# Patient Record
Sex: FEMALE | Race: WHITE | NOT HISPANIC OR LATINO | Employment: UNEMPLOYED | URBAN - METROPOLITAN AREA
[De-identification: names, ages, dates, MRNs, and addresses within clinical notes are randomized per-mention and may not be internally consistent; named-entity substitution may affect disease eponyms.]

---

## 2017-01-06 LAB
EXTERNAL CHLAMYDIA SCREEN: NEGATIVE
EXTERNAL GONORRHEA SCREEN: NEGATIVE
EXTERNAL GTT 1 HOUR: 174
EXTERNAL HEPATITIS B SURFACE ANTIGEN: NEGATIVE
EXTERNAL HEPATITIS C AB: NORMAL
EXTERNAL RUBELLA QUALITATIVE: NORMAL
EXTERNAL SYPHILIS RPR SCREEN: NORMAL
EXTERNAL URINE DRUG SCREEN: NORMAL

## 2017-01-09 ENCOUNTER — LAB (OUTPATIENT)
Dept: LAB | Facility: HOSPITAL | Age: 32
End: 2017-01-09

## 2017-01-09 DIAGNOSIS — Z34.01 ENCOUNTER FOR SUPERVISION OF NORMAL FIRST PREGNANCY IN FIRST TRIMESTER: ICD-10-CM

## 2017-01-09 DIAGNOSIS — Z34.81 PRENATAL CARE, SUBSEQUENT PREGNANCY, FIRST TRIMESTER: Primary | ICD-10-CM

## 2017-01-09 LAB
ABO GROUP BLD: NORMAL
AMPHET+METHAMPHET UR QL: NEGATIVE
AMPHETAMINES UR QL: NEGATIVE
BACTERIA UR QL AUTO: ABNORMAL /HPF
BARBITURATES UR QL SCN: NEGATIVE
BASOPHILS # BLD AUTO: 0.04 10*3/MM3 (ref 0–0.2)
BASOPHILS NFR BLD AUTO: 0.3 % (ref 0–1)
BENZODIAZ UR QL SCN: NEGATIVE
BILIRUB UR QL STRIP: NEGATIVE
BLD GP AB SCN SERPL QL: NEGATIVE
BUPRENORPHINE SERPL-MCNC: NEGATIVE NG/ML
CANNABINOIDS SERPL QL: NEGATIVE
CLARITY UR: CLEAR
COCAINE UR QL: NEGATIVE
COLOR UR: YELLOW
DEPRECATED RDW RBC AUTO: 40 FL (ref 37–54)
EOSINOPHIL # BLD AUTO: 0.22 10*3/MM3 (ref 0.1–0.3)
EOSINOPHIL NFR BLD AUTO: 1.9 % (ref 0–3)
ERYTHROCYTE [DISTWIDTH] IN BLOOD BY AUTOMATED COUNT: 12.4 % (ref 11.3–14.5)
GLUCOSE BLD-MCNC: 87 MG/DL (ref 70–100)
GLUCOSE UR STRIP-MCNC: NEGATIVE MG/DL
HBV SURFACE AG SERPL QL IA: NORMAL
HCT VFR BLD AUTO: 41.5 % (ref 34.5–44)
HCV AB SER DONR QL: NORMAL
HGB BLD-MCNC: 13.5 G/DL (ref 11.5–15.5)
HGB UR QL STRIP.AUTO: ABNORMAL
HIV1+2 AB SER QL: NORMAL
HYALINE CASTS UR QL AUTO: ABNORMAL /LPF
IMM GRANULOCYTES # BLD: 0.03 10*3/MM3 (ref 0–0.03)
IMM GRANULOCYTES NFR BLD: 0.3 % (ref 0–0.6)
KETONES UR QL STRIP: NEGATIVE
LEUKOCYTE ESTERASE UR QL STRIP.AUTO: NEGATIVE
LYMPHOCYTES # BLD AUTO: 3.05 10*3/MM3 (ref 0.6–4.8)
LYMPHOCYTES NFR BLD AUTO: 25.7 % (ref 24–44)
MCH RBC QN AUTO: 28.5 PG (ref 27–31)
MCHC RBC AUTO-ENTMCNC: 32.5 G/DL (ref 32–36)
MCV RBC AUTO: 87.7 FL (ref 80–99)
METHADONE UR QL SCN: NEGATIVE
MONOCYTES # BLD AUTO: 0.85 10*3/MM3 (ref 0–1)
MONOCYTES NFR BLD AUTO: 7.2 % (ref 0–12)
NEUTROPHILS # BLD AUTO: 7.67 10*3/MM3 (ref 1.5–8.3)
NEUTROPHILS NFR BLD AUTO: 64.6 % (ref 41–71)
NITRITE UR QL STRIP: NEGATIVE
OPIATES UR QL: NEGATIVE
OXYCODONE UR QL SCN: NEGATIVE
PCP UR QL SCN: NEGATIVE
PH UR STRIP.AUTO: 5.5 [PH] (ref 5–8)
PLATELET # BLD AUTO: 307 10*3/MM3 (ref 150–450)
PMV BLD AUTO: 9.5 FL (ref 6–12)
PROPOXYPH UR QL: NEGATIVE
PROT UR QL STRIP: NEGATIVE
RBC # BLD AUTO: 4.73 10*6/MM3 (ref 3.89–5.14)
RBC # UR: ABNORMAL /HPF
REF LAB TEST METHOD: ABNORMAL
RH BLD: POSITIVE
RUBV IGG SER QL: NORMAL
RUBV IGG SER-ACNC: 28.6 IU/ML
SP GR UR STRIP: 1.01 (ref 1–1.03)
SQUAMOUS #/AREA URNS HPF: ABNORMAL /HPF
TRICYCLICS UR QL SCN: NEGATIVE
UROBILINOGEN UR QL STRIP: ABNORMAL
WBC NRBC COR # BLD: 11.86 10*3/MM3 (ref 3.5–10.8)
WBC UR QL AUTO: ABNORMAL /HPF

## 2017-01-09 PROCEDURE — 86850 RBC ANTIBODY SCREEN: CPT

## 2017-01-09 PROCEDURE — 85025 COMPLETE CBC W/AUTO DIFF WBC: CPT | Performed by: ADVANCED PRACTICE MIDWIFE

## 2017-01-09 PROCEDURE — G0432 EIA HIV-1/HIV-2 SCREEN: HCPCS | Performed by: ADVANCED PRACTICE MIDWIFE

## 2017-01-09 PROCEDURE — 86803 HEPATITIS C AB TEST: CPT | Performed by: ADVANCED PRACTICE MIDWIFE

## 2017-01-09 PROCEDURE — 86900 BLOOD TYPING SEROLOGIC ABO: CPT

## 2017-01-09 PROCEDURE — 36415 COLL VENOUS BLD VENIPUNCTURE: CPT

## 2017-01-09 PROCEDURE — 82947 ASSAY GLUCOSE BLOOD QUANT: CPT | Performed by: ADVANCED PRACTICE MIDWIFE

## 2017-01-09 PROCEDURE — 80306 DRUG TEST PRSMV INSTRMNT: CPT | Performed by: ADVANCED PRACTICE MIDWIFE

## 2017-01-09 PROCEDURE — 81001 URINALYSIS AUTO W/SCOPE: CPT | Performed by: ADVANCED PRACTICE MIDWIFE

## 2017-01-09 PROCEDURE — 86592 SYPHILIS TEST NON-TREP QUAL: CPT | Performed by: ADVANCED PRACTICE MIDWIFE

## 2017-01-09 PROCEDURE — 87340 HEPATITIS B SURFACE AG IA: CPT | Performed by: ADVANCED PRACTICE MIDWIFE

## 2017-01-09 PROCEDURE — 86901 BLOOD TYPING SEROLOGIC RH(D): CPT

## 2017-01-11 LAB — RPR SER QL: NORMAL

## 2017-02-15 ENCOUNTER — APPOINTMENT (OUTPATIENT)
Dept: LAB | Facility: HOSPITAL | Age: 32
End: 2017-02-15

## 2017-02-15 ENCOUNTER — TRANSCRIBE ORDERS (OUTPATIENT)
Dept: LAB | Facility: HOSPITAL | Age: 32
End: 2017-02-15

## 2017-02-15 DIAGNOSIS — Z87.68 PERSONAL HISTORY OF PERINATAL PROBLEMS: Primary | ICD-10-CM

## 2017-02-15 DIAGNOSIS — R53.81 DEBILITY, UNSPECIFIED: ICD-10-CM

## 2017-02-15 LAB
FLUAV AG NPH QL: POSITIVE
FLUBV AG NPH QL IA: NEGATIVE
S PYO AG THROAT QL: NEGATIVE

## 2017-02-15 PROCEDURE — 87081 CULTURE SCREEN ONLY: CPT | Performed by: NURSE PRACTITIONER

## 2017-02-15 PROCEDURE — 87880 STREP A ASSAY W/OPTIC: CPT | Performed by: NURSE PRACTITIONER

## 2017-02-15 PROCEDURE — 87804 INFLUENZA ASSAY W/OPTIC: CPT | Performed by: NURSE PRACTITIONER

## 2017-02-17 LAB — BACTERIA SPEC AEROBE CULT: NORMAL

## 2017-06-05 ENCOUNTER — LAB (OUTPATIENT)
Dept: LAB | Facility: HOSPITAL | Age: 32
End: 2017-06-05

## 2017-06-05 ENCOUNTER — TRANSCRIBE ORDERS (OUTPATIENT)
Dept: LAB | Facility: HOSPITAL | Age: 32
End: 2017-06-05

## 2017-06-05 DIAGNOSIS — Z3A.28 28 WEEKS GESTATION OF PREGNANCY: ICD-10-CM

## 2017-06-05 DIAGNOSIS — Z34.83 PRENATAL CARE, SUBSEQUENT PREGNANCY, THIRD TRIMESTER: ICD-10-CM

## 2017-06-05 DIAGNOSIS — Z3A.28 28 WEEKS GESTATION OF PREGNANCY: Primary | ICD-10-CM

## 2017-06-05 LAB
BLD GP AB SCN SERPL QL: NEGATIVE
DEPRECATED RDW RBC AUTO: 42.1 FL (ref 37–54)
ERYTHROCYTE [DISTWIDTH] IN BLOOD BY AUTOMATED COUNT: 12.9 % (ref 11.3–14.5)
GLUCOSE 1H P 100 G GLC PO SERPL-MCNC: 174 MG/DL (ref 65–199)
HCT VFR BLD AUTO: 38.1 % (ref 34.5–44)
HGB BLD-MCNC: 12.4 G/DL (ref 11.5–15.5)
MCH RBC QN AUTO: 29.5 PG (ref 27–31)
MCHC RBC AUTO-ENTMCNC: 32.5 G/DL (ref 32–36)
MCV RBC AUTO: 90.5 FL (ref 80–99)
PLATELET # BLD AUTO: 248 10*3/MM3 (ref 150–450)
PMV BLD AUTO: 9.8 FL (ref 6–12)
RBC # BLD AUTO: 4.21 10*6/MM3 (ref 3.89–5.14)
WBC NRBC COR # BLD: 10.43 10*3/MM3 (ref 3.5–10.8)

## 2017-06-05 PROCEDURE — 82950 GLUCOSE TEST: CPT | Performed by: NURSE PRACTITIONER

## 2017-06-05 PROCEDURE — 86850 RBC ANTIBODY SCREEN: CPT | Performed by: NURSE PRACTITIONER

## 2017-06-05 PROCEDURE — 85027 COMPLETE CBC AUTOMATED: CPT | Performed by: NURSE PRACTITIONER

## 2017-06-05 PROCEDURE — 36415 COLL VENOUS BLD VENIPUNCTURE: CPT | Performed by: NURSE PRACTITIONER

## 2017-06-09 ENCOUNTER — LAB (OUTPATIENT)
Dept: LAB | Facility: HOSPITAL | Age: 32
End: 2017-06-09

## 2017-06-09 ENCOUNTER — TRANSCRIBE ORDERS (OUTPATIENT)
Dept: LAB | Facility: HOSPITAL | Age: 32
End: 2017-06-09

## 2017-06-09 DIAGNOSIS — Z3A.28 28 WEEKS GESTATION OF PREGNANCY: ICD-10-CM

## 2017-06-09 DIAGNOSIS — Z3A.28 28 WEEKS GESTATION OF PREGNANCY: Primary | ICD-10-CM

## 2017-06-09 LAB
GLUCOSE 1H P 100 G GLC PO SERPL-MCNC: 215 MG/DL (ref 65–199)
GLUCOSE 2H P 100 G GLC PO SERPL-MCNC: 192 MG/DL (ref 65–139)
GLUCOSE 3H P 100 G GLC PO SERPL-MCNC: 89 MG/DL (ref 65–109)
GLUCOSE BLDC-MCNC: 92 MG/DL (ref 75–125)
GLUCOSE P FAST SERPL-MCNC: 93 MG/DL (ref 65–99)

## 2017-06-09 PROCEDURE — 82962 GLUCOSE BLOOD TEST: CPT | Performed by: NURSE PRACTITIONER

## 2017-06-09 PROCEDURE — 36415 COLL VENOUS BLD VENIPUNCTURE: CPT

## 2017-06-09 PROCEDURE — 82951 GLUCOSE TOLERANCE TEST (GTT): CPT | Performed by: NURSE PRACTITIONER

## 2017-06-09 PROCEDURE — 82952 GTT-ADDED SAMPLES: CPT | Performed by: NURSE PRACTITIONER

## 2017-06-12 ENCOUNTER — TRANSCRIBE ORDERS (OUTPATIENT)
Dept: ENDOCRINOLOGY | Facility: CLINIC | Age: 32
End: 2017-06-12

## 2017-06-12 DIAGNOSIS — O24.419 GESTATIONAL DIABETES MELLITUS IN PREGNANCY, UNSPECIFIED CONTROL: Primary | ICD-10-CM

## 2017-06-19 ENCOUNTER — CLINICAL SUPPORT (OUTPATIENT)
Dept: INTERNAL MEDICINE | Facility: CLINIC | Age: 32
End: 2017-06-19

## 2017-06-19 VITALS
DIASTOLIC BLOOD PRESSURE: 60 MMHG | WEIGHT: 220 LBS | SYSTOLIC BLOOD PRESSURE: 118 MMHG | HEIGHT: 63 IN | BODY MASS INDEX: 38.98 KG/M2

## 2017-06-19 DIAGNOSIS — O24.410 DIET CONTROLLED GESTATIONAL DIABETES MELLITUS (GDM), ANTEPARTUM: Primary | ICD-10-CM

## 2017-06-19 PROBLEM — O24.419 GESTATIONAL DIABETES: Status: ACTIVE | Noted: 2017-06-19

## 2017-06-19 RX ORDER — CHLORPHENIR/PHENYLEPH/ASPIRIN 2-7.8-325
1 TABLET, EFFERVESCENT ORAL DAILY
Qty: 50 STRIP | Refills: 2 | Status: SHIPPED | OUTPATIENT
Start: 2017-06-19 | End: 2017-08-31 | Stop reason: HOSPADM

## 2017-06-19 RX ORDER — BLOOD-GLUCOSE METER
EACH MISCELLANEOUS
Qty: 1 EACH | Refills: 0 | Status: SHIPPED | OUTPATIENT
Start: 2017-06-19 | End: 2017-12-20

## 2017-06-19 RX ORDER — LANCETS 28 GAUGE
EACH MISCELLANEOUS
Qty: 100 EACH | Refills: 5 | Status: SHIPPED | OUTPATIENT
Start: 2017-06-19 | End: 2017-06-19

## 2017-06-19 RX ORDER — LANCETS 33 GAUGE
EACH MISCELLANEOUS
Qty: 100 EACH | Refills: 5 | Status: SHIPPED | OUTPATIENT
Start: 2017-06-19 | End: 2017-12-20

## 2017-06-19 NOTE — PROGRESS NOTES
"Angelika Vick 31 y.o.  CC:Establish Care (new patient being seen for diabetes education at the request of Samanta Lockett CNM for Gestational Diabetes, ESTEFANI 8-)      [facility]: This is the second pregnancy for this patient , birth weight of first baby was 7 lbs, 8 oz and she did have hypertension with the pregnancy but no history of gestational diabetes  No Known Allergies    Current Outpatient Prescriptions:   •  Acetone, Urine, Test (KETONE TEST) strip, 1 strip by In Vitro route Daily., Disp: 50 strip, Rfl: 2  •  glucose blood (FREESTYLE LITE) test strip, Test blood sugars QID, Disp: 100 each, Rfl: 5  •  Lancets (FREESTYLE) lancets, Test blood sugars QID, Disp: 100 each, Rfl: 5  •  Prenatal Vit-Fe Fumarate-FA (PRENATAL, CLASSIC, VITAMIN) 28-0.8 MG tablet tablet, Take  by mouth Daily., Disp: , Rfl:   There are no active problems to display for this patient.    Review of Systems  Social History     Social History   • Marital status: Single     Spouse name: N/A   • Number of children: N/A   • Years of education: N/A     Occupational History   • Not on file.     Social History Main Topics   • Smoking status: Former Smoker   • Smokeless tobacco: Never Used   • Alcohol use No      Comment: drinks wine when not pregnant   • Drug use: No   • Sexual activity: Yes     Other Topics Concern   • Not on file     Social History Narrative   • No narrative on file     Family History   Problem Relation Age of Onset   • Arthritis Mother    • Hypertension Mother    • Thyroid disease Sister    • Diabetes Sister    • Diabetes Maternal Grandfather      /60  Ht 63\" (160 cm)  Wt 220 lb (99.8 kg)  LMP 11/01/2016 (Approximate)  BMI 38.97 kg/m2  Physical Exam  Results for orders placed or performed in visit on 06/09/17   Glucose, Fingerstick   Result Value Ref Range    Glucose 92 75 - 125 mg/dL   GTT Fasting   Result Value Ref Range    Glucose, GTT - Fasting 93 65 - 99 mg/dL   GTT 1 Hour   Result Value Ref Range    Glucose, GTT " - 1 Hour 215 (H) 65 - 199 mg/dL   GTT 2 Hour   Result Value Ref Range    Glucose, GTT - 2 Hour 192 (H) 65 - 139 mg/dL   GTT 3 Hour   Result Value Ref Range    Glucose, GTT - 3 Hour 89 65 - 109 mg/dL     The patient was instructed in general diabetes education including finger stick glucose testing QID and urine ketone testing Q am.  She was advised to email the readings in weekly.  She was also instructed in the recommended amounts of carbohydrates and to avoid sweets including regular soda, sweet tea and chocolate milk and cereal.    Patient will be scheduled to see Dr Gómez within the next week  Patient voiced understanding to all the above and she was advised to call if any questions or concerns          There are no diagnoses linked to this encounter.  Maggy Merida MA  Signed Iveth Gómez MD

## 2017-06-19 NOTE — TELEPHONE ENCOUNTER
We were notified by Mayela the Freestyle Lite system is not covered by insurance so we changed it to one touch ultra meter, strips and delica lancets

## 2017-06-23 ENCOUNTER — OFFICE VISIT (OUTPATIENT)
Dept: ENDOCRINOLOGY | Facility: CLINIC | Age: 32
End: 2017-06-23

## 2017-06-23 VITALS
DIASTOLIC BLOOD PRESSURE: 60 MMHG | BODY MASS INDEX: 38.98 KG/M2 | WEIGHT: 220 LBS | HEIGHT: 63 IN | SYSTOLIC BLOOD PRESSURE: 108 MMHG | HEART RATE: 83 BPM | OXYGEN SATURATION: 99 %

## 2017-06-23 DIAGNOSIS — O24.410 DIET CONTROLLED GESTATIONAL DIABETES MELLITUS (GDM), ANTEPARTUM: Primary | ICD-10-CM

## 2017-06-23 PROCEDURE — 99243 OFF/OP CNSLTJ NEW/EST LOW 30: CPT | Performed by: INTERNAL MEDICINE

## 2017-06-23 NOTE — ASSESSMENT & PLAN NOTE
She will begin testing sugars fasting and 2 hours after meals and will fax blood sugars weekly to [email or web address].  We will plan to see her back in 3-4 weeks, or sooner if problems or questions.  Thank you for this referral and please do not hesitate to call for any problems or questions.

## 2017-06-23 NOTE — PROGRESS NOTES
Angelika Vick 31 y.o.  CC:Establish Care (New patient being seen at the request of Samanta Lockett CNM for a consultation regarding gestational diabetes, ESTEFANI 2017)    [place]: Establish Care (New patient being seen at the request of Samanta Lockett CNM for a consultation regarding gestational diabetes, ESTEFANI 2017)  is - has 4 year old 7 lb 8 oz at 37.5 weeks   Sister and grandfather have II diabetes mellitus- sister on insulin   She is 30 weeks pregnant  We discussed the diagnosis of gestational diabetes and reviewed her glucose levels/ glucose tolerance test.  We discussed the concept of carbohydrate awaredness and carbohydrate content of meals was discussed.  Impact of sweets and other carb containing foods and types of foods typically high in carbohydrates was discussed. Overall guidelines for meal planning related to specific carbohydrate content of meals was discussed and she was provided recommendations about carbohydrates recommended with meals and with snacks.  She was asked to give up any sugared drinks, and avoid breakfast cereal.  Blood sugar testing fasting and after each meal was reviewed and the patient was taught to use a glucometer to test her blood sugar.  Normal values for blood sugar monitoring were discussed as well, with fasting level less than 95, 1 hour pp blood sugar less than 140 and 2 hour blood sugar less than 120 recommended.  Risks related to poor control of blood sugars during pregnancy were discussed with a focus on specific risks to both her and the baby.  Risks discussed include macrosomia (large birthweight), fetal lung immaturity with respiratory distress and low oxygen level, stillbirth, low blood sugar after delivery, high bilirubin/jaundice, and hypocalcemia.  Use of medications during pregnancy (eg metformin, glyburide and insulin) was discussed.  Her long term risks (outside of pregnancy) for development of Diabetes Mellitus were reviewed and we discussed the  importance of healthy lifestyle now and in the future to help reduce the risk of progression to diabetes.      No Known Allergies    Current Outpatient Prescriptions:   •  Acetone, Urine, Test (KETONE TEST) strip, 1 strip by In Vitro route Daily., Disp: 50 strip, Rfl: 2  •  Blood Glucose Monitoring Suppl (ONE TOUCH ULTRA 2) W/DEVICE kit, Use daily to test blood sugars, Disp: 1 each, Rfl: 0  •  glucose blood (ONE TOUCH ULTRA TEST) test strip, Test blood sugars QID, Disp: 100 each, Rfl: 5  •  ONETOUCH DELICA LANCETS 33G misc, Test blood sugars QID, Disp: 100 each, Rfl: 5  •  Prenatal Vit-Fe Fumarate-FA (PRENATAL, CLASSIC, VITAMIN) 28-0.8 MG tablet tablet, Take  by mouth Daily., Disp: , Rfl:   Patient Active Problem List    Diagnosis   • Gestational diabetes [O24.419]     Review of Systems   Constitutional: Negative for activity change, appetite change, chills, diaphoresis, fatigue, fever and unexpected weight change.   HENT: Negative for congestion, dental problem, drooling, ear discharge, ear pain, facial swelling, hearing loss, mouth sores, nosebleeds, postnasal drip, rhinorrhea, sinus pressure, sneezing, sore throat, tinnitus, trouble swallowing and voice change.    Eyes: Negative for photophobia, pain, discharge, redness, itching and visual disturbance.   Respiratory: Negative for apnea, cough, choking, chest tightness, shortness of breath, wheezing and stridor.    Cardiovascular: Negative for chest pain, palpitations and leg swelling.   Gastrointestinal: Negative for abdominal distention, abdominal pain, anal bleeding, blood in stool, constipation, diarrhea, nausea, rectal pain and vomiting.   Endocrine: Negative for cold intolerance, heat intolerance, polydipsia, polyphagia and polyuria.   Genitourinary: Negative for decreased urine volume, difficulty urinating, dysuria, enuresis, flank pain, frequency, genital sores, hematuria and urgency.   Musculoskeletal: Negative for arthralgias, back pain, gait problem,  "joint swelling, myalgias, neck pain and neck stiffness.   Skin: Negative for color change, pallor, rash and wound.   Allergic/Immunologic: Negative for environmental allergies, food allergies and immunocompromised state.   Neurological: Negative for dizziness, tremors, seizures, syncope, facial asymmetry, speech difficulty, weakness, light-headedness, numbness and headaches.   Hematological: Negative for adenopathy. Does not bruise/bleed easily.   Psychiatric/Behavioral: Negative for agitation, behavioral problems, confusion, decreased concentration, dysphoric mood, hallucinations, self-injury, sleep disturbance and suicidal ideas. The patient is not nervous/anxious and is not hyperactive.      Social History     Social History   • Marital status: Single     Spouse name: N/A   • Number of children: N/A   • Years of education: N/A     Occupational History   • Not on file.     Social History Main Topics   • Smoking status: Former Smoker   • Smokeless tobacco: Never Used   • Alcohol use No      Comment: drinks wine when not pregnant   • Drug use: No   • Sexual activity: Yes     Other Topics Concern   • Not on file     Social History Narrative     Family History   Problem Relation Age of Onset   • Arthritis Mother    • Hypertension Mother    • Thyroid disease Sister    • Diabetes Sister    • Diabetes Maternal Grandfather      /60  Pulse 83  Ht 63\" (160 cm)  Wt 220 lb (99.8 kg)  LMP 11/01/2016 (Approximate)  SpO2 99%  BMI 38.97 kg/m2  Physical Exam   Constitutional: She is oriented to person, place, and time. She appears well-developed and well-nourished.   HENT:   Head: Normocephalic and atraumatic.   Nose: Nose normal.   Mouth/Throat: Oropharynx is clear and moist.   Eyes: Conjunctivae, EOM and lids are normal. Pupils are equal, round, and reactive to light.   Neck: Trachea normal and normal range of motion. Neck supple. Carotid bruit is not present. No tracheal deviation present. No thyroid mass and no " thyromegaly present.   Cardiovascular: Normal rate, regular rhythm, normal heart sounds and intact distal pulses.  Exam reveals no gallop and no friction rub.    No murmur heard.  Pulmonary/Chest: Effort normal and breath sounds normal. No respiratory distress. She has no wheezes.   Musculoskeletal: Normal range of motion. She exhibits no edema or deformity.   Lymphadenopathy:     She has no cervical adenopathy.   Neurological: She is alert and oriented to person, place, and time. She has normal reflexes. She displays normal reflexes. No cranial nerve deficit.   Skin: Skin is warm and dry. No rash noted. No cyanosis or erythema. Nails show no clubbing.   Psychiatric: She has a normal mood and affect. Her speech is normal and behavior is normal. Judgment and thought content normal. Cognition and memory are normal.   Nursing note and vitals reviewed.    Results for orders placed or performed in visit on 06/09/17   Glucose, Fingerstick   Result Value Ref Range    Glucose 92 75 - 125 mg/dL   GTT Fasting   Result Value Ref Range    Glucose, GTT - Fasting 93 65 - 99 mg/dL   GTT 1 Hour   Result Value Ref Range    Glucose, GTT - 1 Hour 215 (H) 65 - 199 mg/dL   GTT 2 Hour   Result Value Ref Range    Glucose, GTT - 2 Hour 192 (H) 65 - 139 mg/dL   GTT 3 Hour   Result Value Ref Range    Glucose, GTT - 3 Hour 89 65 - 109 mg/dL     Problem List Items Addressed This Visit        Endocrine    Gestational diabetes - Primary     She will begin testing sugars fasting and 2 hours after meals and will fax blood sugars weekly to [email or web address]  We will plan to see her back in 3-4 weeks, or sooner if problems or questions.  Thank you for this referral and please do not hesitate to call for any problems or questions.               Return in about 4 weeks (around 7/21/2017) for Recheck 30 min .      Maggy Merida MA  Signed Iveth Gómez MD

## 2017-07-06 ENCOUNTER — HOSPITAL ENCOUNTER (OUTPATIENT)
Facility: HOSPITAL | Age: 32
Discharge: HOME | End: 2017-07-06
Attending: NURSE PRACTITIONER | Admitting: OBSTETRICS & GYNECOLOGY

## 2017-07-06 VITALS
SYSTOLIC BLOOD PRESSURE: 132 MMHG | HEIGHT: 63 IN | HEART RATE: 111 BPM | DIASTOLIC BLOOD PRESSURE: 78 MMHG | RESPIRATION RATE: 20 BRPM | WEIGHT: 220 LBS | BODY MASS INDEX: 38.98 KG/M2 | TEMPERATURE: 98.2 F

## 2017-07-06 LAB
BILIRUB UR QL STRIP: NEGATIVE
CLARITY UR: CLEAR
COLOR UR: YELLOW
GLUCOSE UR STRIP-MCNC: NEGATIVE MG/DL
HGB UR QL STRIP.AUTO: NEGATIVE
KETONES UR QL STRIP: NEGATIVE
LEUKOCYTE ESTERASE UR QL STRIP.AUTO: NEGATIVE
NITRITE UR QL STRIP: NEGATIVE
PH UR STRIP.AUTO: 6.5 [PH] (ref 5–8)
PROT UR QL STRIP: NEGATIVE
SP GR UR STRIP: 1.01 (ref 1–1.03)
UROBILINOGEN UR QL STRIP: NORMAL

## 2017-07-06 PROCEDURE — 81003 URINALYSIS AUTO W/O SCOPE: CPT | Performed by: NURSE PRACTITIONER

## 2017-07-06 PROCEDURE — G0463 HOSPITAL OUTPT CLINIC VISIT: HCPCS

## 2017-07-06 PROCEDURE — 59025 FETAL NON-STRESS TEST: CPT

## 2017-07-06 RX ORDER — ACETAMINOPHEN 500 MG
1000 TABLET ORAL EVERY 4 HOURS PRN
Status: CANCELLED | OUTPATIENT
Start: 2017-07-06

## 2017-07-06 RX ORDER — ACETAMINOPHEN 500 MG
1000 TABLET ORAL ONCE
Status: COMPLETED | OUTPATIENT
Start: 2017-07-06 | End: 2017-07-06

## 2017-07-06 RX ADMIN — ACETAMINOPHEN 1000 MG: 500 TABLET ORAL at 12:09

## 2017-07-06 NOTE — NURSING NOTE
1300- d/c teaching to include  labor precautions and abdominal pain during pregnancy, pt vu. Pt to keep follow up appt if it is within 2 weeks. Pt vu. Pt ambulating to private vehicle with all belongings.

## 2017-07-06 NOTE — H&P
Ker[facility]  Obstetric History and Physical    Chief Complaint   Patient presents with   • Abdominal Pain       Subjective     Patient is a 31 y.o. female  currently at 32w2d, who presents with complaints of abdominal pain.  She reports feeling one contraction yesterday when playing with her four year old daughter.  Today she has had sharp pains in the left and right lower quadrants.  She denies contractions, LOF, VB, n/v/d.  She reports good fetal movement.     Her prenatal care is complicated by gestational diabetes: A1.  Her previous obstetric/gynecological history is noted for is non-contributory.    The following portions of the patients history were reviewed and updated as appropriate: current medications, allergies, past medical history, past surgical history, past family history, past social history and problem list .       Prenatal Information:  Prenatal Results         1st Trimester Ref. Range Date Time   CBC with auto diff       Rubella IgG       Hepatitis B SAg  Non-Reactive  Non-Reactive 17 1509   RPR  Non-Reactive  Non-Reactive 17 1509   ABO  O   17 1509   Rh  Positive   17 1509   Anibody Screen  Negative   17 0954   HIV       Varicella IgG       Urinalysis with microscopy       Urine Culture       GC/Chlamydia/TV       ThinPrep/Pap       2nd and 3rd Trimester Ref. Range Date Time   Hemoglobin / Hematocrit  38.1 % 34.5 - 44.0 % 17 0954   Hemoglobin  12.4 g/dL 11.5 - 15.5 g/dL 17 0954   Group B Strep Culture       Glucose Challenge Test 1 Hr  215 mg/dL (H) 65 - 199 mg/dL 17 0835   Glucose Fasting  93 mg/dL 65 - 99 mg/dL 17 0835   Glucose 1 Hr  215 mg/dL (H) 65 - 199 mg/dL 17 0835   Glucose 2 Hr  192 mg/dL (H) 65 - 139 mg/dL 17 0835   Glucose 3 Hr  89 mg/dL 65 - 109 mg/dL 17 0835   Pre-eclampsia Panel       Risk Screening Ref. Range Date Time   Fetal Fibronectin       Amnisure       Hepatitis C Antibody       Hemoglobin  electrophoresis       Cystic Fibrosis       Hemoglobin A1C       MSAFP - 4       NIPT       AFP       Parvovirus IgG       Parvovirus IgM       POCT - glucose  92 mg/dL 75 - 125 mg/dL 17 0835   [facility]       24 Hour urine - Total protein       24 Hour urine - Creatinine clearance       Urinalysis with microscopy       Urine Culture       Drug Screening Ref. Range Date Time   Amphetamine Screen  Negative  Negative 17 1509   Barbiturate Screen  Negative  Negative 17 1509   Benzodiazepine Screen  Negative  Negative 17 1509   Methadone Screen  Negative  Negative 17 1509   Phencyclidine Screen  Negative  Negative 17 1509   Opiates Screen  Negative  Negative 17 1509   THC Screen  Negative  Negative 17 1509   Cocaine Screen  Negative  Negative 17 1509   Propoxyphene Screen  Negative  Negative 17 1509   Buprenorphine Screen  Negative  Negative 17 1509   Methamphetamine Screen  Negative  Negative 17 1509   Oxycodone Screen  Negative  Negative 17 1509   Tryicyclic Antidepressants Screen  Negative  Negative 17 1509          Legend: ^: Historical            View all results for this pregnancy             Past OB History:     Obstetric History       T1      TAB0   SAB0   E0   M0   L1       # Outcome Date GA Lbr William/2nd Weight Sex Delivery Anes PTL Lv   2 Current            1 Term 12 37w0d  7 lb 8 oz (3.402 kg) M Vag-Spont EPI N Y      Name: moustapha ojeda          Past Medical History: Past Medical History:   Diagnosis Date   • Acid reflux    • Bilateral ovarian cysts    • Depression    • Gestational hypertension     with last pregnancy   • Hypertension in pregnancy    • Migraines       Past Surgical History Past Surgical History:   Procedure Laterality Date   • CYST REMOVAL     • GANGLION CYST EXCISION Right       Family History: Family History   Problem Relation Age of Onset   • Arthritis Mother    • Hypertension Mother     • Thyroid disease Sister    • Diabetes Sister    • Diabetes Maternal Grandfather       Social History:  reports that she quit smoking about 8 months ago. Her smoking use included Cigarettes. She smoked 0.50 packs per day. She has never used smokeless tobacco.   reports that she does not drink alcohol.   reports that she does not use illicit drugs.        Review of Systems   Constitutional: Negative.    HENT: Negative.    Eyes: Negative.    Respiratory: Negative.    Cardiovascular: Negative.    Gastrointestinal: Positive for abdominal pain. Negative for abdominal distention, anal bleeding, blood in stool, constipation, diarrhea, nausea, rectal pain and vomiting.   Endocrine: Negative.    Genitourinary: Negative.    Musculoskeletal: Negative.    Skin: Negative.    Allergic/Immunologic: Negative.    Neurological: Negative.    Hematological: Negative.    Psychiatric/Behavioral: Negative.          Objective     Vital Signs Range for the last 24 hours  Temperature: Temp:  [98.2 °F (36.8 °C)] 98.2 °F (36.8 °C)   Temp Source: Temp src: Oral   BP: BP: (132)/(78) 132/78   Pulse: Heart Rate:  [111] 111   Respirations: Resp:  [20] 20   SPO2:     O2 Amount (l/min):     O2 Devices     Weight: Weight:  [220 lb (99.8 kg)] 220 lb (99.8 kg)     Physical Examination: General appearance - alert, well appearing, and in no distress  Chest - clear to auscultation, no wheezes, rales or rhonchi, symmetric air entry  Heart - normal rate, regular rhythm, normal S1, S2, no murmurs, rubs, clicks or gallops  Abdomen - soft, nontender, nondistended, no masses or organomegaly, slight tenderness over round ligaments  Pelvic - normal external genitalia, vulva, vagina, cervix, uterus and adnexa  Musculoskeletal - no joint tenderness, deformity or swelling  Extremities - peripheral pulses normal, no pedal edema, no clubbing or cyanosis    Presentation: vertex   Cervix: Exam by:  CNM   Dilation:  closed   Effacement:  long   Station:  high     Fetal  Heart Rate Assessment   Method:  external    Beats/min:  140   Baseline:     Varibility:  moderate   Accels:  present   Decels:  absent   Tracing Category:  1     Uterine Assessment   Method:  no contractions noted with external toco   Frequency (min):     Ctx Count in 10 min:     Duration:     Intensity:     Intensity by IUPC:     Resting Tone:     Resting Tone by IUPC:     [place] Units:       Laboratory Results: UA WNL        Active Problems:    * No active hospital problems. *      Assessment & Plan    Assessment:  1.  Intrauterine pregnancy at 32w2d weeks gestation with reactive fetal status.    2.  ACHES and pains of pregnancy  3.  Obstetrical history significant for A1 GDM.  4.  GBS status: No results found for: GBSANTIGEN    Plan:  1. Admit for observation, monitoring and Tylenol.   2.  Pain now improved.    3.  Will discharge home with PTL precautions  4. Follow up in the office as scheduled.   5. Plan of care has been reviewed with patient and she verbalizes understanding  6.  Risks, benefits of treatment plan have been discussed.  7.  All questions have been answered.        Fannie Vick CNM  7/6/2017  12:38 PM

## 2017-07-17 ENCOUNTER — TRANSCRIBE ORDERS (OUTPATIENT)
Dept: LAB | Facility: HOSPITAL | Age: 32
End: 2017-07-17

## 2017-07-17 DIAGNOSIS — O13.9: ICD-10-CM

## 2017-07-17 DIAGNOSIS — Z3A.33 33 WEEKS GESTATION OF PREGNANCY: Primary | ICD-10-CM

## 2017-07-17 LAB
DEPRECATED RDW RBC AUTO: 43.6 FL (ref 37–54)
ERYTHROCYTE [DISTWIDTH] IN BLOOD BY AUTOMATED COUNT: 13.3 % (ref 11.3–14.5)
HCT VFR BLD AUTO: 39.7 % (ref 34.5–44)
HGB BLD-MCNC: 12.8 G/DL (ref 11.5–15.5)
MCH RBC QN AUTO: 29 PG (ref 27–31)
MCHC RBC AUTO-ENTMCNC: 32.2 G/DL (ref 32–36)
MCV RBC AUTO: 90 FL (ref 80–99)
PLATELET # BLD AUTO: 237 10*3/MM3 (ref 150–450)
PMV BLD AUTO: 9.7 FL (ref 6–12)
RBC # BLD AUTO: 4.41 10*6/MM3 (ref 3.89–5.14)
WBC NRBC COR # BLD: 9.69 10*3/MM3 (ref 3.5–10.8)

## 2017-07-17 PROCEDURE — 84460 ALANINE AMINO (ALT) (SGPT): CPT | Performed by: NURSE PRACTITIONER

## 2017-07-17 PROCEDURE — 84550 ASSAY OF BLOOD/URIC ACID: CPT | Performed by: NURSE PRACTITIONER

## 2017-07-17 PROCEDURE — 84075 ASSAY ALKALINE PHOSPHATASE: CPT | Performed by: NURSE PRACTITIONER

## 2017-07-17 PROCEDURE — 82565 ASSAY OF CREATININE: CPT | Performed by: NURSE PRACTITIONER

## 2017-07-17 PROCEDURE — 83615 LACTATE (LD) (LDH) ENZYME: CPT | Performed by: NURSE PRACTITIONER

## 2017-07-17 PROCEDURE — 82247 BILIRUBIN TOTAL: CPT | Performed by: NURSE PRACTITIONER

## 2017-07-17 PROCEDURE — 36415 COLL VENOUS BLD VENIPUNCTURE: CPT | Performed by: NURSE PRACTITIONER

## 2017-07-17 PROCEDURE — 85027 COMPLETE CBC AUTOMATED: CPT | Performed by: NURSE PRACTITIONER

## 2017-07-17 PROCEDURE — 84450 TRANSFERASE (AST) (SGOT): CPT | Performed by: NURSE PRACTITIONER

## 2017-07-25 ENCOUNTER — OFFICE VISIT (OUTPATIENT)
Dept: ENDOCRINOLOGY | Facility: CLINIC | Age: 32
End: 2017-07-25

## 2017-07-25 VITALS
SYSTOLIC BLOOD PRESSURE: 110 MMHG | HEIGHT: 63 IN | OXYGEN SATURATION: 98 % | BODY MASS INDEX: 39.69 KG/M2 | WEIGHT: 224 LBS | HEART RATE: 115 BPM | DIASTOLIC BLOOD PRESSURE: 70 MMHG

## 2017-07-25 DIAGNOSIS — O24.410 DIET CONTROLLED GESTATIONAL DIABETES MELLITUS (GDM) IN THIRD TRIMESTER: Primary | ICD-10-CM

## 2017-07-25 PROCEDURE — 99213 OFFICE O/P EST LOW 20 MIN: CPT | Performed by: INTERNAL MEDICINE

## 2017-07-25 NOTE — ASSESSMENT & PLAN NOTE
Blood sugars reviewed- doing well overall  Is not having higher sugars   Discussed goals with her   Baby on target with weight  Discussed delivery  F/u pp   Continue to email weekly

## 2017-08-01 ENCOUNTER — HOSPITAL ENCOUNTER (OUTPATIENT)
Facility: HOSPITAL | Age: 32
Setting detail: OBSERVATION
Discharge: HOME | End: 2017-08-01
Attending: OBSTETRICS & GYNECOLOGY | Admitting: OBSTETRICS & GYNECOLOGY

## 2017-08-01 VITALS
RESPIRATION RATE: 18 BRPM | TEMPERATURE: 98.9 F | DIASTOLIC BLOOD PRESSURE: 74 MMHG | WEIGHT: 221 LBS | SYSTOLIC BLOOD PRESSURE: 126 MMHG | HEART RATE: 81 BPM | BODY MASS INDEX: 39.16 KG/M2 | HEIGHT: 63 IN

## 2017-08-01 PROBLEM — Z34.90 PREGNANCY: Status: ACTIVE | Noted: 2017-08-01

## 2017-08-01 PROCEDURE — 59025 FETAL NON-STRESS TEST: CPT

## 2017-08-01 PROCEDURE — G0378 HOSPITAL OBSERVATION PER HR: HCPCS

## 2017-08-01 RX ORDER — ACETAMINOPHEN 500 MG
1000 TABLET ORAL ONCE
Status: COMPLETED | OUTPATIENT
Start: 2017-08-01 | End: 2017-08-01

## 2017-08-01 RX ADMIN — ACETAMINOPHEN 1000 MG: 500 TABLET ORAL at 18:06

## 2017-08-01 NOTE — NURSING NOTE
PATIENT GIVEN DISCHARGE INSTRUCTIONS, NO FURTHER QUESTIONS BY PATIENT AND/OR SPOUSE.  PATIENT AMBULATORY TO PRIVATE VEHICLE

## 2017-08-01 NOTE — H&P
TOSHIA Sy  Obstetric History and Physical    Chief Complaint   Patient presents with   • Decreased Fetal Movement       Subjective     Patient is a 31 y.o. female  currently at 36w0d, who presents with decreased fetal movement today. She reports baby moved well at 0800 this am and had not moved well since that time until arrival on unit. Since arrival on unit and placement on NST she reports strong fetal movement. She denies vaginal bleeding or loss of fluid. She denies contractions, reports occasional cramping.     Her prenatal care is complicated by  diabetes  GDM A1. Sugars have been well controlled. She is followed by Dr Olvera. Her prenatal care has been with АНДРЕЙ Lockett CNM.  Her previous obstetric/gynecological history  is remarkable for gestational hypertension with G1, vaginal delivery.     The following portions of the patients history were reviewed and updated as appropriate: current medications, allergies, past medical history, past surgical history, past family history, past social history and problem list .       Prenatal Information:  Prenatal Results         1st Trimester Ref. Range Date Time   CBC with auto diff       Rubella IgG       Hepatitis B SAg  Non-Reactive  Non-Reactive 17 1509   RPR  Non-Reactive  Non-Reactive 17 1509   ABO  O   17 1509   Rh  Positive   17 1509   Anibody Screen  Negative   17 0954   HIV       Varicella IgG       Urinalysis with microscopy       Urine Culture       GC/Chlamydia/TV       ThinPrep/Pap       2nd and 3rd Trimester Ref. Range Date Time   Hemoglobin / Hematocrit  39.7 % 34.5 - 44.0 % 17 1005   Hemoglobin  12.8 g/dL 11.5 - 15.5 g/dL 17 1005   Group B Strep Culture       Glucose Challenge Test 1 Hr  215 mg/dL (H) 65 - 199 mg/dL 17 0835   Glucose Fasting  93 mg/dL 65 - 99 mg/dL 17 0835   Glucose 1 Hr  215 mg/dL (H) 65 - 199 mg/dL 17 0835   Glucose 2 Hr  192 mg/dL (H) 65 - 139 mg/dL 17 0835    Glucose 3 Hr  89 mg/dL 65 - 109 mg/dL 17 0835   Pre-eclampsia Panel  Normal   17 1005   Risk Screening Ref. Range Date Time   Fetal Fibronectin       Amnisure       Hepatitis C Antibody       Hemoglobin electrophoresis       Cystic Fibrosis       Hemoglobin A1C       MSAFP - 4       NIPT       AFP       Parvovirus IgG       Parvovirus IgM       POCT - glucose  92 mg/dL 75 - 125 mg/dL 17 0835   [facility]       24 Hour urine - Total protein       24 Hour urine - Creatinine clearance       Urinalysis with microscopy       Urine Culture       Drug Screening Ref. Range Date Time   Amphetamine Screen  Negative  Negative 17 1509   Barbiturate Screen  Negative  Negative 17 1509   Benzodiazepine Screen  Negative  Negative 17 1509   Methadone Screen  Negative  Negative 17 1509   Phencyclidine Screen  Negative  Negative 17 1509   Opiates Screen  Negative  Negative 17 1509   THC Screen  Negative  Negative 17 1509   Cocaine Screen  Negative  Negative 17 1509   Propoxyphene Screen  Negative  Negative 17 1509   Buprenorphine Screen  Negative  Negative 17 1509   Methamphetamine Screen  Negative  Negative 17 1509   Oxycodone Screen  Negative  Negative 17 1509   Tryicyclic Antidepressants Screen  Negative  Negative 17 1509          Legend: ^: Historical            View all results for this pregnancy             Past OB History:     Obstetric History       T1      TAB0   SAB0   E0   M0   L1       # Outcome Date GA Lbr William/2nd Weight Sex Delivery Anes PTL Lv   2 Current            1 Term 12 37w0d  7 lb 8 oz (3.402 kg) M Vag-Spont EPI N Y      Name: moustapha ojeda          Past Medical History: Past Medical History:   Diagnosis Date   • Acid reflux    • Bilateral ovarian cysts    • Depression    • Gestational diabetes     diet controlled with this pregnancy   • Gestational hypertension     with last pregnancy   •  Hypertension in pregnancy 2012   • Migraines       Past Surgical History Past Surgical History:   Procedure Laterality Date   • CYST REMOVAL     • GANGLION CYST EXCISION Right       Family History: Family History   Problem Relation Age of Onset   • Arthritis Mother    • Hypertension Mother    • Thyroid disease Sister    • Diabetes Sister    • Diabetes Maternal Grandfather       Social History:  reports that she quit smoking about 9 months ago. Her smoking use included Cigarettes. She smoked 0.50 packs per day. She has never used smokeless tobacco.   reports that she does not drink alcohol.   reports that she does not use illicit drugs.        Review of Systems   All other systems reviewed and are negative.        Objective     Vital Signs Range for the last 24 hours  Temperature: Temp:  [98.9 °F (37.2 °C)] 98.9 °F (37.2 °C)   Temp Source: Temp src: Oral   BP: BP: (126)/(74) 126/74   Pulse: Heart Rate:  [81] 81   Respirations: Resp:  [18-20] 18   SPO2:     O2 Amount (l/min):     O2 Devices     Weight: Weight:  [221 lb (100 kg)] 221 lb (100 kg)     Physical Examination: General appearance - alert, well appearing, and in no distress  Mental status - alert, oriented to person, place, and time  Chest - clear to auscultation, no wheezes, rales or rhonchi, symmetric air entry  Heart - normal rate, regular rhythm, normal S1, S2, no murmurs, rubs, clicks or gallops  Abdomen - soft, nontender, nondistended, no masses or organomegaly, gravid  Neurological - alert, oriented, normal speech, no focal findings or movement disorder noted  Musculoskeletal - no joint tenderness, deformity or swelling  Extremities - peripheral pulses normal, no pedal edema, no clubbing or cyanosis  Skin - normal coloration and turgor, no rashes, no suspicious skin lesions noted                          Fetal Heart Rate Assessment   Method: Fetal HR Assessment Method: external   Beats/min: Fetal HR (Beats/Min): 115   Baseline:     Varibility: Fetal HR  Variability: moderate (amplitude range 6 to 25 bpm)   Accels: Fetal HR Accelerations: greater than/equal to 15 bpm, lasting at least 15 seconds   Decels: Fetal HR Decelerations: absent   Tracing Category:       Uterine Assessment   Method:     Frequency (min):     Ctx Count in 10 min:     Duration:     Intensity: Contraction Intensity: no contractions   Intensity by IUPC:     Resting Tone: Uterine Resting Tone: soft by palpation   Resting Tone by IUPC:     [place] Units:           Assessment/Plan     Active Problems:    Pregnancy      Assessment & Plan    Assessment:  1.  Intrauterine pregnancy at 36w0d weeks gestation with reactive, reassuring fetal status. Reactive NST   2. Good fetal movement since admission, no contractions per toco or palpation    Plan:  1. Discharge home with fetal kick counts and  labor precautions. Keep appt scheduled for next Monday (6 days) or PRN  2. Plan of care has been reviewed with patient and family  3.  Risks, benefits of treatment plan have been discussed.  4.  All questions have been answered.        Samanta Lockett CNM  2017  6:32 PM

## 2017-08-13 ENCOUNTER — HOSPITAL ENCOUNTER (OUTPATIENT)
Facility: HOSPITAL | Age: 32
Setting detail: OBSERVATION
Discharge: HOME | End: 2017-08-13
Attending: OBSTETRICS & GYNECOLOGY | Admitting: OBSTETRICS & GYNECOLOGY

## 2017-08-13 VITALS
HEIGHT: 63 IN | HEART RATE: 77 BPM | BODY MASS INDEX: 38.98 KG/M2 | DIASTOLIC BLOOD PRESSURE: 75 MMHG | SYSTOLIC BLOOD PRESSURE: 129 MMHG | RESPIRATION RATE: 16 BRPM | WEIGHT: 220 LBS | TEMPERATURE: 98.6 F

## 2017-08-13 PROBLEM — R51.9 HEADACHE: Status: ACTIVE | Noted: 2017-08-13

## 2017-08-13 LAB
ALP SERPL-CCNC: 106 U/L (ref 25–100)
ALT SERPL W P-5'-P-CCNC: 16 U/L (ref 7–40)
AST SERPL-CCNC: 15 U/L (ref 0–33)
BILIRUB SERPL-MCNC: 0.3 MG/DL (ref 0.3–1.2)
CREAT BLD-MCNC: 0.5 MG/DL (ref 0.6–1.3)
DEPRECATED RDW RBC AUTO: 42.4 FL (ref 37–54)
ERYTHROCYTE [DISTWIDTH] IN BLOOD BY AUTOMATED COUNT: 13.3 % (ref 11.3–14.5)
HCT VFR BLD AUTO: 37.7 % (ref 34.5–44)
HGB BLD-MCNC: 12.6 G/DL (ref 11.5–15.5)
LDH SERPL-CCNC: 129 U/L (ref 120–246)
MCH RBC QN AUTO: 29 PG (ref 27–31)
MCHC RBC AUTO-ENTMCNC: 33.4 G/DL (ref 32–36)
MCV RBC AUTO: 86.9 FL (ref 80–99)
PLATELET # BLD AUTO: 217 10*3/MM3 (ref 150–450)
PMV BLD AUTO: 9.7 FL (ref 6–12)
RBC # BLD AUTO: 4.34 10*6/MM3 (ref 3.89–5.14)
URATE SERPL-MCNC: 4.2 MG/DL (ref 3.1–7.8)
WBC NRBC COR # BLD: 10.85 10*3/MM3 (ref 3.5–10.8)

## 2017-08-13 PROCEDURE — 99218 PR INITIAL OBSERVATION CARE/DAY 30 MINUTES: CPT | Performed by: OBSTETRICS & GYNECOLOGY

## 2017-08-13 PROCEDURE — 83615 LACTATE (LD) (LDH) ENZYME: CPT | Performed by: OBSTETRICS & GYNECOLOGY

## 2017-08-13 PROCEDURE — 85027 COMPLETE CBC AUTOMATED: CPT | Performed by: OBSTETRICS & GYNECOLOGY

## 2017-08-13 PROCEDURE — 59025 FETAL NON-STRESS TEST: CPT

## 2017-08-13 PROCEDURE — 84550 ASSAY OF BLOOD/URIC ACID: CPT | Performed by: OBSTETRICS & GYNECOLOGY

## 2017-08-13 PROCEDURE — 84075 ASSAY ALKALINE PHOSPHATASE: CPT | Performed by: OBSTETRICS & GYNECOLOGY

## 2017-08-13 PROCEDURE — 84450 TRANSFERASE (AST) (SGOT): CPT | Performed by: OBSTETRICS & GYNECOLOGY

## 2017-08-13 PROCEDURE — 82247 BILIRUBIN TOTAL: CPT | Performed by: OBSTETRICS & GYNECOLOGY

## 2017-08-13 PROCEDURE — 84460 ALANINE AMINO (ALT) (SGPT): CPT | Performed by: OBSTETRICS & GYNECOLOGY

## 2017-08-13 PROCEDURE — G0378 HOSPITAL OBSERVATION PER HR: HCPCS

## 2017-08-13 PROCEDURE — 82565 ASSAY OF CREATININE: CPT | Performed by: OBSTETRICS & GYNECOLOGY

## 2017-08-13 RX ORDER — ACETAMINOPHEN 500 MG
1000 TABLET ORAL EVERY 6 HOURS PRN
COMMUNITY
End: 2017-08-31 | Stop reason: HOSPADM

## 2017-08-13 NOTE — H&P
"History and Physical:    Patient Name: Angelika Vick  : 1985  MRN: [id number]  Date of Service: 2017      Chief complaint: headache  HPI:  Angelika Vick is a 31 y.o. year old  with an Estimated Date of Delivery: 17 currently at 37w5d. Presents with c/o headache. Started yesterday morning and has persisted. Right-sided, constant, and sharp. Rate 6/10.Tried tylenol, rest and hydration without relief. Similar headaches with prior pregnancy which improved with lortab or tylenol-3. Patient states she does not want to take narcotics with this pregnancy unless necessary. Denies visual changes or RUQ pain. Good fetal movement. No contractions, leaking fluid, or vaginal bleeding.    Prenatal care has been with Jennyfer Mcgrath DO. PNC complicated by A1DM.    Review of Systems:  All systems reviewed and negative.      Medical History: acid reflux, depression, gestational HTN with first pregnancy  Surgeries: ganglion cyst removal  Medications: pnv  Allergies: nkda  Social History: no tobacco x 10 mos  Family History: DM, HTN, thyroid disease    OB History    Para Term  AB SAB TAB Ectopic Multiple Living   2 1 1       1      # Outcome Date GA Lbr William/2nd Weight Sex Delivery Anes PTL Lv   2 Current            1 Term 12 37w0d  7 lb 8 oz (3.402 kg) M Vag-Spont EPI N Y            Objective     /77  Pulse 89  Temp 98.6 °F (37 °C) (Oral)   Resp 16  Ht 63\" (160 cm)  Wt 220 lb (99.8 kg)  LMP 2016 (Approximate)  BMI 38.97 kg/m2      Constitutional:  Well developed, well nourished, no acute distress  HEENT: normocephalic, atraumatic, pharynx clear   Neck: supple, no significant adenopathy  Lungs:  Clear to auscultation bilaterally, normal breath sounds   Heart:  Normal rate and rhythm, no murmurs   Abdomen:  Soft, gravid, nontender  Uterus: Soft, nontender. Fundus appropriate for dates  Extremities: no cyanosis, clubbing or edema, no evidence of DVT, 2+DTR's  Skin: normal " coloration and turgor, no rashes, no suspicious skin lesions noted        Fetal monitoring: Indication headache , onset 1640 , offset 1745 , baseline 130's , moderate variability , multiple accels (15 X 15), no decels, no contractions, interpretation category 1         Assessment     1. IUP at 37w5d  2. Headache       Plan     1. Admit to labor and delivery   2.  Serial BP and CBC/PEP, then will re-assess.         Malka Good MD  8/13/2017@      ADDENDUM:  BP's 129-136/75-87  Labs Ok  Plan D/C home with precautions. Keep scheduled appointment tomorrow.

## 2017-08-15 LAB — EXTERNAL GROUP B STREP ANTIGEN: NEGATIVE

## 2017-08-26 ENCOUNTER — HOSPITAL ENCOUNTER (OUTPATIENT)
Facility: HOSPITAL | Age: 32
End: 2017-08-26
Attending: OBSTETRICS & GYNECOLOGY | Admitting: OBSTETRICS & GYNECOLOGY

## 2017-08-26 ENCOUNTER — HOSPITAL ENCOUNTER (OUTPATIENT)
Facility: HOSPITAL | Age: 32
Setting detail: OBSERVATION
Discharge: HOME | End: 2017-08-26
Attending: NURSE PRACTITIONER | Admitting: OBSTETRICS & GYNECOLOGY

## 2017-08-26 VITALS
WEIGHT: 221 LBS | HEART RATE: 90 BPM | RESPIRATION RATE: 16 BRPM | DIASTOLIC BLOOD PRESSURE: 77 MMHG | TEMPERATURE: 98.7 F | HEIGHT: 63 IN | SYSTOLIC BLOOD PRESSURE: 137 MMHG | BODY MASS INDEX: 39.16 KG/M2

## 2017-08-28 ENCOUNTER — HOSPITAL ENCOUNTER (INPATIENT)
Dept: LABOR AND DELIVERY | Facility: HOSPITAL | Age: 32
LOS: 3 days | Discharge: HOME | End: 2017-08-31
Attending: NURSE PRACTITIONER | Admitting: OBSTETRICS & GYNECOLOGY

## 2017-08-28 DIAGNOSIS — O24.410 DIET CONTROLLED GESTATIONAL DIABETES MELLITUS (GDM) IN THIRD TRIMESTER: Primary | ICD-10-CM

## 2017-08-28 LAB
BASOPHILS # BLD AUTO: 0.02 10*3/MM3 (ref 0–0.2)
BASOPHILS NFR BLD AUTO: 0.2 % (ref 0–1)
DEPRECATED RDW RBC AUTO: 44.5 FL (ref 37–54)
EOSINOPHIL # BLD AUTO: 0.08 10*3/MM3 (ref 0–0.3)
EOSINOPHIL NFR BLD AUTO: 0.9 % (ref 0–3)
ERYTHROCYTE [DISTWIDTH] IN BLOOD BY AUTOMATED COUNT: 13.6 % (ref 11.3–14.5)
HCT VFR BLD AUTO: 38.9 % (ref 34.5–44)
HGB BLD-MCNC: 12.8 G/DL (ref 11.5–15.5)
IMM GRANULOCYTES # BLD: 0.02 10*3/MM3 (ref 0–0.03)
IMM GRANULOCYTES NFR BLD: 0.2 % (ref 0–0.6)
LYMPHOCYTES # BLD AUTO: 2.39 10*3/MM3 (ref 0.6–4.8)
LYMPHOCYTES NFR BLD AUTO: 25.7 % (ref 24–44)
MCH RBC QN AUTO: 29.2 PG (ref 27–31)
MCHC RBC AUTO-ENTMCNC: 32.9 G/DL (ref 32–36)
MCV RBC AUTO: 88.8 FL (ref 80–99)
MONOCYTES # BLD AUTO: 0.68 10*3/MM3 (ref 0–1)
MONOCYTES NFR BLD AUTO: 7.3 % (ref 0–12)
NEUTROPHILS # BLD AUTO: 6.11 10*3/MM3 (ref 1.5–8.3)
NEUTROPHILS NFR BLD AUTO: 65.7 % (ref 41–71)
PLATELET # BLD AUTO: 229 10*3/MM3 (ref 150–450)
PMV BLD AUTO: 10.1 FL (ref 6–12)
RBC # BLD AUTO: 4.38 10*6/MM3 (ref 3.89–5.14)
WBC NRBC COR # BLD: 9.3 10*3/MM3 (ref 3.5–10.8)

## 2017-08-28 PROCEDURE — 85025 COMPLETE CBC W/AUTO DIFF WBC: CPT | Performed by: OBSTETRICS & GYNECOLOGY

## 2017-08-28 PROCEDURE — 59025 FETAL NON-STRESS TEST: CPT

## 2017-08-28 PROCEDURE — 86850 RBC ANTIBODY SCREEN: CPT | Performed by: OBSTETRICS & GYNECOLOGY

## 2017-08-28 PROCEDURE — 86900 BLOOD TYPING SEROLOGIC ABO: CPT | Performed by: OBSTETRICS & GYNECOLOGY

## 2017-08-28 PROCEDURE — 59200 INSERT CERVICAL DILATOR: CPT | Performed by: OBSTETRICS & GYNECOLOGY

## 2017-08-28 PROCEDURE — 86901 BLOOD TYPING SEROLOGIC RH(D): CPT | Performed by: OBSTETRICS & GYNECOLOGY

## 2017-08-28 RX ORDER — ACETAMINOPHEN 325 MG/1
650 TABLET ORAL EVERY 4 HOURS PRN
Status: DISCONTINUED | OUTPATIENT
Start: 2017-08-28 | End: 2017-08-30 | Stop reason: HOSPADM

## 2017-08-28 RX ORDER — ONDANSETRON 4 MG/1
4 TABLET, FILM COATED ORAL EVERY 6 HOURS PRN
Status: DISCONTINUED | OUTPATIENT
Start: 2017-08-28 | End: 2017-08-30 | Stop reason: HOSPADM

## 2017-08-28 RX ORDER — ONDANSETRON 2 MG/ML
4 INJECTION INTRAMUSCULAR; INTRAVENOUS EVERY 6 HOURS PRN
Status: DISCONTINUED | OUTPATIENT
Start: 2017-08-28 | End: 2017-08-30 | Stop reason: HOSPADM

## 2017-08-28 RX ORDER — LIDOCAINE HYDROCHLORIDE 10 MG/ML
5 INJECTION, SOLUTION INFILTRATION; PERINEURAL AS NEEDED
Status: DISCONTINUED | OUTPATIENT
Start: 2017-08-28 | End: 2017-08-30 | Stop reason: HOSPADM

## 2017-08-28 RX ORDER — SODIUM CHLORIDE 0.9 % (FLUSH) 0.9 %
1-10 SYRINGE (ML) INJECTION AS NEEDED
Status: DISCONTINUED | OUTPATIENT
Start: 2017-08-28 | End: 2017-08-30 | Stop reason: HOSPADM

## 2017-08-28 RX ORDER — SODIUM CHLORIDE, SODIUM LACTATE, POTASSIUM CHLORIDE, CALCIUM CHLORIDE 600; 310; 30; 20 MG/100ML; MG/100ML; MG/100ML; MG/100ML
125 INJECTION, SOLUTION INTRAVENOUS CONTINUOUS
Status: DISCONTINUED | OUTPATIENT
Start: 2017-08-28 | End: 2017-08-31 | Stop reason: HOSPADM

## 2017-08-29 PROBLEM — R51.9 HEADACHE: Status: RESOLVED | Noted: 2017-08-13 | Resolved: 2017-08-29

## 2017-08-29 PROBLEM — Z34.90 PREGNANCY: Status: RESOLVED | Noted: 2017-08-01 | Resolved: 2017-08-29

## 2017-08-29 PROBLEM — O24.419 GESTATIONAL DIABETES: Status: RESOLVED | Noted: 2017-06-19 | Resolved: 2017-08-29

## 2017-08-29 LAB
ABO GROUP BLD: NORMAL
BLD GP AB SCN SERPL QL: NEGATIVE
GLUCOSE BLDC GLUCOMTR-MCNC: 107 MG/DL (ref 70–130)
GLUCOSE BLDC GLUCOMTR-MCNC: 69 MG/DL (ref 70–130)
GLUCOSE BLDC GLUCOMTR-MCNC: 75 MG/DL (ref 70–130)
GLUCOSE BLDC GLUCOMTR-MCNC: 82 MG/DL (ref 70–130)
GLUCOSE BLDC GLUCOMTR-MCNC: 87 MG/DL (ref 70–130)
RH BLD: POSITIVE

## 2017-08-29 PROCEDURE — 0UQMXZZ REPAIR VULVA, EXTERNAL APPROACH: ICD-10-PCS | Performed by: NURSE PRACTITIONER

## 2017-08-29 PROCEDURE — 59025 FETAL NON-STRESS TEST: CPT

## 2017-08-29 PROCEDURE — 0KQM0ZZ REPAIR PERINEUM MUSCLE, OPEN APPROACH: ICD-10-PCS | Performed by: NURSE PRACTITIONER

## 2017-08-29 PROCEDURE — 82962 GLUCOSE BLOOD TEST: CPT

## 2017-08-29 RX ORDER — MISOPROSTOL 200 UG/1
800 TABLET ORAL AS NEEDED
Status: DISCONTINUED | OUTPATIENT
Start: 2017-08-29 | End: 2017-08-30 | Stop reason: HOSPADM

## 2017-08-29 RX ORDER — OXYTOCIN/RINGER'S LACTATE 20/1000 ML
125 PLASTIC BAG, INJECTION (ML) INTRAVENOUS CONTINUOUS PRN
Status: DISCONTINUED | OUTPATIENT
Start: 2017-08-29 | End: 2017-08-30 | Stop reason: HOSPADM

## 2017-08-29 RX ORDER — FAMOTIDINE 20 MG/1
20 TABLET, FILM COATED ORAL 2 TIMES DAILY PRN
Status: DISCONTINUED | OUTPATIENT
Start: 2017-08-29 | End: 2017-08-31 | Stop reason: HOSPADM

## 2017-08-29 RX ORDER — CARBOPROST TROMETHAMINE 250 UG/ML
250 INJECTION, SOLUTION INTRAMUSCULAR AS NEEDED
Status: DISCONTINUED | OUTPATIENT
Start: 2017-08-29 | End: 2017-08-30 | Stop reason: HOSPADM

## 2017-08-29 RX ORDER — OXYTOCIN/RINGER'S LACTATE 30/500 ML
2-24 PLASTIC BAG, INJECTION (ML) INTRAVENOUS
Status: DISCONTINUED | OUTPATIENT
Start: 2017-08-29 | End: 2017-08-31 | Stop reason: HOSPADM

## 2017-08-29 RX ORDER — METHYLERGONOVINE MALEATE 0.2 MG/ML
200 INJECTION INTRAVENOUS ONCE AS NEEDED
Status: DISCONTINUED | OUTPATIENT
Start: 2017-08-29 | End: 2017-08-30 | Stop reason: HOSPADM

## 2017-08-29 RX ORDER — OXYTOCIN/RINGER'S LACTATE 20/1000 ML
999 PLASTIC BAG, INJECTION (ML) INTRAVENOUS ONCE
Status: COMPLETED | OUTPATIENT
Start: 2017-08-29 | End: 2017-08-29

## 2017-08-29 RX ADMIN — Medication 2 MILLI-UNITS/MIN: at 12:21

## 2017-08-29 RX ADMIN — Medication 125 ML/HR: at 23:00

## 2017-08-29 RX ADMIN — SODIUM CHLORIDE, POTASSIUM CHLORIDE, SODIUM LACTATE AND CALCIUM CHLORIDE 125 ML/HR: 600; 310; 30; 20 INJECTION, SOLUTION INTRAVENOUS at 12:22

## 2017-08-29 RX ADMIN — LIDOCAINE HYDROCHLORIDE 5 ML: 10 INJECTION, SOLUTION INFILTRATION; PERINEURAL at 22:50

## 2017-08-29 RX ADMIN — FAMOTIDINE 20 MG: 20 TABLET ORAL at 06:21

## 2017-08-29 RX ADMIN — IBUPROFEN 600 MG: 600 TABLET ORAL at 23:10

## 2017-08-29 RX ADMIN — Medication 999 ML/HR: at 22:35

## 2017-08-30 PROCEDURE — 85025 COMPLETE CBC W/AUTO DIFF WBC: CPT | Performed by: NURSE PRACTITIONER

## 2017-08-30 RX ORDER — ONDANSETRON 4 MG/1
4 TABLET, FILM COATED ORAL EVERY 8 HOURS PRN
Status: DISCONTINUED | OUTPATIENT
Start: 2017-08-30 | End: 2017-08-31 | Stop reason: HOSPADM

## 2017-08-30 RX ORDER — LANOLIN 100 %
OINTMENT (GRAM) TOPICAL
Status: DISCONTINUED | OUTPATIENT
Start: 2017-08-30 | End: 2017-08-31 | Stop reason: HOSPADM

## 2017-08-30 RX ORDER — DOCUSATE SODIUM 100 MG/1
100 CAPSULE, LIQUID FILLED ORAL 2 TIMES DAILY
Status: DISCONTINUED | OUTPATIENT
Start: 2017-08-30 | End: 2017-08-31 | Stop reason: HOSPADM

## 2017-08-30 RX ORDER — ACETAMINOPHEN 325 MG/1
650 TABLET ORAL EVERY 4 HOURS PRN
Status: DISCONTINUED | OUTPATIENT
Start: 2017-08-30 | End: 2017-08-31 | Stop reason: HOSPADM

## 2017-08-30 RX ORDER — BISACODYL 10 MG
10 SUPPOSITORY, RECTAL RECTAL DAILY PRN
Status: DISCONTINUED | OUTPATIENT
Start: 2017-08-31 | End: 2017-08-31 | Stop reason: HOSPADM

## 2017-08-30 RX ORDER — IBUPROFEN 600 MG/1
600 TABLET ORAL EVERY 6 HOURS PRN
Status: DISCONTINUED | OUTPATIENT
Start: 2017-08-30 | End: 2017-08-30 | Stop reason: SDUPTHER

## 2017-08-30 RX ORDER — IBUPROFEN 600 MG/1
600 TABLET ORAL EVERY 4 HOURS PRN
Status: DISCONTINUED | OUTPATIENT
Start: 2017-08-30 | End: 2017-08-31 | Stop reason: HOSPADM

## 2017-08-30 RX ORDER — SODIUM CHLORIDE 0.9 % (FLUSH) 0.9 %
1-10 SYRINGE (ML) INJECTION AS NEEDED
Status: DISCONTINUED | OUTPATIENT
Start: 2017-08-30 | End: 2017-08-31 | Stop reason: HOSPADM

## 2017-08-30 RX ADMIN — DOCUSATE SODIUM 100 MG: 100 CAPSULE, LIQUID FILLED ORAL at 08:21

## 2017-08-30 RX ADMIN — WITCH HAZEL 1 PAD: 500 SOLUTION RECTAL; TOPICAL at 02:50

## 2017-08-30 RX ADMIN — IBUPROFEN 600 MG: 600 TABLET, FILM COATED ORAL at 17:26

## 2017-08-30 RX ADMIN — DOCUSATE SODIUM 100 MG: 100 CAPSULE, LIQUID FILLED ORAL at 17:26

## 2017-08-30 RX ADMIN — IBUPROFEN 600 MG: 600 TABLET, FILM COATED ORAL at 11:52

## 2017-08-30 RX ADMIN — BENZOCAINE AND MENTHOL: 20; .5 SPRAY TOPICAL at 02:50

## 2017-08-30 RX ADMIN — IBUPROFEN 600 MG: 600 TABLET, FILM COATED ORAL at 05:27

## 2017-08-31 VITALS
DIASTOLIC BLOOD PRESSURE: 80 MMHG | TEMPERATURE: 97.9 F | HEART RATE: 63 BPM | HEIGHT: 63 IN | BODY MASS INDEX: 39.16 KG/M2 | WEIGHT: 221 LBS | SYSTOLIC BLOOD PRESSURE: 118 MMHG | RESPIRATION RATE: 18 BRPM

## 2017-08-31 LAB
BASOPHILS # BLD AUTO: 0.04 10*3/MM3 (ref 0–0.2)
BASOPHILS NFR BLD AUTO: 0.4 % (ref 0–1)
DEPRECATED RDW RBC AUTO: 44.3 FL (ref 37–54)
EOSINOPHIL # BLD AUTO: 0.15 10*3/MM3 (ref 0–0.3)
EOSINOPHIL NFR BLD AUTO: 1.4 % (ref 0–3)
ERYTHROCYTE [DISTWIDTH] IN BLOOD BY AUTOMATED COUNT: 13.7 % (ref 11.3–14.5)
HCT VFR BLD AUTO: 37.5 % (ref 34.5–44)
HGB BLD-MCNC: 12.1 G/DL (ref 11.5–15.5)
IMM GRANULOCYTES # BLD: 0.03 10*3/MM3 (ref 0–0.03)
IMM GRANULOCYTES NFR BLD: 0.3 % (ref 0–0.6)
LYMPHOCYTES # BLD AUTO: 3.13 10*3/MM3 (ref 0.6–4.8)
LYMPHOCYTES NFR BLD AUTO: 28.7 % (ref 24–44)
MCH RBC QN AUTO: 28.7 PG (ref 27–31)
MCHC RBC AUTO-ENTMCNC: 32.3 G/DL (ref 32–36)
MCV RBC AUTO: 88.9 FL (ref 80–99)
MONOCYTES # BLD AUTO: 1.13 10*3/MM3 (ref 0–1)
MONOCYTES NFR BLD AUTO: 10.4 % (ref 0–12)
NEUTROPHILS # BLD AUTO: 6.42 10*3/MM3 (ref 1.5–8.3)
NEUTROPHILS NFR BLD AUTO: 58.8 % (ref 41–71)
PLATELET # BLD AUTO: 215 10*3/MM3 (ref 150–450)
PMV BLD AUTO: 10.1 FL (ref 6–12)
RBC # BLD AUTO: 4.22 10*6/MM3 (ref 3.89–5.14)
WBC NRBC COR # BLD: 10.9 10*3/MM3 (ref 3.5–10.8)

## 2017-08-31 PROCEDURE — 25010000002 TDAP 5-2.5-18.5 LF-MCG/0.5 SUSPENSION: Performed by: NURSE PRACTITIONER

## 2017-08-31 PROCEDURE — 90715 TDAP VACCINE 7 YRS/> IM: CPT | Performed by: NURSE PRACTITIONER

## 2017-08-31 PROCEDURE — 90471 IMMUNIZATION ADMIN: CPT | Performed by: NURSE PRACTITIONER

## 2017-08-31 RX ORDER — IBUPROFEN 600 MG/1
600 TABLET ORAL EVERY 4 HOURS PRN
Qty: 120 TABLET | Refills: 1 | Status: SHIPPED | OUTPATIENT
Start: 2017-08-31 | End: 2017-12-20

## 2017-08-31 RX ORDER — PSEUDOEPHEDRINE HCL 30 MG
100 TABLET ORAL 2 TIMES DAILY
Qty: 30 CAPSULE | Refills: 1 | Status: SHIPPED | OUTPATIENT
Start: 2017-08-31 | End: 2017-12-20

## 2017-08-31 RX ADMIN — IBUPROFEN 600 MG: 600 TABLET, FILM COATED ORAL at 09:20

## 2017-08-31 RX ADMIN — TETANUS TOXOID, REDUCED DIPHTHERIA TOXOID AND ACELLULAR PERTUSSIS VACCINE, ADSORBED 0.5 ML: 5; 2.5; 8; 8; 2.5 SUSPENSION INTRAMUSCULAR at 13:17

## 2017-08-31 RX ADMIN — DOCUSATE SODIUM 100 MG: 100 CAPSULE, LIQUID FILLED ORAL at 09:20

## 2017-09-11 ENCOUNTER — HOSPITAL ENCOUNTER (OUTPATIENT)
Dept: LACTATION | Facility: HOSPITAL | Age: 32
Discharge: HOME | End: 2017-09-11

## 2017-09-11 NOTE — LACTATION NOTE
17 1240   Maternal Information   Date of Referral 17   Person Making Referral patient   Maternal Reason for Referral breastfeeding unsuccessful in past   Prenatal History   Breast Changes enlargement;colostrum leaking   Maternal  Assessment   Size Issue, Bilateral Breasts no   Shape, Bilateral Breasts pendulous   Density, Left Breast soft   Density, Right Breast full   Areola, Bilateral elastic   Nipples, Bilateral everted   Nipple Conditions, Bilateral intact   Signs/Symptoms of Infection, Bilateral none   Infant Assessment   Medical Condition none   Skin Color pink   Tone (Musculoskeletal) equal responses;symmetrical   Feeding Infant   Feeding Readiness Cues eager;finger sucking;hand to mouth movements;rooting   Effective Latch During Feeding yes   Audible Swallow other (see comments)  (some on R and none on L)   Pre-Feeding Weight (gm),  (3450 gm)   Post-Feeding Weight (gm),  (3480 gm)   Worried about baby getting enough with breastfeeding--thinks baby is constantly breastfeeding. Never felt let-down with first baby and had to start supplementing/pumping at 2 months. Was able to get good supply with pumping. At 7 months pumped 4-5 oz on R and 2 oz on L every 3 hours and gave 1 bottle of formula daily. Wants to avoid formula with this baby if possible.     At this breastfeeding baby fed 15 minutes per side and got 15 mL on each side. Recommend feeding every 3 hours. Breastfeed for 30 minutes, supplement with 1-2 oz and double breast pump for 20 minutes. Discussed importance of pumping to increase milk supply. Offered formual to use for supplement today in clinic--pt declined. Has pediatrician appt tomorrow and will wait to see what pediatrician says. Keep appt with peds tomorrow and fu with lactation services in 2 weeks. Has appt on .

## 2017-09-25 ENCOUNTER — HOSPITAL ENCOUNTER (OUTPATIENT)
Dept: LACTATION | Facility: HOSPITAL | Age: 32
Discharge: HOME | End: 2017-09-25

## 2017-09-25 NOTE — LACTATION NOTE
"   17 1015   Maternal  Assessment   Nipple Conditions, Bilateral intact   Infant Assessment   Frenulum normal  (extends tonuge past gum line)   Sucking Reflex present   Rooting Reflex present   Swallow Reflex present   Maternal Infant Feeding   Infant Positioning cross-cradle;clutch/\"football\"   Latch Assistance yes   Feeding Infant   Pre-Feeding Weight (gm),  4000 gm  (3975 nude- 8-12 - up 6oz in 5 days )   Equipment Type/Education   Breast Pump Type manual  (did not bring her pump, got 3 oz this feeding)   mom reports pumping 3oz every 3hours and feeding baby at breast or pumped milk and baby gaining well, baby nursed short time at breast then frustrated, attempted to latch with shield and sns with only short time t breast then frustrated again, gave baby 3oz ebm with bottle while mom pumped breast, suck appears strong and organized- baby enjoys bottle and no frustration noted, discussed ways to get baby back to breast ( slower flowing bottle, shield, sns, skin to skin, decrease stress at breast)  and encouraged mom to continue pumping to maintain milk supply, follow up in clinic "

## 2017-10-09 ENCOUNTER — APPOINTMENT (OUTPATIENT)
Dept: LACTATION | Facility: HOSPITAL | Age: 32
End: 2017-10-09

## 2017-10-31 ENCOUNTER — OFFICE VISIT (OUTPATIENT)
Dept: ENDOCRINOLOGY | Facility: CLINIC | Age: 32
End: 2017-10-31

## 2017-10-31 VITALS
BODY MASS INDEX: 36.5 KG/M2 | HEIGHT: 63 IN | WEIGHT: 206 LBS | DIASTOLIC BLOOD PRESSURE: 70 MMHG | SYSTOLIC BLOOD PRESSURE: 110 MMHG

## 2017-10-31 LAB
GLUCOSE BLDC GLUCOMTR-MCNC: 95 MG/DL (ref 70–130)
HBA1C MFR BLD: 5.6 %

## 2017-10-31 PROCEDURE — 99213 OFFICE O/P EST LOW 20 MIN: CPT | Performed by: INTERNAL MEDICINE

## 2017-10-31 PROCEDURE — 83036 HEMOGLOBIN GLYCOSYLATED A1C: CPT | Performed by: INTERNAL MEDICINE

## 2017-10-31 PROCEDURE — 82947 ASSAY GLUCOSE BLOOD QUANT: CPT | Performed by: INTERNAL MEDICINE

## 2017-10-31 RX ORDER — CHLORPHENIR/PHENYLEPH/ASPIRIN 2-7.8-325
TABLET, EFFERVESCENT ORAL
Refills: 2 | COMMUNITY
Start: 2017-10-05 | End: 2017-12-20

## 2017-10-31 NOTE — PROGRESS NOTES
Angelika Vick 32 y.o.  [facility]:Follow-up and Gestational Diabetes (after delivery on 8-, birth weight was 8 lbs OB: Samanta Lockett CNM)    [place]: Follow-up and Gestational Diabetes (after delivery on 8-, birth weight was 8 lbs OB: Samanta Lockett CNM)  blood sugar and 90 day average sugar reviewed  Results for orders placed or performed in visit on 10/31/17   POC Glycosylated Hemoglobin (Hb A1C)   Result Value Ref Range    Hemoglobin A1C 5.6 %   POC Glucose Fingerstick   Result Value Ref Range    Glucose 95 70 - 130 mg/dL     Doing well overall  Is watching diet  Baby did not have any problems  Commended efforts with dm during pregnancy  Long term goals of health and fitness reviewed  Recommended return to prepregnancy weight with eye on ideal body mass  Discussed whole food diet, avoid processed carbs sofi (along with sugared drinks  Signs and symptoms of diabetes including polydipsia, polyuria, cystitis and vaginitis discussed  Blood sugar cutoffs fasting >126 and pp anytime >200 a indicators of diabetes were reviewed  Need for yearly f/u with PCP discussed      No Known Allergies    Current Outpatient Prescriptions:   •  Acetone, Urine, Test (KETONE TEST) strip, USE DAILY UTD, Disp: , Rfl: 2  •  Blood Glucose Monitoring Suppl (ONE TOUCH ULTRA 2) W/DEVICE kit, Use daily to test blood sugars, Disp: 1 each, Rfl: 0  •  docusate sodium 100 MG capsule, Take 100 mg by mouth 2 (Two) Times a Day., Disp: 30 capsule, Rfl: 1  •  ibuprofen (ADVIL,MOTRIN) 600 MG tablet, Take 1 tablet by mouth Every 4 (Four) Hours As Needed for Mild Pain ., Disp: 120 tablet, Rfl: 1  •  ONE TOUCH ULTRA TEST test strip, TEST BLOOD SUGAR QID, Disp: , Rfl: 5  •  ONETOUCH DELICA LANCETS 33G misc, Test blood sugars QID, Disp: 100 each, Rfl: 5  •  Prenatal Vit-Fe Fumarate-FA (PRENATAL, CLASSIC, VITAMIN) 28-0.8 MG tablet tablet, Take  by mouth Daily., Disp: , Rfl:   •  sertraline (ZOLOFT) 50 MG tablet, TK 1 T PO D, Disp: , Rfl: 3  Patient  Active Problem List    Diagnosis   • Spontaneous vaginal delivery [O80]     Review of Systems   Constitutional: Negative for activity change, appetite change, chills, diaphoresis, fatigue, fever and unexpected weight change.   HENT: Negative for congestion, dental problem, drooling, ear discharge, ear pain, facial swelling, hearing loss, mouth sores, nosebleeds, postnasal drip, rhinorrhea, sinus pressure, sneezing, sore throat, tinnitus, trouble swallowing and voice change.    Eyes: Negative for photophobia, pain, discharge, redness, itching and visual disturbance.   Respiratory: Negative for apnea, cough, choking, chest tightness, shortness of breath, wheezing and stridor.    Cardiovascular: Negative for chest pain, palpitations and leg swelling.   Gastrointestinal: Negative for abdominal distention, abdominal pain, anal bleeding, blood in stool, constipation, diarrhea, nausea, rectal pain and vomiting.   Endocrine: Negative for cold intolerance, heat intolerance, polydipsia, polyphagia and polyuria.   Genitourinary: Negative for decreased urine volume, difficulty urinating, dysuria, enuresis, flank pain, frequency, genital sores, hematuria and urgency.   Musculoskeletal: Negative for arthralgias, back pain, gait problem, joint swelling, myalgias, neck pain and neck stiffness.   Skin: Negative for color change, pallor, rash and wound.   Allergic/Immunologic: Negative for environmental allergies, food allergies and immunocompromised state.   Neurological: Negative for dizziness, tremors, seizures, syncope, facial asymmetry, speech difficulty, weakness, light-headedness, numbness and headaches.   Hematological: Negative for adenopathy. Does not bruise/bleed easily.   Psychiatric/Behavioral: Negative for agitation, behavioral problems, confusion, decreased concentration, dysphoric mood, hallucinations, self-injury, sleep disturbance and suicidal ideas. The patient is not nervous/anxious and is not hyperactive.   "    Social History     Social History   • Marital status: Single     Spouse name: N/A   • Number of children: N/A   • Years of education: N/A     Occupational History   • Not on file.     Social History Main Topics   • Smoking status: Former Smoker     Packs/day: 0.50     Types: Cigarettes     Quit date: 10/6/2016   • Smokeless tobacco: Never Used   • Alcohol use No      Comment: drinks wine when not pregnant   • Drug use: No   • Sexual activity: Defer     Other Topics Concern   • Not on file     Social History Narrative     Family History   Problem Relation Age of Onset   • Arthritis Mother    • Hypertension Mother    • Thyroid disease Sister    • Diabetes Sister    • Diabetes Maternal Grandfather      /70  Ht 63\" (160 cm)  Wt 206 lb (93.4 kg)  LMP 11/01/2016 (Approximate)  BMI 36.49 kg/m2  Physical Exam   Constitutional: She is oriented to person, place, and time. She appears well-developed and well-nourished.   HENT:   Head: Normocephalic and atraumatic.   Nose: Nose normal.   Mouth/Throat: Oropharynx is clear and moist.   Eyes: Conjunctivae, EOM and lids are normal. Pupils are equal, round, and reactive to light.   Neck: Trachea normal and normal range of motion. Neck supple. Carotid bruit is not present. No tracheal deviation present. No thyroid mass and no thyromegaly present.   Cardiovascular: Normal rate, regular rhythm, normal heart sounds and intact distal pulses.  Exam reveals no gallop and no friction rub.    No murmur heard.  Pulmonary/Chest: Effort normal and breath sounds normal. No respiratory distress. She has no wheezes.   Musculoskeletal: Normal range of motion. She exhibits no edema or deformity.   Lymphadenopathy:     She has no cervical adenopathy.   Neurological: She is alert and oriented to person, place, and time. She has normal reflexes. She displays normal reflexes. No cranial nerve deficit.   Skin: Skin is warm and dry. No rash noted. No cyanosis or erythema. Nails show no " clubbing.   Psychiatric: She has a normal mood and affect. Her speech is normal and behavior is normal. Judgment and thought content normal. Cognition and memory are normal.   Nursing note and vitals reviewed.    Results for orders placed or performed during the hospital encounter of 08/28/17   Group B Streptococcus Culture   Result Value Ref Range    External Strep Group B Ag Negative    Chlamydia trachomatis, Neisseria gonorrhoeae, PCR w/ confirmation   Result Value Ref Range    External Chlamydia Screen Negative    Gonorrhea Screen   Result Value Ref Range    External Gonorrhea Screen Negative    CBC Auto Differential   Result Value Ref Range    WBC 9.30 3.50 - 10.80 10*3/mm3    RBC 4.38 3.89 - 5.14 10*6/mm3    Hemoglobin 12.8 11.5 - 15.5 g/dL    Hematocrit 38.9 34.5 - 44.0 %    MCV 88.8 80.0 - 99.0 fL    MCH 29.2 27.0 - 31.0 pg    MCHC 32.9 32.0 - 36.0 g/dL    RDW 13.6 11.3 - 14.5 %    RDW-SD 44.5 37.0 - 54.0 fl    MPV 10.1 6.0 - 12.0 fL    Platelets 229 150 - 450 10*3/mm3    Neutrophil % 65.7 41.0 - 71.0 %    Lymphocyte % 25.7 24.0 - 44.0 %    Monocyte % 7.3 0.0 - 12.0 %    Eosinophil % 0.9 0.0 - 3.0 %    Basophil % 0.2 0.0 - 1.0 %    Immature Grans % 0.2 0.0 - 0.6 %    Neutrophils, Absolute 6.11 1.50 - 8.30 10*3/mm3    Lymphocytes, Absolute 2.39 0.60 - 4.80 10*3/mm3    Monocytes, Absolute 0.68 0.00 - 1.00 10*3/mm3    Eosinophils, Absolute 0.08 0.00 - 0.30 10*3/mm3    Basophils, Absolute 0.02 0.00 - 0.20 10*3/mm3    Immature Grans, Absolute 0.02 0.00 - 0.03 10*3/mm3   Urine Drug Screen   Result Value Ref Range    External Urine Drug Screen neg    Hepatitis C Antibody   Result Value Ref Range    External Hepatitis C Ab neg    GTT 1 Hour   Result Value Ref Range    External GTT 1 Hour 174    Hepatitis B Surface Antigen   Result Value Ref Range    External Hepatitis B Surface Ag Negative    RPR   Result Value Ref Range    External RPR Non-Reactive    Rubella Antibody, IgG   Result Value Ref Range    External  Rubella Qual Immune    CBC Auto Differential   Result Value Ref Range    WBC 10.90 (H) 3.50 - 10.80 10*3/mm3    RBC 4.22 3.89 - 5.14 10*6/mm3    Hemoglobin 12.1 11.5 - 15.5 g/dL    Hematocrit 37.5 34.5 - 44.0 %    MCV 88.9 80.0 - 99.0 fL    MCH 28.7 27.0 - 31.0 pg    MCHC 32.3 32.0 - 36.0 g/dL    RDW 13.7 11.3 - 14.5 %    RDW-SD 44.3 37.0 - 54.0 fl    MPV 10.1 6.0 - 12.0 fL    Platelets 215 150 - 450 10*3/mm3    Neutrophil % 58.8 41.0 - 71.0 %    Lymphocyte % 28.7 24.0 - 44.0 %    Monocyte % 10.4 0.0 - 12.0 %    Eosinophil % 1.4 0.0 - 3.0 %    Basophil % 0.4 0.0 - 1.0 %    Immature Grans % 0.3 0.0 - 0.6 %    Neutrophils, Absolute 6.42 1.50 - 8.30 10*3/mm3    Lymphocytes, Absolute 3.13 0.60 - 4.80 10*3/mm3    Monocytes, Absolute 1.13 (H) 0.00 - 1.00 10*3/mm3    Eosinophils, Absolute 0.15 0.00 - 0.30 10*3/mm3    Basophils, Absolute 0.04 0.00 - 0.20 10*3/mm3    Immature Grans, Absolute 0.03 0.00 - 0.03 10*3/mm3   POC Glucose Fingerstick   Result Value Ref Range    Glucose 82 70 - 130 mg/dL   POC Glucose Fingerstick   Result Value Ref Range    Glucose 75 70 - 130 mg/dL   POC Glucose Fingerstick   Result Value Ref Range    Glucose 69 (L) 70 - 130 mg/dL   POC Glucose Fingerstick   Result Value Ref Range    Glucose 87 70 - 130 mg/dL   POC Glucose Fingerstick   Result Value Ref Range    Glucose 107 70 - 130 mg/dL   Type & Screen   Result Value Ref Range    ABO Type O     RH type Positive     Antibody Screen Negative      Angelika was seen today for follow-up and gestational diabetes.    Diagnoses and all orders for this visit:    Gestational diabetes mellitus, delivered  -     POC Glycosylated Hemoglobin (Hb A1C)  -     POC Glucose Fingerstick      Problem List Items Addressed This Visit        Endocrine    Gestational diabetes mellitus, delivered - Primary     Doing well pp   Will continue to work on overall fitness  Discussed 30% increased risk of development of diabetes with drinking 1 sugared drink a day  F/u here  prn  F/u with her pcp annually   email for any problems or questions            Relevant Orders    POC Glycosylated Hemoglobin (Hb A1C) (Completed)    POC Glucose Fingerstick (Completed)        Return if symptoms worsen or fail to improve, for Recheck.    Maggy Merida MA  Signed Iveth Gómez MD

## 2017-10-31 NOTE — ASSESSMENT & PLAN NOTE
Doing well pp   Will continue to work on overall fitness  Discussed 30% increased risk of development of diabetes with drinking 1 sugared drink a day  F/u here prn  F/u with her pcp annually   email for any problems or questions

## 2017-12-07 RX ORDER — CHLORPHENIR/PHENYLEPH/ASPIRIN 2-7.8-325
TABLET, EFFERVESCENT ORAL
Qty: 50 STRIP | Refills: 0 | Status: SHIPPED | OUTPATIENT
Start: 2017-12-07 | End: 2017-12-20

## 2018-10-17 PROCEDURE — 84479 ASSAY OF THYROID (T3 OR T4): CPT | Performed by: FAMILY MEDICINE

## 2018-10-17 PROCEDURE — 84436 ASSAY OF TOTAL THYROXINE: CPT | Performed by: FAMILY MEDICINE

## 2018-10-17 PROCEDURE — 80050 GENERAL HEALTH PANEL: CPT | Performed by: FAMILY MEDICINE

## 2018-10-18 ENCOUNTER — TELEPHONE (OUTPATIENT)
Dept: URGENT CARE | Facility: CLINIC | Age: 33
End: 2018-10-18

## 2018-10-18 NOTE — TELEPHONE ENCOUNTER
Pt called about her labs. Pt was given results and advised to see her PCP as soon as possible. Pt does not have a primary care and was given a number to call to get an appt. Reviewed by Dr Smyth

## 2018-10-22 ENCOUNTER — OFFICE VISIT (OUTPATIENT)
Dept: FAMILY MEDICINE CLINIC | Facility: CLINIC | Age: 33
End: 2018-10-22

## 2018-10-22 VITALS
HEIGHT: 63 IN | WEIGHT: 195.4 LBS | BODY MASS INDEX: 34.62 KG/M2 | SYSTOLIC BLOOD PRESSURE: 116 MMHG | OXYGEN SATURATION: 97 % | DIASTOLIC BLOOD PRESSURE: 72 MMHG | HEART RATE: 80 BPM

## 2018-10-22 DIAGNOSIS — Z23 FLU VACCINE NEED: ICD-10-CM

## 2018-10-22 DIAGNOSIS — E03.8 HYPOTHYROIDISM DUE TO HASHIMOTO'S THYROIDITIS: Primary | ICD-10-CM

## 2018-10-22 DIAGNOSIS — E06.3 HYPOTHYROIDISM DUE TO HASHIMOTO'S THYROIDITIS: Primary | ICD-10-CM

## 2018-10-22 LAB
T4 FREE SERPL-MCNC: 0.99 NG/DL (ref 0.89–1.76)
TSH SERPL DL<=0.05 MIU/L-ACNC: 27.63 MIU/ML (ref 0.35–5.35)

## 2018-10-22 PROCEDURE — 84443 ASSAY THYROID STIM HORMONE: CPT | Performed by: INTERNAL MEDICINE

## 2018-10-22 PROCEDURE — 84439 ASSAY OF FREE THYROXINE: CPT | Performed by: INTERNAL MEDICINE

## 2018-10-22 PROCEDURE — 90471 IMMUNIZATION ADMIN: CPT | Performed by: INTERNAL MEDICINE

## 2018-10-22 PROCEDURE — 86376 MICROSOMAL ANTIBODY EACH: CPT | Performed by: INTERNAL MEDICINE

## 2018-10-22 PROCEDURE — 36415 COLL VENOUS BLD VENIPUNCTURE: CPT | Performed by: INTERNAL MEDICINE

## 2018-10-22 PROCEDURE — 99204 OFFICE O/P NEW MOD 45 MIN: CPT | Performed by: INTERNAL MEDICINE

## 2018-10-22 PROCEDURE — 90686 IIV4 VACC NO PRSV 0.5 ML IM: CPT | Performed by: INTERNAL MEDICINE

## 2018-10-22 RX ORDER — SERTRALINE HYDROCHLORIDE 100 MG/1
1 TABLET, FILM COATED ORAL DAILY
COMMUNITY
Start: 2018-10-15 | End: 2019-02-08

## 2018-10-22 RX ORDER — LEVOTHYROXINE SODIUM 0.1 MG/1
100 TABLET ORAL DAILY
Qty: 30 TABLET | Refills: 2 | Status: SHIPPED | OUTPATIENT
Start: 2018-10-22 | End: 2018-12-07

## 2018-10-22 NOTE — PROGRESS NOTES
Chief Complaint:  fatigue    HPI:  Angelika Vick is a 33 y.o. female who presents today for fatigue x 2 weeks. Associated with decrease appetite and increased anxiety. She went to urgent care recently and was told to come to PCP for treatment of her hypothyroidism. She reports feeling relatively well prior to 2 weeks ago. Denies any history of viral infections, including URI or GI complaints. She had a baby over 1 year ago and had some postpartum depression, currently taking sertraline. Denies cold intolerance, hair loss. Currently taking a multivitamin, unknown if it has biotin.     ROS:  Constitutional: no fevers, night sweats or unexplained weight loss  Eyes: no vision changes  ENT: no runny nose, ear pain, sore throat  Cardio: no chest pain, palpitations  Pulm: no shortness of breath, wheezing, or cough  GI: no abdominal pain or changes in bowel movements  : no difficulty urinating  MSK: no difficulty ambulating, no joint pain  Neuro: no weakness, dizziness or headache  Psych: no trouble sleeping  Endo: no change in appetite      Past Medical History:   Diagnosis Date   • Acid reflux    • Bilateral ovarian cysts    • Depression    • Gestational hypertension     with last pregnancy   • Hypertension in pregnancy 2012   • Migraines       Family History   Problem Relation Age of Onset   • Arthritis Mother    • Hypertension Mother    • Thyroid disease Sister    • Diabetes Sister    • Diabetes Maternal Grandfather       Social History     Social History   • Marital status: Single     Spouse name: N/A   • Number of children: N/A   • Years of education: N/A     Occupational History   • Not on file.     Social History Main Topics   • Smoking status: Former Smoker     Packs/day: 0.50     Types: Cigarettes     Quit date: 10/6/2016   • Smokeless tobacco: Never Used   • Alcohol use Yes      Comment: Occasionally   • Drug use: No   • Sexual activity: Yes     Other Topics Concern   • Not on file     Social History  Narrative   • No narrative on file      No Known Allergies   Immunization History   Administered Date(s) Administered   • Tdap 08/31/2017        PE:  Vitals:    10/22/18 1418   BP: 116/72   Pulse: 80   SpO2: 97%        Gen Appearance: NAD  HEENT: Normocephalic, PERRLA, no thyromegaly, trache midline  Heart: RRR, normal S1 and S2, no murmur  Lungs: CTA b/l, no wheezing, no crackles  Abdomen: Soft, non-tender, non-distended, no guarding and BSx4  MSK: Moves all extremities well, normal gait, no peripheral edema  Pulses: Palpable and equal b/l  Lymph nodes: No palpable lymphadenopathy   Neuro: No focal deficits      Current Outpatient Prescriptions   Medication Sig Dispense Refill   • Prenatal Vit-Fe Fumarate-FA (PRENATAL, CLASSIC, VITAMIN) 28-0.8 MG tablet tablet Take  by mouth Daily.     • sertraline (ZOLOFT) 100 MG tablet Take 1 tablet by mouth Daily.     • levothyroxine (SYNTHROID) 100 MCG tablet Take 1 tablet by mouth Daily. 30 tablet 2     No current facility-administered medications for this visit.         Angelika was seen today for establish care.    Diagnoses and all orders for this visit:    Hypothyroidism due to Hashimoto's thyroiditis  -     TSH Rfx On Abnormal To Free T4; Future  -     Thyroid Peroxidase Antibody; Future  TSH markedly increased at 25 but total T4 was low end of normal. TBI was also drawn which was 1.6, slightly low for female. Regardless, will recheck labs here today if her MVI contains no biotin or on Friday if it does. Pt will be checking bottle when she returns home. Doubt infectious cause of hypothyroidism with no recent illnesses, she is >1 year postpartum so not likely postpartum hypothyroidism. She has a sister with hashimoto thyroiditis. Checking anti-TPO today as well. Will start levothyroxine 100mcg since TSH is >10 and she is having symptoms. 1.6mcg/kg/day would be ~130 or 140 which seems a little high with a normal T4 count. Recheck TSH in 6 weeks. RTC at that time after labs  are drawn. Flu shot today. Call clinic if still feeling significantly fatigued at the 2 week gina, may need an increase sooner than 6 weeks since the starting dose is low for her weight. Other labs drawn for fatigue at  were normal, including a1c and CBC, BMP.    Postpartum depression  Discussed sertraline use with patient, her depression anxiety is likely worsened from hypothyroidism. Will reassess once she is stable on levothyroxine. She may be able to come off SSRI if she is feeling significantly better.    Other orders  -     levothyroxine (SYNTHROID) 100 MCG tablet; Take 1 tablet by mouth Daily.         Return in about 6 weeks (around 12/3/2018).

## 2018-10-23 LAB — THYROPEROXIDASE AB SERPL-ACNC: >600 IU/ML (ref 0–34)

## 2018-10-24 ENCOUNTER — TELEPHONE (OUTPATIENT)
Dept: FAMILY MEDICINE CLINIC | Facility: CLINIC | Age: 33
End: 2018-10-24

## 2018-10-24 DIAGNOSIS — E03.8 HYPOTHYROIDISM DUE TO HASHIMOTO'S THYROIDITIS: Primary | ICD-10-CM

## 2018-10-24 DIAGNOSIS — E06.3 HYPOTHYROIDISM DUE TO HASHIMOTO'S THYROIDITIS: Primary | ICD-10-CM

## 2018-12-05 ENCOUNTER — LAB (OUTPATIENT)
Dept: LAB | Facility: HOSPITAL | Age: 33
End: 2018-12-05

## 2018-12-05 DIAGNOSIS — E03.8 HYPOTHYROIDISM DUE TO HASHIMOTO'S THYROIDITIS: ICD-10-CM

## 2018-12-05 DIAGNOSIS — E06.3 HYPOTHYROIDISM DUE TO HASHIMOTO'S THYROIDITIS: ICD-10-CM

## 2018-12-05 DIAGNOSIS — Z13.220 SCREENING CHOLESTEROL LEVEL: ICD-10-CM

## 2018-12-05 LAB — TSH SERPL DL<=0.05 MIU/L-ACNC: 2.68 MIU/ML (ref 0.35–5.35)

## 2018-12-05 PROCEDURE — 84443 ASSAY THYROID STIM HORMONE: CPT | Performed by: INTERNAL MEDICINE

## 2018-12-05 PROCEDURE — 80061 LIPID PANEL: CPT | Performed by: INTERNAL MEDICINE

## 2018-12-07 ENCOUNTER — OFFICE VISIT (OUTPATIENT)
Dept: FAMILY MEDICINE CLINIC | Facility: CLINIC | Age: 33
End: 2018-12-07

## 2018-12-07 VITALS
HEIGHT: 63 IN | WEIGHT: 195.2 LBS | TEMPERATURE: 98.5 F | SYSTOLIC BLOOD PRESSURE: 114 MMHG | DIASTOLIC BLOOD PRESSURE: 82 MMHG | BODY MASS INDEX: 34.59 KG/M2 | OXYGEN SATURATION: 96 % | HEART RATE: 84 BPM

## 2018-12-07 DIAGNOSIS — E06.3 HYPOTHYROIDISM DUE TO HASHIMOTO'S THYROIDITIS: Primary | ICD-10-CM

## 2018-12-07 DIAGNOSIS — H65.93 OTITIS MEDIA WITH EFFUSION, BILATERAL: ICD-10-CM

## 2018-12-07 DIAGNOSIS — Z13.220 SCREENING CHOLESTEROL LEVEL: ICD-10-CM

## 2018-12-07 DIAGNOSIS — E03.8 HYPOTHYROIDISM DUE TO HASHIMOTO'S THYROIDITIS: Primary | ICD-10-CM

## 2018-12-07 DIAGNOSIS — F41.1 GENERALIZED ANXIETY DISORDER: ICD-10-CM

## 2018-12-07 LAB
ARTICHOKE IGE QN: 153 MG/DL (ref 0–130)
CHOLEST SERPL-MCNC: 233 MG/DL (ref 0–200)
HDLC SERPL-MCNC: 64 MG/DL (ref 40–60)
TRIGL SERPL-MCNC: 80 MG/DL (ref 0–150)

## 2018-12-07 PROCEDURE — 99214 OFFICE O/P EST MOD 30 MIN: CPT | Performed by: INTERNAL MEDICINE

## 2018-12-07 RX ORDER — LEVOTHYROXINE SODIUM 112 UG/1
112 TABLET ORAL DAILY
Qty: 30 TABLET | Refills: 5 | Status: SHIPPED | OUTPATIENT
Start: 2018-12-07 | End: 2019-03-05

## 2018-12-07 RX ORDER — FLUTICASONE PROPIONATE 50 MCG
2 SPRAY, SUSPENSION (ML) NASAL DAILY
Qty: 1 BOTTLE | Refills: 5 | Status: SHIPPED | OUTPATIENT
Start: 2018-12-07 | End: 2019-02-08

## 2018-12-07 NOTE — PROGRESS NOTES
Chief Complaint:  F/u hypothyroidism    HPI:  Angelika Vick is a 33 y.o. female who presents today for f/u hypothyroidism and new onset sinus congestion and ear fullness. Her thyroid symptoms are significantly improved since starting synthroid. She has seasonal allergies but is not taking any medication for it. Reports 2 weeks of ear fullness and congestion. No purulent nasal discharge or sinus pain. No ear pain but states she feels like she can't hear as well with left ear.     ROS:  Constitutional: no fevers, night sweats or unexplained weight loss  Eyes: no vision changes  ENT: no runny nose, ear pain, +sore throat, +congestion, +ear fullness  Cardio: no chest pain, palpitations  Pulm: no shortness of breath, wheezing, or cough  GI: no abdominal pain or changes in bowel movements  : no difficulty urinating  MSK: no difficulty ambulating, no joint pain  Neuro: no weakness, dizziness or headache  Psych: no trouble sleeping  Endo: no change in appetite      Past Medical History:   Diagnosis Date   • Acid reflux    • Bilateral ovarian cysts    • Depression    • Gestational hypertension     with last pregnancy   • Hypertension in pregnancy 2012   • Migraines       Family History   Problem Relation Age of Onset   • Arthritis Mother    • Hypertension Mother    • Thyroid disease Sister    • Diabetes Sister    • Diabetes Maternal Grandfather       Social History     Socioeconomic History   • Marital status: Single     Spouse name: Not on file   • Number of children: Not on file   • Years of education: Not on file   • Highest education level: Not on file   Social Needs   • Financial resource strain: Not on file   • Food insecurity - worry: Not on file   • Food insecurity - inability: Not on file   • Transportation needs - medical: Not on file   • Transportation needs - non-medical: Not on file   Occupational History   • Not on file   Tobacco Use   • Smoking status: Former Smoker     Packs/day: 0.50     Types:  Cigarettes     Last attempt to quit: 10/6/2016     Years since quittin.1   • Smokeless tobacco: Never Used   Substance and Sexual Activity   • Alcohol use: Yes     Comment: Occasionally   • Drug use: No   • Sexual activity: Yes   Other Topics Concern   • Not on file   Social History Narrative   • Not on file      No Known Allergies   Immunization History   Administered Date(s) Administered   • FLUARIX/FLUZONE/AFLURIA/FLULAVAL QUAD 10/22/2018   • Tdap 2017        PE:  Vitals:    18 0924   BP: 114/82   Pulse: 84   Temp: 98.5 °F (36.9 °C)   SpO2: 96%        Gen Appearance: NAD  HEENT: Normocephalic, PERRLA, no thyromegaly, trache midline, fluid b/l TM, no erythema, no sinus pressure  Heart: RRR, normal S1 and S2, no murmur  Lungs: CTA b/l, no wheezing, no crackles  Abdomen: Soft, non-tender, non-distended, no guarding and BSx4  MSK: Moves all extremities well, normal gait, no peripheral edema  Pulses: Palpable and equal b/l  Lymph nodes: No palpable lymphadenopathy   Neuro: No focal deficits      Current Outpatient Medications   Medication Sig Dispense Refill   • Prenatal Vit-Fe Fumarate-FA (PRENATAL, CLASSIC, VITAMIN) 28-0.8 MG tablet tablet Take  by mouth Daily.     • sertraline (ZOLOFT) 100 MG tablet Take 1 tablet by mouth Daily.     • fluticasone (FLONASE) 50 MCG/ACT nasal spray 2 sprays into the nostril(s) as directed by provider Daily. 1 bottle 5   • levothyroxine (SYNTHROID) 112 MCG tablet Take 1 tablet by mouth Daily. 30 tablet 5     No current facility-administered medications for this visit.         Angelika was seen today for follow-up.    Diagnoses and all orders for this visit:    Hypothyroidism due to Hashimoto's thyroiditis  -     TSH Rfx On Abnormal To Free T4; Future   -     levothyroxine (SYNTHROID) 112 MCG tablet; Take 1 tablet by mouth Daily.  Increase levothyroxine to 112 mcg and recheck. Would like to get TSH <2. Still having some fatigue and irritability. Advised patient to call  if she has symptoms of hyperthyroidism, will reduce dose back to 100mcg. RTC 6 weeks for re-evaluation.   Generalized anxiety disorder  Cont sertraline 100mg. Stable.     Screening cholesterol level  -     Lipid Panel; Future    Otitis media with effusion, bilateral  -     fluticasone (FLONASE) 50 MCG/ACT nasal spray; 2 sprays into the nostril(s) as directed by provider Daily.  Would also recommend zyrtec or allegra but she is breastfeeding. Recommend consulting OBGYN before starting antihistamine. RTC in 1-2 weeks if no better or for worsening symptoms.        No Follow-up on file.

## 2019-01-10 ENCOUNTER — APPOINTMENT (OUTPATIENT)
Dept: LAB | Facility: HOSPITAL | Age: 34
End: 2019-01-10

## 2019-01-10 ENCOUNTER — OFFICE VISIT (OUTPATIENT)
Dept: FAMILY MEDICINE CLINIC | Facility: CLINIC | Age: 34
End: 2019-01-10

## 2019-01-10 VITALS
HEIGHT: 63 IN | OXYGEN SATURATION: 97 % | HEART RATE: 106 BPM | BODY MASS INDEX: 35.37 KG/M2 | SYSTOLIC BLOOD PRESSURE: 114 MMHG | WEIGHT: 199.6 LBS | DIASTOLIC BLOOD PRESSURE: 76 MMHG

## 2019-01-10 DIAGNOSIS — R07.89 ATYPICAL CHEST PAIN: Primary | ICD-10-CM

## 2019-01-10 DIAGNOSIS — E03.8 HYPOTHYROIDISM DUE TO HASHIMOTO'S THYROIDITIS: ICD-10-CM

## 2019-01-10 DIAGNOSIS — E06.3 HYPOTHYROIDISM DUE TO HASHIMOTO'S THYROIDITIS: ICD-10-CM

## 2019-01-10 DIAGNOSIS — R05.9 COUGH: ICD-10-CM

## 2019-01-10 LAB
D DIMER PPP FEU-MCNC: <0.19 MG/L (FEU) (ref 0–0.5)
T4 FREE SERPL-MCNC: 1.32 NG/DL (ref 0.89–1.76)
TSH SERPL DL<=0.05 MIU/L-ACNC: 5.42 MIU/ML (ref 0.35–5.35)

## 2019-01-10 PROCEDURE — 36415 COLL VENOUS BLD VENIPUNCTURE: CPT | Performed by: PHYSICIAN ASSISTANT

## 2019-01-10 PROCEDURE — 84439 ASSAY OF FREE THYROXINE: CPT | Performed by: INTERNAL MEDICINE

## 2019-01-10 PROCEDURE — 99213 OFFICE O/P EST LOW 20 MIN: CPT | Performed by: PHYSICIAN ASSISTANT

## 2019-01-10 PROCEDURE — 85379 FIBRIN DEGRADATION QUANT: CPT | Performed by: PHYSICIAN ASSISTANT

## 2019-01-10 PROCEDURE — 84443 ASSAY THYROID STIM HORMONE: CPT | Performed by: INTERNAL MEDICINE

## 2019-01-10 RX ORDER — METHYLPREDNISOLONE 4 MG/1
TABLET ORAL
Qty: 1 EACH | Refills: 0 | Status: SHIPPED | OUTPATIENT
Start: 2019-01-10 | End: 2019-02-08

## 2019-01-10 NOTE — PATIENT INSTRUCTIONS
"Try to breastfeed 8-12 hours after taking steroid medications    · Attain adequate rest and increase clear fluid intake.   · Use warm salt water gargles, lozenges, honey as a natural antitussive, and/or Delsym syrup as needed for cough.   · Use Mucinex 600 mg twice daily and nasal saline irrigation with \"ocean\" or \"simply saline\" brand sterile nasal spray twice daily.   · Use warm compresses over sinuses for comfort.   · Alternate use of Tylenol 500 mg and Ibuprofen 400 mg every 4 hours as needed for headache, body aches, and/or fever reduction  · Use Loratadine (Claritin), Cetrizine (Zyrtec), or Fexofenadine (Allegra) once daily over the counter, Flonase 1 spray each nostril daily, and auto-inflate ears using modified valsalva maneuver approximately 20 times daily for ear congestion.   · Recommend probiotic, plain yogurt or kefir while taking an antibiotic to help mitigate GI symptoms.    Call or Return to office if symptoms worsen or persist.     "

## 2019-01-10 NOTE — PROGRESS NOTES
Chief Complaint   Patient presents with   • Cough     x 1 week. cough is painful, chest is tighter than normal. worsening   • Shortness of Breath       HPI      Angelika Vick is a 33 y.o. female who presents for Cough (x 1 week. cough is painful, chest is tighter than normal. worsening) and Shortness of Breath    Patient presents with dry cough for the last week.  Woke up this morning with bilateral chest tightness and pain.  The pain is constant and is not aggravated by coughing.  She reports feeling short of breath.  No history of recent travel/surgery/HRT or history of DVT/blood clot or asthma.  She denies fever/chills/sore throat/productive sputum.    Past Medical History:   Diagnosis Date   • Acid reflux    • Bilateral ovarian cysts    • Depression    • Gestational hypertension     with last pregnancy   • Hypertension in pregnancy    • Migraines        Past Surgical History:   Procedure Laterality Date   • CYST REMOVAL     • GANGLION CYST EXCISION Right        Family History   Problem Relation Age of Onset   • Arthritis Mother    • Hypertension Mother    • Thyroid disease Sister    • Diabetes Sister    • Diabetes Maternal Grandfather        Social History     Socioeconomic History   • Marital status: Single     Spouse name: Not on file   • Number of children: Not on file   • Years of education: Not on file   • Highest education level: Not on file   Social Needs   • Financial resource strain: Not on file   • Food insecurity - worry: Not on file   • Food insecurity - inability: Not on file   • Transportation needs - medical: Not on file   • Transportation needs - non-medical: Not on file   Occupational History   • Not on file   Tobacco Use   • Smoking status: Former Smoker     Packs/day: 0.50     Types: Cigarettes     Last attempt to quit: 10/6/2016     Years since quittin.2   • Smokeless tobacco: Never Used   Substance and Sexual Activity   • Alcohol use: Yes     Comment: Occasionally   • Drug use:  "No   • Sexual activity: Yes   Other Topics Concern   • Not on file   Social History Narrative   • Not on file       No Known Allergies    ROS    Review of Systems   Constitutional: Negative for chills, diaphoresis, fatigue and fever.   HENT: Negative for congestion, ear pain, sinus pressure and sore throat.    Respiratory: Positive for cough and shortness of breath. Negative for wheezing and stridor.    Cardiovascular: Positive for chest pain. Negative for leg swelling.       Vitals:    01/10/19 1422   BP: 114/76   BP Location: Right arm   Patient Position: Sitting   Cuff Size: Adult   Pulse: 106   SpO2: 97%   Weight: 90.5 kg (199 lb 9.6 oz)   Height: 160 cm (63\")         Current Outpatient Medications:   •  fluticasone (FLONASE) 50 MCG/ACT nasal spray, 2 sprays into the nostril(s) as directed by provider Daily., Disp: 1 bottle, Rfl: 5  •  levothyroxine (SYNTHROID) 112 MCG tablet, Take 1 tablet by mouth Daily., Disp: 30 tablet, Rfl: 5  •  Prenatal Vit-Fe Fumarate-FA (PRENATAL, CLASSIC, VITAMIN) 28-0.8 MG tablet tablet, Take  by mouth Daily., Disp: , Rfl:   •  sertraline (ZOLOFT) 100 MG tablet, Take 1 tablet by mouth Daily., Disp: , Rfl:   •  MethylPREDNISolone (MEDROL) 4 MG tablet, Take as directed on package instructions. Dispense: 21 Count dose katia with 0 refills., Disp: 1 each, Rfl: 0    PE    Physical Exam   Constitutional: She appears well-developed and well-nourished. No distress.   HENT:   Head: Normocephalic and atraumatic.   Eyes: EOM are normal.   Neck: Normal range of motion.   Cardiovascular: Normal rate, regular rhythm and normal heart sounds.   Pulmonary/Chest: Effort normal. She has decreased breath sounds. She has no wheezes.   Diminished breath sounds throughout.  No wheezes, rales or rhonci.  Dry cough.   Musculoskeletal: Normal range of motion.   Neurological: She is alert.   Skin: Skin is warm. She is not diaphoretic. No erythema.   Psychiatric: She has a normal mood and affect. Her speech is " normal and behavior is normal. Judgment and thought content normal. She is not actively hallucinating. She is attentive.        A/P    Angelika was seen today for cough and shortness of breath.    Diagnoses and all orders for this visit:    Atypical chest pain  -with bilateral anterior chest wall tightness and pain, not aggravated by cough  -with complaints of SOB and dry cough x1 week, will check d-dimer  -patient aware if d-dimer is positive, she will need CTA  -no recent travel/hx of DVT/blood clot/HRT   -pulse ox is 97% on room air  -     D-dimer, Quantitative    Cough  -diminished lung sounds throughout with dry cough  -will prescribe medrol dosepak  -may use steroids while breastfeeding, advised best to wait 8-12h after taking medicine to breastfeed  -     MethylPREDNISolone (MEDROL) 4 MG tablet; Take as directed on package instructions. Dispense: 21 Count dose katia with 0 refills.  -     D-dimer, Quantitative    Hypothyroidism due to Hashimoto's thyroiditis  -     TSH Rfx On Abnormal To Free T4         Plan of care reviewed with patient at the conclusion of today's visit. Education was provided regarding diagnosis, management and any prescribed or recommended OTC medications.  Patient verbalizes understanding of and agreement with management plan.    No Follow-up on file.     Jenny Ravi PA-C

## 2019-01-11 ENCOUNTER — TELEPHONE (OUTPATIENT)
Dept: FAMILY MEDICINE CLINIC | Facility: CLINIC | Age: 34
End: 2019-01-11

## 2019-01-12 PROBLEM — B34.9 VIRAL SYNDROME: Status: ACTIVE | Noted: 2019-01-12

## 2019-01-14 ENCOUNTER — TELEPHONE (OUTPATIENT)
Dept: FAMILY MEDICINE CLINIC | Facility: CLINIC | Age: 34
End: 2019-01-14

## 2019-02-04 ENCOUNTER — LAB (OUTPATIENT)
Dept: LAB | Facility: HOSPITAL | Age: 34
End: 2019-02-04

## 2019-02-04 DIAGNOSIS — E06.3 HYPOTHYROIDISM DUE TO HASHIMOTO'S THYROIDITIS: ICD-10-CM

## 2019-02-04 DIAGNOSIS — E03.8 HYPOTHYROIDISM DUE TO HASHIMOTO'S THYROIDITIS: Primary | ICD-10-CM

## 2019-02-04 DIAGNOSIS — E03.8 HYPOTHYROIDISM DUE TO HASHIMOTO'S THYROIDITIS: ICD-10-CM

## 2019-02-04 DIAGNOSIS — E06.3 HYPOTHYROIDISM DUE TO HASHIMOTO'S THYROIDITIS: Primary | ICD-10-CM

## 2019-02-04 LAB — TSH SERPL DL<=0.05 MIU/L-ACNC: 1.37 MIU/ML (ref 0.35–5.35)

## 2019-02-04 PROCEDURE — 84443 ASSAY THYROID STIM HORMONE: CPT | Performed by: INTERNAL MEDICINE

## 2019-02-08 ENCOUNTER — OFFICE VISIT (OUTPATIENT)
Dept: FAMILY MEDICINE CLINIC | Facility: CLINIC | Age: 34
End: 2019-02-08

## 2019-02-08 VITALS
OXYGEN SATURATION: 98 % | BODY MASS INDEX: 34.91 KG/M2 | HEART RATE: 71 BPM | HEIGHT: 63 IN | WEIGHT: 197 LBS | SYSTOLIC BLOOD PRESSURE: 112 MMHG | TEMPERATURE: 98.2 F | DIASTOLIC BLOOD PRESSURE: 72 MMHG

## 2019-02-08 DIAGNOSIS — E06.3 HYPOTHYROIDISM DUE TO HASHIMOTO'S THYROIDITIS: Primary | ICD-10-CM

## 2019-02-08 DIAGNOSIS — E03.8 HYPOTHYROIDISM DUE TO HASHIMOTO'S THYROIDITIS: Primary | ICD-10-CM

## 2019-02-08 DIAGNOSIS — E78.2 MIXED HYPERLIPIDEMIA: ICD-10-CM

## 2019-02-08 PROCEDURE — 99214 OFFICE O/P EST MOD 30 MIN: CPT | Performed by: INTERNAL MEDICINE

## 2019-02-08 RX ORDER — DIPHENHYDRAMINE HYDROCHLORIDE 25 MG/1
25 CAPSULE ORAL DAILY
COMMUNITY
End: 2019-10-01 | Stop reason: HOSPADM

## 2019-02-08 NOTE — PROGRESS NOTES
Chief Complaint:  Hypothyroidism    HPI:  Angelika Vick is a 33 y.o. female who presents today for hypothyroidism.  She denies any symptoms over the last 3 months.  She reports that she is expecting her third child now.  She is about 4 weeks pregnant.  She continues to take an 112 µg of Synthroid.  No acute complaints or concerns at today's visit.  Would like to discuss high cholesterol on recent labs.    ROS:  Constitutional: no fevers, night sweats or unexplained weight loss  Eyes: no vision changes  ENT: no runny nose, ear pain, sore throat  Cardio: no chest pain, palpitations  Pulm: no shortness of breath, wheezing, or cough  GI: no abdominal pain or changes in bowel movements  : no difficulty urinating  MSK: no difficulty ambulating, no joint pain  Neuro: no weakness, dizziness or headache  Psych: no trouble sleeping  Endo: no change in appetite      Past Medical History:   Diagnosis Date   • Acid reflux    • Bilateral ovarian cysts    • Depression    • Gestational hypertension     with last pregnancy   • Hypertension in pregnancy    • Migraines       Family History   Problem Relation Age of Onset   • Arthritis Mother    • Hypertension Mother    • Thyroid disease Sister    • Diabetes Sister    • Diabetes Maternal Grandfather       Social History     Socioeconomic History   • Marital status: Single     Spouse name: Not on file   • Number of children: Not on file   • Years of education: Not on file   • Highest education level: Not on file   Social Needs   • Financial resource strain: Not on file   • Food insecurity - worry: Not on file   • Food insecurity - inability: Not on file   • Transportation needs - medical: Not on file   • Transportation needs - non-medical: Not on file   Occupational History   • Not on file   Tobacco Use   • Smoking status: Former Smoker     Packs/day: 0.50     Types: Cigarettes     Last attempt to quit: 10/6/2016     Years since quittin.3   • Smokeless tobacco: Never  Used   Substance and Sexual Activity   • Alcohol use: Yes     Comment: Occasionally   • Drug use: No   • Sexual activity: Yes     Partners: Male     Comment: pt expecting 3rd child.    Other Topics Concern   • Not on file   Social History Narrative   • Not on file      No Known Allergies   Immunization History   Administered Date(s) Administered   • FLUARIX/FLUZONE/AFLURIA/FLULAVAL QUAD 10/22/2018   • Tdap 08/31/2017        PE:  Vitals:    02/08/19 0807   BP: 112/72   Pulse: 71   Temp: 98.2 °F (36.8 °C)   SpO2: 98%        Gen Appearance: NAD  HEENT: Normocephalic, PERRLA, no thyromegaly, trache midline  Heart: RRR, normal S1 and S2, no murmur  Lungs: CTA b/l, no wheezing, no crackles  Abdomen: Soft, non-tender, non-distended, no guarding and BSx4  MSK: Moves all extremities well, normal gait, no peripheral edema  Pulses: Palpable and equal b/l  Lymph nodes: No palpable lymphadenopathy   Neuro: No focal deficits      Current Outpatient Medications   Medication Sig Dispense Refill   • doxylamine (UNISOM) 25 MG tablet Take 25 mg by mouth At Night As Needed for Sleep.     • levothyroxine (SYNTHROID) 112 MCG tablet Take 1 tablet by mouth Daily. 30 tablet 5   • Prenatal Vit-Fe Fumarate-FA (PRENATAL, CLASSIC, VITAMIN) 28-0.8 MG tablet tablet Take  by mouth Daily.     • vitamin B-6 (PYRIDOXINE) 25 MG tablet Take 25 mg by mouth Daily.       No current facility-administered medications for this visit.         Angelika was seen today for hashimoto's thyroiditis.    Diagnoses and all orders for this visit:    Hypothyroidism due to Hashimoto's thyroiditis  -     TSH Rfx On Abnormal To Free T4; Future  Plan to recheck TSH in 4 weeks so she is now pregnant.  may need to increase dose depending on results.  Mixed hyperlipidemia  .  Recommend decreasing saturated fats eliminating trans fats.  We'll recheck in 6 months.  Discussed referral to nutritionist if no improvement at that time.       Return in about 6 months (around  8/8/2019).

## 2019-02-27 ENCOUNTER — LAB (OUTPATIENT)
Dept: LAB | Facility: HOSPITAL | Age: 34
End: 2019-02-27

## 2019-02-27 ENCOUNTER — TRANSCRIBE ORDERS (OUTPATIENT)
Dept: LAB | Facility: HOSPITAL | Age: 34
End: 2019-02-27

## 2019-02-27 DIAGNOSIS — E06.3 HYPOTHYROIDISM DUE TO HASHIMOTO'S THYROIDITIS: ICD-10-CM

## 2019-02-27 DIAGNOSIS — Z34.81 PRENATAL CARE, SUBSEQUENT PREGNANCY, FIRST TRIMESTER: Primary | ICD-10-CM

## 2019-02-27 DIAGNOSIS — Z34.81 PRENATAL CARE, SUBSEQUENT PREGNANCY, FIRST TRIMESTER: ICD-10-CM

## 2019-02-27 DIAGNOSIS — E03.8 HYPOTHYROIDISM DUE TO HASHIMOTO'S THYROIDITIS: ICD-10-CM

## 2019-02-27 LAB
ABO GROUP BLD: NORMAL
BASOPHILS # BLD AUTO: 0.04 10*3/MM3 (ref 0–0.2)
BASOPHILS NFR BLD AUTO: 0.4 % (ref 0–1)
BLD GP AB SCN SERPL QL: NEGATIVE
DEPRECATED RDW RBC AUTO: 42.2 FL (ref 37–54)
EOSINOPHIL # BLD AUTO: 0.11 10*3/MM3 (ref 0–0.3)
EOSINOPHIL NFR BLD AUTO: 1.2 % (ref 0–3)
ERYTHROCYTE [DISTWIDTH] IN BLOOD BY AUTOMATED COUNT: 13.2 % (ref 11.3–14.5)
GLUCOSE BLD-MCNC: 88 MG/DL (ref 70–100)
HCT VFR BLD AUTO: 41 % (ref 34.5–44)
HGB BLD-MCNC: 13.4 G/DL (ref 11.5–15.5)
IMM GRANULOCYTES # BLD AUTO: 0.02 10*3/MM3 (ref 0–0.05)
IMM GRANULOCYTES NFR BLD AUTO: 0.2 % (ref 0–0.6)
LYMPHOCYTES # BLD AUTO: 2.65 10*3/MM3 (ref 0.6–4.8)
LYMPHOCYTES NFR BLD AUTO: 29.6 % (ref 24–44)
MCH RBC QN AUTO: 28.5 PG (ref 27–31)
MCHC RBC AUTO-ENTMCNC: 32.7 G/DL (ref 32–36)
MCV RBC AUTO: 87.2 FL (ref 80–99)
MONOCYTES # BLD AUTO: 0.58 10*3/MM3 (ref 0–1)
MONOCYTES NFR BLD AUTO: 6.5 % (ref 0–12)
NEUTROPHILS # BLD AUTO: 5.57 10*3/MM3 (ref 1.5–8.3)
NEUTROPHILS NFR BLD AUTO: 62.3 % (ref 41–71)
PLATELET # BLD AUTO: 323 10*3/MM3 (ref 150–450)
PMV BLD AUTO: 9.4 FL (ref 6–12)
RBC # BLD AUTO: 4.7 10*6/MM3 (ref 3.89–5.14)
RH BLD: POSITIVE
TSH SERPL DL<=0.05 MIU/L-ACNC: 3.41 MIU/ML (ref 0.35–5.35)
WBC NRBC COR # BLD: 8.95 10*3/MM3 (ref 3.5–10.8)

## 2019-02-27 PROCEDURE — 84443 ASSAY THYROID STIM HORMONE: CPT

## 2019-02-27 PROCEDURE — 86900 BLOOD TYPING SEROLOGIC ABO: CPT

## 2019-02-27 PROCEDURE — 86901 BLOOD TYPING SEROLOGIC RH(D): CPT

## 2019-02-27 PROCEDURE — 85025 COMPLETE CBC W/AUTO DIFF WBC: CPT

## 2019-02-27 PROCEDURE — 80081 OBSTETRIC PANEL INC HIV TSTG: CPT

## 2019-02-27 PROCEDURE — 82947 ASSAY GLUCOSE BLOOD QUANT: CPT

## 2019-02-27 PROCEDURE — 86803 HEPATITIS C AB TEST: CPT

## 2019-02-27 PROCEDURE — 86850 RBC ANTIBODY SCREEN: CPT

## 2019-02-27 PROCEDURE — 36415 COLL VENOUS BLD VENIPUNCTURE: CPT

## 2019-02-28 LAB
HBV SURFACE AG SERPL QL IA: NORMAL
HCV AB SER DONR QL: NORMAL
HIV1+2 AB SER QL: NORMAL
RUBV IGG SER QL: NORMAL
RUBV IGG SER-ACNC: 29.5 IU/ML

## 2019-03-01 LAB — RPR SER QL: NORMAL

## 2019-03-05 ENCOUNTER — TELEPHONE (OUTPATIENT)
Dept: FAMILY MEDICINE CLINIC | Facility: CLINIC | Age: 34
End: 2019-03-05

## 2019-03-05 DIAGNOSIS — E06.3 HYPOTHYROIDISM DUE TO HASHIMOTO'S THYROIDITIS: Primary | ICD-10-CM

## 2019-03-05 DIAGNOSIS — E03.8 HYPOTHYROIDISM DUE TO HASHIMOTO'S THYROIDITIS: Primary | ICD-10-CM

## 2019-03-05 RX ORDER — LEVOTHYROXINE SODIUM 0.12 MG/1
125 TABLET ORAL DAILY
Qty: 30 TABLET | Refills: 5 | Status: SHIPPED | OUTPATIENT
Start: 2019-03-05 | End: 2019-04-02

## 2019-03-05 NOTE — TELEPHONE ENCOUNTER
----- Message from Augustine Muniz, DO sent at 3/5/2019 10:42 AM EST -----  Please let patient know that I sent her in a higher dose of thyroid medication. Plan on rechecking TSH in 4 weeks.

## 2019-04-01 ENCOUNTER — LAB (OUTPATIENT)
Dept: LAB | Facility: HOSPITAL | Age: 34
End: 2019-04-01

## 2019-04-01 DIAGNOSIS — E03.8 HYPOTHYROIDISM DUE TO HASHIMOTO'S THYROIDITIS: ICD-10-CM

## 2019-04-01 DIAGNOSIS — E06.3 HYPOTHYROIDISM DUE TO HASHIMOTO'S THYROIDITIS: ICD-10-CM

## 2019-04-01 LAB — TSH SERPL DL<=0.05 MIU/L-ACNC: 3.68 MIU/ML (ref 0.35–5.35)

## 2019-04-01 PROCEDURE — 84443 ASSAY THYROID STIM HORMONE: CPT | Performed by: INTERNAL MEDICINE

## 2019-04-02 DIAGNOSIS — E06.3 HYPOTHYROIDISM DUE TO HASHIMOTO'S THYROIDITIS: Primary | ICD-10-CM

## 2019-04-02 DIAGNOSIS — E03.8 HYPOTHYROIDISM DUE TO HASHIMOTO'S THYROIDITIS: Primary | ICD-10-CM

## 2019-04-02 RX ORDER — LEVOTHYROXINE SODIUM 137 UG/1
137 CAPSULE ORAL DAILY
Qty: 30 CAPSULE | Refills: 5 | Status: SHIPPED | OUTPATIENT
Start: 2019-04-02 | End: 2019-04-02

## 2019-04-02 RX ORDER — LEVOTHYROXINE SODIUM 137 UG/1
137 TABLET ORAL DAILY
Qty: 30 TABLET | Refills: 5 | Status: SHIPPED | OUTPATIENT
Start: 2019-04-02 | End: 2019-05-03

## 2019-05-01 ENCOUNTER — LAB (OUTPATIENT)
Dept: LAB | Facility: HOSPITAL | Age: 34
End: 2019-05-01

## 2019-05-01 DIAGNOSIS — E03.8 HYPOTHYROIDISM DUE TO HASHIMOTO'S THYROIDITIS: ICD-10-CM

## 2019-05-01 DIAGNOSIS — E06.3 HYPOTHYROIDISM DUE TO HASHIMOTO'S THYROIDITIS: ICD-10-CM

## 2019-05-01 LAB
T4 FREE SERPL-MCNC: 1.21 NG/DL (ref 0.93–1.7)
TSH SERPL DL<=0.05 MIU/L-ACNC: 4.34 MIU/ML (ref 0.27–4.2)

## 2019-05-01 PROCEDURE — 84439 ASSAY OF FREE THYROXINE: CPT

## 2019-05-01 PROCEDURE — 84443 ASSAY THYROID STIM HORMONE: CPT | Performed by: INTERNAL MEDICINE

## 2019-05-03 DIAGNOSIS — E06.3 HYPOTHYROIDISM DUE TO HASHIMOTO'S THYROIDITIS: Primary | ICD-10-CM

## 2019-05-03 DIAGNOSIS — E03.8 HYPOTHYROIDISM DUE TO HASHIMOTO'S THYROIDITIS: Primary | ICD-10-CM

## 2019-05-03 RX ORDER — LEVOTHYROXINE SODIUM 0.15 MG/1
150 TABLET ORAL DAILY
Qty: 30 TABLET | Refills: 5 | Status: SHIPPED | OUTPATIENT
Start: 2019-05-03 | End: 2019-11-01 | Stop reason: SDUPTHER

## 2019-06-11 ENCOUNTER — LAB (OUTPATIENT)
Dept: LAB | Facility: HOSPITAL | Age: 34
End: 2019-06-11

## 2019-06-11 DIAGNOSIS — E03.8 HYPOTHYROIDISM DUE TO HASHIMOTO'S THYROIDITIS: ICD-10-CM

## 2019-06-11 DIAGNOSIS — E06.3 HYPOTHYROIDISM DUE TO HASHIMOTO'S THYROIDITIS: ICD-10-CM

## 2019-06-11 LAB — TSH SERPL DL<=0.05 MIU/L-ACNC: 1.5 MIU/ML (ref 0.27–4.2)

## 2019-06-11 PROCEDURE — 84443 ASSAY THYROID STIM HORMONE: CPT | Performed by: INTERNAL MEDICINE

## 2019-06-13 DIAGNOSIS — E06.3 HYPOTHYROIDISM DUE TO HASHIMOTO'S THYROIDITIS: Primary | ICD-10-CM

## 2019-06-13 DIAGNOSIS — E03.8 HYPOTHYROIDISM DUE TO HASHIMOTO'S THYROIDITIS: Primary | ICD-10-CM

## 2019-07-05 ENCOUNTER — LAB (OUTPATIENT)
Dept: LAB | Facility: HOSPITAL | Age: 34
End: 2019-07-05

## 2019-07-05 ENCOUNTER — TRANSCRIBE ORDERS (OUTPATIENT)
Dept: LAB | Facility: HOSPITAL | Age: 34
End: 2019-07-05

## 2019-07-05 DIAGNOSIS — Z3A.28 28 WEEKS GESTATION OF PREGNANCY: Primary | ICD-10-CM

## 2019-07-05 DIAGNOSIS — Z3A.28 28 WEEKS GESTATION OF PREGNANCY: ICD-10-CM

## 2019-07-05 DIAGNOSIS — Z34.83 PRENATAL CARE, SUBSEQUENT PREGNANCY, THIRD TRIMESTER: ICD-10-CM

## 2019-07-05 LAB
BLD GP AB SCN SERPL QL: NEGATIVE
DEPRECATED RDW RBC AUTO: 43 FL (ref 37–54)
ERYTHROCYTE [DISTWIDTH] IN BLOOD BY AUTOMATED COUNT: 13 % (ref 12.3–15.4)
HCT VFR BLD AUTO: 38.8 % (ref 34–46.6)
HGB BLD-MCNC: 12.3 G/DL (ref 12–15.9)
MCH RBC QN AUTO: 29.2 PG (ref 26.6–33)
MCHC RBC AUTO-ENTMCNC: 31.7 G/DL (ref 31.5–35.7)
MCV RBC AUTO: 92.2 FL (ref 79–97)
PLATELET # BLD AUTO: 257 10*3/MM3 (ref 140–450)
PMV BLD AUTO: 9.4 FL (ref 6–12)
RBC # BLD AUTO: 4.21 10*6/MM3 (ref 3.77–5.28)
WBC NRBC COR # BLD: 7.88 10*3/MM3 (ref 3.4–10.8)

## 2019-07-05 PROCEDURE — 82950 GLUCOSE TEST: CPT

## 2019-07-05 PROCEDURE — 85027 COMPLETE CBC AUTOMATED: CPT

## 2019-07-05 PROCEDURE — 36415 COLL VENOUS BLD VENIPUNCTURE: CPT

## 2019-07-05 PROCEDURE — 86850 RBC ANTIBODY SCREEN: CPT

## 2019-07-06 ENCOUNTER — HOSPITAL ENCOUNTER (OUTPATIENT)
Facility: HOSPITAL | Age: 34
Setting detail: OBSERVATION
Discharge: HOME | End: 2019-07-07
Attending: NURSE PRACTITIONER | Admitting: OBSTETRICS & GYNECOLOGY

## 2019-07-06 VITALS
HEIGHT: 63 IN | BODY MASS INDEX: 35.79 KG/M2 | TEMPERATURE: 98.6 F | HEART RATE: 76 BPM | RESPIRATION RATE: 16 BRPM | DIASTOLIC BLOOD PRESSURE: 61 MMHG | WEIGHT: 202 LBS | SYSTOLIC BLOOD PRESSURE: 108 MMHG

## 2019-07-06 PROBLEM — O46.90 BLEEDING FROM GENITAL TRACT DURING PREGNANCY: Status: ACTIVE | Noted: 2019-07-06

## 2019-07-06 LAB
BILIRUB UR QL STRIP: NEGATIVE
CLARITY UR: CLEAR
COLOR UR: YELLOW
GLUCOSE UR STRIP-MCNC: NEGATIVE MG/DL
HGB UR QL STRIP.AUTO: NEGATIVE
KETONES UR QL STRIP: NEGATIVE
LEUKOCYTE ESTERASE UR QL STRIP.AUTO: NEGATIVE
NITRITE UR QL STRIP: NEGATIVE
PH UR STRIP.AUTO: 6 [PH] (ref 5–8)
PROT UR QL STRIP: NEGATIVE
SP GR UR STRIP: <=1.005 (ref 1–1.03)
UROBILINOGEN UR QL STRIP: NORMAL

## 2019-07-06 PROCEDURE — G0378 HOSPITAL OBSERVATION PER HR: HCPCS

## 2019-07-06 PROCEDURE — 81003 URINALYSIS AUTO W/O SCOPE: CPT | Performed by: OBSTETRICS & GYNECOLOGY

## 2019-07-06 PROCEDURE — 99218 PR INITIAL OBSERVATION CARE/DAY 30 MINUTES: CPT | Performed by: OBSTETRICS & GYNECOLOGY

## 2019-07-06 PROCEDURE — 59025 FETAL NON-STRESS TEST: CPT | Performed by: OBSTETRICS & GYNECOLOGY

## 2019-07-06 PROCEDURE — 59025 FETAL NON-STRESS TEST: CPT

## 2019-07-06 RX ORDER — MAGNESIUM GLUCONATE 27 MG(500)
500 TABLET ORAL NIGHTLY
Status: ON HOLD | COMMUNITY
End: 2019-10-14

## 2019-07-07 NOTE — H&P
"Angelika Valencia Aureliano  1985  [id number]  [id number]    CC: vaginal bleeding  HPI:  Patient is 33 y.o. white female   currently at 26w3d presents with c/o spotting.  Onset ~1 hr ago, sees only with wiping after voiding.  Pt denies uterine contractions, abd pain, or any blood in underwear or pad.  No recent sexual activity.  Pt diagnosed with previa at 20 weeks gestation.  PNC comp by previa, and hypothyroidism    PMH:  Current meds: PNV, synthroid 150mcg, magnesium, claritan  Illnesses: hypothyroidism  Surgeries: ganglion cyst removed  Allergies: NKDA    Past OB History:       Obstetric History       T2      L2     SAB0   TAB0   Ectopic0   Molar0   Multiple0   Live Births2       # Outcome Date GA Lbr William/2nd Weight Sex Delivery Anes PTL Lv   3 Current            2 Term 17 40w0d 07:20 / 00:06 3620 g (7 lb 15.7 oz) F Vag-Spont None N SILVA      Name: AURELIANORAUDELADALI      Apgar1:  5                Apgar5: 9   1 Term 12 37w0d  3402 g (7 lb 8 oz) M Vag-Spont EPI N SILVA      Name: moustapha ojeda               SH: tob neg , EtOH neg, drugs neg  FH: heart dz neg , diabetes pos , cancer pos    General ROS: HA (resolved), edema (hands).   All other systems reviewed and are negative.      Physical Examination: General appearance - alert, well appearing, and in no distress  Vital signs - /61   Pulse 76   Temp 98.6 °F (37 °C) (Oral)   Resp 16   Ht 160 cm (63\")   Wt 91.6 kg (202 lb)   LMP 2019   BMI 35.78 kg/m²   HEENT: normocephalic, atraumatic,oropharynx clear, appearance of ears and nose normal  Neck - supple, no significant adenopathy, no thyromegaly  Lymphatics - no palpable lymphadenopathy in the neck or groin, no hepatosplenomegaly  Chest - clear to auscultation, no wheezes, rales or rhonchi, respiratory effort non-labored  Heart - normal rate, regular rhythm, normal S1, S2, no murmurs, rubs, clicks or gallops, no JVD, no lower extremity edema  Abdomen - soft, " nontender, nondistended, no masses, no hepatosplenomegaly  no rebound tenderness noted, bowel sounds normal  Vaginal Exam: closed, no blood in vault ,external genitalia normal  Extremities - no pedal edema noted, no calf tend  Skin -warm and dry, normal coloration and turgor, no rashes, no suspicious skin lesions noted      Fetal monitoring: indication bleeding , onset 2230 , offset 2309 , baseline 140 , mod BTB variability , multiple accels (10 X 10), no decels, no contractions, interpretation reactive NST    Radiology US; low lying placenta, unsure if still previa.  Normal fluid, active fetus    Assessment 1)IUP 26 3/7 weeks    2)bleeding- doubt vaginal   3)previa vs low lying placenta  4)hypothyroidism- stable on synthroid    Plan 1)observe    2)ccUA    3)home after UA result    Omega Rodgers MD  7/6/2019  11:06 PM

## 2019-07-08 LAB — GLUCOSE 1H P 100 G GLC PO SERPL-MCNC: 113 MG/DL

## 2019-08-13 ENCOUNTER — LAB (OUTPATIENT)
Dept: LAB | Facility: HOSPITAL | Age: 34
End: 2019-08-13

## 2019-08-13 ENCOUNTER — OFFICE VISIT (OUTPATIENT)
Dept: FAMILY MEDICINE CLINIC | Facility: CLINIC | Age: 34
End: 2019-08-13

## 2019-08-13 VITALS
TEMPERATURE: 98 F | OXYGEN SATURATION: 98 % | BODY MASS INDEX: 36.71 KG/M2 | DIASTOLIC BLOOD PRESSURE: 70 MMHG | WEIGHT: 207.2 LBS | HEIGHT: 63 IN | HEART RATE: 102 BPM | SYSTOLIC BLOOD PRESSURE: 114 MMHG

## 2019-08-13 DIAGNOSIS — E03.8 HYPOTHYROIDISM DUE TO HASHIMOTO'S THYROIDITIS: ICD-10-CM

## 2019-08-13 DIAGNOSIS — E78.49 OTHER HYPERLIPIDEMIA: ICD-10-CM

## 2019-08-13 DIAGNOSIS — E06.3 HYPOTHYROIDISM DUE TO HASHIMOTO'S THYROIDITIS: ICD-10-CM

## 2019-08-13 DIAGNOSIS — E06.3 HYPOTHYROIDISM DUE TO HASHIMOTO'S THYROIDITIS: Primary | ICD-10-CM

## 2019-08-13 DIAGNOSIS — E03.8 HYPOTHYROIDISM DUE TO HASHIMOTO'S THYROIDITIS: Primary | ICD-10-CM

## 2019-08-13 LAB — TSH SERPL DL<=0.05 MIU/L-ACNC: 0.89 MIU/ML (ref 0.27–4.2)

## 2019-08-13 PROCEDURE — 84443 ASSAY THYROID STIM HORMONE: CPT

## 2019-08-13 PROCEDURE — 99213 OFFICE O/P EST LOW 20 MIN: CPT | Performed by: INTERNAL MEDICINE

## 2019-08-13 RX ORDER — SERTRALINE HYDROCHLORIDE 100 MG/1
100 TABLET, FILM COATED ORAL DAILY
Refills: 1 | Status: ON HOLD | COMMUNITY
Start: 2019-07-11 | End: 2019-10-16 | Stop reason: SDUPTHER

## 2019-08-13 NOTE — PROGRESS NOTES
Chief Complaint:  hypothyroidism    HPI:  Angelika Vick is a 33 y.o. female who presents today for follow-up hyperthyroidism.  Patient currently pregnant will be due in October.  Currently taking 150 mcg of Synthroid.  Asymptomatic.  Denies any symptoms of hypothyroidism or hyperthyroidism.  She otherwise feels well.  She has no other acute complaints or concerns today.    ROS:  Constitutional: no fevers, night sweats or unexplained weight loss  Eyes: no vision changes  ENT: no runny nose, ear pain, sore throat  Cardio: no chest pain, palpitations  Pulm: no shortness of breath, wheezing, or cough  GI: no abdominal pain or changes in bowel movements  : no difficulty urinating  MSK: no difficulty ambulating, no joint pain  Neuro: no weakness, dizziness or headache  Psych: no trouble sleeping  Endo: no change in appetite      Past Medical History:   Diagnosis Date   • Acid reflux    • Bilateral ovarian cysts    • Depression    • Gestational hypertension     with last pregnancy   • Hypertension in pregnancy    • Migraines       Family History   Problem Relation Age of Onset   • Arthritis Mother    • Hypertension Mother    • Thyroid disease Sister    • Diabetes Sister    • Diabetes Maternal Grandfather       Social History     Socioeconomic History   • Marital status: Single     Spouse name: Not on file   • Number of children: Not on file   • Years of education: Not on file   • Highest education level: Not on file   Tobacco Use   • Smoking status: Former Smoker     Packs/day: 0.50     Types: Cigarettes     Last attempt to quit: 10/6/2016     Years since quittin.8   • Smokeless tobacco: Never Used   Substance and Sexual Activity   • Alcohol use: Yes     Comment: Occasionally   • Drug use: No   • Sexual activity: Yes     Partners: Male     Comment: pt expecting 3rd child.       No Known Allergies   Immunization History   Administered Date(s) Administered   • FLUARIX/FLUZONE/AFLURIA/FLULAVAL QUAD 10/22/2018    • Tdap 08/31/2017        PE:  Vitals:    08/13/19 0900   BP: 114/70   Pulse: 102   Temp: 98 °F (36.7 °C)   SpO2: 98%        Gen Appearance: NAD  HEENT: Normocephalic, PERRLA, no thyromegaly, trache midline  Heart: RRR, normal S1 and S2, no murmur  Lungs: CTA b/l, no wheezing, no crackles  Abdomen: Soft, non-tender, non-distended, no guarding and BSx4  MSK: Moves all extremities well, normal gait, no peripheral edema  Pulses: Palpable and equal b/l  Lymph nodes: No palpable lymphadenopathy   Neuro: No focal deficits      Current Outpatient Medications   Medication Sig Dispense Refill   • doxylamine (UNISOM) 25 MG tablet Take 25 mg by mouth At Night As Needed for Sleep.     • levothyroxine (SYNTHROID) 150 MCG tablet Take 1 tablet by mouth Daily. 30 tablet 5   • magnesium gluconate (MAGONATE) 500 MG tablet Take 500 mg by mouth Every Night.     • Prenatal Vit-Fe Fumarate-FA (PRENATAL, CLASSIC, VITAMIN) 28-0.8 MG tablet tablet Take  by mouth Daily.     • sertraline (ZOLOFT) 100 MG tablet Take 100 mg by mouth Daily.  1   • vitamin B-6 (PYRIDOXINE) 25 MG tablet Take 25 mg by mouth Daily.       No current facility-administered medications for this visit.         Angelika was seen today for anxiety.    Diagnoses and all orders for this visit:    Hypothyroidism due to Hashimoto's thyroiditis  Rechecking TSH today.  Continue on 150 mcg of Synthroid for now.  Will need to recheck TSH after delivery, will likely need reduction in dose.  Other hyperlipidemia  Elevated LDL on previous check.  Recheck lipid panel in 3 to 6 months after pregnancy.       No Follow-up on file.

## 2019-09-30 ENCOUNTER — PREP FOR SURGERY (OUTPATIENT)
Dept: OTHER | Facility: HOSPITAL | Age: 34
End: 2019-09-30

## 2019-09-30 RX ORDER — SODIUM CHLORIDE 0.9 % (FLUSH) 0.9 %
3 SYRINGE (ML) INJECTION EVERY 12 HOURS SCHEDULED
Status: CANCELLED | OUTPATIENT
Start: 2019-09-30

## 2019-09-30 RX ORDER — SODIUM CHLORIDE 0.9 % (FLUSH) 0.9 %
10 SYRINGE (ML) INJECTION AS NEEDED
Status: CANCELLED | OUTPATIENT
Start: 2019-09-30

## 2019-09-30 RX ORDER — LIDOCAINE HYDROCHLORIDE 10 MG/ML
5 INJECTION, SOLUTION EPIDURAL; INFILTRATION; INTRACAUDAL; PERINEURAL AS NEEDED
Status: CANCELLED | OUTPATIENT
Start: 2019-09-30

## 2019-09-30 RX ORDER — TERBUTALINE SULFATE 1 MG/ML
0.25 INJECTION, SOLUTION SUBCUTANEOUS AS NEEDED
Status: CANCELLED | OUTPATIENT
Start: 2019-09-30

## 2019-10-01 ENCOUNTER — HOSPITAL ENCOUNTER (OUTPATIENT)
Facility: HOSPITAL | Age: 34
Setting detail: OBSERVATION
Discharge: HOME | End: 2019-10-01
Attending: OBSTETRICS & GYNECOLOGY | Admitting: OBSTETRICS & GYNECOLOGY

## 2019-10-01 VITALS
HEART RATE: 98 BPM | BODY MASS INDEX: 37.03 KG/M2 | DIASTOLIC BLOOD PRESSURE: 67 MMHG | RESPIRATION RATE: 16 BRPM | HEIGHT: 63 IN | SYSTOLIC BLOOD PRESSURE: 115 MMHG | WEIGHT: 209 LBS | TEMPERATURE: 98.3 F

## 2019-10-01 PROBLEM — Z3A.38 PREGNANCY WITH 38 COMPLETED WEEKS GESTATION: Status: ACTIVE | Noted: 2019-10-01

## 2019-10-01 PROCEDURE — G0378 HOSPITAL OBSERVATION PER HR: HCPCS

## 2019-10-01 PROCEDURE — 59025 FETAL NON-STRESS TEST: CPT

## 2019-10-01 RX ORDER — LIDOCAINE HYDROCHLORIDE 10 MG/ML
5 INJECTION, SOLUTION EPIDURAL; INFILTRATION; INTRACAUDAL; PERINEURAL AS NEEDED
Status: DISCONTINUED | OUTPATIENT
Start: 2019-10-01 | End: 2019-10-01 | Stop reason: HOSPADM

## 2019-10-01 RX ORDER — TERBUTALINE SULFATE 1 MG/ML
0.25 INJECTION, SOLUTION SUBCUTANEOUS AS NEEDED
Status: DISCONTINUED | OUTPATIENT
Start: 2019-10-01 | End: 2019-10-01 | Stop reason: HOSPADM

## 2019-10-01 RX ORDER — SODIUM CHLORIDE 0.9 % (FLUSH) 0.9 %
3 SYRINGE (ML) INJECTION EVERY 12 HOURS SCHEDULED
Status: DISCONTINUED | OUTPATIENT
Start: 2019-10-01 | End: 2019-10-01 | Stop reason: HOSPADM

## 2019-10-01 RX ORDER — SODIUM CHLORIDE 0.9 % (FLUSH) 0.9 %
10 SYRINGE (ML) INJECTION AS NEEDED
Status: DISCONTINUED | OUTPATIENT
Start: 2019-10-01 | End: 2019-10-01 | Stop reason: HOSPADM

## 2019-10-01 NOTE — H&P
[place] [facility] [place]        Subjective     Patient is a 33 y.o. female  currently at 38w6d, who presents for external cephalic version.    The following portions of the patients history were reviewed and updated as appropriate: current medications, allergies, past medical history, past surgical history, past family history, past social history and problem list .       Prenatal Information:   Maternal Prenatal Labs  Blood Type No results found for: ABO   Rh Status No results found for: RH   Antibody Screen No results found for: ABSCRN             Past OB History:       Obstetric History       T2      L2     SAB0   TAB0   Ectopic0   Molar0   Multiple0   Live Births2       # Outcome Date GA Lbr William/2nd Weight Sex Delivery Anes PTL Lv   3 Current            2 Term 17 40w0d 07:20 / 00:06 3620 g (7 lb 15.7 oz) F Vag-Spont None N SILVA      Name: AURELIANORAUDELADALI      Apgar1:  5                Apgar5: 9   1 Term 12 37w0d  3402 g (7 lb 8 oz) M Vag-Spont EPI N SILVA      Name: moustapha ojeda          Past Medical History: Past Medical History:   Diagnosis Date   • Acid reflux    • Bilateral ovarian cysts    • Depression    • Gestational diabetes     with 2nd pregnancy   • Gestational hypertension     with last pregnancy   • Hypertension in pregnancy    • Migraines       Past Surgical History Past Surgical History:   Procedure Laterality Date   • CYST REMOVAL     • GANGLION CYST EXCISION Right       Family History: Family History   Problem Relation Age of Onset   • Arthritis Mother    • Hypertension Mother    • Thyroid disease Sister    • Diabetes Sister    • Diabetes Maternal Grandfather       Social History:  reports that she quit smoking about 2 years ago. Her smoking use included cigarettes. She smoked 0.50 packs per day. She has never used smokeless tobacco.   reports that she drinks alcohol.   reports that she does not use drugs.        General ROS: Pertinent items are  noted in HPI    Objective       Vital Signs Range for the last 24 hours  Temperature: Temp:  [98.3 °F (36.8 °C)] 98.3 °F (36.8 °C)   Temp Source: Temp src: Oral   BP: BP: (115)/(67) 115/67   Pulse: Heart Rate:  [98] 98   Respirations: Resp:  [16] 16   SPO2:     O2 Amount (l/min):     O2 Devices     Weight: Weight:  [94.8 kg (209 lb)] 94.8 kg (209 lb)      NST reactive            Assessment:  1. IUP at 38 weeks, breech has converted to vertex per laborist ultrasound.  Cancel ECV. DC home.         Thao Marie MD  10/1/2019  12:09 PM

## 2019-10-13 ENCOUNTER — HOSPITAL ENCOUNTER (INPATIENT)
Facility: HOSPITAL | Age: 34
LOS: 3 days | Discharge: HOME | End: 2019-10-16
Attending: NURSE PRACTITIONER | Admitting: OBSTETRICS & GYNECOLOGY

## 2019-10-13 PROBLEM — O36.8190 DECREASED FETAL MOVEMENT: Status: ACTIVE | Noted: 2019-10-13

## 2019-10-13 PROBLEM — Z34.90 PREGNANCY: Status: ACTIVE | Noted: 2019-10-13

## 2019-10-13 LAB
ABO GROUP BLD: NORMAL
ALP SERPL-CCNC: 113 U/L (ref 39–117)
ALT SERPL W P-5'-P-CCNC: 10 U/L (ref 1–33)
AST SERPL-CCNC: 14 U/L (ref 1–32)
BILIRUB SERPL-MCNC: 0.2 MG/DL (ref 0.2–1.2)
BLD GP AB SCN SERPL QL: NEGATIVE
CREAT BLD-MCNC: 0.53 MG/DL (ref 0.57–1)
DEPRECATED RDW RBC AUTO: 43.5 FL (ref 37–54)
ERYTHROCYTE [DISTWIDTH] IN BLOOD BY AUTOMATED COUNT: 13.5 % (ref 12.3–15.4)
HCT VFR BLD AUTO: 40.3 % (ref 34–46.6)
HGB BLD-MCNC: 13.1 G/DL (ref 12–15.9)
LDH SERPL-CCNC: 169 U/L (ref 135–214)
MCH RBC QN AUTO: 28.5 PG (ref 26.6–33)
MCHC RBC AUTO-ENTMCNC: 32.5 G/DL (ref 31.5–35.7)
MCV RBC AUTO: 87.8 FL (ref 79–97)
PLATELET # BLD AUTO: 227 10*3/MM3 (ref 140–450)
PMV BLD AUTO: 9.6 FL (ref 6–12)
RBC # BLD AUTO: 4.59 10*6/MM3 (ref 3.77–5.28)
RH BLD: POSITIVE
T&S EXPIRATION DATE: NORMAL
URATE SERPL-MCNC: 4.5 MG/DL (ref 2.4–5.7)
WBC NRBC COR # BLD: 11.39 10*3/MM3 (ref 3.4–10.8)

## 2019-10-13 PROCEDURE — 83615 LACTATE (LD) (LDH) ENZYME: CPT | Performed by: OBSTETRICS & GYNECOLOGY

## 2019-10-13 PROCEDURE — 86900 BLOOD TYPING SEROLOGIC ABO: CPT | Performed by: OBSTETRICS & GYNECOLOGY

## 2019-10-13 PROCEDURE — 84460 ALANINE AMINO (ALT) (SGPT): CPT | Performed by: OBSTETRICS & GYNECOLOGY

## 2019-10-13 PROCEDURE — 84450 TRANSFERASE (AST) (SGOT): CPT | Performed by: OBSTETRICS & GYNECOLOGY

## 2019-10-13 PROCEDURE — 86850 RBC ANTIBODY SCREEN: CPT | Performed by: OBSTETRICS & GYNECOLOGY

## 2019-10-13 PROCEDURE — 84075 ASSAY ALKALINE PHOSPHATASE: CPT | Performed by: OBSTETRICS & GYNECOLOGY

## 2019-10-13 PROCEDURE — 84550 ASSAY OF BLOOD/URIC ACID: CPT | Performed by: OBSTETRICS & GYNECOLOGY

## 2019-10-13 PROCEDURE — 82565 ASSAY OF CREATININE: CPT | Performed by: OBSTETRICS & GYNECOLOGY

## 2019-10-13 PROCEDURE — 82247 BILIRUBIN TOTAL: CPT | Performed by: OBSTETRICS & GYNECOLOGY

## 2019-10-13 PROCEDURE — 86901 BLOOD TYPING SEROLOGIC RH(D): CPT | Performed by: OBSTETRICS & GYNECOLOGY

## 2019-10-13 PROCEDURE — 59025 FETAL NON-STRESS TEST: CPT

## 2019-10-13 PROCEDURE — 85027 COMPLETE CBC AUTOMATED: CPT | Performed by: OBSTETRICS & GYNECOLOGY

## 2019-10-13 RX ORDER — SODIUM CHLORIDE, SODIUM LACTATE, POTASSIUM CHLORIDE, CALCIUM CHLORIDE 600; 310; 30; 20 MG/100ML; MG/100ML; MG/100ML; MG/100ML
125 INJECTION, SOLUTION INTRAVENOUS CONTINUOUS
Status: DISCONTINUED | OUTPATIENT
Start: 2019-10-13 | End: 2019-10-14

## 2019-10-13 RX ORDER — SODIUM CHLORIDE 0.9 % (FLUSH) 0.9 %
3 SYRINGE (ML) INJECTION EVERY 12 HOURS SCHEDULED
Status: DISCONTINUED | OUTPATIENT
Start: 2019-10-13 | End: 2019-10-14 | Stop reason: HOSPADM

## 2019-10-13 RX ORDER — MAGNESIUM CARB/ALUMINUM HYDROX 105-160MG
30 TABLET,CHEWABLE ORAL ONCE
Status: DISCONTINUED | OUTPATIENT
Start: 2019-10-13 | End: 2019-10-14 | Stop reason: HOSPADM

## 2019-10-13 RX ORDER — LIDOCAINE HYDROCHLORIDE 10 MG/ML
5 INJECTION, SOLUTION EPIDURAL; INFILTRATION; INTRACAUDAL; PERINEURAL AS NEEDED
Status: DISCONTINUED | OUTPATIENT
Start: 2019-10-13 | End: 2019-10-14 | Stop reason: HOSPADM

## 2019-10-13 RX ORDER — OXYTOCIN-SODIUM CHLORIDE 0.9% IV SOLN 30 UNIT/500ML 30-0.9/5 UT/ML-%
2-24 SOLUTION INTRAVENOUS
Status: DISCONTINUED | OUTPATIENT
Start: 2019-10-13 | End: 2019-10-14 | Stop reason: HOSPADM

## 2019-10-13 RX ORDER — SODIUM CHLORIDE 0.9 % (FLUSH) 0.9 %
10 SYRINGE (ML) INJECTION AS NEEDED
Status: DISCONTINUED | OUTPATIENT
Start: 2019-10-13 | End: 2019-10-14 | Stop reason: HOSPADM

## 2019-10-13 RX ADMIN — SODIUM CHLORIDE, POTASSIUM CHLORIDE, SODIUM LACTATE AND CALCIUM CHLORIDE 125 ML/HR: 600; 310; 30; 20 INJECTION, SOLUTION INTRAVENOUS at 21:05

## 2019-10-13 RX ADMIN — SODIUM CHLORIDE, POTASSIUM CHLORIDE, SODIUM LACTATE AND CALCIUM CHLORIDE 1000 ML: 600; 310; 30; 20 INJECTION, SOLUTION INTRAVENOUS at 20:38

## 2019-10-13 RX ADMIN — OXYTOCIN 2 MILLI-UNITS/MIN: 10 INJECTION, SOLUTION INTRAMUSCULAR; INTRAVENOUS at 21:06

## 2019-10-14 PROBLEM — O46.90 BLEEDING FROM GENITAL TRACT DURING PREGNANCY: Status: RESOLVED | Noted: 2019-07-06 | Resolved: 2019-10-14

## 2019-10-14 PROBLEM — O36.8190 DECREASED FETAL MOVEMENT: Status: RESOLVED | Noted: 2019-10-13 | Resolved: 2019-10-14

## 2019-10-14 PROCEDURE — 59025 FETAL NON-STRESS TEST: CPT

## 2019-10-14 PROCEDURE — 0HQ9XZZ REPAIR PERINEUM SKIN, EXTERNAL APPROACH: ICD-10-PCS | Performed by: NURSE PRACTITIONER

## 2019-10-14 RX ORDER — CARBOPROST TROMETHAMINE 250 UG/ML
250 INJECTION, SOLUTION INTRAMUSCULAR AS NEEDED
Status: DISCONTINUED | OUTPATIENT
Start: 2019-10-14 | End: 2019-10-14 | Stop reason: HOSPADM

## 2019-10-14 RX ORDER — OXYTOCIN-SODIUM CHLORIDE 0.9% IV SOLN 30 UNIT/500ML 30-0.9/5 UT/ML-%
85 SOLUTION INTRAVENOUS ONCE
Status: COMPLETED | OUTPATIENT
Start: 2019-10-14 | End: 2019-10-14

## 2019-10-14 RX ORDER — BISACODYL 10 MG
10 SUPPOSITORY, RECTAL RECTAL DAILY PRN
Status: DISCONTINUED | OUTPATIENT
Start: 2019-10-15 | End: 2019-10-16 | Stop reason: HOSPADM

## 2019-10-14 RX ORDER — MISOPROSTOL 200 UG/1
800 TABLET ORAL AS NEEDED
Status: DISCONTINUED | OUTPATIENT
Start: 2019-10-14 | End: 2019-10-14 | Stop reason: HOSPADM

## 2019-10-14 RX ORDER — SODIUM CHLORIDE 0.9 % (FLUSH) 0.9 %
1-10 SYRINGE (ML) INJECTION AS NEEDED
Status: DISCONTINUED | OUTPATIENT
Start: 2019-10-14 | End: 2019-10-16 | Stop reason: HOSPADM

## 2019-10-14 RX ORDER — DOCUSATE SODIUM 100 MG/1
100 CAPSULE, LIQUID FILLED ORAL 2 TIMES DAILY PRN
Status: DISCONTINUED | OUTPATIENT
Start: 2019-10-14 | End: 2019-10-16 | Stop reason: HOSPADM

## 2019-10-14 RX ORDER — CALCIUM CARBONATE 750 MG/1
750 TABLET, CHEWABLE ORAL 3 TIMES DAILY PRN
Status: DISCONTINUED | OUTPATIENT
Start: 2019-10-14 | End: 2019-10-14

## 2019-10-14 RX ORDER — ONDANSETRON 4 MG/1
4 TABLET, FILM COATED ORAL EVERY 8 HOURS PRN
Status: DISCONTINUED | OUTPATIENT
Start: 2019-10-14 | End: 2019-10-16 | Stop reason: HOSPADM

## 2019-10-14 RX ORDER — ACETAMINOPHEN 325 MG/1
650 TABLET ORAL EVERY 4 HOURS PRN
Status: DISCONTINUED | OUTPATIENT
Start: 2019-10-14 | End: 2019-10-16 | Stop reason: HOSPADM

## 2019-10-14 RX ORDER — IBUPROFEN 600 MG/1
600 TABLET ORAL EVERY 6 HOURS PRN
Status: DISCONTINUED | OUTPATIENT
Start: 2019-10-14 | End: 2019-10-16 | Stop reason: HOSPADM

## 2019-10-14 RX ORDER — SERTRALINE HYDROCHLORIDE 100 MG/1
100 TABLET, FILM COATED ORAL DAILY
Status: DISCONTINUED | OUTPATIENT
Start: 2019-10-14 | End: 2019-10-16 | Stop reason: HOSPADM

## 2019-10-14 RX ORDER — METHYLERGONOVINE MALEATE 0.2 MG/ML
200 INJECTION INTRAVENOUS ONCE AS NEEDED
Status: DISCONTINUED | OUTPATIENT
Start: 2019-10-14 | End: 2019-10-14 | Stop reason: HOSPADM

## 2019-10-14 RX ORDER — OXYTOCIN-SODIUM CHLORIDE 0.9% IV SOLN 30 UNIT/500ML 30-0.9/5 UT/ML-%
650 SOLUTION INTRAVENOUS ONCE
Status: DISCONTINUED | OUTPATIENT
Start: 2019-10-14 | End: 2019-10-14 | Stop reason: HOSPADM

## 2019-10-14 RX ORDER — PRENATAL VIT/IRON FUM/FOLIC AC 27MG-0.8MG
1 TABLET ORAL DAILY
Status: DISCONTINUED | OUTPATIENT
Start: 2019-10-14 | End: 2019-10-16 | Stop reason: HOSPADM

## 2019-10-14 RX ADMIN — IBUPROFEN 600 MG: 600 TABLET, FILM COATED ORAL at 19:50

## 2019-10-14 RX ADMIN — BENZOCAINE 1 APPLICATION: 5.6 OINTMENT TOPICAL at 15:27

## 2019-10-14 RX ADMIN — WITCH HAZEL 1 PAD: 500 SOLUTION RECTAL; TOPICAL at 15:27

## 2019-10-14 RX ADMIN — IBUPROFEN 600 MG: 600 TABLET, FILM COATED ORAL at 13:01

## 2019-10-14 RX ADMIN — SERTRALINE HYDROCHLORIDE 100 MG: 100 TABLET ORAL at 23:11

## 2019-10-14 RX ADMIN — Medication: at 15:28

## 2019-10-14 RX ADMIN — LEVOTHYROXINE SODIUM 150 MCG: 150 TABLET ORAL at 08:15

## 2019-10-14 RX ADMIN — SODIUM CHLORIDE, POTASSIUM CHLORIDE, SODIUM LACTATE AND CALCIUM CHLORIDE 125 ML/HR: 600; 310; 30; 20 INJECTION, SOLUTION INTRAVENOUS at 04:57

## 2019-10-14 RX ADMIN — OXYTOCIN 85 ML/HR: 10 INJECTION, SOLUTION INTRAMUSCULAR; INTRAVENOUS at 13:42

## 2019-10-14 NOTE — L&D DELIVERY NOTE
[facility]  Vaginal Delivery Note    Delivery     Delivery: Vaginal, Spontaneous     YOB: 2018    Time of Birth:  Gestational Age 12:14 PM   40w5d     Anesthesia: None     Delivering clinician: Samanta Lockett    Forceps?   No   Vacuum? No    Shoulder dystocia present: No        Delivery narrative:  Angelika pushed effectively to deliver a live born female infant over a first degree laceration with no anesthesia. Infant vigorous at delivery and placed on maternal abdomen where after cord stopped pulsing and was milked x 4 cord was double clamped and cut by fob. Placenta delivered spontaneously and was visually intact. Fundus firm, bleeding light. First degree and right labial laceration repaired with local lidocaine anesthesia. Patient tolerated procedure well. EBL 250ml.     Infant    Findings: female  infant     Infant observations: Weight: 3810 g (8 lb 6.4 oz)   Length: 20.5  in  Observations/Comments:         Apgars:   7 @ 1 minute /      9 @ 5 minutes   Infant Name: Bryce Nur     Placenta, Cord, and Fluid    Placenta delivered  Spontaneous  at   10/14/2019 12:30 PM     Cord: 3 vessels  present.   Nuchal Cord?  no   Cord blood obtained: Yes    Cord gases obtained:  No              Repair    Episiotomy: None    Lacerations: Yes  Laceration Information  Laceration Repaired?   Perineal: 1st  Yes    Periurethral:         Labial: right  Yes    Sulcus:         Vaginal:         Cervical:           Suture used for repair: 3-0 Vicryl rapide   Estimated Blood Loss: Est. Blood Loss (mL): 250 mL(Filed from Delivery Summary) (10/14/19 1214)           Complications  none    Disposition  Mother to Mother Baby/Postpartum  in stable condition currently.  Baby to remains with mom  in stable condition currently.      Samanta Lockett CNM  10/14/19  1:32 PM

## 2019-10-14 NOTE — PROGRESS NOTES
[facility]  Obstetric Progress Note    Subjective     Patient:    Resting without complaints, feeling occasional contractions, up on birthing ball    Objective     Vital Signs Range for the last 24 hours  Temp:  [97.7 °F (36.5 °C)-98.1 °F (36.7 °C)] 97.7 °F (36.5 °C)   Temp src: Oral   BP: ()/(50-80) 108/63   Heart Rate:  [] 76   Resp:  [16] 16               Weight:  [96.6 kg (213 lb)] 96.6 kg (213 lb)       Intake/Output this shift:    No intake/output data recorded.    Physical Exam:      Abdomen Abdominal exam: soft, nontender, nondistended, no masses or organomegaly.   Extremities Exam of extremities: peripheral pulses normal, no pedal edema, no clubbing or cyanosis     Presentation: vertex   Cervix: S Levy CNM   Dilation:  4   Effacement: Cervical Effacement: 80%   Station:  -3         Fetal Heart Rate Assessment   Method: Fetal HR Assessment Method: external   Beats/min: Fetal HR (beats/min): 135   Baseline: Fetal Heart Baseline Rate: normal range   Varibility: Fetal HR Variability: moderate (amplitude range 6 to 25 bpm)   Accels: Fetal HR Accelerations: absent   Decels: Fetal HR Decelerations: absent   Tracing Category:       Uterine Assessment   Method: Method: external tocotransducer, palpation   Frequency (min): Contraction Frequency (Minutes): 3-4   Ctx Count in 10 min:     Duration:     Intensity: Contraction Intensity: moderate by palpation   Intensity by IUPC:     Resting Tone: Uterine Resting Tone: soft by palpation   Resting Tone by IUPC:     [place] Units:         Assessment/Plan       Decreased fetal movement    Pregnancy        Assessment:  1.  Intrauterine pregnancy at 40w5d weeks gestation with reactive fetal status.    2.  induction of labor  for postdates pregnancy  with favorable cervix  3.  Obstetrical history significant for two previous full term vaginal deliveries.  4.  GBS status:negative  Plan:  1. Continue current plan of care, arom when fetal head engaged  2.   Repeat SVE every 2-4 hours or prn  3.   Plan of care has been reviewed with patient and family  4.  Risks, benefits of treatment plan have been discussed.  5.  All questions have been answered.  6.  Patient desires natural labor and delivery      Samanta Lockett CNM  10/14/2019  8:43 AM

## 2019-10-14 NOTE — H&P
[facility]  Obstetric History and Physical    Chief Complaint   Patient presents with   • Decreased Fetal Movement     pt sts baby just isn't moving as much       Subjective     Patient is a 34 y.o. female  currently at 40w5d, who presented this afternoon with complaints of decreased fetal movement.  testing was reassuring.  She is scheduled for IOL later this week for post dates pregnancy.  She was given the option to start IOL this evening.  She desired to stay at the hospital for IOL.     Her prenatal care is benign.  Her previous obstetric/gynecological history is noted for is non-contributory.    The following portions of the patients history were reviewed and updated as appropriate: current medications, allergies, past medical history, past surgical history, past family history, past social history and problem list .       Prenatal Information:  Prenatal Results     Initial Prenatal Labs     Test Value Reference Range Date Time    Hemoglobin 13.4 g/dL 11.5 - 15.5 g/dL 19 1002    Hematocrit 41.0 % 34.5 - 44.0 % 19 1002    Platelets 227 10*3/mm3 140 - 450 10*3/mm3 10/13/19 2029    Rubella IgG 29.5 IU/mL >=5.0 IU/mL 19 1002      Immune  Immune 19 1002    Hepatitis B SAg Non-Reactive  Non-Reactive 19 1002    Hepatitis C Ab Non-Reactive  Non-Reactive 19 1002    RPR Non-Reactive  Non-Reactive 19 1002    ABO O   10/13/19 2030    Rh Positive   10/13/19 2030    Antibody Screen Negative   19 1002    HIV Non-Reactive  Non-Reactive 19 1002    Urine Culture        Gonorrhea Negative   17     Chlamydia Negative   17     TSH 0.889 mIU/mL 0.270 - 4.200 mIU/mL 19 0925          2nd and 3rd Trimester     Test Value Reference Range Date Time    Hemoglobin (repeated) 13.1 g/dL 12.0 - 15.9 g/dL 10/13/19 2029    Hematocrit (repeated) 40.3 % 34.0 - 46.6 % 10/13/19 2029     mg/dL mg/dL 19 1006    Antibody Screen (repeated) Negative    10/13/19 2030    GTT Fasting        GTT 1 Hr 174   01/06/17     GTT 2 Hr        GTT 3 Hr        Group B Strep              Drug Screening     Test Value Reference Range Date Time    Amphetamine Screen        Barbiturate Screen        Benzodiazepine Screen        Methadone Screen        Phencyclidine Screen        Opiates Screen        THC Screen        Cocaine Screen        Propoxyphene Screen        Buprenorphine Screen        Methamphetamine Screen        Oxycodone Screen        Tricyclic Antidepressants Screen              Other (Risk screening)     Test Value Reference Range Date Time    Varicella IgG        Parvovirus IgG        CMV IgG        Cystic Fibrosis        Hemoglobin electrophoresis        NIPT        MSAFP-4        AFP (for NTD only)                  External Prenatal Results     Pregnancy Outside Results - Transcribed From Office Records - See Scanned Records For Details     Test Value Date Time    Hgb 13.1 g/dL 10/13/19 2029    Hct 40.3 % 10/13/19 2029    ABO O  10/13/19 2030    Rh Positive  10/13/19 2030    Antibody Screen Negative  10/13/19 2030    Glucose Fasting GTT       Glucose Tolerance Test 1 hour 174  01/06/17     Glucose Tolerance Test 3 hour       Gonorrhea (discrete) Negative  01/06/17     Chlamydia (discrete) Negative  01/06/17     RPR Non-Reactive  02/27/19 1002    VDRL       Syphilis Antibody       Rubella 29.5 IU/mL 02/27/19 1002      Immune  02/27/19 1002    HBsAg Non-Reactive  02/27/19 1002    Herpes Simplex Virus PCR       Herpes Simplex VIrus Culture       HIV Non-Reactive  02/27/19 1002    Hep C RNA Quant PCR       Hep C Antibody Non-Reactive  02/27/19 1002    AFP       Group B Strep Negative  08/15/17     GBS Susceptibility to Clindamycin       GBS Susceptibility to Erythromycin       Fetal Fibronectin       Genetic Testing, Maternal Blood             Drug Screening     Test Value Date Time    Urine Drug Screen neg  01/06/17     Amphetamine Screen Negative  01/09/17 1509     Barbiturate Screen Negative  17 1509    Benzodiazepine Screen Negative  17 1509    Methadone Screen Negative  17 1509    Phencyclidine Screen Negative  17 1509    Opiates Screen Negative  17 1509    THC Screen Negative  17 1509    Cocaine Screen       Propoxyphene Screen Negative  17 1509    Buprenorphine Screen Negative  17 1509    Methamphetamine Screen       Oxycodone Screen Negative  17 1509    Tricyclic Antidepressants Screen Negative  17 1509                 Past OB History:     Obstetric History       T2      L2     SAB0   TAB0   Ectopic0   Molar0   Multiple0   Live Births2       # Outcome Date GA Lbr William/2nd Weight Sex Delivery Anes PTL Lv   3 Current            2 Term 17 40w0d 07:20 / 00:06 3620 g (7 lb 15.7 oz) F Vag-Spont None N SILVA      Name: BLANCA BEDOYA      Apgar1:  5                Apgar5: 9   1 Term 12 37w0d  3402 g (7 lb 8 oz) M Vag-Spont EPI N SILVA      Name: moustapha diazkay          Past Medical History: Past Medical History:   Diagnosis Date   • Acid reflux    • Bilateral ovarian cysts    • Depression    • Gestational diabetes     with 2nd pregnancy   • Gestational hypertension     with last pregnancy   • Hypertension in pregnancy    • Migraines       Past Surgical History Past Surgical History:   Procedure Laterality Date   • CYST REMOVAL     • GANGLION CYST EXCISION Right       Family History: Family History   Problem Relation Age of Onset   • Arthritis Mother    • Hypertension Mother    • Thyroid disease Sister    • Diabetes Sister    • Diabetes Maternal Grandfather       Social History:  reports that she quit smoking about 3 years ago. Her smoking use included cigarettes. She smoked 0.50 packs per day. She has never used smokeless tobacco.   reports that she drinks alcohol.   reports that she does not use drugs.        Review of Systems   Constitutional: Negative.    HENT: Negative.    Eyes:  Negative.    Respiratory: Negative.    Cardiovascular: Negative.    Gastrointestinal: Negative.    Endocrine: Negative.    Genitourinary: Negative.    Musculoskeletal: Negative.    Skin: Negative.    Allergic/Immunologic: Negative.    Neurological: Negative.    Hematological: Negative.    Psychiatric/Behavioral: Negative.          Objective     Vital Signs Range for the last 24 hours  Temperature: Temp:  [97.7 °F (36.5 °C)-97.9 °F (36.6 °C)] 97.9 °F (36.6 °C)   Temp Source: Temp src: Oral   BP: BP: ()/(54-74) 103/60   Pulse: Heart Rate:  [] 73   Respirations: Resp:  [16] 16   SPO2:     O2 Amount (l/min):     O2 Devices     Weight: Weight:  [96.6 kg (213 lb)] 96.6 kg (213 lb)     Physical Examination: General appearance - alert, well appearing, and in no distress  Nose - normal and patent, no erythema, discharge or polyps  Neck - supple, no significant adenopathy  Chest - clear to auscultation, no wheezes, rales or rhonchi, symmetric air entry  Heart - normal rate, regular rhythm, normal S1, S2, no murmurs, rubs, clicks or gallops  Abdomen - soft, nontender, nondistended, no masses or organomegaly  Extremities - peripheral pulses normal, no pedal edema, no clubbing or cyanosis    Presentation: vertex   Cervix: Exam by:   RN   Dilation: Cervical Dilation (cm): 3   Effacement: Cervical Effacement: 80%   Station:   -2     Fetal Heart Rate Assessment   Method: Fetal HR Assessment Method: external   Beats/min: Fetal HR (beats/min): 125   Baseline: Fetal Heart Baseline Rate: normal range   Variability: Fetal HR Variability: moderate (amplitude range 6 to 25 bpm)   Accels: Fetal HR Accelerations: greater than/equal to 15 bpm, lasting at least 15 seconds   Decels: Fetal HR Decelerations: absent   Tracing Category:       Uterine Assessment   Method: Method: external tocotransducer   Frequency (min): Contraction Frequency (Minutes): (irregular ctx)   Ctx Count in 10 min:     Duration:     Intensity: Contraction  Intensity: moderate by palpation   Intensity by IUPC:     Resting Tone: Uterine Resting Tone: soft by palpation   Resting Tone by IUPC:     [place] Units:         Assessment/Plan       Decreased fetal movement    Pregnancy      Assessment & Plan    Assessment:  1.  Intrauterine pregnancy at 40w5d gestation with reactive fetal status.    2.  induction of labor  for postdates pregnancy  with favorable cervix  3.  Obstetrical history significant for is non-contributory.  4.  GBS status: No results found for: STREPGPB    Plan:  1. fetal and uterine monitoring  continuously and labor augmentation  Pitocin  2. Plan of care has been reviewed with patient and she verbalizes understanding  3.  Risks, benefits of treatment plan have been discussed.  4.  All questions have been answered.        Fannie Vick CNM  10/14/2019  12:29 AM

## 2019-10-14 NOTE — LACTATION NOTE
Mom reports baby latched and nursed well post delivery.  Teaching done, information given.       10/14/19 1410   Maternal Information   Date of Referral 10/14/19   Person Making Referral other (see comments)  (courtesy)   Maternal Infant Feeding   Maternal Emotional State relaxed;independent   Equipment Type   Breast Pump Type double electric, personal   Reproductive Interventions   Breast Care: Breastfeeding frequency of feeding adjusted   Breastfeeding Assistance feeding cue recognition promoted;feeding on demand promoted;support offered   Breastfeeding Support encouragement provided;lactation counseling provided   Coping/Psychosocial Interventions   Parent/Child Attachment Promotion cue recognition promoted;face-to-face positioning promoted;participation in care promoted;skin-to-skin contact encouraged   Supportive Measures counseling provided

## 2019-10-14 NOTE — PLAN OF CARE
Problem: Labor (Cervical Ripen, Induct, Augment) (Adult,Obstetrics,Pediatric)  Goal: Signs and Symptoms of Listed Potential Problems Will be Absent, Minimized or Managed (Labor)  Outcome: Outcome(s) achieved Date Met: 10/14/19   10/14/19 2044   Goal/Outcome Evaluation   Problems Assessed (Labor) all   Problems Present (Labor) none

## 2019-10-15 ENCOUNTER — HOSPITAL ENCOUNTER (OUTPATIENT)
Dept: LABOR AND DELIVERY | Facility: HOSPITAL | Age: 34
Discharge: HOME | End: 2019-10-15

## 2019-10-15 LAB
BASOPHILS # BLD AUTO: 0.05 10*3/MM3 (ref 0–0.2)
BASOPHILS NFR BLD AUTO: 0.4 % (ref 0–1.5)
DEPRECATED RDW RBC AUTO: 44.2 FL (ref 37–54)
EOSINOPHIL # BLD AUTO: 0.12 10*3/MM3 (ref 0–0.4)
EOSINOPHIL NFR BLD AUTO: 1 % (ref 0.3–6.2)
ERYTHROCYTE [DISTWIDTH] IN BLOOD BY AUTOMATED COUNT: 13.5 % (ref 12.3–15.4)
HCT VFR BLD AUTO: 38.2 % (ref 34–46.6)
HGB BLD-MCNC: 12.2 G/DL (ref 12–15.9)
IMM GRANULOCYTES # BLD AUTO: 0.04 10*3/MM3 (ref 0–0.05)
IMM GRANULOCYTES NFR BLD AUTO: 0.3 % (ref 0–0.5)
LYMPHOCYTES # BLD AUTO: 2.51 10*3/MM3 (ref 0.7–3.1)
LYMPHOCYTES NFR BLD AUTO: 21.9 % (ref 19.6–45.3)
MCH RBC QN AUTO: 28.7 PG (ref 26.6–33)
MCHC RBC AUTO-ENTMCNC: 31.9 G/DL (ref 31.5–35.7)
MCV RBC AUTO: 89.9 FL (ref 79–97)
MONOCYTES # BLD AUTO: 0.9 10*3/MM3 (ref 0.1–0.9)
MONOCYTES NFR BLD AUTO: 7.8 % (ref 5–12)
NEUTROPHILS # BLD AUTO: 7.86 10*3/MM3 (ref 1.7–7)
NEUTROPHILS NFR BLD AUTO: 68.6 % (ref 42.7–76)
NRBC BLD AUTO-RTO: 0 /100 WBC (ref 0–0.2)
PLATELET # BLD AUTO: 187 10*3/MM3 (ref 140–450)
PMV BLD AUTO: 9.9 FL (ref 6–12)
RBC # BLD AUTO: 4.25 10*6/MM3 (ref 3.77–5.28)
WBC NRBC COR # BLD: 11.48 10*3/MM3 (ref 3.4–10.8)

## 2019-10-15 PROCEDURE — 85025 COMPLETE CBC W/AUTO DIFF WBC: CPT | Performed by: NURSE PRACTITIONER

## 2019-10-15 RX ORDER — LANOLIN
CREAM (ML) TOPICAL AS NEEDED
Status: DISCONTINUED | OUTPATIENT
Start: 2019-10-15 | End: 2019-10-16 | Stop reason: HOSPADM

## 2019-10-15 RX ADMIN — IBUPROFEN 600 MG: 600 TABLET, FILM COATED ORAL at 05:59

## 2019-10-15 RX ADMIN — IBUPROFEN 600 MG: 600 TABLET, FILM COATED ORAL at 20:41

## 2019-10-15 RX ADMIN — DOCUSATE SODIUM 100 MG: 100 CAPSULE, LIQUID FILLED ORAL at 01:33

## 2019-10-15 RX ADMIN — LEVOTHYROXINE SODIUM 150 MCG: 25 TABLET ORAL at 06:09

## 2019-10-15 NOTE — PROGRESS NOTES
[facility]  Vaginal Delivery Progress Note    Subjective     Doing well, pain controlled, lochia less than menses      Objective     Vital Signs Range for the last 24 hours  Temperature: Temp:  [98.1 °F (36.7 °C)-99 °F (37.2 °C)] 98.8 °F (37.1 °C)   Temp Source: Temp src: Oral   BP: BP: (109-132)/(55-80) 127/74   Pulse: Heart Rate:  [75-93] 90   Respirations: Resp:  [16-20] 18   SPO2:     O2 Amount (l/min):     O2 Devices           Physical Exam:  General:  no acute distresss.  Abdomen: Soft, non-tender, fundus firm  Extremities: normal, atraumatic, no cyanosis, and trace edema.       Lab results reviewed:  Yes    Lab Results   Component Value Date    WBC 11.48 (H) 10/15/2019    HGB 12.2 10/15/2019    HCT 38.2 10/15/2019    MCV 89.9 10/15/2019     10/15/2019         Assessment/Plan       Pregnancy     (normal spontaneous vaginal delivery)      Angelika Vick is Day 1  post-partum       Plan:  Continue current care.      Samanta Lockett CNM  10/15/2019  8:25 AM

## 2019-10-15 NOTE — LACTATION NOTE
10/15/19 0815   Maternal Information   Date of Referral 10/15/19   Person Making Referral other (see comments)  (courtesy)   Maternal Reason for Referral breastfeeding currently   Maternal Assessment   Breast Size Issue none   Breast Shape Bilateral:;round   Breast Density Bilateral:;soft   Nipples Bilateral:;everted   Left Nipple Symptoms tender   Right Nipple Symptoms tender   Maternal Infant Feeding   Maternal Emotional State relaxed;independent   Infant Positioning cradle;cross-cradle  (adjusted mom from cradle to CC)   Signs of Milk Transfer infant jaw motion present   Comfort Measures Before/During Feeding maternal position adjusted;latch adjusted   Latch Assistance yes

## 2019-10-16 VITALS
WEIGHT: 213 LBS | DIASTOLIC BLOOD PRESSURE: 84 MMHG | RESPIRATION RATE: 16 BRPM | HEIGHT: 63 IN | SYSTOLIC BLOOD PRESSURE: 139 MMHG | BODY MASS INDEX: 37.74 KG/M2 | HEART RATE: 73 BPM | TEMPERATURE: 98.4 F

## 2019-10-16 PROBLEM — Z34.90 PREGNANCY: Status: RESOLVED | Noted: 2019-10-13 | Resolved: 2019-10-16

## 2019-10-16 RX ORDER — IBUPROFEN 600 MG/1
600 TABLET ORAL EVERY 6 HOURS PRN
Qty: 90 TABLET | Refills: 0 | Status: SHIPPED | OUTPATIENT
Start: 2019-10-16 | End: 2020-02-24

## 2019-10-16 RX ORDER — SERTRALINE HYDROCHLORIDE 100 MG/1
100 TABLET, FILM COATED ORAL DAILY
Qty: 90 TABLET | Refills: 1 | Status: SHIPPED | OUTPATIENT
Start: 2019-10-16 | End: 2020-05-14

## 2019-10-16 RX ORDER — DOCUSATE SODIUM 100 MG/1
100 CAPSULE, LIQUID FILLED ORAL 2 TIMES DAILY PRN
Qty: 60 CAPSULE | Refills: 1 | Status: SHIPPED | OUTPATIENT
Start: 2019-10-16 | End: 2020-02-24

## 2019-10-16 RX ADMIN — PRENATAL VITAMINS-IRON FUMARATE 27 MG IRON-FOLIC ACID 0.8 MG TABLET 1 TABLET: at 08:17

## 2019-10-16 RX ADMIN — DOCUSATE SODIUM 100 MG: 100 CAPSULE, LIQUID FILLED ORAL at 08:17

## 2019-10-16 RX ADMIN — SERTRALINE HYDROCHLORIDE 100 MG: 100 TABLET ORAL at 08:18

## 2019-10-16 RX ADMIN — LEVOTHYROXINE SODIUM 150 MCG: 25 TABLET ORAL at 08:17

## 2019-10-16 NOTE — PROGRESS NOTES
[facility]  Vaginal Delivery Progress Note    Subjective     Doing well, pain controlled, lochia less than menses      Objective     Vital Signs Range for the last 24 hours  Temperature: Temp:  [98.4 °F (36.9 °C)-98.7 °F (37.1 °C)] 98.4 °F (36.9 °C)   Temp Source: Temp src: Oral   BP: BP: (111-139)/(67-84) 139/84   Pulse: Heart Rate:  [73-79] 73   Respirations: Resp:  [16] 16   SPO2:     O2 Amount (l/min):     O2 Devices           Physical Exam:  General:  no acute distresss.  Abdomen: Soft, non-tender, fundus firm  Extremities: normal, atraumatic, no cyanosis, and trace edema.       Lab results reviewed:  Yes    Lab Results   Component Value Date    WBC 11.48 (H) 10/15/2019    HGB 12.2 10/15/2019    HCT 38.2 10/15/2019    MCV 89.9 10/15/2019     10/15/2019         Assessment/Plan       Pregnancy     (normal spontaneous vaginal delivery)      Angelika Vick is Day 2  post-partum       Plan:  Discharge home with standard precautions and return to clinic in 4-6 weeks.      Samanta Lockett CNM  10/16/2019  9:19 AM

## 2019-10-16 NOTE — DISCHARGE SUMMARY
[facility]  Delivery Discharge Summary    Primary OB Clinician:     EDC: Estimated Date of Delivery: 10/9/19    Gestational Age:40w5d    Date of Admission: 10/13/2019    Admission Diagnosis:      Discharge Diagnosis: Same    Date of Delivery: 10/14/2019   Time of Delivery: 12:14 PM     Delivered By:  Samanta Lockett     Delivery Type: Vaginal, Spontaneous          Baby:@BABYNOHDR(SEX)@ infant;   Apgar:  7   @ 1 minute /   Apgar:  9   @ 5 minutes       Anesthesia: None      Laceration: Yes  Laceration Information  Laceration Repaired?   Perineal: 1st  Yes    Periurethral:         Labial: right  Yes    Sulcus:         Vaginal:         Cervical:               Exam:  Normal postpartum exam    Hospital Course:  Hospital course has been stable.  Patient is tolerating po, voiding without difficulty and ready for discharge.  Please see medication reconciliation and lab report for additional details.      Follow Up:  Patient has been given a follow up appointment in our office in 6 weeks. Postpartum depression, mastitis, and lochia warning signs reviewed. Pelvic rest for 6 weeks.  Discharge Date: 10/16/2019; Discharge Time: 9:24 AM

## 2019-11-01 DIAGNOSIS — E03.8 HYPOTHYROIDISM DUE TO HASHIMOTO'S THYROIDITIS: ICD-10-CM

## 2019-11-01 DIAGNOSIS — E06.3 HYPOTHYROIDISM DUE TO HASHIMOTO'S THYROIDITIS: ICD-10-CM

## 2019-11-01 RX ORDER — LEVOTHYROXINE SODIUM 0.15 MG/1
150 TABLET ORAL DAILY
Qty: 30 TABLET | Refills: 0 | Status: SHIPPED | OUTPATIENT
Start: 2019-11-01 | End: 2019-12-01 | Stop reason: SDUPTHER

## 2019-12-01 DIAGNOSIS — E06.3 HYPOTHYROIDISM DUE TO HASHIMOTO'S THYROIDITIS: ICD-10-CM

## 2019-12-01 DIAGNOSIS — E03.8 HYPOTHYROIDISM DUE TO HASHIMOTO'S THYROIDITIS: ICD-10-CM

## 2019-12-02 RX ORDER — LEVOTHYROXINE SODIUM 0.15 MG/1
150 TABLET ORAL DAILY
Qty: 30 TABLET | Refills: 2 | Status: SHIPPED | OUTPATIENT
Start: 2019-12-02 | End: 2020-02-24 | Stop reason: SDUPTHER

## 2020-02-21 ENCOUNTER — LAB (OUTPATIENT)
Dept: LAB | Facility: HOSPITAL | Age: 35
End: 2020-02-21

## 2020-02-21 DIAGNOSIS — E03.8 HYPOTHYROIDISM DUE TO HASHIMOTO'S THYROIDITIS: ICD-10-CM

## 2020-02-21 DIAGNOSIS — E06.3 HYPOTHYROIDISM DUE TO HASHIMOTO'S THYROIDITIS: ICD-10-CM

## 2020-02-21 DIAGNOSIS — E06.3 HYPOTHYROIDISM DUE TO HASHIMOTO'S THYROIDITIS: Primary | ICD-10-CM

## 2020-02-21 DIAGNOSIS — E03.8 HYPOTHYROIDISM DUE TO HASHIMOTO'S THYROIDITIS: Primary | ICD-10-CM

## 2020-02-21 LAB
T4 FREE SERPL-MCNC: 1.19 NG/DL (ref 0.93–1.7)
TSH SERPL DL<=0.05 MIU/L-ACNC: 1.34 UIU/ML (ref 0.27–4.2)

## 2020-02-21 PROCEDURE — 84443 ASSAY THYROID STIM HORMONE: CPT

## 2020-02-21 PROCEDURE — 84439 ASSAY OF FREE THYROXINE: CPT

## 2020-02-24 ENCOUNTER — OFFICE VISIT (OUTPATIENT)
Dept: FAMILY MEDICINE CLINIC | Facility: CLINIC | Age: 35
End: 2020-02-24

## 2020-02-24 VITALS
HEART RATE: 94 BPM | OXYGEN SATURATION: 98 % | WEIGHT: 214 LBS | BODY MASS INDEX: 37.92 KG/M2 | HEIGHT: 63 IN | SYSTOLIC BLOOD PRESSURE: 130 MMHG | DIASTOLIC BLOOD PRESSURE: 86 MMHG

## 2020-02-24 DIAGNOSIS — R63.5 WEIGHT GAIN: ICD-10-CM

## 2020-02-24 DIAGNOSIS — E06.3 HYPOTHYROIDISM DUE TO HASHIMOTO'S THYROIDITIS: Primary | ICD-10-CM

## 2020-02-24 DIAGNOSIS — E03.8 HYPOTHYROIDISM DUE TO HASHIMOTO'S THYROIDITIS: Primary | ICD-10-CM

## 2020-02-24 DIAGNOSIS — F32.A DEPRESSION, UNSPECIFIED DEPRESSION TYPE: ICD-10-CM

## 2020-02-24 PROCEDURE — 99214 OFFICE O/P EST MOD 30 MIN: CPT | Performed by: INTERNAL MEDICINE

## 2020-02-24 RX ORDER — LEVOTHYROXINE SODIUM 0.15 MG/1
150 TABLET ORAL DAILY
Qty: 90 TABLET | Refills: 1 | Status: SHIPPED | OUTPATIENT
Start: 2020-02-24 | End: 2020-08-24

## 2020-02-24 NOTE — PROGRESS NOTES
Chief Complaint   Patient presents with   • Hypothyroidism   • Weight Gain       HPI:  Angelika Vick is a 34 y.o. female who presents today for follow-up.  Hypothyroidism-having difficulties with weight gain otherwise asymptomatic.  Taking Synthroid 150 mcg daily.  She was to review recent thyroid testing.  She has been attempting to limit carbohydrates, this is been unsuccessful in regards to weight loss.  She is unable to incorporate any more physical activity due to childcare.  Depression- stable on Zoloft.    ROS:  Constitutional: no fevers, night sweats or unexplained weight loss  Eyes: no vision changes  ENT: no runny nose, ear pain, sore throat  Cardio: no chest pain, palpitations  Pulm: no shortness of breath, wheezing, or cough  GI: no abdominal pain or changes in bowel movements  : no difficulty urinating  MSK: no difficulty ambulating, no joint pain  Neuro: no weakness, dizziness or headache  Psych: no trouble sleeping  Endo: no change in appetite      Past Medical History:   Diagnosis Date   • Acid reflux    • Bilateral ovarian cysts    • Depression    • Gestational diabetes     with 2nd pregnancy   • Gestational hypertension     with last pregnancy   • Hypertension in pregnancy 2012   • Migraines       Family History   Problem Relation Age of Onset   • Arthritis Mother    • Hypertension Mother    • Thyroid disease Sister    • Diabetes Sister    • Diabetes Maternal Grandfather       Social History     Socioeconomic History   • Marital status: Single     Spouse name: Not on file   • Number of children: Not on file   • Years of education: Not on file   • Highest education level: Not on file   Tobacco Use   • Smoking status: Former Smoker     Packs/day: 0.50     Types: Cigarettes     Last attempt to quit: 10/6/2016     Years since quitting: 3.3   • Smokeless tobacco: Never Used   Substance and Sexual Activity   • Alcohol use: Yes     Comment: Occasionally   • Drug use: No   • Sexual activity: Yes      Partners: Male     Comment: pt expecting 3rd child.       No Known Allergies   Immunization History   Administered Date(s) Administered   • FLUARIX/FLUZONE/AFLURIA/FLULAVAL QUAD 10/22/2018   • Tdap 08/31/2017        PE:  Vitals:    02/24/20 1603   BP: 130/86   Pulse: 94   SpO2: 98%      Body mass index is 37.91 kg/m².    Gen Appearance: NAD  HEENT: Normocephalic, PERRLA, no thyromegaly, trache midline  Heart: RRR, normal S1 and S2, no murmur  Lungs: CTA b/l, no wheezing, no crackles  Abdomen: Soft, non-tender, non-distended, no guarding and BSx4  MSK: Moves all extremities well, normal gait, no peripheral edema  Pulses: Palpable and equal b/l  Lymph nodes: No palpable lymphadenopathy   Neuro: No focal deficits      Current Outpatient Medications   Medication Sig Dispense Refill   • sertraline (ZOLOFT) 100 MG tablet Take 1 tablet by mouth Daily. (Patient taking differently: Take 100 mg by mouth 2 (Two) Times a Day.) 90 tablet 1   • levothyroxine (SYNTHROID, LEVOTHROID) 150 MCG tablet Take 1 tablet by mouth Daily. 90 tablet 1     No current facility-administered medications for this visit.         Angelika was seen today for hypothyroidism and weight gain.    Diagnoses and all orders for this visit:    Hypothyroidism due to Hashimoto's thyroiditis  -     TSH+Free T4; Future  -     levothyroxine (SYNTHROID, LEVOTHROID) 150 MCG tablet; Take 1 tablet by mouth Daily.  Recommend repeating thyroid function studies in 3 months.  Stable today.  Continue current dose.  Weight gain  -     Ambulatory Referral to Nutrition Services  Referred to nutrition to establish care.  Would recommend incorporating activity when able.  She is having difficulty with diet recommendations due to concurrent breast-feeding.  Could be secondary to Zoloft.  Depression, unspecified depression type  Stable on Zoloft.       Return in about 3 months (around 5/24/2020).     Please note that portions of this document were completed with a voice  recognition program. Efforts were made to edit the dictations, but occasionally words are mis-transcribed.

## 2020-03-11 ENCOUNTER — HOSPITAL ENCOUNTER (OUTPATIENT)
Dept: NUTRITION | Facility: HOSPITAL | Age: 35
Setting detail: RECURRING SERIES
Discharge: HOME | End: 2020-03-11

## 2020-03-11 VITALS — HEIGHT: 63 IN | BODY MASS INDEX: 36.68 KG/M2 | WEIGHT: 207 LBS

## 2020-03-11 PROCEDURE — 97802 MEDICAL NUTRITION INDIV IN: CPT

## 2020-04-09 ENCOUNTER — TELEPHONE (OUTPATIENT)
Dept: NUTRITION | Facility: HOSPITAL | Age: 35
End: 2020-04-09

## 2020-04-09 NOTE — PROGRESS NOTES
Adult Outpatient Nutrition  Assessment/PES    Patient Name:  Angelika Vick  YOB: 1985  MRN: [id number]    Assessment Date:  4/9/2020    Comments:  Patient wishes to reschedule nutrition follow-up due to COVID pandemic. Briefly discussed patient's progress so far. Patient states things have been going well and she has lost 2.3 kg (5 lbs) since initial visit. Patient states she has been walking daily, is using the plate method, and tracking her intake daily using My Fitness Pal. Patient appears excited that she has introduced more carbohydrates into her diet and has still seen weight loss. Patient is consuming around 2206-5902 calories per day and is still breastfeeding with no reduction in supply. Patient has been focusing on using the plate method for meal planning. Patient's 24-hour recall consists of:    Breakfast: bagel with cream cheese, banana, coffee  Lunch: leftover turkey noodle soup with sandwich (turkey, cheese, snider), diet soda  PM snack: 1 cup frozen yogurt  Dinner: cheeseburger on bun with snider, mustard, and pickle, brussel sprouts, water     Patient has no concerns or questions currently. Patient rescheduled appointment for 6/26/2020 at 8:30 AM.    Goal Completion:  -Exercise 30 minutes 4 days per week: 100%  -Weight loss .9 kg (2 lbs) per month: 100%              -Stated weight today of 93.9 kg (207 lbs). Patient reports weight loss of 2.3 kg (5 lbs).        Electronically signed by:  Shereen Albright RD  04/09/20 14:00

## 2020-04-10 ENCOUNTER — APPOINTMENT (OUTPATIENT)
Dept: NUTRITION | Facility: HOSPITAL | Age: 35
End: 2020-04-10

## 2020-05-14 ENCOUNTER — LAB (OUTPATIENT)
Dept: LAB | Facility: HOSPITAL | Age: 35
End: 2020-05-14

## 2020-05-14 ENCOUNTER — TELEPHONE (OUTPATIENT)
Dept: FAMILY MEDICINE CLINIC | Facility: CLINIC | Age: 35
End: 2020-05-14

## 2020-05-14 ENCOUNTER — OFFICE VISIT (OUTPATIENT)
Dept: FAMILY MEDICINE CLINIC | Facility: CLINIC | Age: 35
End: 2020-05-14

## 2020-05-14 VITALS
DIASTOLIC BLOOD PRESSURE: 76 MMHG | WEIGHT: 216 LBS | HEIGHT: 63 IN | OXYGEN SATURATION: 98 % | BODY MASS INDEX: 38.27 KG/M2 | HEART RATE: 87 BPM | SYSTOLIC BLOOD PRESSURE: 126 MMHG

## 2020-05-14 DIAGNOSIS — E06.3 HYPOTHYROIDISM DUE TO HASHIMOTO'S THYROIDITIS: ICD-10-CM

## 2020-05-14 DIAGNOSIS — E03.8 HYPOTHYROIDISM DUE TO HASHIMOTO'S THYROIDITIS: ICD-10-CM

## 2020-05-14 DIAGNOSIS — L30.9 DERMATITIS: ICD-10-CM

## 2020-05-14 DIAGNOSIS — R51.9 CHRONIC NONINTRACTABLE HEADACHE, UNSPECIFIED HEADACHE TYPE: Primary | ICD-10-CM

## 2020-05-14 DIAGNOSIS — G89.29 CHRONIC NONINTRACTABLE HEADACHE, UNSPECIFIED HEADACHE TYPE: Primary | ICD-10-CM

## 2020-05-14 LAB
T4 FREE SERPL-MCNC: 1.33 NG/DL (ref 0.93–1.7)
TSH SERPL DL<=0.05 MIU/L-ACNC: 0.97 UIU/ML (ref 0.27–4.2)

## 2020-05-14 PROCEDURE — 99214 OFFICE O/P EST MOD 30 MIN: CPT | Performed by: INTERNAL MEDICINE

## 2020-05-14 PROCEDURE — 84439 ASSAY OF FREE THYROXINE: CPT

## 2020-05-14 PROCEDURE — 84443 ASSAY THYROID STIM HORMONE: CPT

## 2020-05-14 RX ORDER — SERTRALINE HYDROCHLORIDE 100 MG/1
100 TABLET, FILM COATED ORAL DAILY
Qty: 90 TABLET | Refills: 1
Start: 2020-05-14 | End: 2021-07-09

## 2020-05-14 RX ORDER — DIAPER,BRIEF,INFANT-TODD,DISP
EACH MISCELLANEOUS 2 TIMES DAILY
Qty: 14 G | Refills: 0 | Status: SHIPPED | OUTPATIENT
Start: 2020-05-14 | End: 2020-05-21

## 2020-05-14 RX ORDER — PROPRANOLOL HYDROCHLORIDE 20 MG/1
20 TABLET ORAL 2 TIMES DAILY
Qty: 90 TABLET | Refills: 2 | Status: SHIPPED | OUTPATIENT
Start: 2020-05-14 | End: 2020-05-29

## 2020-05-14 NOTE — PROGRESS NOTES
Chief Complaint   Patient presents with   • Rash     right hand - pinky finger    • Headache       HPI:  Angelika Vick is a 34 y.o. female who presents today for follow-up.  Current taking Synthroid 150 mcg.  Recently had a baby last October.  She has not been sleeping as much and drinking more caffeine.  She reduced her Zoloft dose recently as well.  She reports frequent headaches sometimes up to 4 times per week.  She would like to discuss the rash on her pinky finger as well.  She is tried moisturizer and topical antibiotic which have not been effective.  She reports the skin is very dry and painful.  She is currently breast-feeding.    ROS:  Constitutional: no fevers, night sweats or unexplained weight loss  Eyes: no vision changes  ENT: no runny nose, ear pain, sore throat  Cardio: no chest pain, palpitations  Pulm: no shortness of breath, wheezing, or cough  GI: no abdominal pain or changes in bowel movements  : no difficulty urinating  MSK: no difficulty ambulating, no joint pain  Neuro: no weakness, dizziness or headache  Psych: no trouble sleeping  Endo: no change in appetite      Past Medical History:   Diagnosis Date   • Acid reflux    • Bilateral ovarian cysts    • Depression    • Gestational diabetes     with 2nd pregnancy   • Gestational hypertension     with last pregnancy   • Hypertension in pregnancy 2012   • Migraines       Family History   Problem Relation Age of Onset   • Arthritis Mother    • Hypertension Mother    • Thyroid disease Sister    • Diabetes Sister    • Diabetes Maternal Grandfather       Social History     Socioeconomic History   • Marital status: Single     Spouse name: Not on file   • Number of children: Not on file   • Years of education: Not on file   • Highest education level: Not on file   Tobacco Use   • Smoking status: Former Smoker     Packs/day: 0.50     Types: Cigarettes     Last attempt to quit: 10/6/2016     Years since quitting: 3.6   • Smokeless tobacco: Never  Used   Substance and Sexual Activity   • Alcohol use: Yes     Comment: Occasionally   • Drug use: No   • Sexual activity: Yes     Partners: Male     Comment: pt expecting 3rd child.       No Known Allergies   Immunization History   Administered Date(s) Administered   • FLUARIX/FLUZONE/AFLURIA/FLULAVAL QUAD 10/22/2018   • Tdap 08/31/2017        PE:  Vitals:    05/14/20 0913   BP: 126/76   Pulse: 87   SpO2: 98%      Body mass index is 38.26 kg/m².    Gen Appearance: NAD  HEENT: Normocephalic, PERRLA, no thyromegaly, trache midline  Heart: RRR, normal S1 and S2, no murmur  Lungs: CTA b/l, no wheezing, no crackles  Abdomen: Soft, non-tender, non-distended, no guarding and BSx4  MSK: Moves all extremities well, normal gait, no peripheral edema, dry erythematous skin on pinky finger  Pulses: Palpable and equal b/l  Lymph nodes: No palpable lymphadenopathy   Neuro: No focal deficits      Current Outpatient Medications   Medication Sig Dispense Refill   • levothyroxine (SYNTHROID, LEVOTHROID) 150 MCG tablet Take 1 tablet by mouth Daily. 90 tablet 1   • sertraline (ZOLOFT) 100 MG tablet Take 1 tablet by mouth Daily. 90 tablet 1   • hydrocortisone 1 % cream Apply  topically to the appropriate area as directed 2 (Two) Times a Day for 7 days. 14 g 0   • propranolol (INDERAL) 20 MG tablet Take 1 tablet by mouth 2 (Two) Times a Day. 90 tablet 2     No current facility-administered medications for this visit.         Angelika was seen today for rash and headache.    Diagnoses and all orders for this visit:    Chronic nonintractable headache, unspecified headache type  Recommend Tylenol as needed for abortive treatment.  She is currently breast-feeding so we will need to consider prophylactic medication carefully.  Propranolol has a reasonable RID, although I would still recommend discussing with her OB/GYN before starting this medication.  Dermatitis  -     hydrocortisone 1 % cream; Apply  topically to the appropriate area as  directed 2 (Two) Times a Day for 7 days.  Trial of steroid cream on skin lesion.  Discussed with patient that she needs to wear a glove or cover this finger when breast-feeding.  It is important not to get any steroids around the baby's mouth to avoid ingestion.  Hypothyroidism due to Hashimoto's thyroiditis  Recheck TSH today.  Other orders  -     sertraline (ZOLOFT) 100 MG tablet; Take 1 tablet by mouth Daily.  -     propranolol (INDERAL) 20 MG tablet; Take 1 tablet by mouth 2 (Two) Times a Day.         Return in about 6 months (around 11/14/2020).     Please note that portions of this document were completed with a voice recognition program. Efforts were made to edit the dictations, but occasionally words are mis-transcribed.          Answers for HPI/ROS submitted by the patient on 5/12/2020   Rash  What is the primary reason for your visit?: Rash  Chronicity: new  Onset: more than 1 month ago  Progression since onset: gradually worsening  Affected locations: right fingers  Characteristics: dryness, pain, swelling, itchiness, peeling, scaling  Exposed to: nothing  anorexia: No  congestion: No  cough: No  diarrhea: Yes  eye pain: No  facial edema: No  fatigue: Yes  fever: No  joint pain: No  nail changes: No  rhinorrhea: No  shortness of breath: No  sore throat: No  vomiting: No

## 2020-05-14 NOTE — TELEPHONE ENCOUNTER
Called patient to discuss propranolol for headache prevention.  No answer, left voicemail.  I would like to discuss this medication with patient before he starts taking it.  I think she will ultimately need to weigh pros and cons with her OB/GYN before starting the medication.

## 2020-05-29 ENCOUNTER — OFFICE VISIT (OUTPATIENT)
Dept: FAMILY MEDICINE CLINIC | Facility: CLINIC | Age: 35
End: 2020-05-29

## 2020-05-29 VITALS
DIASTOLIC BLOOD PRESSURE: 74 MMHG | WEIGHT: 202 LBS | BODY MASS INDEX: 35.79 KG/M2 | HEIGHT: 63 IN | SYSTOLIC BLOOD PRESSURE: 122 MMHG

## 2020-05-29 DIAGNOSIS — K58.0 IRRITABLE BOWEL SYNDROME WITH DIARRHEA: ICD-10-CM

## 2020-05-29 DIAGNOSIS — F41.1 GENERALIZED ANXIETY DISORDER: Primary | ICD-10-CM

## 2020-05-29 DIAGNOSIS — E03.8 HYPOTHYROIDISM DUE TO HASHIMOTO'S THYROIDITIS: ICD-10-CM

## 2020-05-29 DIAGNOSIS — E06.3 HYPOTHYROIDISM DUE TO HASHIMOTO'S THYROIDITIS: ICD-10-CM

## 2020-05-29 PROCEDURE — 99214 OFFICE O/P EST MOD 30 MIN: CPT | Performed by: INTERNAL MEDICINE

## 2020-05-29 NOTE — PROGRESS NOTES
Chief Complaint   Patient presents with   • Anxiety   • Diarrhea       HPI:  Angelika Vick is a 34 y.o. female who presents today for increased anxiety and diarrhea for the last 2 to 3 weeks.  Patient reports she has had some family stressors ongoing which have worsened her symptoms.  Currently taking Zoloft 100 mg.  She continues take levothyroxine daily for hypothyroidism.  She has noticed that her anxiety is worse in the mornings after drinking caffeine so she has cut back.  Having difficulty sleeping due to waking up to breast-feed as well as anxiety.    ROS:  Constitutional: no fevers, night sweats or unexplained weight loss  Eyes: no vision changes  ENT: no runny nose, ear pain, sore throat  Cardio: no chest pain, palpitations  Pulm: no shortness of breath, wheezing, or cough  GI: no abdominal pain or changes in bowel movements  : no difficulty urinating  MSK: no difficulty ambulating, no joint pain  Neuro: no weakness, dizziness or headache  Psych: + trouble sleeping  Endo: no change in appetite      Past Medical History:   Diagnosis Date   • Acid reflux    • Bilateral ovarian cysts    • Depression    • Gestational diabetes     with 2nd pregnancy   • Gestational hypertension     with last pregnancy   • Hypertension in pregnancy 2012   • Migraines       Family History   Problem Relation Age of Onset   • Arthritis Mother    • Hypertension Mother    • Thyroid disease Sister    • Diabetes Sister    • Diabetes Maternal Grandfather       Social History     Socioeconomic History   • Marital status: Single     Spouse name: Not on file   • Number of children: Not on file   • Years of education: Not on file   • Highest education level: Not on file   Tobacco Use   • Smoking status: Former Smoker     Packs/day: 0.50     Types: Cigarettes     Last attempt to quit: 10/6/2016     Years since quitting: 3.6   • Smokeless tobacco: Never Used   Substance and Sexual Activity   • Alcohol use: Yes     Comment: Occasionally    • Drug use: No   • Sexual activity: Yes     Partners: Male     Comment: pt expecting 3rd child.       No Known Allergies   Immunization History   Administered Date(s) Administered   • FLUARIX/FLUZONE/AFLURIA/FLULAVAL QUAD 10/22/2018   • Tdap 08/31/2017        PE:  Vitals:    05/29/20 0941   BP: 122/74      Body mass index is 35.78 kg/m².    Gen Appearance: NAD  HEENT: Normocephalic, PERRLA, no thyromegaly, trache midline  Heart: RRR, normal S1 and S2, no murmur  Lungs: CTA b/l, no wheezing, no crackles  Abdomen: Soft, non-tender, non-distended, no guarding and BSx4  MSK: Moves all extremities well, normal gait, no peripheral edema  Pulses: Palpable and equal b/l  Lymph nodes: No palpable lymphadenopathy   Neuro: No focal deficits      Current Outpatient Medications   Medication Sig Dispense Refill   • sertraline (ZOLOFT) 100 MG tablet Take 1 tablet by mouth Daily. 90 tablet 1   • levothyroxine (SYNTHROID, LEVOTHROID) 150 MCG tablet Take 1 tablet by mouth Daily. 90 tablet 1     No current facility-administered medications for this visit.         Angelika was seen today for anxiety and diarrhea.    Diagnoses and all orders for this visit:    Generalized anxiety disorder  Currently still breast-feeding which makes medication management difficult.  After discussion would recommend establishing care with therapy and continuing Zoloft at this time.  She does discontinue breast-feeding would recommend BuSpar adjunct for anxiety and nortriptyline for irritable bowel syndrome.  Irritable bowel syndrome with diarrhea  See above.  Discussed low FODMAP diet.  Likely due to worsening anxiety.  No recent antibiotics, no fevers or chills or abdominal pain.  See back in 4 weeks for reassessment, if no improvement would recommend establishing with gastroenterology.  Hypothyroidism due to Hashimoto's thyroiditis  Stable, continue Synthroid.       Return in about 4 weeks (around 6/26/2020).     Please note that portions of this  document were completed with a voice recognition program. Efforts were made to edit the dictations, but occasionally words are mis-transcribed.  Answers for HPI/ROS submitted by the patient on 5/27/2020   What is the primary reason for your visit?: Other  Please describe your symptoms.: Anxiety is getting really bad. Panic attacks, racing heart, nervousness  Have you had these symptoms before?: Yes  How long have you been having these symptoms?: 1-2 weeks  Please list any medications you are currently taking for this condition.: Zoloft  Please describe any probable cause for these symptoms. : Family stress

## 2020-06-26 ENCOUNTER — HOSPITAL ENCOUNTER (OUTPATIENT)
Dept: NUTRITION | Facility: HOSPITAL | Age: 35
Setting detail: RECURRING SERIES
Discharge: HOME | End: 2020-06-26

## 2020-06-26 VITALS — WEIGHT: 197 LBS | BODY MASS INDEX: 34.91 KG/M2 | HEIGHT: 63 IN

## 2020-07-06 DIAGNOSIS — L98.9 SKIN LESION: Primary | ICD-10-CM

## 2020-07-09 DIAGNOSIS — F41.1 GENERALIZED ANXIETY DISORDER: Primary | ICD-10-CM

## 2020-07-27 ENCOUNTER — OFFICE VISIT (OUTPATIENT)
Dept: PSYCHIATRY | Facility: CLINIC | Age: 35
End: 2020-07-27

## 2020-07-27 DIAGNOSIS — F41.1 GAD (GENERALIZED ANXIETY DISORDER): Primary | ICD-10-CM

## 2020-07-27 PROCEDURE — 90791 PSYCH DIAGNOSTIC EVALUATION: CPT | Performed by: PSYCHOLOGIST

## 2020-07-27 NOTE — PROGRESS NOTES
PROGRESS NOTE    Data:  Angelika Vick is a 34 y.o. female who met with the undersigned for a scheduled individual outpatient therapy session from 8:00 - 8:45am.      Clinical Maneuvering/Intervention:      The pt talked about struggling with different things such as anxiety over many years (is has gotten worse recently) and being stuck inside due to COVID-19 while raising 3 children. A psychological evaluation was conducted in order to assess past and current level of functioning. Areas assessed included, but were not limited to: perception of social support, perception of ability to face and deal with challenges in life (positive functioning), anxiety symptoms, depressive symptoms, perspective on beliefs/belief system, coping skills for stress, intelligence level, addiction issues, etc. Therapeutic rapport was established. Interventions conducted today were geared towards understanding anxiety, understanding core issues of anxiety, and starting to identifying coping skills. She admitted to struggling with setting boundaries or saying 'no' to things. Stressors were processed individually and in detail. Venting of frustrations was conducted in order to help the pt feel less tense emotionally and gain insight into issues. Feelings were processed and validated several times in session. Education was also provided as to the nature of counseling and what to expect in subsequent sessions. A treatment plan was initiated tailored to meeting pt’s presenting needs. The pt was encouraged to return for additional sessions and agreed to do so.               Mental Status Exam  Hygiene:  good  Dress: normal  Attitude:  cooperative and proactive  Motor Activity: normal  Speech: normal  Mood:  anxious  Affect:  congruent  Thought Processes: normal  Thought Content:  normal  Suicidal Thoughts:  not endorsed  Homicidal Thoughts:  not endorsed  Crisis Safety Plan: not needed   Hallucinations:  none      Patient's Support Network  Includes:  family, friends      Progress toward goal: there is evidence to suggest that she is taking measures to improve the quality of her life including seeking counseling and being open to the process.       Functional Status: moderate to high      Prognosis: good    Assessment      The pt presented to be struggling with high anxiety in a generalized sense. With improved coping skills and insight, managing anxiety will likely improve. She is a good candidate for counseling as therapeutic rapport was established, she responded positively to interventions today, and she seems motivated to continue with counseling.        Plan      In order to diminish symptoms of anxiety, the pt is to read on the 10 Law of Boundaries and make a point to get her downtime without her children for an hour each day after dinner as discussed in session and quite possible to do (this week). She will continue with counseling (ongoing).    Kasey Peters, PhD, LP

## 2020-07-30 ENCOUNTER — TELEMEDICINE (OUTPATIENT)
Dept: FAMILY MEDICINE CLINIC | Facility: CLINIC | Age: 35
End: 2020-07-30

## 2020-07-30 DIAGNOSIS — R53.83 FATIGUE, UNSPECIFIED TYPE: ICD-10-CM

## 2020-07-30 DIAGNOSIS — J01.00 ACUTE NON-RECURRENT MAXILLARY SINUSITIS: ICD-10-CM

## 2020-07-30 DIAGNOSIS — R06.02 SHORTNESS OF BREATH: Primary | ICD-10-CM

## 2020-07-30 PROCEDURE — U0003 INFECTIOUS AGENT DETECTION BY NUCLEIC ACID (DNA OR RNA); SEVERE ACUTE RESPIRATORY SYNDROME CORONAVIRUS 2 (SARS-COV-2) (CORONAVIRUS DISEASE [COVID-19]), AMPLIFIED PROBE TECHNIQUE, MAKING USE OF HIGH THROUGHPUT TECHNOLOGIES AS DESCRIBED BY CMS-2020-01-R: HCPCS | Performed by: INTERNAL MEDICINE

## 2020-07-30 PROCEDURE — 99214 OFFICE O/P EST MOD 30 MIN: CPT | Performed by: INTERNAL MEDICINE

## 2020-07-30 RX ORDER — AMOXICILLIN AND CLAVULANATE POTASSIUM 875; 125 MG/1; MG/1
1 TABLET, FILM COATED ORAL 2 TIMES DAILY
Qty: 14 TABLET | Refills: 0 | Status: SHIPPED | OUTPATIENT
Start: 2020-07-30 | End: 2020-08-06

## 2020-07-30 NOTE — PROGRESS NOTES
Cc: URI      HPI:  Angelika Vick is a 34 y.o. female who presents today for blood pressure infection for the past 4 days.  She reports significant fatigue, sinus congestion runny nose and cough.  No obvious coronavirus exposures.  Her  is sick with similar symptoms.  She does not typically get sinus infections or allergies.    ROS:  Constitutional: no fevers, night sweats or unexplained weight loss  Eyes: no vision changes  ENT: no runny nose, ear pain, sore throat  Cardio: no chest pain, palpitations  Pulm: + shortness of breath, -wheezing, + cough  GI: no abdominal pain or changes in bowel movements  : no difficulty urinating  MSK: no difficulty ambulating, no joint pain  Neuro: no weakness, dizziness or headache  Psych: no trouble sleeping  Endo: no change in appetite      Past Medical History:   Diagnosis Date   • Acid reflux    • Bilateral ovarian cysts    • Depression    • Gestational diabetes     with 2nd pregnancy   • Gestational hypertension     with last pregnancy   • Hypertension in pregnancy 2012   • Migraines       Family History   Problem Relation Age of Onset   • Arthritis Mother    • Hypertension Mother    • Thyroid disease Sister    • Diabetes Sister    • Diabetes Maternal Grandfather       Social History     Socioeconomic History   • Marital status: Single     Spouse name: Not on file   • Number of children: Not on file   • Years of education: Not on file   • Highest education level: Not on file   Tobacco Use   • Smoking status: Former Smoker     Packs/day: 0.50     Types: Cigarettes     Last attempt to quit: 10/6/2016     Years since quitting: 3.8   • Smokeless tobacco: Never Used   Substance and Sexual Activity   • Alcohol use: Yes     Comment: Occasionally   • Drug use: No   • Sexual activity: Yes     Partners: Male     Comment: pt expecting 3rd child.       No Known Allergies   Immunization History   Administered Date(s) Administered   • FLUARIX/FLUZONE/AFLURIA/FLULAVAL QUAD  10/22/2018   • Tdap 08/31/2017        PE:  There were no vitals filed for this visit.   There is no height or weight on file to calculate BMI.    Gen Appearance: NAD  HEENT: Normocephalic  MSK: Moves all extremities well, normal gait, no peripheral edema  Neuro: No focal deficits      Current Outpatient Medications   Medication Sig Dispense Refill   • amoxicillin-clavulanate (Augmentin) 875-125 MG per tablet Take 1 tablet by mouth 2 (Two) Times a Day for 7 days. 14 tablet 0   • levothyroxine (SYNTHROID, LEVOTHROID) 150 MCG tablet Take 1 tablet by mouth Daily. 90 tablet 1   • sertraline (ZOLOFT) 100 MG tablet Take 1 tablet by mouth Daily. 90 tablet 1     No current facility-administered medications for this visit.         Diagnoses and all orders for this visit:    Shortness of breath  -     COVID-19,[facility] ROUTINE, NP/OP SWAB IN TRANSPORT MEDIA OR ESWAB 72 HR TAT - Swab, Oropharynx; Future    Acute non-recurrent maxillary sinusitis  Treat for sinusitis with Augmentin.  Recommend checking for COVID due to significant fatigue and some shortness of breath.  Call back in 7 days if no improvement.  Recommend quarantine until COVID testing returns.  Other orders  -     amoxicillin-clavulanate (Augmentin) 875-125 MG per tablet; Take 1 tablet by mouth 2 (Two) Times a Day for 7 days.         No follow-ups on file.     Please note that portions of this document were completed with a voice recognition program. Efforts were made to edit the dictations, but occasionally words are mis-transcribed.

## 2020-08-17 ENCOUNTER — OFFICE VISIT (OUTPATIENT)
Dept: PSYCHIATRY | Facility: CLINIC | Age: 35
End: 2020-08-17

## 2020-08-17 DIAGNOSIS — F41.1 GAD (GENERALIZED ANXIETY DISORDER): Primary | ICD-10-CM

## 2020-08-17 PROCEDURE — 90834 PSYTX W PT 45 MINUTES: CPT | Performed by: PSYCHOLOGIST

## 2020-08-17 NOTE — PROGRESS NOTES
PROGRESS NOTE    Data:  Angelika Vick is a 34 y.o. female who met with the undersigned for a scheduled individual outpatient therapy session from 10:16 - 11:00am.      Clinical Maneuvering/Intervention:      The pt talked about struggling with different things such as anxiety, feeling trapped at home due to social restrictions related to COVID-19, and often feeling irritable. She often feels overstimulated at home (where she spends most of her time) by her three children, particularly by her 2 year old's energy/needs. Stressors were processed individually and in detail. Venting of frustrations was conducted in order to help the pt feel less tense emotionally and gain insight into issues. Feelings were processed and validated several times in session. She was assisted with noting her core difficulty (which was feeling overstimulated). Coping skills, existing and otherwise, were discussed in detail in session: exercising in the morning, having schedules for the family, having a schedule for her 2 year old, focusing on specific praying (including knowing what she wants and needs). Some of the things she felt guilty about or bad in some way were reframed as appropriate and done so in order to help her feel less distressed. Maladaptive thought patterns were identified, challenged, and evaluated for validity in order to allow for the pt to chose different and more adaptive ways of thinking. Homework was assigned, an exercise taken from a , Christiano Basilio, that he developed in order to get needs met in life. The pt expressed gratitude for today's session.    Mental Status Exam  Hygiene:  good  Dress: normal  Attitude:  cooperative and proactive  Motor Activity: normal  Speech: normal  Mood:  anxious  Affect:  congruent  Thought Processes: normal  Thought Content:  normal  Suicidal Thoughts:  not endorsed  Homicidal Thoughts:  not endorsed  Crisis Safety Plan: not needed   Hallucinations:   none      Patient's Support Network Includes:  family, friends      Progress toward goal: there is evidence to suggest that she is coping better with anxiety over time       Functional Status: moderate to high      Prognosis: good    Assessment      The pt presented to be struggling with high anxiety in a generalized sense. With improved coping skills and insight, managing anxiety will likely improve. She learns quickly and seems more hopeful lately that her quality of life will improve.       Plan      In order to diminish symptoms of anxiety, the pt is to look for the next opportunity to change a behavior in order to take stress/pressure off of herself (ongoing). She is also to give thought to the 3 question (Joshua Basilio) exercise assigned and answer the following: What do you want?, What do you need to do to get it?, and What do you need to stop doing in order to get it?    Kasey Peters, PhD, LP

## 2020-08-24 DIAGNOSIS — E06.3 HYPOTHYROIDISM DUE TO HASHIMOTO'S THYROIDITIS: ICD-10-CM

## 2020-08-24 DIAGNOSIS — E03.8 HYPOTHYROIDISM DUE TO HASHIMOTO'S THYROIDITIS: ICD-10-CM

## 2020-08-24 RX ORDER — LEVOTHYROXINE SODIUM 0.15 MG/1
150 TABLET ORAL DAILY
Qty: 90 TABLET | Refills: 1 | Status: SHIPPED | OUTPATIENT
Start: 2020-08-24 | End: 2021-02-23

## 2020-08-31 ENCOUNTER — OFFICE VISIT (OUTPATIENT)
Dept: PSYCHIATRY | Facility: CLINIC | Age: 35
End: 2020-08-31

## 2020-08-31 DIAGNOSIS — F41.1 GAD (GENERALIZED ANXIETY DISORDER): Primary | ICD-10-CM

## 2020-08-31 PROCEDURE — 90834 PSYTX W PT 45 MINUTES: CPT | Performed by: PSYCHOLOGIST

## 2020-08-31 NOTE — PROGRESS NOTES
PROGRESS NOTE    Data:  Angelika Vick is a 34 y.o. female who met with the undersigned for a scheduled individual outpatient therapy session from 9:31 - 10:15am.      Clinical Maneuvering/Intervention:      The pt talked about struggling with different things such as anxiety, worry, and stress. She admits to continually worry which fuels her anxiety, but she does not want to struggle with anxiety anymore. Education about healthy versus unhealthy anxiety was reviewed today. The pt's 3 year old daughter with have surgery this Thursday and the pt worries quite a bit about this. Stressors were processed individually and in detail. Venting of frustrations was conducted in order to help the pt feel less tense emotionally and gain insight into issues. Feelings were processed and validated several times in session. Education about coping better with anxiety was provided today and discussed in detail. Understanding worries and negative thinking were reviewed. Having the thoughts, but no longer believing the thoughts was a topic in session. Replacing the thoughts or the 'worry habit' with alternative methods was conducted in session. Examples were provided and practiced in session: setting emotion aside for a moment to use logic, assessing how likely the worry will come true, accepting lack of control but finding the good in this, etc. It was noted that such habits take a while to form, and this is ok. Valuing some anxiety and feeling in general will be discussed in subsequent sessions. The pt expressed gratitude for today's session.     Mental Status Exam  Hygiene:  good  Dress: normal  Attitude:  cooperative and proactive  Motor Activity: normal  Speech: normal  Mood:  anxious  Affect:  congruent  Thought Processes: normal  Thought Content:  normal  Suicidal Thoughts:  not endorsed  Homicidal Thoughts:  not endorsed  Crisis Safety Plan: not needed   Hallucinations:  none      Patient's Support Network Includes:   family, friends      Progress toward goal: there is evidence to suggest that she is coping better with anxiety over time       Functional Status: moderate to high      Prognosis: good    Assessment      The pt presented to be struggling with high anxiety in a generalized sense. With improved coping skills and insight, managing anxiety will likely improve. She learns quickly and seems more hopeful lately that her quality of life will improve.       Plan      In order to diminish symptoms of anxiety, the pt is to start practicing the replacement habits for anxiety as taught today and described in detail above (ongoing). Valuing some anxiety and feeling in general will be discussed in subsequent sessions.    Kasey Peters, PhD, LP

## 2020-09-14 ENCOUNTER — OFFICE VISIT (OUTPATIENT)
Dept: PSYCHIATRY | Facility: CLINIC | Age: 35
End: 2020-09-14

## 2020-09-14 DIAGNOSIS — F41.1 GAD (GENERALIZED ANXIETY DISORDER): Primary | ICD-10-CM

## 2020-09-14 DIAGNOSIS — F43.9 FEELING STRESSED OUT: ICD-10-CM

## 2020-09-14 PROCEDURE — 90834 PSYTX W PT 45 MINUTES: CPT | Performed by: PSYCHOLOGIST

## 2020-09-14 NOTE — PROGRESS NOTES
PROGRESS NOTE    Data:  Angelika Vick is a 34 y.o. female who met with the undersigned for a scheduled individual outpatient therapy session from 8:00 - 8:45am.      Clinical Maneuvering/Intervention:      The pt talked about struggling with different things such as anxiety, worry, and stress. Financial stress (e.g., debt) was noted in session. She talked about feeling overwhelmed by debt and concerned in the past few months about how to pay bills. Stressors were processed individually and in detail. Venting of frustrations was conducted in order to help the pt feel less tense emotionally and gain insight into issues. Feelings were processed and validated several times in session. She expressed deep gratitude for her [employer] 'blessing' her with some money, but feeling uncomfortable by it as well. Being humbled by kindness and even uncomfortable some were normalized. Perspective taking was conducted: honoring those who donated by paying off bills/debt and being willing to 'pay it forward,' in the future, while just focusing on being grateful. Dealing with worry and stress by perspective taking was conducted multiple times in session. Improving communication with her  in order to feel calmer/more loved herself and help build him up was discussed in detail. She was assisted with noting how she can, for example, be more effective in getting her needs met. Needing compliments from him (her love language), what compliments, why she needs this, and how to convey this to him were flushed out. A statement was recorded to help her remember what to say to him. It was noted too, not to downplay or ignore times when he does compliment her as she wants to get better and honoring and appreciating him. Homework was assigned tailored to pt's needs (do the communication technique). The pt expressed gratitude for today's session.    Mental Status Exam  Hygiene:  good  Dress: normal  Attitude:  cooperative and  proactive  Motor Activity: normal  Speech: normal  Mood:  anxious and stressed  Affect:  congruent  Thought Processes: normal  Thought Content:  normal  Suicidal Thoughts:  not endorsed  Homicidal Thoughts:  not endorsed  Crisis Safety Plan: not needed   Hallucinations:  none      Patient's Support Network Includes:  family, friends      Progress toward goal: there is evidence to suggest that she is coping better with anxiety and stress over time       Functional Status: moderate to high      Prognosis: good    Assessment      The pt presented to be struggling with high anxiety in a generalized sense and feeling stressed out. With improved coping skills and insight, she will likely improve. For example, looking and acting on solutions instead of getting stuck in worry will likely benefit her quite a bit.      Plan      In order to diminish symptoms of anxiety and stress, she is to practice the communication techniques noted in session today and detailed above (as soon as feasible).    Kasey Peters, PhD, LP

## 2020-11-09 ENCOUNTER — OFFICE VISIT (OUTPATIENT)
Dept: PSYCHIATRY | Facility: CLINIC | Age: 35
End: 2020-11-09

## 2020-11-09 DIAGNOSIS — F41.1 GAD (GENERALIZED ANXIETY DISORDER): Primary | ICD-10-CM

## 2020-11-09 DIAGNOSIS — F43.9 FEELING STRESSED OUT: ICD-10-CM

## 2020-11-09 PROCEDURE — 90834 PSYTX W PT 45 MINUTES: CPT | Performed by: PSYCHOLOGIST

## 2020-11-09 NOTE — PROGRESS NOTES
PROGRESS NOTE    Data:  Angelika Vick is a 35 y.o. female who met with the undersigned for a scheduled individual outpatient therapy session from 8:00 - 8:45am.      Clinical Maneuvering/Intervention:      The pt talked about struggling with different things such as stress and feeling drained. She also wants to be better to her kids and , but can feel to worn out/drained to do so, then this can make her feel guilty. Stressors were processed individually and in detail. Venting of frustrations was conducted in order to help the pt feel less tense emotionally and gain insight into issues. Feelings were processed and validated several times in session. Understanding the feeling of being drained and what this can mean were discussed. What it means to 'fill one's tank,' versus, 'running on an empty tank,' were explained to her. The intervention of  the 'acting on values,' was introduced. This intervention was conducted in order to help the pt have a way to prevent herself from feeling drained and to help improve mood. Education about knowing one's values and acting on them, including how this benefits mental health, was provided. The pt was assisted with identifying values first, then discussing some small actions to take on a daily basis that would be in line with the value (for example, de-cluttering the living room because of her value to have a de-cluttered house). Homework was assigned to employ this technique daily, and as early in the day as possible. The pt expressed gratitude for this. The rest of the session was spent identifying needs and some worries. She was assisted with letting go of judgement of herself for taking breaks in the day and for wanting to finish breastfeeding her baby. How she can be more present with her children and  were discussed in the session today as well. She was commended for her work today and assisted in being hopeful of good things to come. The pt expressed  gratitude for today's session.     Mental Status Exam  Hygiene:  good  Dress: normal  Attitude:  cooperative and proactive  Motor Activity: normal  Speech: normal  Mood:  stressed and drained  Affect:  congruent  Thought Processes: normal  Thought Content:  normal  Suicidal Thoughts:  not endorsed  Homicidal Thoughts:  not endorsed  Crisis Safety Plan: not needed   Hallucinations:  none      Patient's Support Network Includes:  family, friends      Progress toward goal: there is evidence to suggest that she is learning ways to enjoy life more and learning to be kinder to herself.      Functional Status: moderate to high      Prognosis: good    Assessment      The pt presented to be struggling with anxiety in a generalized sense and feeling stressed out. She presented as feeling quite drained today, but also seemed to learn counseling techniques rather quickly today. She seemed appreciative of learning new ways to diminish emotional distress and enjoy life more.      Plan      In order to diminish symptoms of anxiety and stress, she is to practice the 'acting on values' technique taught to her today and practiced in session (ongoing/daily).     Kasey Peters, PhD, LP

## 2020-12-04 ENCOUNTER — OFFICE VISIT (OUTPATIENT)
Dept: PSYCHIATRY | Facility: CLINIC | Age: 35
End: 2020-12-04

## 2020-12-04 DIAGNOSIS — F43.9 FEELING STRESSED OUT: ICD-10-CM

## 2020-12-04 DIAGNOSIS — F41.1 GAD (GENERALIZED ANXIETY DISORDER): Primary | ICD-10-CM

## 2020-12-04 PROCEDURE — 90834 PSYTX W PT 45 MINUTES: CPT | Performed by: PSYCHOLOGIST

## 2020-12-04 NOTE — PROGRESS NOTES
PROGRESS NOTE    Data:  Angelika Vick is a 35 y.o. female who met with the undersigned for a scheduled individual outpatient therapy session from 8:46 - 9:30am.       Clinical Maneuvering/Intervention:      The pt talked about struggling with different things: weight, weight loss, in-laws, and self-esteem. Stressors were processed individually and in detail. Venting of frustrations was conducted in order to help the pt feel less tense emotionally and gain insight into issues. Feelings were processed and validated several times in session. The pt was assisted with learning how to be kinder to herself. She was educated about proper self-talk, how she is not 'born' with negative self-talk, and improving this is not only possible, but she can start improving it now. Examples were given. Should thinking was noted from time to time in session, such as related to weight. Maladaptive thought patterns were identified, challenged, and evaluated for validity in order to allow for the pt to chose different and more adaptive ways of thinking. Psychology of weight loss was processed. She was taught practical skills she can do that help promote motivation and and help her achieve one small goal at a time. A step by step process was taught to her. She wanted to discuss family conflict as well, so this was done. The pt was assisted several times in this discussion, to see how she in fact, is doing quite well in boundary setting and being assertive without being aggressive or passive. She was commended for this. The pt expressed gratitude for today's session.    Mental Status Exam  Hygiene:  good  Dress: normal  Attitude:  cooperative and proactive  Motor Activity: normal  Speech: normal  Mood:  stressed and drained  Affect:  congruent  Thought Processes: normal  Thought Content:  normal  Suicidal Thoughts:  not endorsed  Homicidal Thoughts:  not endorsed  Crisis Safety Plan: not needed   Hallucinations:  none      Patient's  Support Network Includes:  family, friends      Progress toward goal: there is evidence to suggest that she is learning ways to enjoy life more and learning to be kinder to herself.      Functional Status: moderate to high      Prognosis: good    Assessment      The pt presented to be struggling with anxiety in a generalized sense and feeling stressed out. She presented as feeling quite drained today, but also seemed to learn counseling techniques rather quickly once again. She seemed appreciative of learning new ways to diminish emotional distress and enjoy life more.      Plan      In order to diminish symptoms of anxiety and stress, she is to practice the motivational techniques of weight loss and continue with practicing both boundary setting and assertiveness with her family (i.e., sister-in-law as discussed today) (over the holidays, longer as needed).     Kasey Peters, PhD, LP

## 2020-12-18 ENCOUNTER — CLINICAL SUPPORT (OUTPATIENT)
Dept: FAMILY MEDICINE CLINIC | Facility: CLINIC | Age: 35
End: 2020-12-18

## 2020-12-18 ENCOUNTER — TELEMEDICINE (OUTPATIENT)
Dept: FAMILY MEDICINE CLINIC | Facility: CLINIC | Age: 35
End: 2020-12-18

## 2020-12-18 DIAGNOSIS — R43.2 LOSS OF TASTE: ICD-10-CM

## 2020-12-18 DIAGNOSIS — R09.81 NASAL CONGESTION: ICD-10-CM

## 2020-12-18 DIAGNOSIS — J34.89 SINUS PRESSURE: ICD-10-CM

## 2020-12-18 DIAGNOSIS — J06.9 UPPER RESPIRATORY TRACT INFECTION, UNSPECIFIED TYPE: ICD-10-CM

## 2020-12-18 DIAGNOSIS — R43.2 LOSS OF TASTE: Primary | ICD-10-CM

## 2020-12-18 PROCEDURE — U0004 COV-19 TEST NON-CDC HGH THRU: HCPCS | Performed by: PHYSICIAN ASSISTANT

## 2020-12-18 PROCEDURE — 99213 OFFICE O/P EST LOW 20 MIN: CPT | Performed by: PHYSICIAN ASSISTANT

## 2020-12-18 NOTE — PROGRESS NOTES
"  Chief complaint  Loss of taste    HPI     Angelika Vick is a pleasant 35 y.o. female who presents for video evaluation of \"chief complaint.\" You have chosen to receive care through a telehealth visit.  Do you consent to use a video/audio connection for your medical care today? Yes.    Here with sudden loss of taste starting after supper last night. Some nasal congestion, 2 migraines this week, dizzy this morning. No known sick contacts or COVID exposures. Denies fever, chills, cough, soa, n/v/d, abdominal pain.    Past Medical History:   Diagnosis Date   • Acid reflux    • Bilateral ovarian cysts    • Depression    • Gestational diabetes     with 2nd pregnancy   • Gestational hypertension     with last pregnancy   • Hypertension in pregnancy    • Migraines        Past Surgical History:   Procedure Laterality Date   • CYST REMOVAL     • GANGLION CYST EXCISION Right        Family History   Problem Relation Age of Onset   • Arthritis Mother    • Hypertension Mother    • Thyroid disease Sister    • Diabetes Sister    • Diabetes Maternal Grandfather        Social History     Socioeconomic History   • Marital status: Single     Spouse name: Not on file   • Number of children: Not on file   • Years of education: Not on file   • Highest education level: Not on file   Tobacco Use   • Smoking status: Former Smoker     Packs/day: 0.50     Types: Cigarettes     Quit date: 10/6/2016     Years since quittin.2   • Smokeless tobacco: Never Used   Substance and Sexual Activity   • Alcohol use: Yes     Comment: Occasionally   • Drug use: No   • Sexual activity: Yes     Partners: Male     Comment: pt expecting 3rd child.        No Known Allergies    ROS    Review of Systems   Constitutional: Negative for chills and fever.   HENT: Positive for congestion and sinus pressure. Negative for postnasal drip and rhinorrhea.    Respiratory: Negative for cough and shortness of breath.    Gastrointestinal: Negative for abdominal " pain, diarrhea, nausea and vomiting.   Musculoskeletal: Negative for myalgias.   Neurological: Positive for headache.       There were no vitals filed for this visit.  There is no height or weight on file to calculate BMI.      Current Outpatient Medications:   •  levothyroxine (SYNTHROID, LEVOTHROID) 150 MCG tablet, TAKE 1 TABLET BY MOUTH DAILY, Disp: 90 tablet, Rfl: 1  •  sertraline (ZOLOFT) 100 MG tablet, Take 1 tablet by mouth Daily., Disp: 90 tablet, Rfl: 1    PE    Physical Exam  Vitals signs reviewed.   Constitutional:       General: She is not in acute distress.  Pulmonary:      Effort: Pulmonary effort is normal. No respiratory distress.   Neurological:      Mental Status: She is alert.   Psychiatric:         Mood and Affect: Mood normal.          A/P    Problem List Items Addressed This Visit     None      Visit Diagnoses     Loss of taste    -  Primary  -Check COVID swab today  -Discussed symptomatic/supportive measures: rest, hydration, vitamin C supplement, nasal saline irrigation for nasal congestion prn, Tylenol prn headaches  -Advised patient to self-quarantine until result returns.     Relevant Orders    COVID-19 PCR, [facility], NP SWAB IN [facility] 24-30 HR TAT - Swab, Nasopharynx    Nasal congestion        Relevant Orders    COVID-19 PCR, [facility], NP SWAB IN [facility] 24-30 HR TAT - Swab, Nasopharynx    Sinus pressure        Upper respiratory tract infection, unspecified type              Plan of care was reviewed with patient at the conclusion of today's visit. Education was provided regarding diagnoses, management, prescribed or recommended OTC products, and the importance of compliance with follow-up appointments. The patient was counseled regarding the risks, benefits, and possible side-effects of treatment. I advised the patient to keep me informed of any acute changes in their status including new, worsening, or persistent symptoms. Patient expresses  understanding and agreement with the management plan.        TARA Vinson

## 2020-12-19 LAB — SARS-COV-2 RNA RESP QL NAA+PROBE: NOT DETECTED

## 2021-01-20 ENCOUNTER — OFFICE VISIT (OUTPATIENT)
Dept: PSYCHIATRY | Facility: CLINIC | Age: 36
End: 2021-01-20

## 2021-01-20 DIAGNOSIS — F41.1 GAD (GENERALIZED ANXIETY DISORDER): Primary | ICD-10-CM

## 2021-01-20 PROCEDURE — 90834 PSYTX W PT 45 MINUTES: CPT | Performed by: PSYCHOLOGIST

## 2021-01-20 NOTE — PROGRESS NOTES
PROGRESS NOTE    Data:  Angelika Vick is a 35 y.o. female who met with the undersigned for a scheduled individual outpatient therapy session from 8:46 - 9:30am.       Clinical Maneuvering/Intervention:      The pt talked about struggling with anxiety, not getting along with certain in-laws, and some various frustrations. Stressors were processed individually and in detail. Venting of frustrations was conducted in order to help the pt feel less tense emotionally and gain insight into issues. Feelings were processed and validated several times in session. Ways in which she is improving however, were noted throughout session. The pt was assisted in recognizing progress in order to show encouragement and promote motivation to keep making positive changes in life. She is engaging less in negativity and was able to spot it sooner when she was overstepping boundary-wise. Worries about medication were processed as were other worries. She was assisted with finding the solution rather than keeping herself stuck in the worry and steps moving through this were conducted. Her natural way of being was a topic today as well, that she tends to be a rather spontaneous person. Putting language to her way of being was conducted for a few minutes, noticing and normalizing how she likes to have plans in the day and in life, but also likes plans to be flexible and to be able to have creative outlets. She was commeneded for her success. What it means for her to be her authentic self and not try to be someone else or even envy what others have were issues that came up today. She talked about spending a lot less time on social media in order to see if that helps her stay truer to herself. The pt expressed gratitude for today's session.    Mental Status Exam  Hygiene:  good  Dress: normal  Attitude:  cooperative and proactive  Motor Activity: normal  Speech: normal  Mood:  anxious  Affect:  congruent  Thought Processes: normal  Thought  Content:  normal  Suicidal Thoughts:  not endorsed  Homicidal Thoughts:  not endorsed  Crisis Safety Plan: not needed   Hallucinations:  none      Patient's Support Network Includes:  family, friends      Progress toward goal: there is evidence to suggest that she is learning ways to enjoy life more and becoming a stronger person.       Functional Status: moderate to high      Prognosis: good    Assessment      The pt presented to be struggling with anxiety in a generalized sense, but to a less severe degree. She tends to benefit from empowering therapy, but therapy that helps her identify and take responsibility for her actions.      Plan      In order to diminish symptoms of anxiety, she is to practice the boundary setting techniques and being her authentic self as discussed today (ongoing).    Kasey Peters, PhD, LP

## 2021-02-03 ENCOUNTER — LAB (OUTPATIENT)
Dept: LAB | Facility: HOSPITAL | Age: 36
End: 2021-02-03

## 2021-02-03 ENCOUNTER — OFFICE VISIT (OUTPATIENT)
Dept: FAMILY MEDICINE CLINIC | Facility: CLINIC | Age: 36
End: 2021-02-03

## 2021-02-03 VITALS
WEIGHT: 200 LBS | SYSTOLIC BLOOD PRESSURE: 124 MMHG | BODY MASS INDEX: 35.44 KG/M2 | HEART RATE: 73 BPM | DIASTOLIC BLOOD PRESSURE: 68 MMHG | OXYGEN SATURATION: 99 % | HEIGHT: 63 IN

## 2021-02-03 DIAGNOSIS — Z00.00 PREVENTATIVE HEALTH CARE: ICD-10-CM

## 2021-02-03 DIAGNOSIS — R53.83 FATIGUE, UNSPECIFIED TYPE: Primary | ICD-10-CM

## 2021-02-03 DIAGNOSIS — E06.3 HYPOTHYROIDISM DUE TO HASHIMOTO'S THYROIDITIS: ICD-10-CM

## 2021-02-03 DIAGNOSIS — F41.1 GENERALIZED ANXIETY DISORDER: ICD-10-CM

## 2021-02-03 DIAGNOSIS — E03.8 HYPOTHYROIDISM DUE TO HASHIMOTO'S THYROIDITIS: ICD-10-CM

## 2021-02-03 LAB
25(OH)D3 SERPL-MCNC: 27.4 NG/ML (ref 30–100)
ALBUMIN SERPL-MCNC: 4.6 G/DL (ref 3.5–5.2)
ALBUMIN/GLOB SERPL: 1.5 G/DL
ALP SERPL-CCNC: 84 U/L (ref 39–117)
ALT SERPL W P-5'-P-CCNC: 9 U/L (ref 1–33)
ANION GAP SERPL CALCULATED.3IONS-SCNC: 10.4 MMOL/L (ref 5–15)
AST SERPL-CCNC: 12 U/L (ref 1–32)
BASOPHILS # BLD AUTO: 0.07 10*3/MM3 (ref 0–0.2)
BASOPHILS NFR BLD AUTO: 1.1 % (ref 0–1.5)
BILIRUB SERPL-MCNC: 0.3 MG/DL (ref 0–1.2)
BILIRUB UR QL STRIP: NEGATIVE
BUN SERPL-MCNC: 13 MG/DL (ref 6–20)
BUN/CREAT SERPL: 14.9 (ref 7–25)
CALCIUM SPEC-SCNC: 9.9 MG/DL (ref 8.6–10.5)
CHLORIDE SERPL-SCNC: 104 MMOL/L (ref 98–107)
CHOLEST SERPL-MCNC: 201 MG/DL (ref 0–200)
CLARITY UR: CLEAR
CO2 SERPL-SCNC: 26.6 MMOL/L (ref 22–29)
COLOR UR: YELLOW
CREAT SERPL-MCNC: 0.87 MG/DL (ref 0.57–1)
DEPRECATED RDW RBC AUTO: 38.9 FL (ref 37–54)
EOSINOPHIL # BLD AUTO: 0.25 10*3/MM3 (ref 0–0.4)
EOSINOPHIL NFR BLD AUTO: 3.8 % (ref 0.3–6.2)
ERYTHROCYTE [DISTWIDTH] IN BLOOD BY AUTOMATED COUNT: 12 % (ref 12.3–15.4)
GFR SERPL CREATININE-BSD FRML MDRD: 75 ML/MIN/1.73
GLOBULIN UR ELPH-MCNC: 3 GM/DL
GLUCOSE SERPL-MCNC: 83 MG/DL (ref 65–99)
GLUCOSE UR STRIP-MCNC: NEGATIVE MG/DL
HBA1C MFR BLD: 5 % (ref 4.8–5.6)
HCT VFR BLD AUTO: 45.1 % (ref 34–46.6)
HDLC SERPL-MCNC: 55 MG/DL (ref 40–60)
HGB BLD-MCNC: 14.7 G/DL (ref 12–15.9)
HGB UR QL STRIP.AUTO: NEGATIVE
IMM GRANULOCYTES # BLD AUTO: 0.01 10*3/MM3 (ref 0–0.05)
IMM GRANULOCYTES NFR BLD AUTO: 0.2 % (ref 0–0.5)
KETONES UR QL STRIP: NEGATIVE
LDLC SERPL CALC-MCNC: 132 MG/DL (ref 0–100)
LDLC/HDLC SERPL: 2.37 {RATIO}
LEUKOCYTE ESTERASE UR QL STRIP.AUTO: NEGATIVE
LYMPHOCYTES # BLD AUTO: 2.52 10*3/MM3 (ref 0.7–3.1)
LYMPHOCYTES NFR BLD AUTO: 38.7 % (ref 19.6–45.3)
MCH RBC QN AUTO: 28.7 PG (ref 26.6–33)
MCHC RBC AUTO-ENTMCNC: 32.6 G/DL (ref 31.5–35.7)
MCV RBC AUTO: 87.9 FL (ref 79–97)
MONOCYTES # BLD AUTO: 0.51 10*3/MM3 (ref 0.1–0.9)
MONOCYTES NFR BLD AUTO: 7.8 % (ref 5–12)
NEUTROPHILS NFR BLD AUTO: 3.16 10*3/MM3 (ref 1.7–7)
NEUTROPHILS NFR BLD AUTO: 48.4 % (ref 42.7–76)
NITRITE UR QL STRIP: NEGATIVE
NRBC BLD AUTO-RTO: 0.2 /100 WBC (ref 0–0.2)
PH UR STRIP.AUTO: 7.5 [PH] (ref 5–8)
PLATELET # BLD AUTO: 309 10*3/MM3 (ref 140–450)
PMV BLD AUTO: 10.3 FL (ref 6–12)
POTASSIUM SERPL-SCNC: 4.6 MMOL/L (ref 3.5–5.2)
PROT SERPL-MCNC: 7.6 G/DL (ref 6–8.5)
PROT UR QL STRIP: NEGATIVE
RBC # BLD AUTO: 5.13 10*6/MM3 (ref 3.77–5.28)
SODIUM SERPL-SCNC: 141 MMOL/L (ref 136–145)
SP GR UR STRIP: 1.01 (ref 1–1.03)
T4 FREE SERPL-MCNC: 1.65 NG/DL (ref 0.93–1.7)
TRIGL SERPL-MCNC: 77 MG/DL (ref 0–150)
TSH SERPL DL<=0.05 MIU/L-ACNC: 0.33 UIU/ML (ref 0.27–4.2)
UROBILINOGEN UR QL STRIP: NORMAL
VLDLC SERPL-MCNC: 14 MG/DL (ref 5–40)
WBC # BLD AUTO: 6.52 10*3/MM3 (ref 3.4–10.8)

## 2021-02-03 PROCEDURE — 80061 LIPID PANEL: CPT

## 2021-02-03 PROCEDURE — 99214 OFFICE O/P EST MOD 30 MIN: CPT | Performed by: INTERNAL MEDICINE

## 2021-02-03 PROCEDURE — 82306 VITAMIN D 25 HYDROXY: CPT

## 2021-02-03 PROCEDURE — 83036 HEMOGLOBIN GLYCOSYLATED A1C: CPT

## 2021-02-03 PROCEDURE — 81003 URINALYSIS AUTO W/O SCOPE: CPT

## 2021-02-03 PROCEDURE — 84439 ASSAY OF FREE THYROXINE: CPT

## 2021-02-03 PROCEDURE — 80050 GENERAL HEALTH PANEL: CPT

## 2021-02-23 DIAGNOSIS — E03.8 HYPOTHYROIDISM DUE TO HASHIMOTO'S THYROIDITIS: ICD-10-CM

## 2021-02-23 DIAGNOSIS — E06.3 HYPOTHYROIDISM DUE TO HASHIMOTO'S THYROIDITIS: ICD-10-CM

## 2021-02-23 RX ORDER — LEVOTHYROXINE SODIUM 0.15 MG/1
150 TABLET ORAL DAILY
Qty: 90 TABLET | Refills: 3 | Status: SHIPPED | OUTPATIENT
Start: 2021-02-23 | End: 2021-11-10

## 2021-05-12 DIAGNOSIS — F41.1 GENERALIZED ANXIETY DISORDER: Primary | ICD-10-CM

## 2021-07-01 ENCOUNTER — OFFICE VISIT (OUTPATIENT)
Dept: PSYCHIATRY | Facility: CLINIC | Age: 36
End: 2021-07-01

## 2021-07-01 DIAGNOSIS — F43.9 FEELING STRESSED OUT: ICD-10-CM

## 2021-07-01 DIAGNOSIS — F41.9 ANXIETY: Primary | ICD-10-CM

## 2021-07-01 PROCEDURE — 90834 PSYTX W PT 45 MINUTES: CPT | Performed by: PSYCHOLOGIST

## 2021-07-09 ENCOUNTER — OFFICE VISIT (OUTPATIENT)
Dept: FAMILY MEDICINE CLINIC | Facility: CLINIC | Age: 36
End: 2021-07-09

## 2021-07-09 ENCOUNTER — HOSPITAL ENCOUNTER (OUTPATIENT)
Dept: GENERAL RADIOLOGY | Facility: HOSPITAL | Age: 36
Discharge: HOME | End: 2021-07-09

## 2021-07-09 ENCOUNTER — LAB (OUTPATIENT)
Dept: LAB | Facility: HOSPITAL | Age: 36
End: 2021-07-09

## 2021-07-09 VITALS
OXYGEN SATURATION: 98 % | HEART RATE: 69 BPM | DIASTOLIC BLOOD PRESSURE: 70 MMHG | SYSTOLIC BLOOD PRESSURE: 124 MMHG | BODY MASS INDEX: 32.43 KG/M2 | HEIGHT: 63 IN | WEIGHT: 183 LBS

## 2021-07-09 DIAGNOSIS — M79.671 BILATERAL FOOT PAIN: Primary | ICD-10-CM

## 2021-07-09 DIAGNOSIS — M79.672 BILATERAL FOOT PAIN: Primary | ICD-10-CM

## 2021-07-09 DIAGNOSIS — M79.672 BILATERAL FOOT PAIN: ICD-10-CM

## 2021-07-09 DIAGNOSIS — M79.671 BILATERAL FOOT PAIN: ICD-10-CM

## 2021-07-09 DIAGNOSIS — E03.9 HYPOTHYROIDISM, UNSPECIFIED TYPE: ICD-10-CM

## 2021-07-09 DIAGNOSIS — S99.929A INJURY OF PLANTAR PLATE, UNSPECIFIED LATERALITY, INITIAL ENCOUNTER: ICD-10-CM

## 2021-07-09 LAB
T4 FREE SERPL-MCNC: 1.69 NG/DL (ref 0.93–1.7)
TSH SERPL DL<=0.05 MIU/L-ACNC: 0.25 UIU/ML (ref 0.27–4.2)

## 2021-07-09 PROCEDURE — 73630 X-RAY EXAM OF FOOT: CPT

## 2021-07-09 PROCEDURE — 99214 OFFICE O/P EST MOD 30 MIN: CPT | Performed by: INTERNAL MEDICINE

## 2021-07-09 PROCEDURE — 84439 ASSAY OF FREE THYROXINE: CPT

## 2021-07-09 PROCEDURE — 84443 ASSAY THYROID STIM HORMONE: CPT

## 2021-07-09 NOTE — PROGRESS NOTES
Chief Complaint   Patient presents with   • Foot Pain     Bilateral ; pain x 1 year. Toes are  x a few months        HPI:  Angelika Vick is a 35 y.o. female who presents today for bilateral forefoot pain, right side worse than left.  Pain seems to be intermittent not associated with any particular shoes or activity.  Her second and third toe on both sides are splitting apart and obese.  Denies any trauma or injury.    ROS:  Constitutional: no fevers, night sweats or unexplained weight loss  Eyes: no vision changes  ENT: no runny nose, ear pain, sore throat  Cardio: no chest pain, palpitations  Pulm: no shortness of breath, wheezing, or cough  GI: no abdominal pain or changes in bowel movements  : no difficulty urinating  MSK: no difficulty ambulating, no joint pain  Neuro: no weakness, dizziness or headache  Psych: no trouble sleeping  Endo: no change in appetite      Past Medical History:   Diagnosis Date   • Acid reflux    • Bilateral ovarian cysts    • Depression    • Gestational diabetes     with 2nd pregnancy   • Gestational hypertension     with last pregnancy   • Hypertension in pregnancy    • Migraines       Family History   Problem Relation Age of Onset   • Arthritis Mother    • Hypertension Mother    • Thyroid disease Sister    • Diabetes Sister    • Diabetes Maternal Grandfather       Social History     Socioeconomic History   • Marital status: Single     Spouse name: Not on file   • Number of children: Not on file   • Years of education: Not on file   • Highest education level: Not on file   Tobacco Use   • Smoking status: Former Smoker     Packs/day: 0.50     Types: Cigarettes     Quit date: 10/6/2016     Years since quittin.7   • Smokeless tobacco: Never Used   Substance and Sexual Activity   • Alcohol use: Yes     Comment: Occasionally   • Drug use: No   • Sexual activity: Yes     Partners: Male     Comment: pt expecting 3rd child.       No Known Allergies   Immunization  History   Administered Date(s) Administered   • Flulaval/Fluarix/Fluzone Quad 10/22/2018   • Tdap 08/31/2017        PE:  Vitals:    07/09/21 0754   BP: 124/70   Pulse: 69   SpO2: 98%      Body mass index is 32.42 kg/m².    Gen Appearance: NAD  HEENT: Normocephalic, PERRLA, no thyromegaly, trache midline  Heart: RRR, normal S1 and S2, no murmur  Lungs: CTA b/l, no wheezing, no crackles  Abdomen: Soft, non-tender, non-distended, no guarding and BSx4  MSK: Moves all extremities well, normal gait, no peripheral edema  Pulses: Palpable and equal b/l  Lymph nodes: No palpable lymphadenopathy   Neuro: No focal deficits      Current Outpatient Medications   Medication Sig Dispense Refill   • levothyroxine (SYNTHROID, LEVOTHROID) 150 MCG tablet TAKE 1 TABLET BY MOUTH DAILY 90 tablet 3     No current facility-administered medications for this visit.        Diagnoses and all orders for this visit:    1. Bilateral foot pain (Primary)  -     XR Foot 3+ View Bilateral; Future  -     Ambulatory Referral to Podiatry  Possibly plantar plate tear or injury?  Checking x-ray today but will likely need MRI.  Recommend establishing care with podiatry.  2. Hypothyroidism, unspecified type  -     TSH+Free T4; Future  Recheck TSH today.  Continue current dose levothyroxine for now.  3. Injury of plantar plate, unspecified laterality, initial encounter  See above.       No follow-ups on file.     Please note that portions of this document were completed with a voice recognition program. Efforts were made to edit the dictations, but occasionally words are mis-transcribed.

## 2021-07-12 DIAGNOSIS — M79.671 BILATERAL FOOT PAIN: Primary | ICD-10-CM

## 2021-07-12 DIAGNOSIS — M79.672 BILATERAL FOOT PAIN: Primary | ICD-10-CM

## 2021-09-20 NOTE — PROGRESS NOTES
Angelikanoemi Vick 31 y.o.  CC:Follow-up and Gestational Diabetes (ESTEFANI 8-, OB: Samanta Lockett CNM)    [place]: Follow-up and Gestational Diabetes (ESTEFANI 8-, OB: Samanta Lockett CNM)    Blood sugars reviewed  Fasting 68-95  Pp sugar  breakfast  Pp sugar  lunch  Pp sugar  dinner with 1 sugar over goal   She is 35 weeks currently   Baby by u/s doing well   Baby moving well  bp is good    No Known Allergies    Current Outpatient Prescriptions:   •  Acetone, Urine, Test (KETONE TEST) strip, 1 strip by In Vitro route Daily., Disp: 50 strip, Rfl: 2  •  Blood Glucose Monitoring Suppl (ONE TOUCH ULTRA 2) W/DEVICE kit, Use daily to test blood sugars, Disp: 1 each, Rfl: 0  •  glucose blood (ONE TOUCH ULTRA TEST) test strip, Test blood sugars QID, Disp: 100 each, Rfl: 5  •  ONETOUCH DELICA LANCETS 33G misc, Test blood sugars QID, Disp: 100 each, Rfl: 5  •  Prenatal Vit-Fe Fumarate-FA (PRENATAL, CLASSIC, VITAMIN) 28-0.8 MG tablet tablet, Take  by mouth Daily., Disp: , Rfl:   Patient Active Problem List    Diagnosis   • Gestational diabetes [O24.419]     Review of Systems   Constitutional: Negative for activity change, appetite change, chills, diaphoresis, fatigue, fever and unexpected weight change.   HENT: Negative for congestion, dental problem, drooling, ear discharge, ear pain, facial swelling, hearing loss, mouth sores, nosebleeds, postnasal drip, rhinorrhea, sinus pressure, sneezing, sore throat, tinnitus, trouble swallowing and voice change.    Eyes: Negative for photophobia, pain, discharge, redness, itching and visual disturbance.   Respiratory: Negative for apnea, cough, choking, chest tightness, shortness of breath, wheezing and stridor.    Cardiovascular: Negative for chest pain, palpitations and leg swelling.   Gastrointestinal: Negative for abdominal distention, abdominal pain, anal bleeding, blood in stool, constipation, diarrhea, nausea, rectal pain and vomiting.   Endocrine: Negative for  Restorative Technician Note      Patient Name: Adelina Covington     Restorative Tech Visit Date: 09/20/21  Note Type: Bracing, Initial consult  Patient Position Upon Consult: Seated edge of bed  Brace Applied: Aspen Horizon 456 Back Pack TLSO (size large)  Patient Position When Brace Applied: Supine  Education Provided: Yes;Other (comment); Family or social support of family present for education by provider (instructional sheet provided)  Patient Position at End of Consult: Supine; All needs within reach  Nurse Communication: Nurse aware of consult, application of brace      Please call [employer] at extension [phone] with any questions or adjustments      GAGE Foley "cold intolerance, heat intolerance, polydipsia, polyphagia and polyuria.   Genitourinary: Negative for decreased urine volume, difficulty urinating, dysuria, enuresis, flank pain, frequency, genital sores, hematuria and urgency.   Musculoskeletal: Negative for arthralgias, back pain, gait problem, joint swelling, myalgias, neck pain and neck stiffness.   Skin: Negative for color change, pallor, rash and wound.   Allergic/Immunologic: Negative for environmental allergies, food allergies and immunocompromised state.   Neurological: Negative for dizziness, tremors, seizures, syncope, facial asymmetry, speech difficulty, weakness, light-headedness, numbness and headaches.   Hematological: Negative for adenopathy. Does not bruise/bleed easily.   Psychiatric/Behavioral: Negative for agitation, behavioral problems, confusion, decreased concentration, dysphoric mood, hallucinations, self-injury, sleep disturbance and suicidal ideas. The patient is not nervous/anxious and is not hyperactive.      Social History     Social History   • Marital status: Single     Spouse name: N/A   • Number of children: N/A   • Years of education: N/A     Occupational History   • Not on file.     Social History Main Topics   • Smoking status: Former Smoker     Packs/day: 0.50     Types: Cigarettes     Quit date: 10/6/2016   • Smokeless tobacco: Never Used   • Alcohol use No      Comment: drinks wine when not pregnant   • Drug use: No   • Sexual activity: No     Other Topics Concern   • Not on file     Social History Narrative     Family History   Problem Relation Age of Onset   • Arthritis Mother    • Hypertension Mother    • Thyroid disease Sister    • Diabetes Sister    • Diabetes Maternal Grandfather      /70  Pulse 115  Ht 63\" (160 cm)  Wt 224 lb (102 kg)  LMP 11/01/2016 (Approximate)  SpO2 98%  BMI 39.68 kg/m2  Physical Exam   Constitutional: She is oriented to person, place, and time. She appears well-developed and " well-nourished.   HENT:   Head: Normocephalic and atraumatic.   Nose: Nose normal.   Mouth/Throat: Oropharynx is clear and moist.   Eyes: Conjunctivae, EOM and lids are normal. Pupils are equal, round, and reactive to light.   Neck: Trachea normal and normal range of motion. Neck supple. Carotid bruit is not present. No tracheal deviation present. No thyroid mass and no thyromegaly present.   Cardiovascular: Normal rate, regular rhythm, normal heart sounds and intact distal pulses.  Exam reveals no gallop and no friction rub.    No murmur heard.  Pulmonary/Chest: Effort normal and breath sounds normal. No respiratory distress. She has no wheezes.   Musculoskeletal: Normal range of motion. She exhibits no edema or deformity.   Lymphadenopathy:     She has no cervical adenopathy.   Neurological: She is alert and oriented to person, place, and time. She has normal reflexes. She displays normal reflexes. No cranial nerve deficit.   Skin: Skin is warm and dry. No rash noted. No cyanosis or erythema. Nails show no clubbing.   Psychiatric: She has a normal mood and affect. Her speech is normal and behavior is normal. Judgment and thought content normal. Cognition and memory are normal.   Nursing note and vitals reviewed.    Results for orders placed or performed in visit on 07/17/17   Preeclampsia Panel   Result Value Ref Range    Alkaline Phosphatase 77 25 - 100 U/L    ALT (SGPT) 24 7 - 40 U/L    AST (SGOT) 17 0 - 33 U/L    Creatinine 0.70 0.60 - 1.30 mg/dL    Total Bilirubin 0.3 0.3 - 1.2 mg/dL     120 - 246 U/L    Uric Acid 4.0 3.1 - 7.8 mg/dL     Problem List Items Addressed This Visit        Endocrine    Gestational diabetes - Primary     Blood sugars reviewed- doing well overall  Is not having higher sugars   Discussed goals with her   Baby on target with weight  Discussed delivery  F/u pp   Continue to email weekly              Return in about 12 weeks (around 10/17/2017) for Recheck 30 min .    Maggy MASON  ELDA Merida  Signed Iveth Gómez MD

## 2021-11-08 ENCOUNTER — OFFICE VISIT (OUTPATIENT)
Dept: FAMILY MEDICINE CLINIC | Facility: CLINIC | Age: 36
End: 2021-11-08

## 2021-11-08 ENCOUNTER — LAB (OUTPATIENT)
Dept: LAB | Facility: HOSPITAL | Age: 36
End: 2021-11-08

## 2021-11-08 VITALS
HEIGHT: 63 IN | WEIGHT: 183 LBS | BODY MASS INDEX: 32.43 KG/M2 | DIASTOLIC BLOOD PRESSURE: 82 MMHG | OXYGEN SATURATION: 98 % | HEART RATE: 77 BPM | SYSTOLIC BLOOD PRESSURE: 126 MMHG

## 2021-11-08 DIAGNOSIS — E03.8 HYPOTHYROIDISM DUE TO HASHIMOTO'S THYROIDITIS: ICD-10-CM

## 2021-11-08 DIAGNOSIS — E06.3 HYPOTHYROIDISM DUE TO HASHIMOTO'S THYROIDITIS: ICD-10-CM

## 2021-11-08 DIAGNOSIS — E06.3 HYPOTHYROIDISM DUE TO HASHIMOTO'S THYROIDITIS: Primary | ICD-10-CM

## 2021-11-08 DIAGNOSIS — E03.8 HYPOTHYROIDISM DUE TO HASHIMOTO'S THYROIDITIS: Primary | ICD-10-CM

## 2021-11-08 DIAGNOSIS — R53.83 FATIGUE, UNSPECIFIED TYPE: ICD-10-CM

## 2021-11-08 LAB
T4 FREE SERPL-MCNC: 1.74 NG/DL (ref 0.93–1.7)
TSH SERPL DL<=0.05 MIU/L-ACNC: 0.49 UIU/ML (ref 0.27–4.2)

## 2021-11-08 PROCEDURE — 84439 ASSAY OF FREE THYROXINE: CPT

## 2021-11-08 PROCEDURE — 84443 ASSAY THYROID STIM HORMONE: CPT

## 2021-11-08 PROCEDURE — 99213 OFFICE O/P EST LOW 20 MIN: CPT | Performed by: INTERNAL MEDICINE

## 2021-11-08 NOTE — PROGRESS NOTES
Chief Complaint   Patient presents with   • Thyroid Problem       HPI:  Angelika Vick is a 36 y.o. female who presents today for follow-up of hypothyroidism.  She suspects her levels may be off due to fatigue.    ROS:  Constitutional: no fevers, night sweats or unexplained weight loss  Eyes: no vision changes  ENT: no runny nose, ear pain, sore throat  Cardio: no chest pain, palpitations  Pulm: no shortness of breath, wheezing, or cough  GI: no abdominal pain or changes in bowel movements  : no difficulty urinating  MSK: no difficulty ambulating, no joint pain  Neuro: no weakness, dizziness or headache  Psych: no trouble sleeping  Endo: no change in appetite      Past Medical History:   Diagnosis Date   • Acid reflux    • Bilateral ovarian cysts    • Depression    • Gestational diabetes     with 2nd pregnancy   • Gestational hypertension     with last pregnancy   • Hypertension in pregnancy    • Migraines       Family History   Problem Relation Age of Onset   • Arthritis Mother    • Hypertension Mother    • Thyroid disease Sister    • Diabetes Sister    • Diabetes Maternal Grandfather       Social History     Socioeconomic History   • Marital status: Single   Tobacco Use   • Smoking status: Former Smoker     Packs/day: 0.50     Types: Cigarettes     Quit date: 10/6/2016     Years since quittin.0   • Smokeless tobacco: Never Used   Substance and Sexual Activity   • Alcohol use: Yes     Comment: Occasionally   • Drug use: No   • Sexual activity: Yes     Partners: Male     Comment: pt expecting 3rd child.       No Known Allergies   Immunization History   Administered Date(s) Administered   • COVID-19 (PFIZER) 2021   • FluLaval/Fluarix/Fluzone >6 10/22/2018   • Tdap 2017        PE:  Vitals:    21 0746   BP: 126/82   Pulse: 77   SpO2: 98%      Body mass index is 32.42 kg/m².    Gen Appearance: NAD  HEENT: Normocephalic, PERRLA, no thyromegaly, trache midline  Heart: RRR, normal S1 and  S2, no murmur  Lungs: CTA b/l, no wheezing, no crackles  Abdomen: Soft, non-tender, non-distended, no guarding and BSx4  MSK: Moves all extremities well, normal gait, no peripheral edema  Pulses: Palpable and equal b/l  Lymph nodes: No palpable lymphadenopathy   Neuro: No focal deficits      Current Outpatient Medications   Medication Sig Dispense Refill   • levothyroxine (SYNTHROID, LEVOTHROID) 150 MCG tablet TAKE 1 TABLET BY MOUTH DAILY 90 tablet 3     No current facility-administered medications for this visit.        Diagnoses and all orders for this visit:    1. Hypothyroidism due to Hashimoto's thyroiditis (Primary)  -     TSH+Free T4; Future  Borderline high on recent testing.  Recheck levels today.  2. Fatigue, unspecified type  See above, possibly due to thyroid levels.  She has a significant amount of stress ongoing.  Plans on leaving for her honeymoon in 7 days.       No follow-ups on file.     Please note that portions of this document were completed with a voice recognition program. Efforts were made to edit the dictations, but occasionally words are mis-transcribed.

## 2021-11-10 ENCOUNTER — TELEPHONE (OUTPATIENT)
Dept: FAMILY MEDICINE CLINIC | Facility: CLINIC | Age: 36
End: 2021-11-10

## 2021-11-10 DIAGNOSIS — E03.9 HYPOTHYROIDISM, UNSPECIFIED TYPE: Primary | ICD-10-CM

## 2021-11-10 DIAGNOSIS — F41.9 ANXIETY AND DEPRESSION: ICD-10-CM

## 2021-11-10 DIAGNOSIS — F32.A ANXIETY AND DEPRESSION: ICD-10-CM

## 2021-11-10 RX ORDER — LEVOTHYROXINE SODIUM 0.12 MG/1
125 TABLET ORAL DAILY
Qty: 90 TABLET | Refills: 1 | Status: SHIPPED | OUTPATIENT
Start: 2021-11-10 | End: 2022-05-12

## 2021-11-10 NOTE — TELEPHONE ENCOUNTER
Caller: Angelika Vick    Relationship: Self    Best call back number: [phone]    What medication are you requesting: ZOLOFT  What are your current symptoms:  ANXIETY AND DEPRESSION    How long have you been experiencing symptoms: N/A    Have you had these symptoms before:    [x] Yes  [] No    Have you been treated for these symptoms before:   [x] Yes  [] No    If a prescription is needed, what is your preferred pharmacy and phone number:      Additional notes:    [id number] - [place], [place] - [facility] [address] - [phone] [facility] [phone] FX

## 2021-11-10 NOTE — TELEPHONE ENCOUNTER
Returned pt's call. Pt wanted ot let provider know that she checked and her multivitamin does contain biotin in it and she takes this daily. Would like to know what he would like her to do in regards to this with her thyroid labs. Also stated she was seen in office on 11/8/21 had been taking a break from zoloft and would like to restart this stated she feels like she needs it again. Does pt need a new appt for this? Please advise, Thanks

## 2021-11-10 NOTE — TELEPHONE ENCOUNTER
Appointment needed at this time.  Thyroid results are a bit high.  I have sent in a lower dose thyroid medication as well as Zoloft refill.  She will need follow-up in 4 to 6 weeks to recheck thyroid and discuss anxiety depression.

## 2021-11-10 NOTE — TELEPHONE ENCOUNTER
Caller: Angelika Vick    Relationship: Self    Best call back number: [phone]    What orders are you requesting (i.e. lab or imaging): LAB    In what timeframe would the patient need to come in:N/A    Where will you receive your lab/imaging services: N/A    Additional notes:   PT STATED  THAT SHE HAD LAB DONE ON Monday AND WOULD LIKE TO KNOW IF TESTS NEED TO BE RETAKEN.

## 2021-12-08 ENCOUNTER — LAB (OUTPATIENT)
Dept: LAB | Facility: HOSPITAL | Age: 36
End: 2021-12-08

## 2021-12-08 ENCOUNTER — OFFICE VISIT (OUTPATIENT)
Dept: FAMILY MEDICINE CLINIC | Facility: CLINIC | Age: 36
End: 2021-12-08

## 2021-12-08 VITALS
HEART RATE: 78 BPM | WEIGHT: 189 LBS | HEIGHT: 63 IN | DIASTOLIC BLOOD PRESSURE: 84 MMHG | SYSTOLIC BLOOD PRESSURE: 124 MMHG | BODY MASS INDEX: 33.49 KG/M2 | OXYGEN SATURATION: 95 %

## 2021-12-08 DIAGNOSIS — Z23 IMMUNIZATION DUE: ICD-10-CM

## 2021-12-08 DIAGNOSIS — E03.9 HYPOTHYROIDISM, UNSPECIFIED TYPE: Primary | ICD-10-CM

## 2021-12-08 DIAGNOSIS — F41.9 ANXIETY: ICD-10-CM

## 2021-12-08 DIAGNOSIS — E03.9 HYPOTHYROIDISM, UNSPECIFIED TYPE: ICD-10-CM

## 2021-12-08 PROCEDURE — 99214 OFFICE O/P EST MOD 30 MIN: CPT | Performed by: INTERNAL MEDICINE

## 2021-12-08 PROCEDURE — 90686 IIV4 VACC NO PRSV 0.5 ML IM: CPT | Performed by: INTERNAL MEDICINE

## 2021-12-08 PROCEDURE — 84443 ASSAY THYROID STIM HORMONE: CPT

## 2021-12-08 PROCEDURE — 90471 IMMUNIZATION ADMIN: CPT | Performed by: INTERNAL MEDICINE

## 2021-12-08 PROCEDURE — 84439 ASSAY OF FREE THYROXINE: CPT

## 2021-12-08 NOTE — PROGRESS NOTES
Chief Complaint   Patient presents with   • Hypothyroidism   • Anxiety     4 wk f/u ; zoloft made anxiety worse       HPI:  Angelika Vick is a 36 y.o. female who presents today for follow-up hypothyroidism and anxiety.  She did initially resume Zoloft but this made her anxiety worse.  After going on vacation for her honeymoon her anxiety symptoms are significantly improved.  She is now on a lower dose of thyroid medication as well.  Overall feels well today.    ROS:  Constitutional: no fevers, night sweats or unexplained weight loss  Eyes: no vision changes  ENT: no runny nose, ear pain, sore throat  Cardio: no chest pain, palpitations  Pulm: no shortness of breath, wheezing, or cough  GI: no abdominal pain or changes in bowel movements  : no difficulty urinating  MSK: no difficulty ambulating, no joint pain  Neuro: no weakness, dizziness or headache  Psych: no trouble sleeping  Endo: no change in appetite      Past Medical History:   Diagnosis Date   • Acid reflux    • Bilateral ovarian cysts    • Depression    • Gestational diabetes     with 2nd pregnancy   • Gestational hypertension     with last pregnancy   • Hypertension in pregnancy    • Migraines       Family History   Problem Relation Age of Onset   • Arthritis Mother    • Hypertension Mother    • Thyroid disease Sister    • Diabetes Sister    • Diabetes Maternal Grandfather       Social History     Socioeconomic History   • Marital status: Single   Tobacco Use   • Smoking status: Former Smoker     Packs/day: 0.50     Types: Cigarettes     Quit date: 10/6/2016     Years since quittin.1   • Smokeless tobacco: Never Used   Substance and Sexual Activity   • Alcohol use: Yes     Comment: Occasionally   • Drug use: No   • Sexual activity: Yes     Partners: Male     Comment: pt expecting 3rd child.       Allergies   Allergen Reactions   • Dermagel Hand  [Alcohol] Rash      Immunization History   Administered Date(s) Administered   •  COVID-19 (PFIZER) 06/14/2021, 07/06/2021   • FluLaval/Fluarix/Fluzone >6 10/22/2018   • Influenza, Unspecified 10/20/2020   • Tdap 08/31/2017        PE:  Vitals:    12/08/21 0752   BP: 124/84   Pulse: 78   SpO2: 95%      Body mass index is 33.48 kg/m².    Gen Appearance: NAD  HEENT: Normocephalic, PERRLA, no thyromegaly, trache midline  Heart: RRR, normal S1 and S2, no murmur  Lungs: CTA b/l, no wheezing, no crackles  Abdomen: Soft, non-tender, non-distended, no guarding and BSx4  MSK: Moves all extremities well, normal gait, no peripheral edema  Pulses: Palpable and equal b/l  Lymph nodes: No palpable lymphadenopathy   Neuro: No focal deficits      Current Outpatient Medications   Medication Sig Dispense Refill   • levothyroxine (Synthroid) 125 MCG tablet Take 1 tablet by mouth Daily. 90 tablet 1     No current facility-administered medications for this visit.      Anxiety symptoms stable off of Zoloft.  Continue to monitor.  Rechecking thyroid levels today.  Continue Synthroid 125 mcg.  Flu shot today.    Diagnoses and all orders for this visit:    1. Hypothyroidism, unspecified type (Primary)  -     TSH+Free T4; Future    2. Anxiety    3. Immunization due         Return in about 6 months (around 6/8/2022) for Annual.     Dictated Utilizing Dragon Dictation    Please note that portions of this note were completed with a voice recognition program.    Part of this note may be an electronic transcription/translation of spoken language to printed text using the Dragon Dictation System.

## 2021-12-09 LAB
T4 FREE SERPL-MCNC: 1.49 NG/DL (ref 0.93–1.7)
TSH SERPL DL<=0.05 MIU/L-ACNC: 3.2 UIU/ML (ref 0.27–4.2)

## 2022-02-01 ENCOUNTER — TELEMEDICINE (OUTPATIENT)
Dept: FAMILY MEDICINE CLINIC | Facility: CLINIC | Age: 37
End: 2022-02-01

## 2022-02-01 DIAGNOSIS — J06.9 UPPER RESPIRATORY TRACT INFECTION, UNSPECIFIED TYPE: Primary | ICD-10-CM

## 2022-02-01 DIAGNOSIS — J32.4 PANSINUSITIS, UNSPECIFIED CHRONICITY: ICD-10-CM

## 2022-02-01 PROCEDURE — 99214 OFFICE O/P EST MOD 30 MIN: CPT | Performed by: INTERNAL MEDICINE

## 2022-02-01 RX ORDER — METHYLPREDNISOLONE 4 MG/1
TABLET ORAL
Qty: 21 EACH | Refills: 0 | Status: SHIPPED | OUTPATIENT
Start: 2022-02-01 | End: 2022-06-08

## 2022-02-01 RX ORDER — DOXYCYCLINE 100 MG/1
100 CAPSULE ORAL 2 TIMES DAILY
Qty: 14 CAPSULE | Refills: 0 | Status: SHIPPED | OUTPATIENT
Start: 2022-02-01 | End: 2022-02-08

## 2022-02-01 NOTE — PROGRESS NOTES
Cc: URI  You have chosen to receive care through a telehealth visit.  Do you consent to use a video/audio connection for your medical care today? Yes  Patient located at her home residence.  I was located at my usual office location.    HPI:  Angelika Vick is a 36 y.o. female who presents today for cough, shortness of breath, sore throat, runny nose and fever for 3 days duration.  She tested on  which was the beginning of her symptoms.  This was negative for COVID-19.  She continues to have symptoms.    ROS:  Constitutional: no fevers, night sweats or unexplained weight loss  Eyes: no vision changes  ENT: no runny nose, ear pain, sore throat  Cardio: no chest pain, palpitations  Pulm: no shortness of breath, wheezing, or cough  GI: no abdominal pain or changes in bowel movements  : no difficulty urinating  MSK: no difficulty ambulating, no joint pain  Neuro: no weakness, dizziness or headache  Psych: no trouble sleeping  Endo: no change in appetite      Past Medical History:   Diagnosis Date   • Acid reflux    • Bilateral ovarian cysts    • Depression    • Gestational diabetes     with 2nd pregnancy   • Gestational hypertension     with last pregnancy   • Hypertension in pregnancy    • Migraines       Family History   Problem Relation Age of Onset   • Arthritis Mother    • Hypertension Mother    • Thyroid disease Sister    • Diabetes Sister    • Diabetes Maternal Grandfather       Social History     Socioeconomic History   • Marital status: Single   Tobacco Use   • Smoking status: Former Smoker     Packs/day: 0.50     Years: 5.00     Pack years: 2.50     Types: Cigarettes     Quit date: 10/6/2016     Years since quittin.3   • Smokeless tobacco: Never Used   Substance and Sexual Activity   • Alcohol use: Yes     Comment: Occasionally   • Drug use: No   • Sexual activity: Yes     Partners: Male     Comment: pt expecting 3rd child.       Allergies   Allergen Reactions   • Dermagel Hand   [Alcohol] Rash      Immunization History   Administered Date(s) Administered   • COVID-19 (PFIZER) PURPLE CAP 06/14/2021, 07/06/2021   • FluLaval/Fluarix/Fluzone >6 10/22/2018, 12/08/2021   • Influenza, Unspecified 10/20/2020   • Tdap 08/31/2017        PE:  There were no vitals filed for this visit.   There is no height or weight on file to calculate BMI.    Gen Appearance: NAD        Current Outpatient Medications   Medication Sig Dispense Refill   • doxycycline (MONODOX) 100 MG capsule Take 1 capsule by mouth 2 (Two) Times a Day for 7 days. 14 capsule 0   • levothyroxine (Synthroid) 125 MCG tablet Take 1 tablet by mouth Daily. 90 tablet 1   • methylPREDNISolone (MEDROL) 4 MG dose pack Take as directed on package instructions. 21 each 0     No current facility-administered medications for this visit.      Recommend repeating Covid test tomorrow morning.  Starting on Medrol Dosepak due to worsening symptoms.    Diagnoses and all orders for this visit:    1. Upper respiratory tract infection, unspecified type (Primary)  -     COVID-19 PCR, [facility], NP SWAB IN LEXAR VIRAL TRANSPORT MEDIA/ORAL SWISH 24-30 HR TAT - Swab, Nasopharynx; Future    2. Pansinusitis, unspecified chronicity  -     methylPREDNISolone (MEDROL) 4 MG dose pack; Take as directed on package instructions.  Dispense: 21 each; Refill: 0  -     doxycycline (MONODOX) 100 MG capsule; Take 1 capsule by mouth 2 (Two) Times a Day for 7 days.  Dispense: 14 capsule; Refill: 0         No follow-ups on file.     Dictated Utilizing Dragon Dictation    Please note that portions of this note were completed with a voice recognition program.    Part of this note may be an electronic transcription/translation of spoken language to printed text using the Dragon Dictation System.

## 2022-02-02 ENCOUNTER — CLINICAL SUPPORT (OUTPATIENT)
Dept: FAMILY MEDICINE CLINIC | Facility: CLINIC | Age: 37
End: 2022-02-02

## 2022-02-02 DIAGNOSIS — J06.9 UPPER RESPIRATORY TRACT INFECTION, UNSPECIFIED TYPE: ICD-10-CM

## 2022-02-02 PROCEDURE — U0004 COV-19 TEST NON-CDC HGH THRU: HCPCS | Performed by: INTERNAL MEDICINE

## 2022-02-03 LAB — SARS-COV-2 RNA NOSE QL NAA+PROBE: DETECTED

## 2022-05-12 DIAGNOSIS — F32.A ANXIETY AND DEPRESSION: ICD-10-CM

## 2022-05-12 DIAGNOSIS — F41.9 ANXIETY AND DEPRESSION: ICD-10-CM

## 2022-05-12 DIAGNOSIS — E03.9 HYPOTHYROIDISM, UNSPECIFIED TYPE: ICD-10-CM

## 2022-05-12 RX ORDER — LEVOTHYROXINE SODIUM 0.12 MG/1
125 TABLET ORAL DAILY
Qty: 30 TABLET | Refills: 0 | Status: SHIPPED | OUTPATIENT
Start: 2022-05-12 | End: 2022-07-07

## 2022-05-12 NOTE — TELEPHONE ENCOUNTER
Rx Refill Note  Requested Prescriptions     Pending Prescriptions Disp Refills   • levothyroxine (SYNTHROID, LEVOTHROID) 125 MCG tablet [Pharmacy Med Name: LEVOTHYROXINE 0.125MG (125MCG) TAB] 90 tablet 1     Sig: TAKE 1 TABLET BY MOUTH DAILY      Last office visit with prescribing clinician: 12/8/2021      Next office visit with prescribing clinician: 6/8/2022            Meenakshi Vergara Rep  05/12/22, 13:30 EDT

## 2022-05-19 DIAGNOSIS — E03.8 HYPOTHYROIDISM DUE TO HASHIMOTO'S THYROIDITIS: ICD-10-CM

## 2022-05-19 DIAGNOSIS — E06.3 HYPOTHYROIDISM DUE TO HASHIMOTO'S THYROIDITIS: ICD-10-CM

## 2022-05-19 RX ORDER — LEVOTHYROXINE SODIUM 0.15 MG/1
150 TABLET ORAL DAILY
Qty: 90 TABLET | Refills: 3 | OUTPATIENT
Start: 2022-05-19

## 2022-06-08 ENCOUNTER — OFFICE VISIT (OUTPATIENT)
Dept: FAMILY MEDICINE CLINIC | Facility: CLINIC | Age: 37
End: 2022-06-08

## 2022-06-08 VITALS
HEIGHT: 63 IN | BODY MASS INDEX: 34.02 KG/M2 | OXYGEN SATURATION: 99 % | WEIGHT: 192 LBS | SYSTOLIC BLOOD PRESSURE: 124 MMHG | DIASTOLIC BLOOD PRESSURE: 86 MMHG | HEART RATE: 61 BPM

## 2022-06-08 DIAGNOSIS — E55.9 VITAMIN D DEFICIENCY: ICD-10-CM

## 2022-06-08 DIAGNOSIS — Z00.00 PREVENTATIVE HEALTH CARE: Primary | ICD-10-CM

## 2022-06-08 PROCEDURE — 99395 PREV VISIT EST AGE 18-39: CPT | Performed by: INTERNAL MEDICINE

## 2022-06-08 NOTE — PROGRESS NOTES
Chief Complaint   Patient presents with   • Annual Exam       HPI:  Angelika Vick is a 36 y.o. female who presents today for annual exam.  No acute concerns today.    ROS:  Constitutional: no fevers, night sweats or unexplained weight loss  Eyes: no vision changes  ENT: no runny nose, ear pain, sore throat  Cardio: no chest pain, palpitations  Pulm: no shortness of breath, wheezing, or cough  GI: no abdominal pain or changes in bowel movements  : no difficulty urinating  MSK: no difficulty ambulating, no joint pain  Neuro: no weakness, dizziness or headache  Psych: no trouble sleeping  Endo: no change in appetite      Past Medical History:   Diagnosis Date   • Acid reflux    • Bilateral ovarian cysts    • Depression    • Gestational diabetes     with 2nd pregnancy   • Gestational hypertension     with last pregnancy   • Hypertension in pregnancy    • Migraines       Family History   Problem Relation Age of Onset   • Arthritis Mother    • Hypertension Mother    • Thyroid disease Sister    • Diabetes Sister    • Diabetes Maternal Grandfather       Social History     Socioeconomic History   • Marital status: Single   Tobacco Use   • Smoking status: Former Smoker     Packs/day: 0.50     Years: 5.00     Pack years: 2.50     Types: Cigarettes     Quit date: 10/6/2016     Years since quittin.6   • Smokeless tobacco: Never Used   Substance and Sexual Activity   • Alcohol use: Yes     Comment: Occasionally   • Drug use: No   • Sexual activity: Yes     Partners: Male     Comment: pt expecting 3rd child.       Allergies   Allergen Reactions   • Dermagel Hand  [Alcohol] Rash      Immunization History   Administered Date(s) Administered   • COVID-19 (PFIZER) PURPLE CAP 2021, 2021   • FluLaval/Fluarix/Fluzone >6 10/22/2018, 2021   • Influenza, Unspecified 10/20/2020   • Tdap 2017        PE:  Vitals:    22 0824   BP: 124/86   Pulse: 61   SpO2: 99%      Body mass index is 34.01  kg/m².    Gen Appearance: NAD  HEENT: Normocephalic, PERRLA, no thyromegaly, trache midline  Heart: RRR, normal S1 and S2, no murmur  Lungs: CTA b/l, no wheezing, no crackles  Abdomen: Soft, non-tender, non-distended, no guarding and BSx4  MSK: Moves all extremities well, normal gait, no peripheral edema  Pulses: Palpable and equal b/l  Lymph nodes: No palpable lymphadenopathy   Neuro: No focal deficits      Current Outpatient Medications   Medication Sig Dispense Refill   • levothyroxine (SYNTHROID, LEVOTHROID) 125 MCG tablet TAKE 1 TABLET BY MOUTH DAILY 30 tablet 0     No current facility-administered medications for this visit.        Diagnoses and all orders for this visit:    1. Preventative health care (Primary)  -     CBC & Differential; Future  -     Comprehensive Metabolic Panel; Future  -     Hemoglobin A1c; Future  -     Lipid Panel; Future  -     TSH+Free T4; Future  -     Urinalysis With Culture If Indicated - Urine, Clean Catch; Future  -     Vitamin D 25 Hydroxy; Future  Counseled on healthy weight, nutrition, physical activity, cancer screening, and immunizations.    2. Vitamin D deficiency  -     Vitamin D 25 Hydroxy; Future         No follow-ups on file.     Dictated Utilizing Dragon Dictation    Please note that portions of this note were completed with a voice recognition program.    Part of this note may be an electronic transcription/translation of spoken language to printed text using the Dragon Dictation System.

## 2022-06-10 ENCOUNTER — LAB (OUTPATIENT)
Dept: LAB | Facility: HOSPITAL | Age: 37
End: 2022-06-10

## 2022-06-10 DIAGNOSIS — E55.9 VITAMIN D DEFICIENCY: ICD-10-CM

## 2022-06-10 DIAGNOSIS — Z00.00 PREVENTATIVE HEALTH CARE: ICD-10-CM

## 2022-06-10 LAB
25(OH)D3 SERPL-MCNC: 47 NG/ML (ref 30–100)
ALBUMIN SERPL-MCNC: 4.3 G/DL (ref 3.5–5.2)
ALBUMIN/GLOB SERPL: 1.7 G/DL
ALP SERPL-CCNC: 56 U/L (ref 39–117)
ALT SERPL W P-5'-P-CCNC: 14 U/L (ref 1–33)
ANION GAP SERPL CALCULATED.3IONS-SCNC: 10.9 MMOL/L (ref 5–15)
AST SERPL-CCNC: 10 U/L (ref 1–32)
BASOPHILS # BLD AUTO: 0.08 10*3/MM3 (ref 0–0.2)
BASOPHILS NFR BLD AUTO: 1.1 % (ref 0–1.5)
BILIRUB SERPL-MCNC: 0.3 MG/DL (ref 0–1.2)
BILIRUB UR QL STRIP: NEGATIVE
BUN SERPL-MCNC: 15 MG/DL (ref 6–20)
BUN/CREAT SERPL: 19.7 (ref 7–25)
CALCIUM SPEC-SCNC: 9.3 MG/DL (ref 8.6–10.5)
CHLORIDE SERPL-SCNC: 106 MMOL/L (ref 98–107)
CHOLEST SERPL-MCNC: 166 MG/DL (ref 0–200)
CLARITY UR: CLEAR
CO2 SERPL-SCNC: 22.1 MMOL/L (ref 22–29)
COLOR UR: YELLOW
CREAT SERPL-MCNC: 0.76 MG/DL (ref 0.57–1)
DEPRECATED RDW RBC AUTO: 38.5 FL (ref 37–54)
EGFRCR SERPLBLD CKD-EPI 2021: 104.9 ML/MIN/1.73
EOSINOPHIL # BLD AUTO: 0.25 10*3/MM3 (ref 0–0.4)
EOSINOPHIL NFR BLD AUTO: 3.4 % (ref 0.3–6.2)
ERYTHROCYTE [DISTWIDTH] IN BLOOD BY AUTOMATED COUNT: 12.2 % (ref 12.3–15.4)
GLOBULIN UR ELPH-MCNC: 2.6 GM/DL
GLUCOSE SERPL-MCNC: 88 MG/DL (ref 65–99)
GLUCOSE UR STRIP-MCNC: NEGATIVE MG/DL
HBA1C MFR BLD: 5 % (ref 4.8–5.6)
HCT VFR BLD AUTO: 43.5 % (ref 34–46.6)
HDLC SERPL-MCNC: 54 MG/DL (ref 40–60)
HGB BLD-MCNC: 14.4 G/DL (ref 12–15.9)
HGB UR QL STRIP.AUTO: NEGATIVE
IMM GRANULOCYTES # BLD AUTO: 0.01 10*3/MM3 (ref 0–0.05)
IMM GRANULOCYTES NFR BLD AUTO: 0.1 % (ref 0–0.5)
KETONES UR QL STRIP: NEGATIVE
LDLC SERPL CALC-MCNC: 101 MG/DL (ref 0–100)
LDLC/HDLC SERPL: 1.88 {RATIO}
LEUKOCYTE ESTERASE UR QL STRIP.AUTO: NEGATIVE
LYMPHOCYTES # BLD AUTO: 1.97 10*3/MM3 (ref 0.7–3.1)
LYMPHOCYTES NFR BLD AUTO: 27 % (ref 19.6–45.3)
MCH RBC QN AUTO: 28.8 PG (ref 26.6–33)
MCHC RBC AUTO-ENTMCNC: 33.1 G/DL (ref 31.5–35.7)
MCV RBC AUTO: 87 FL (ref 79–97)
MONOCYTES # BLD AUTO: 0.58 10*3/MM3 (ref 0.1–0.9)
MONOCYTES NFR BLD AUTO: 8 % (ref 5–12)
NEUTROPHILS NFR BLD AUTO: 4.4 10*3/MM3 (ref 1.7–7)
NEUTROPHILS NFR BLD AUTO: 60.4 % (ref 42.7–76)
NITRITE UR QL STRIP: NEGATIVE
NRBC BLD AUTO-RTO: 0.1 /100 WBC (ref 0–0.2)
PH UR STRIP.AUTO: 7.5 [PH] (ref 5–8)
PLATELET # BLD AUTO: 318 10*3/MM3 (ref 140–450)
PMV BLD AUTO: 10.2 FL (ref 6–12)
POTASSIUM SERPL-SCNC: 4.7 MMOL/L (ref 3.5–5.2)
PROT SERPL-MCNC: 6.9 G/DL (ref 6–8.5)
PROT UR QL STRIP: NEGATIVE
RBC # BLD AUTO: 5 10*6/MM3 (ref 3.77–5.28)
SODIUM SERPL-SCNC: 139 MMOL/L (ref 136–145)
SP GR UR STRIP: 1.01 (ref 1–1.03)
T4 FREE SERPL-MCNC: 1.46 NG/DL (ref 0.93–1.7)
TRIGL SERPL-MCNC: 53 MG/DL (ref 0–150)
TSH SERPL DL<=0.05 MIU/L-ACNC: 3.62 UIU/ML (ref 0.27–4.2)
UROBILINOGEN UR QL STRIP: NORMAL
VLDLC SERPL-MCNC: 11 MG/DL (ref 5–40)
WBC NRBC COR # BLD: 7.29 10*3/MM3 (ref 3.4–10.8)

## 2022-06-10 PROCEDURE — 82306 VITAMIN D 25 HYDROXY: CPT

## 2022-06-10 PROCEDURE — 83036 HEMOGLOBIN GLYCOSYLATED A1C: CPT

## 2022-06-10 PROCEDURE — 80061 LIPID PANEL: CPT

## 2022-06-10 PROCEDURE — 81003 URINALYSIS AUTO W/O SCOPE: CPT

## 2022-06-10 PROCEDURE — 84439 ASSAY OF FREE THYROXINE: CPT

## 2022-06-10 PROCEDURE — 80050 GENERAL HEALTH PANEL: CPT

## 2022-06-20 ENCOUNTER — OFFICE VISIT (OUTPATIENT)
Dept: FAMILY MEDICINE CLINIC | Facility: CLINIC | Age: 37
End: 2022-06-20

## 2022-06-20 ENCOUNTER — TELEPHONE (OUTPATIENT)
Dept: FAMILY MEDICINE CLINIC | Facility: CLINIC | Age: 37
End: 2022-06-20

## 2022-06-20 ENCOUNTER — LAB (OUTPATIENT)
Dept: LAB | Facility: HOSPITAL | Age: 37
End: 2022-06-20

## 2022-06-20 VITALS
OXYGEN SATURATION: 98 % | HEART RATE: 68 BPM | WEIGHT: 192 LBS | BODY MASS INDEX: 34.02 KG/M2 | SYSTOLIC BLOOD PRESSURE: 128 MMHG | HEIGHT: 63 IN | DIASTOLIC BLOOD PRESSURE: 64 MMHG

## 2022-06-20 DIAGNOSIS — R42 LIGHTHEADED: ICD-10-CM
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
## 2022-06-20 DIAGNOSIS — R00.2 PALPITATIONS: ICD-10-CM

## 2022-06-20 DIAGNOSIS — R42 DIZZINESS: Primary | ICD-10-CM

## 2022-06-20 DIAGNOSIS — R53.83 FATIGUE, UNSPECIFIED TYPE: ICD-10-CM

## 2022-06-20 DIAGNOSIS — R42 DIZZINESS: ICD-10-CM

## 2022-06-20 LAB
ALBUMIN SERPL-MCNC: 4.9 G/DL (ref 3.5–5.2)
ALBUMIN/GLOB SERPL: 2 G/DL
ALP SERPL-CCNC: 61 U/L (ref 39–117)
ALT SERPL W P-5'-P-CCNC: 12 U/L (ref 1–33)
ANION GAP SERPL CALCULATED.3IONS-SCNC: 11.9 MMOL/L (ref 5–15)
AST SERPL-CCNC: 15 U/L (ref 1–32)
BASOPHILS # BLD AUTO: 0.1 10*3/MM3 (ref 0–0.2)
BASOPHILS NFR BLD AUTO: 1.4 % (ref 0–1.5)
BILIRUB SERPL-MCNC: 0.4 MG/DL (ref 0–1.2)
BUN SERPL-MCNC: 15 MG/DL (ref 6–20)
BUN/CREAT SERPL: 18.5 (ref 7–25)
CALCIUM SPEC-SCNC: 9.6 MG/DL (ref 8.6–10.5)
CHLORIDE SERPL-SCNC: 107 MMOL/L (ref 98–107)
CO2 SERPL-SCNC: 24.1 MMOL/L (ref 22–29)
CREAT SERPL-MCNC: 0.81 MG/DL (ref 0.57–1)
CRP SERPL-MCNC: <0.3 MG/DL (ref 0–0.5)
DEPRECATED RDW RBC AUTO: 38.4 FL (ref 37–54)
EGFRCR SERPLBLD CKD-EPI 2021: 97.2 ML/MIN/1.73
EOSINOPHIL # BLD AUTO: 0.2 10*3/MM3 (ref 0–0.4)
EOSINOPHIL NFR BLD AUTO: 2.9 % (ref 0.3–6.2)
ERYTHROCYTE [DISTWIDTH] IN BLOOD BY AUTOMATED COUNT: 12.1 % (ref 12.3–15.4)
ERYTHROCYTE [SEDIMENTATION RATE] IN BLOOD: 4 MM/HR (ref 0–20)
EXPIRATION DATE: NORMAL
FERRITIN SERPL-MCNC: 55.4 NG/ML (ref 13–150)
FLUAV AG UPPER RESP QL IA.RAPID: NOT DETECTED
FLUBV AG UPPER RESP QL IA.RAPID: NOT DETECTED
FOLATE SERPL-MCNC: >20 NG/ML (ref 4.78–24.2)
GLOBULIN UR ELPH-MCNC: 2.5 GM/DL
GLUCOSE SERPL-MCNC: 96 MG/DL (ref 65–99)
HCT VFR BLD AUTO: 45.5 % (ref 34–46.6)
HGB BLD-MCNC: 15.2 G/DL (ref 12–15.9)
IMM GRANULOCYTES # BLD AUTO: 0.01 10*3/MM3 (ref 0–0.05)
IMM GRANULOCYTES NFR BLD AUTO: 0.1 % (ref 0–0.5)
INTERNAL CONTROL: NORMAL
IRON 24H UR-MRATE: 151 MCG/DL (ref 37–145)
IRON SATN MFR SERPL: 32 % (ref 20–50)
LYMPHOCYTES # BLD AUTO: 2.14 10*3/MM3 (ref 0.7–3.1)
LYMPHOCYTES NFR BLD AUTO: 30.7 % (ref 19.6–45.3)
Lab: NORMAL
MAGNESIUM SERPL-MCNC: 2.4 MG/DL (ref 1.6–2.6)
MCH RBC QN AUTO: 29.1 PG (ref 26.6–33)
MCHC RBC AUTO-ENTMCNC: 33.4 G/DL (ref 31.5–35.7)
MCV RBC AUTO: 87.2 FL (ref 79–97)
MONOCYTES # BLD AUTO: 0.54 10*3/MM3 (ref 0.1–0.9)
MONOCYTES NFR BLD AUTO: 7.7 % (ref 5–12)
NEUTROPHILS NFR BLD AUTO: 3.98 10*3/MM3 (ref 1.7–7)
NEUTROPHILS NFR BLD AUTO: 57.2 % (ref 42.7–76)
NRBC BLD AUTO-RTO: 0 /100 WBC (ref 0–0.2)
PLATELET # BLD AUTO: 337 10*3/MM3 (ref 140–450)
PMV BLD AUTO: 9.9 FL (ref 6–12)
POTASSIUM SERPL-SCNC: 4.5 MMOL/L (ref 3.5–5.2)
PROT SERPL-MCNC: 7.4 G/DL (ref 6–8.5)
RBC # BLD AUTO: 5.22 10*6/MM3 (ref 3.77–5.28)
SARS-COV-2 AG UPPER RESP QL IA.RAPID: NOT DETECTED
SODIUM SERPL-SCNC: 143 MMOL/L (ref 136–145)
T4 FREE SERPL-MCNC: 1.49 NG/DL (ref 0.93–1.7)
TIBC SERPL-MCNC: 468 MCG/DL (ref 298–536)
TRANSFERRIN SERPL-MCNC: 314 MG/DL (ref 200–360)
TSH SERPL DL<=0.05 MIU/L-ACNC: 4.39 UIU/ML (ref 0.27–4.2)
VIT B12 BLD-MCNC: 407 PG/ML (ref 211–946)
WBC NRBC COR # BLD: 6.97 10*3/MM3 (ref 3.4–10.8)

## 2022-06-20 PROCEDURE — 86757 RICKETTSIA ANTIBODY: CPT

## 2022-06-20 PROCEDURE — 93000 ELECTROCARDIOGRAM COMPLETE: CPT | Performed by: INTERNAL MEDICINE

## 2022-06-20 PROCEDURE — 80050 GENERAL HEALTH PANEL: CPT

## 2022-06-20 PROCEDURE — 86622 BRUCELLA ANTIBODY: CPT

## 2022-06-20 PROCEDURE — 82607 VITAMIN B-12: CPT

## 2022-06-20 PROCEDURE — 36415 COLL VENOUS BLD VENIPUNCTURE: CPT

## 2022-06-20 PROCEDURE — 83735 ASSAY OF MAGNESIUM: CPT

## 2022-06-20 PROCEDURE — 87428 SARSCOV & INF VIR A&B AG IA: CPT | Performed by: INTERNAL MEDICINE

## 2022-06-20 PROCEDURE — 82746 ASSAY OF FOLIC ACID SERUM: CPT

## 2022-06-20 PROCEDURE — 84439 ASSAY OF FREE THYROXINE: CPT

## 2022-06-20 PROCEDURE — 86666 EHRLICHIA ANTIBODY: CPT

## 2022-06-20 PROCEDURE — 99215 OFFICE O/P EST HI 40 MIN: CPT | Performed by: INTERNAL MEDICINE

## 2022-06-20 PROCEDURE — 85652 RBC SED RATE AUTOMATED: CPT

## 2022-06-20 PROCEDURE — 83540 ASSAY OF IRON: CPT

## 2022-06-20 PROCEDURE — 86618 LYME DISEASE ANTIBODY: CPT

## 2022-06-20 PROCEDURE — 82728 ASSAY OF FERRITIN: CPT

## 2022-06-20 PROCEDURE — 84466 ASSAY OF TRANSFERRIN: CPT

## 2022-06-20 PROCEDURE — 86140 C-REACTIVE PROTEIN: CPT

## 2022-06-20 RX ORDER — DOXYCYCLINE 100 MG/1
100 CAPSULE ORAL 2 TIMES DAILY
Qty: 20 CAPSULE | Refills: 0 | Status: SHIPPED | OUTPATIENT
Start: 2022-06-20 | End: 2022-06-30

## 2022-06-20 NOTE — TELEPHONE ENCOUNTER
Caller: Angelika Vick    Relationship: Self    Best call back number: [phone]    What is the best time to reach you: ANYTIME    Who are you requesting to speak with (clinical staff, provider,  specific staff member): CLINICAL STAFF        What was the call regarding: PATIENT WAS SEEN TODAY SHE STATES THAT THE DOCTOR WANTED HER TO FOLLOW UP WITH A CARDIOLOGIST THE PATIENT NEEDS TO KNOW IF SHE NEEDS TO CALL THE CARDIOLOGIST OR IF THEY WILL CALL HER    Do you require a callback: YES

## 2022-06-20 NOTE — PROGRESS NOTES
Chief Complaint   Patient presents with   • Hypertension     141/90 this morning    • Headache   • Dizziness       HPI:  Angelika Vick is a 36 y.o. female who presents today for 7 days of fatigue, lightheadedness and sometimes dizziness.  Initially started last week when taking her child to a pediatrician appointment.  Sudden onset of lightheadedness and palpitations.  Reportedly was tachycardic at the time.  She did not pass out but did have to sit down for an extended time at pediatrician's office.  Denies any recent illness.  No change in diet or medication.  She has been compliant with thyroid medication.  No recent travel.  She did notice a tick bite while at a drive[place]in [employer].  No recent hiking or travel.  She did remove the tick, reports it was likely not attached for very long time.  She reports a mild posterior headache radiating to the front.  Blood pressure was borderline elevated today at 140/90 at her house.  Onset of symptoms was unexpected per patient.     ROS:  Constitutional: no fevers, night sweats or unexplained weight loss  Eyes: no vision changes  ENT: no runny nose, ear pain, sore throat  Cardio: no chest pain, palpitations  Pulm: no shortness of breath, wheezing, or cough  GI: no abdominal pain or changes in bowel movements  : no difficulty urinating  MSK: no difficulty ambulating, no joint pain  Neuro: no weakness, dizziness or headache  Psych: no trouble sleeping  Endo: no change in appetite      Past Medical History:   Diagnosis Date   • Acid reflux    • Bilateral ovarian cysts    • Depression    • Gestational diabetes     with 2nd pregnancy   • Gestational hypertension     with last pregnancy   • Hypertension in pregnancy 2012   • Migraines       Family History   Problem Relation Age of Onset   • Arthritis Mother    • Hypertension Mother    • Thyroid disease Sister    • Diabetes Sister    • Diabetes Maternal Grandfather       Social History     Socioeconomic History   •  Marital status: Single   Tobacco Use   • Smoking status: Former Smoker     Packs/day: 0.50     Years: 5.00     Pack years: 2.50     Types: Cigarettes     Quit date: 10/6/2016     Years since quittin.7   • Smokeless tobacco: Never Used   Substance and Sexual Activity   • Alcohol use: Yes     Comment: Occasionally   • Drug use: No   • Sexual activity: Yes     Partners: Male     Comment: pt expecting 3rd child.       Allergies   Allergen Reactions   • Dermagel Hand  [Alcohol] Rash      Immunization History   Administered Date(s) Administered   • COVID-19 (PFIZER) PURPLE CAP 2021, 2021   • FluLaval/Fluarix/Fluzone >6 10/22/2018, 2021   • Influenza, Unspecified 10/20/2020   • Tdap 2017        PE:  Vitals:    22 0921   BP: 128/64   Pulse: 68   SpO2: 98%      Body mass index is 34.01 kg/m².    Gen Appearance: NAD  HEENT: Normocephalic, PERRLA, no thyromegaly, trache midline  Heart: RRR, normal S1 and S2, no murmur  Lungs: CTA b/l, no wheezing, no crackles  Abdomen: Soft, non-tender, non-distended, no guarding and BSx4  MSK: Moves all extremities well, normal gait, no peripheral edema  Pulses: Palpable and equal b/l  Lymph nodes: No palpable lymphadenopathy   Neuro: No focal deficits      Current Outpatient Medications   Medication Sig Dispense Refill   • levothyroxine (SYNTHROID, LEVOTHROID) 125 MCG tablet TAKE 1 TABLET BY MOUTH DAILY 30 tablet 0   • doxycycline (MONODOX) 100 MG capsule Take 1 capsule by mouth 2 (Two) Times a Day for 10 days. 20 capsule 0     No current facility-administered medications for this visit.     Starting with repeat blood work, EKG and rule out COVID and flu.    EKG sinus rhythm with occasional PVCs.  No prior comparison available.  Rate of 66, , QRS 90, QTc 425.    Recommend following up with heart valve clinic for further evaluation of palpitations.  If no cardiac origin of symptoms would recommend follow-up in clinic for further eval. she is  low risk for Lyme transmission but we will go ahead and check Lyme titers and treat prophylaxis with doxycycline.    Counseling was given to patient for the following topics: diagnostic results, impressions and importance of treatment compliance . Total time of the encounter was 40 minutes and 21 minutes was spent face to face counseling.    An additional 4 minutes was spent for EKG.    Flu and COVID-negative.    Diagnoses and all orders for this visit:    1. Dizziness (Primary)  -     TSH+Free T4; Future  -     Vitamin B12; Future  -     Ferritin; Future  -     Folate; Future  -     Iron Profile; Future  -     Magnesium; Future  -     Tick Panel - Blood, Arm, Left; Future    2. Fatigue, unspecified type  -     TSH+Free T4; Future  -     Vitamin B12; Future  -     Ferritin; Future  -     Folate; Future  -     Iron Profile; Future  -     Magnesium; Future  -     Tick Panel - Blood, Arm, Left; Future    3. Lightheaded  -     TSH+Free T4; Future  -     Vitamin B12; Future  -     Ferritin; Future  -     Folate; Future  -     Iron Profile; Future  -     Magnesium; Future  -     Tick Panel - Blood, Arm, Left; Future    4. Palpitations  -     TSH+Free T4; Future  -     Vitamin B12; Future  -     Ferritin; Future  -     Folate; Future  -     Iron Profile; Future  -     Magnesium; Future  -     Tick Panel - Blood, Arm, Left; Future  -     ECG 12 Lead    5. Tick bite, unspecified site, initial encounter  -     doxycycline (MONODOX) 100 MG capsule; Take 1 capsule by mouth 2 (Two) Times a Day for 10 days.  Dispense: 20 capsule; Refill: 0  -     TSH+Free T4; Future  -     Vitamin B12; Future  -     Ferritin; Future  -     Folate; Future  -     Iron Profile; Future  -     Magnesium; Future  -     Tick Panel - Blood, Arm, Left; Future         No follow-ups on file.     Dictated Utilizing Dragon Dictation    Please note that portions of this note were completed with a voice recognition program.    Part of this note may be an  electronic transcription/translation of spoken language to printed text using the Dragon Dictation System.

## 2022-06-21 LAB — B BURGDOR IGG+IGM SER QL IA: NEGATIVE

## 2022-06-22 ENCOUNTER — HOSPITAL ENCOUNTER (OUTPATIENT)
Dept: CARDIOLOGY | Facility: HOSPITAL | Age: 37
Discharge: HOME | End: 2022-06-22

## 2022-06-22 ENCOUNTER — OFFICE VISIT (OUTPATIENT)
Dept: CARDIOLOGY | Facility: HOSPITAL | Age: 37
End: 2022-06-22

## 2022-06-22 VITALS
HEART RATE: 85 BPM | DIASTOLIC BLOOD PRESSURE: 84 MMHG | WEIGHT: 191.38 LBS | TEMPERATURE: 99 F | SYSTOLIC BLOOD PRESSURE: 137 MMHG | BODY MASS INDEX: 33.91 KG/M2 | RESPIRATION RATE: 20 BRPM | HEIGHT: 63 IN | OXYGEN SATURATION: 99 %

## 2022-06-22 DIAGNOSIS — R55 POSTURAL DIZZINESS WITH PRESYNCOPE: ICD-10-CM

## 2022-06-22 DIAGNOSIS — R42 POSTURAL DIZZINESS WITH PRESYNCOPE: ICD-10-CM

## 2022-06-22 DIAGNOSIS — R00.0 TACHYCARDIA: ICD-10-CM

## 2022-06-22 DIAGNOSIS — R00.0 TACHYCARDIA: Primary | ICD-10-CM

## 2022-06-22 LAB
BH CV ECHO MEAS - AO MAX PG: 12.3 MMHG
BH CV ECHO MEAS - AO MEAN PG: 7 MMHG
BH CV ECHO MEAS - AO ROOT DIAM: 3.3 CM
BH CV ECHO MEAS - AO V2 MAX: 175 CM/SEC
BH CV ECHO MEAS - AO V2 VTI: 38.1 CM
BH CV ECHO MEAS - AVA(I,D): 2.25 CM2
BH CV ECHO MEAS - EDV(CUBED): 91.1 ML
BH CV ECHO MEAS - EDV(MOD-SP2): 92.5 ML
BH CV ECHO MEAS - EDV(MOD-SP4): 100 ML
BH CV ECHO MEAS - EF(MOD-BP): 61.5 %
BH CV ECHO MEAS - EF(MOD-SP2): 61.1 %
BH CV ECHO MEAS - EF(MOD-SP4): 64.5 %
BH CV ECHO MEAS - ESV(CUBED): 39.3 ML
BH CV ECHO MEAS - ESV(MOD-SP2): 36 ML
BH CV ECHO MEAS - ESV(MOD-SP4): 35.5 ML
BH CV ECHO MEAS - FS: 24.4 %
BH CV ECHO MEAS - IVS/LVPW: 1.57 CM
BH CV ECHO MEAS - IVSD: 1.1 CM
BH CV ECHO MEAS - LA DIMENSION: 4.1 CM
BH CV ECHO MEAS - LAT PEAK E' VEL: 16.2 CM/SEC
BH CV ECHO MEAS - LV DIASTOLIC VOL/BSA (35-75): 52.7 CM2
BH CV ECHO MEAS - LV MASS(C)D: 132.8 GRAMS
BH CV ECHO MEAS - LV MAX PG: 6.6 MMHG
BH CV ECHO MEAS - LV MEAN PG: 3 MMHG
BH CV ECHO MEAS - LV SYSTOLIC VOL/BSA (12-30): 18.7 CM2
BH CV ECHO MEAS - LV V1 MAX: 128 CM/SEC
BH CV ECHO MEAS - LV V1 VTI: 27.3 CM
BH CV ECHO MEAS - LVIDD: 4.5 CM
BH CV ECHO MEAS - LVIDS: 3.4 CM
BH CV ECHO MEAS - LVOT AREA: 3.1 CM2
BH CV ECHO MEAS - LVOT DIAM: 2 CM
BH CV ECHO MEAS - LVPWD: 0.7 CM
BH CV ECHO MEAS - MED PEAK E' VEL: 11.9 CM/SEC
BH CV ECHO MEAS - MV A MAX VEL: 77 CM/SEC
BH CV ECHO MEAS - MV DEC SLOPE: 579 CM/SEC2
BH CV ECHO MEAS - MV DEC TIME: 0.2 MSEC
BH CV ECHO MEAS - MV E MAX VEL: 100 CM/SEC
BH CV ECHO MEAS - MV E/A: 1.3
BH CV ECHO MEAS - MV MAX PG: 3.8 MMHG
BH CV ECHO MEAS - MV MEAN PG: 2 MMHG
BH CV ECHO MEAS - MV P1/2T: 51.6 MSEC
BH CV ECHO MEAS - MV V2 VTI: 30.3 CM
BH CV ECHO MEAS - MVA(P1/2T): 4.3 CM2
BH CV ECHO MEAS - MVA(VTI): 2.8 CM2
BH CV ECHO MEAS - PA ACC TIME: 0.17 SEC
BH CV ECHO MEAS - PA PR(ACCEL): 1.15 MMHG
BH CV ECHO MEAS - RAP SYSTOLE: 3 MMHG
BH CV ECHO MEAS - RVSP: 22 MMHG
BH CV ECHO MEAS - SI(MOD-SP2): 29.8 ML/M2
BH CV ECHO MEAS - SI(MOD-SP4): 34 ML/M2
BH CV ECHO MEAS - SV(LVOT): 85.8 ML
BH CV ECHO MEAS - SV(MOD-SP2): 56.5 ML
BH CV ECHO MEAS - SV(MOD-SP4): 64.5 ML
BH CV ECHO MEAS - TAPSE (>1.6): 2.6 CM
BH CV ECHO MEAS - TR MAX PG: 18.7 MMHG
BH CV ECHO MEAS - TR MAX VEL: 215.5 CM/SEC
BH CV ECHO MEASUREMENTS AVERAGE E/E' RATIO: 7.12
BH CV VAS BP RIGHT ARM: NORMAL MMHG
BH CV XLRA - RV BASE: 3 CM
BH CV XLRA - RV LENGTH: 7.8 CM
BH CV XLRA - RV MID: 2.4 CM
BH CV XLRA - TDI S': 18.4 CM/SEC
LEFT ATRIUM VOLUME INDEX: 16.9 ML/M2
MAXIMAL PREDICTED HEART RATE: 185 BPM
STRESS TARGET HR: 157 BPM

## 2022-06-22 PROCEDURE — 93306 TTE W/DOPPLER COMPLETE: CPT | Performed by: INTERNAL MEDICINE

## 2022-06-22 PROCEDURE — 93270 REMOTE 30 DAY ECG REV/REPORT: CPT

## 2022-06-22 PROCEDURE — 93306 TTE W/DOPPLER COMPLETE: CPT

## 2022-06-22 PROCEDURE — 99203 OFFICE O/P NEW LOW 30 MIN: CPT | Performed by: NURSE PRACTITIONER

## 2022-06-22 NOTE — PROGRESS NOTES
[facility] Heart Monitor Documentation    Angelika Vick  1985  [id number]  06/22/22      [] ZIO XT Patch  Model [id number] Prescribed for  Days    · Serial Number: (N + 9 Digits) N  · Apply-By Date on Box:   · USPS Tracking Number:   · USPS Tracking        [x] [employer]ian MINI PLUS Mobile Cardiac Telemetry  Model [id number] Prescribed for 30 Days    · Serial Number: (BGM + 7 Digits) [id number]   · Shipped-By Date on Box: 06/16/2022  · UPS Tracking Number: [id number]  · UPS Tracking      [] [employer] MINI Holter Monitor  Model BGMINIEL Prescribed for  Days    · Serial Number: (7 Digits)   · Shipped-By Date on Box:   · UPS Tracking Number: [id number]  · UPS Tracking        This monitor was applied to the patient's chest and checked for proper functioning.  Ms. Angelika Vick was instructed in the proper use of this monitor.  She was given the opportunity to ask questions and left the office with the device 's instruction manual.    Diane Bliss, ABE, 10:54 EDT, 06/22/22                  [facility]

## 2022-06-23 LAB
BRUCELLA IGG SER QL IA: NEGATIVE
BRUCELLA IGM SER QL IA: NEGATIVE
RICK SF IGG TITR SER IF: NORMAL {TITER}
RICK SF IGM TITR SER IF: NORMAL {TITER}
RICK TYPHUS IGG TITR SER IF: NORMAL {TITER}
RICK TYPHUS IGM TITR SER IF: NORMAL {TITER}

## 2022-06-23 NOTE — PROGRESS NOTES
"Chief Complaint  Establish Care and Palpitations    Subjective    History of Present Illness {CC  Problem List  Visit  Diagnosis   Encounters  Notes  Medications  Labs  Result Review Imaging  Media :23}       History of Present Illness   36-year-old female presents the office today at the request of Dr. Muniz for ongoing evaluation of your palpitations, dizziness and lightheadedness.  Patient reports symptoms started 1 week ago.  She reports that she got out of the car to take her child to the pediatrician when she began experiencing extreme dizziness and lightheadedness.  She reports at that time her heart rate felt like it was racing.  She noted her heart rate to be in the 120s to 130s.  She reports today that her heart rate is higher than normal as she notes heart rate usually runs 60s to 70s.  Patient reports that she has been drinking large amounts of caffeine and has cut back on that.  Objective     Vital Signs:   Vitals:    06/22/22 1015 06/22/22 1017 06/22/22 1019   BP: 137/81 148/83 137/84   BP Location: Right arm Left arm Left arm   Patient Position: Sitting Standing Sitting   Cuff Size: Adult Adult Adult   Pulse: 84 84 85   Resp:   20   Temp:   99 °F (37.2 °C)   TempSrc:   Temporal   SpO2: 99% 99% 99%   Weight:   86.8 kg (191 lb 6 oz)   Height:   160 cm (63\")     Body mass index is 33.9 kg/m².  Physical Exam  Vitals and nursing note reviewed.   Constitutional:       Appearance: Normal appearance.   HENT:      Head: Normocephalic.   Eyes:      Pupils: Pupils are equal, round, and reactive to light.   Cardiovascular:      Rate and Rhythm: Normal rate and regular rhythm.      Pulses: Normal pulses.      Heart sounds: Normal heart sounds. No murmur heard.  Pulmonary:      Effort: Pulmonary effort is normal.      Breath sounds: Normal breath sounds.   Abdominal:      General: Bowel sounds are normal.      Palpations: Abdomen is soft.   Musculoskeletal:         General: Normal range of motion.      " Cervical back: Normal range of motion.      Right lower leg: No edema.      Left lower leg: No edema.   Skin:     General: Skin is warm and dry.      Capillary Refill: Capillary refill takes less than 2 seconds.   Neurological:      Mental Status: She is alert and oriented to person, place, and time.   Psychiatric:         Mood and Affect: Mood normal.         Thought Content: Thought content normal.      right foot in orthopedic boot         Result Review  Data Reviewed:{ Labs  Result Review  Imaging  Med Tab  Media :23}    EKG sinus rhythm with occasional PVCs.  No prior comparison available.  Rate of 66, , QRS 90, QTc 425.  TSH+Free T4 (06/20/2022 09:57)  Vitamin B12 (06/20/2022 09:57)  Ferritin (06/20/2022 09:57)  Folate (06/20/2022 09:57)  Iron Profile (06/20/2022 09:57)  Magnesium (06/20/2022 09:57)             Assessment and Plan {CC Problem List  Visit Diagnosis  ROS  Review (Popup)  Health Maintenance  Quality  BestPractice  Medications  SmartSets  SnapShot Encounters  Media :23}   1. Tachycardia    - Mobile Cardiac Outpatient Telemetry; Future  - Adult Transthoracic Echo Complete W/ Cont if Necessary Per Protocol; Future    2. Postural dizziness with presyncope    - Mobile Cardiac Outpatient Telemetry; Future  - Adult Transthoracic Echo Complete W/ Cont if Necessary Per Protocol; Future        Follow Up {Instructions Charge Capture  Follow-up Communications :23}   Return in about 4 weeks (around 7/20/2022) for Monitor results, Office visit.    Patient was given instructions and counseling regarding her condition or for health maintenance advice. Please see specific information pulled into the AVS if appropriate.  Patient was instructed to call the [facility] with any questions, concerns, or worsening symptoms.

## 2022-06-23 NOTE — PROGRESS NOTES
Your echocardiogram is normal.  Your heart contracts normally.  There are no significant valvular abnormalities noted.    If you have any questions regarding this letter please let me know.    Sincerely yours,        Kathryn HERCULES

## 2022-06-24 ENCOUNTER — HOSPITAL ENCOUNTER (EMERGENCY)
Facility: HOSPITAL | Age: 37
Discharge: HOME | End: 2022-06-24
Attending: EMERGENCY MEDICINE | Admitting: EMERGENCY MEDICINE

## 2022-06-24 ENCOUNTER — APPOINTMENT (OUTPATIENT)
Dept: GENERAL RADIOLOGY | Facility: HOSPITAL | Age: 37
End: 2022-06-24

## 2022-06-24 VITALS
SYSTOLIC BLOOD PRESSURE: 100 MMHG | HEIGHT: 63 IN | HEART RATE: 73 BPM | OXYGEN SATURATION: 94 % | TEMPERATURE: 98 F | DIASTOLIC BLOOD PRESSURE: 76 MMHG | BODY MASS INDEX: 33.66 KG/M2 | RESPIRATION RATE: 24 BRPM | WEIGHT: 190 LBS

## 2022-06-24 DIAGNOSIS — R00.2 PALPITATIONS: ICD-10-CM

## 2022-06-24 DIAGNOSIS — R79.89 TSH ELEVATION: ICD-10-CM

## 2022-06-24 DIAGNOSIS — F41.0 PANIC ATTACK: ICD-10-CM

## 2022-06-24 DIAGNOSIS — R00.0 SINUS TACHYCARDIA: Primary | ICD-10-CM

## 2022-06-24 LAB
ALBUMIN SERPL-MCNC: 4.7 G/DL (ref 3.5–5.2)
ALBUMIN/GLOB SERPL: 1.6 G/DL
ALP SERPL-CCNC: 65 U/L (ref 39–117)
ALT SERPL W P-5'-P-CCNC: 12 U/L (ref 1–33)
ANION GAP SERPL CALCULATED.3IONS-SCNC: 15 MMOL/L (ref 5–15)
AST SERPL-CCNC: 13 U/L (ref 1–32)
B-HCG UR QL: NEGATIVE
BASOPHILS # BLD AUTO: 0.09 10*3/MM3 (ref 0–0.2)
BASOPHILS NFR BLD AUTO: 1.1 % (ref 0–1.5)
BILIRUB SERPL-MCNC: 0.6 MG/DL (ref 0–1.2)
BUN SERPL-MCNC: 13 MG/DL (ref 6–20)
BUN/CREAT SERPL: 17.6 (ref 7–25)
CALCIUM SPEC-SCNC: 9.4 MG/DL (ref 8.6–10.5)
CHLORIDE SERPL-SCNC: 100 MMOL/L (ref 98–107)
CO2 SERPL-SCNC: 19 MMOL/L (ref 22–29)
CREAT SERPL-MCNC: 0.74 MG/DL (ref 0.57–1)
D DIMER PPP FEU-MCNC: <0.27 MCGFEU/ML (ref 0.01–0.5)
DEPRECATED RDW RBC AUTO: 38.7 FL (ref 37–54)
EGFRCR SERPLBLD CKD-EPI 2021: 108.4 ML/MIN/1.73
EOSINOPHIL # BLD AUTO: 0.12 10*3/MM3 (ref 0–0.4)
EOSINOPHIL NFR BLD AUTO: 1.5 % (ref 0.3–6.2)
ERYTHROCYTE [DISTWIDTH] IN BLOOD BY AUTOMATED COUNT: 12.2 % (ref 12.3–15.4)
EXPIRATION DATE: NORMAL
GLOBULIN UR ELPH-MCNC: 2.9 GM/DL
GLUCOSE SERPL-MCNC: 126 MG/DL (ref 65–99)
HCT VFR BLD AUTO: 44.6 % (ref 34–46.6)
HGB BLD-MCNC: 15.2 G/DL (ref 12–15.9)
HOLD SPECIMEN: NORMAL
IMM GRANULOCYTES # BLD AUTO: 0.02 10*3/MM3 (ref 0–0.05)
IMM GRANULOCYTES NFR BLD AUTO: 0.3 % (ref 0–0.5)
INTERNAL NEGATIVE CONTROL: NEGATIVE
INTERNAL POSITIVE CONTROL: POSITIVE
LYMPHOCYTES # BLD AUTO: 2.56 10*3/MM3 (ref 0.7–3.1)
LYMPHOCYTES NFR BLD AUTO: 32.6 % (ref 19.6–45.3)
Lab: NORMAL
MAGNESIUM SERPL-MCNC: 2.1 MG/DL (ref 1.6–2.6)
MCH RBC QN AUTO: 29.5 PG (ref 26.6–33)
MCHC RBC AUTO-ENTMCNC: 34.1 G/DL (ref 31.5–35.7)
MCV RBC AUTO: 86.6 FL (ref 79–97)
MONOCYTES # BLD AUTO: 0.56 10*3/MM3 (ref 0.1–0.9)
MONOCYTES NFR BLD AUTO: 7.1 % (ref 5–12)
NEUTROPHILS NFR BLD AUTO: 4.51 10*3/MM3 (ref 1.7–7)
NEUTROPHILS NFR BLD AUTO: 57.4 % (ref 42.7–76)
NRBC BLD AUTO-RTO: 0 /100 WBC (ref 0–0.2)
NT-PROBNP SERPL-MCNC: 36.1 PG/ML (ref 0–450)
PLATELET # BLD AUTO: 348 10*3/MM3 (ref 140–450)
PMV BLD AUTO: 9.5 FL (ref 6–12)
POTASSIUM SERPL-SCNC: 3.8 MMOL/L (ref 3.5–5.2)
PROT SERPL-MCNC: 7.6 G/DL (ref 6–8.5)
QT INTERVAL: 378 MS
QTC INTERVAL: 454 MS
RBC # BLD AUTO: 5.15 10*6/MM3 (ref 3.77–5.28)
SODIUM SERPL-SCNC: 134 MMOL/L (ref 136–145)
TROPONIN T SERPL-MCNC: <0.01 NG/ML (ref 0–0.03)
TSH SERPL DL<=0.05 MIU/L-ACNC: 4.74 UIU/ML (ref 0.27–4.2)
WBC NRBC COR # BLD: 7.86 10*3/MM3 (ref 3.4–10.8)
WHOLE BLOOD HOLD COAG: NORMAL
WHOLE BLOOD HOLD SPECIMEN: NORMAL

## 2022-06-24 PROCEDURE — 93005 ELECTROCARDIOGRAM TRACING: CPT | Performed by: EMERGENCY MEDICINE

## 2022-06-24 PROCEDURE — 85379 FIBRIN DEGRADATION QUANT: CPT | Performed by: EMERGENCY MEDICINE

## 2022-06-24 PROCEDURE — 81025 URINE PREGNANCY TEST: CPT | Performed by: EMERGENCY MEDICINE

## 2022-06-24 PROCEDURE — 80050 GENERAL HEALTH PANEL: CPT

## 2022-06-24 PROCEDURE — 81025 URINE PREGNANCY TEST: CPT

## 2022-06-24 PROCEDURE — 99284 EMERGENCY DEPT VISIT MOD MDM: CPT

## 2022-06-24 PROCEDURE — 36415 COLL VENOUS BLD VENIPUNCTURE: CPT

## 2022-06-24 PROCEDURE — 83880 ASSAY OF NATRIURETIC PEPTIDE: CPT

## 2022-06-24 PROCEDURE — 25010000002 LORAZEPAM PER 2 MG: Performed by: EMERGENCY MEDICINE

## 2022-06-24 PROCEDURE — 84484 ASSAY OF TROPONIN QUANT: CPT

## 2022-06-24 PROCEDURE — 83835 ASSAY OF METANEPHRINES: CPT | Performed by: EMERGENCY MEDICINE

## 2022-06-24 PROCEDURE — 96374 THER/PROPH/DIAG INJ IV PUSH: CPT

## 2022-06-24 PROCEDURE — 71045 X-RAY EXAM CHEST 1 VIEW: CPT

## 2022-06-24 PROCEDURE — 83735 ASSAY OF MAGNESIUM: CPT

## 2022-06-24 RX ORDER — LORAZEPAM 2 MG/ML
1 INJECTION INTRAMUSCULAR ONCE
Status: COMPLETED | OUTPATIENT
Start: 2022-06-24 | End: 2022-06-24

## 2022-06-24 RX ORDER — VENLAFAXINE HYDROCHLORIDE 37.5 MG/1
37.5 CAPSULE, EXTENDED RELEASE ORAL DAILY
Qty: 30 CAPSULE | Refills: 0 | Status: SHIPPED | OUTPATIENT
Start: 2022-06-24 | End: 2022-06-27

## 2022-06-24 RX ORDER — SODIUM CHLORIDE 0.9 % (FLUSH) 0.9 %
10 SYRINGE (ML) INJECTION AS NEEDED
Status: DISCONTINUED | OUTPATIENT
Start: 2022-06-24 | End: 2022-06-24 | Stop reason: HOSPADM

## 2022-06-24 RX ORDER — LORAZEPAM 1 MG/1
1 TABLET ORAL EVERY 8 HOURS PRN
Qty: 9 TABLET | Refills: 0 | Status: SHIPPED | OUTPATIENT
Start: 2022-06-24 | End: 2022-06-27 | Stop reason: SDUPTHER

## 2022-06-24 RX ADMIN — LORAZEPAM 1 MG: 2 INJECTION INTRAMUSCULAR; INTRAVENOUS at 12:15

## 2022-06-24 NOTE — ED PROVIDER NOTES
Subjective   This is a very pleasant 36-year-old female  and accompanied by her .  She is a homemaker.  She exercises regularly up until her daughter broke her leg and now after taking care of her daughter she is not exercising as much.  No history of cardiac issues but does have a history of panic attacks in the past.    She presents the emergency room today with episodes of palpitations rapid heartbeat restless legs and feeling of impending doom.  This is been going on for a few weeks happened fairly abruptly when she was taking her daughter to the pediatrician.    She has been seen by her PCP was referred to the [facility] and I have reviewed those notes she had an echo which was essentially unremarkable and is currently wearing an event monitor.  However despite this she is continuing to have increasing frequency of these episodes happening now almost on a daily basis.    She has not passed out from these.  She is not had fevers or chills.  She has an IUD in place and denies current pregnancy.  Bowel movements and urine have been unremarkable.  She has not passed blood per any orifice.  She takes no health food or nutritional supplements but was a heavy caffeine user up until the past week when she is cut way back.  Early on doxycycline for a tick bite.  No rash or other manifestations noted from tick bite.        All other systems are reviewed and are negative except as noted above.          Review of Systems   All other systems reviewed and are negative.      Past Medical History:   Diagnosis Date   • Acid reflux    • Bilateral ovarian cysts    • Depression    • Gestational diabetes     with 2nd pregnancy   • Gestational hypertension     with last pregnancy   • Hypertension in pregnancy    • Migraines      Ordering physician: Kathryn Butler APRN Study date: 22       Patient Information    Patient Name   Angelika Vick MRN   [id number] Legal Sex   Female  (Age)    1985 (36 y.o.)          CV PACS 2     Show images for Adult Transthoracic Echo Complete W/ Cont if Necessary Per Protocol   [facility] PACS 1     Show images for Adult Transthoracic Echo Complete W/ Cont if Necessary Per Protocol     Sedation Narrator Report    Sedation Narrator Report       Interpretation Summary    · Left ventricular ejection fraction appears to be 61 - 65%. Left ventricular systolic function is normal.  · Estimated right ventricular systolic pressure from tricuspid regurgitation is normal (<35 mmHg).  · Left ventricular diastolic function was normal.  · Saline test results are negative.      Allergies   Allergen Reactions   • Dermagel Hand  [Alcohol] Rash       Past Surgical History:   Procedure Laterality Date   • CYST REMOVAL     • GANGLION CYST EXCISION Right        Family History   Problem Relation Age of Onset   • Arthritis Mother    • Hypertension Mother    • No Known Problems Father    • Thyroid disease Sister    • Diabetes Sister    • No Known Problems Brother    • Diabetes Maternal Grandmother    • Diabetes Maternal Grandfather    • No Known Problems Paternal Grandmother    • No Known Problems Paternal Grandfather        Social History     Socioeconomic History   • Marital status:    Tobacco Use   • Smoking status: Former Smoker     Packs/day: 0.50     Years: 11.00     Pack years: 5.50     Types: Cigarettes     Start date:      Quit date: 10/6/2016     Years since quittin.7   • Smokeless tobacco: Never Used   Substance and Sexual Activity   • Alcohol use: Yes     Comment: Occasionally   • Drug use: No   • Sexual activity: Yes     Partners: Male     Comment: pt expecting 3rd child.            Objective   Physical Exam  Vitals and nursing note reviewed.   Constitutional:       Comments: This is a pleasant 36-year-old no acute distress alert and oriented GCS 15 currently feeling a little bit anxious pulses a bit elevated 108 bpm sinus on the monitor.   HENT:       Head: Normocephalic and atraumatic.      Right Ear: External ear normal.      Left Ear: External ear normal.      Nose: Nose normal.      Mouth/Throat:      Mouth: Mucous membranes are moist.      Pharynx: Oropharynx is clear.   Eyes:      Extraocular Movements: Extraocular movements intact.      Conjunctiva/sclera: Conjunctivae normal.      Pupils: Pupils are equal, round, and reactive to light.   Cardiovascular:      Comments: Heart rate between 86 and 108 beats a minute sinus.  Currently 86 on exam.  No murmurs rubs gallops or heaves.  Good pulses all 4 extremities.  Pulmonary:      Effort: Pulmonary effort is normal.      Breath sounds: Normal breath sounds.   Abdominal:      Comments: BMI 33.  Soft nontender no organomegaly, masses, or guarding.   Musculoskeletal:         General: No swelling or deformity. Normal range of motion.      Cervical back: Normal range of motion and neck supple.      Comments: Equal pulses without edema, synovitis, rash, or venous cords.   Skin:     General: Skin is warm and dry.      Capillary Refill: Capillary refill takes less than 2 seconds.   Neurological:      Comments: Face is symmetric, voice is strong, tongue is midline.  Vision, hearing, and speech are preserved.  No focal weakness.         Procedures           ED Course            Recent Results (from the past 24 hour(s))   ECG 12 Lead    Collection Time: 06/24/22  9:19 AM   Result Value Ref Range    QT Interval 378 ms    QTC Interval 454 ms   Comprehensive Metabolic Panel    Collection Time: 06/24/22  9:30 AM    Specimen: Blood   Result Value Ref Range    Glucose 126 (H) 65 - 99 mg/dL    BUN 13 6 - 20 mg/dL    Creatinine 0.74 0.57 - 1.00 mg/dL    Sodium 134 (L) 136 - 145 mmol/L    Potassium 3.8 3.5 - 5.2 mmol/L    Chloride 100 98 - 107 mmol/L    CO2 19.0 (L) 22.0 - 29.0 mmol/L    Calcium 9.4 8.6 - 10.5 mg/dL    Total Protein 7.6 6.0 - 8.5 g/dL    Albumin 4.70 3.50 - 5.20 g/dL    ALT (SGPT) 12 1 - 33 U/L    AST (SGOT) 13 1  - 32 U/L    Alkaline Phosphatase 65 39 - 117 U/L    Total Bilirubin 0.6 0.0 - 1.2 mg/dL    Globulin 2.9 gm/dL    A/G Ratio 1.6 g/dL    BUN/Creatinine Ratio 17.6 7.0 - 25.0    Anion Gap 15.0 5.0 - 15.0 mmol/L    eGFR 108.4 >60.0 mL/min/1.73   Magnesium    Collection Time: 06/24/22  9:30 AM    Specimen: Blood   Result Value Ref Range    Magnesium 2.1 1.6 - 2.6 mg/dL   Troponin    Collection Time: 06/24/22  9:30 AM    Specimen: Blood   Result Value Ref Range    Troponin T <0.010 0.000 - 0.030 ng/mL   TSH    Collection Time: 06/24/22  9:30 AM    Specimen: Blood   Result Value Ref Range    TSH 4.740 (H) 0.270 - 4.200 uIU/mL   BNP    Collection Time: 06/24/22  9:30 AM    Specimen: Blood   Result Value Ref Range    proBNP 36.1 0.0 - 450.0 pg/mL   Green Top (Gel)    Collection Time: 06/24/22  9:30 AM   Result Value Ref Range    Extra Tube Hold for add-ons.    Lavender Top    Collection Time: 06/24/22  9:30 AM   Result Value Ref Range    Extra Tube hold for add-on    Gold Top - SST    Collection Time: 06/24/22  9:30 AM   Result Value Ref Range    Extra Tube Hold for add-ons.    Gray Top    Collection Time: 06/24/22  9:30 AM   Result Value Ref Range    Extra Tube Hold for add-ons.    Light Blue Top    Collection Time: 06/24/22  9:30 AM   Result Value Ref Range    Extra Tube Hold for add-ons.    CBC Auto Differential    Collection Time: 06/24/22  9:30 AM    Specimen: Blood   Result Value Ref Range    WBC 7.86 3.40 - 10.80 10*3/mm3    RBC 5.15 3.77 - 5.28 10*6/mm3    Hemoglobin 15.2 12.0 - 15.9 g/dL    Hematocrit 44.6 34.0 - 46.6 %    MCV 86.6 79.0 - 97.0 fL    MCH 29.5 26.6 - 33.0 pg    MCHC 34.1 31.5 - 35.7 g/dL    RDW 12.2 (L) 12.3 - 15.4 %    RDW-SD 38.7 37.0 - 54.0 fl    MPV 9.5 6.0 - 12.0 fL    Platelets 348 140 - 450 10*3/mm3    Neutrophil % 57.4 42.7 - 76.0 %    Lymphocyte % 32.6 19.6 - 45.3 %    Monocyte % 7.1 5.0 - 12.0 %    Eosinophil % 1.5 0.3 - 6.2 %    Basophil % 1.1 0.0 - 1.5 %    Immature Grans % 0.3 0.0 - 0.5 %     Neutrophils, Absolute 4.51 1.70 - 7.00 10*3/mm3    Lymphocytes, Absolute 2.56 0.70 - 3.10 10*3/mm3    Monocytes, Absolute 0.56 0.10 - 0.90 10*3/mm3    Eosinophils, Absolute 0.12 0.00 - 0.40 10*3/mm3    Basophils, Absolute 0.09 0.00 - 0.20 10*3/mm3    Immature Grans, Absolute 0.02 0.00 - 0.05 10*3/mm3    nRBC 0.0 0.0 - 0.2 /100 WBC   D-dimer, Quantitative    Collection Time: 06/24/22  9:30 AM    Specimen: Blood   Result Value Ref Range    D-Dimer, Quantitative <0.27 0.01 - 0.50 MCGFEU/mL   POCT pregnancy, urine    Collection Time: 06/24/22 10:55 AM    Specimen: Urine   Result Value Ref Range    HCG, Urine, QL Negative Negative    Lot Number [id number]     Internal Positive Control Positive Positive, Passed    Internal Negative Control Negative Negative, Passed    Expiration Date 07/31/2023      Note: In addition to lab results from this visit, the labs listed above may include labs taken at another facility or during a different encounter within the last 24 hours. Please correlate lab times with ED admission and discharge times for further clarification of the services performed during this visit.    XR Chest 1 View   Final Result   No acute cardiopulmonary process identified.       This report was finalized on 6/24/2022 10:19 AM by Carlos Hyatt MD.            Vitals:    06/24/22 1309 06/24/22 1310 06/24/22 1312 06/24/22 1313   BP:       BP Location:       Patient Position:       Pulse: 75 78 80 73   Resp:       Temp:       TempSrc:       SpO2: 95% 96% 96% 94%   Weight:       Height:         Medications   LORazepam (ATIVAN) injection 1 mg (1 mg Intravenous Given 6/24/22 1215)     ECG/EMG Results (last 24 hours)     Procedure Component Value Units Date/Time    ECG 12 Lead [[id number]] Collected: 06/24/22 0919     Updated: 06/24/22 1132     QT Interval 378 ms      QTC Interval 454 ms     Narrative:      Test Reason : Dysrhythmia triage protocol  Blood Pressure :   */*   mmHG  Vent. Rate :  87 BPM     Atrial Rate  :  87 BPM     P-R Int : 140 ms          QRS Dur :  78 ms      QT Int : 378 ms       P-R-T Axes :  21   3  33 degrees     QTc Int : 454 ms    Sinus rhythm with frequent premature ventricular complexes  Low voltage QRS  Cannot rule out Anterior infarct , age undetermined  Abnormal ECG  No previous ECGs available  Confirmed by MUSHTAQ HERRERA MD ([id number]) on 6/24/2022 11:32:31 AM    Referred By:            Confirmed By: MUSHTAQ HERRERA MD        ECG 12 Lead   Final Result   Test Reason : Dysrhythmia triage protocol   Blood Pressure :   */*   mmHG   Vent. Rate :  87 BPM     Atrial Rate :  87 BPM      P-R Int : 140 ms          QRS Dur :  78 ms       QT Int : 378 ms       P-R-T Axes :  21   3  33 degrees      QTc Int : 454 ms      Sinus rhythm with frequent premature ventricular complexes   Low voltage QRS   Cannot rule out Anterior infarct , age undetermined   Abnormal ECG   No previous ECGs available   Confirmed by MUSHTAQ HERRERA MD ([id number]) on 6/24/2022 11:32:31 AM      Referred By:            Confirmed By: MUSHTAQ HERRERA MD                                  [facility] reviewed by Mushtaq Herrera MD       MDM  Number of Diagnoses or Management Options  Palpitations  Panic attack  Sinus tachycardia  TSH elevation  Diagnosis management comments:     I have reviewed all available studies at the bedside with the patient and her .  Her labs showed a normal D dimer but a slight elevation of her TSH was persistent and she is already on thyroid replacement.  This point I do not think this is clinically significant but I will have her follow-up with her primary care to follow this closely.    After treatment with IV Ativan here she feels much improved her heart rates down to 76 beats a minute and stable there.    I suspect the patient has recurrent panic attacks.    In the differential would be something like pheochromocytoma as well and I have sent plasma metanephrines on her and have asked her to follow-up with her PCP and the  heart valve clinic regarding this as well as her event monitor.    I started her on Effexor Exar to see if this helps prevent her current attacks I started her on a short course of Ativan to use if she has these and feels disabled by them.    She will follow-up with her primary care regarding this as well.    She will return to the ED if worse in any way.    All are agreeable with the plan       Amount and/or Complexity of Data Reviewed  Clinical lab tests: reviewed  Tests in the radiology section of CPT®: reviewed  Tests in the medicine section of CPT®: reviewed  Decide to obtain previous medical records or to obtain history from someone other than the patient: yes        Final diagnoses:   Sinus tachycardia   Palpitations   Panic attack   TSH elevation       ED Disposition  ED Disposition     ED Disposition   Discharge    Condition   Stable    Comment   --             Augustine Muniz DO  [address]  [place] [id number] [phone]    Schedule an appointment as soon as possible for a visit   Follow-up on plasma metanephrines and to see if medication and treating with helps.    [facility]  [address]  [facility] [address] [phone]    Follow-up with evaluation of event monitor         Medication List      New Prescriptions    LORazepam 1 MG tablet  Commonly known as: Ativan  Take 1 tablet by mouth Every 8 (Eight) Hours As Needed for Anxiety.     venlafaxine XR 37.5 MG 24 hr capsule  Commonly known as: EFFEXOR-XR  Take 1 capsule by mouth Daily for 30 days.           Where to Get Your Medications      These medications were sent to [id number] - [place], [place] - [address] AT [facility] [address] - [phone]  - [phone] FX  [address], [facility] [address]    Phone: [phone]   · LORazepam 1 MG tablet  · venlafaxine XR 37.5 MG 24 hr capsule          Mushtaq Herrera  MD  06/24/22 3734

## 2022-06-27 ENCOUNTER — TELEMEDICINE (OUTPATIENT)
Dept: FAMILY MEDICINE CLINIC | Facility: CLINIC | Age: 37
End: 2022-06-27

## 2022-06-27 ENCOUNTER — APPOINTMENT (OUTPATIENT)
Dept: GENERAL RADIOLOGY | Facility: HOSPITAL | Age: 37
End: 2022-06-27

## 2022-06-27 ENCOUNTER — HOSPITAL ENCOUNTER (EMERGENCY)
Facility: HOSPITAL | Age: 37
Discharge: HOME | End: 2022-06-27
Attending: STUDENT IN AN ORGANIZED HEALTH CARE EDUCATION/TRAINING PROGRAM | Admitting: STUDENT IN AN ORGANIZED HEALTH CARE EDUCATION/TRAINING PROGRAM

## 2022-06-27 VITALS
WEIGHT: 198.41 LBS | TEMPERATURE: 98 F | BODY MASS INDEX: 35.16 KG/M2 | OXYGEN SATURATION: 95 % | HEART RATE: 54 BPM | RESPIRATION RATE: 22 BRPM | SYSTOLIC BLOOD PRESSURE: 101 MMHG | HEIGHT: 63 IN | DIASTOLIC BLOOD PRESSURE: 66 MMHG

## 2022-06-27 DIAGNOSIS — G47.00 INSOMNIA, UNSPECIFIED TYPE: ICD-10-CM

## 2022-06-27 DIAGNOSIS — R07.9 CHEST PAIN, UNSPECIFIED TYPE: Primary | ICD-10-CM

## 2022-06-27 DIAGNOSIS — F41.0 PANIC ATTACK: ICD-10-CM

## 2022-06-27 DIAGNOSIS — R31.21 ASYMPTOMATIC MICROSCOPIC HEMATURIA: ICD-10-CM

## 2022-06-27 DIAGNOSIS — R00.2 PALPITATIONS: ICD-10-CM

## 2022-06-27 DIAGNOSIS — F41.9 ANXIETY: ICD-10-CM

## 2022-06-27 DIAGNOSIS — F41.1 GENERALIZED ANXIETY DISORDER: Primary | ICD-10-CM

## 2022-06-27 LAB
ALBUMIN SERPL-MCNC: 4.3 G/DL (ref 3.5–5.2)
ALBUMIN/GLOB SERPL: 1.5 G/DL
ALP SERPL-CCNC: 64 U/L (ref 39–117)
ALT SERPL W P-5'-P-CCNC: 9 U/L (ref 1–33)
ANION GAP SERPL CALCULATED.3IONS-SCNC: 10 MMOL/L (ref 5–15)
AST SERPL-CCNC: 10 U/L (ref 1–32)
B-HCG UR QL: NEGATIVE
BACTERIA UR QL AUTO: ABNORMAL /HPF
BASOPHILS # BLD AUTO: 0.1 10*3/MM3 (ref 0–0.2)
BASOPHILS NFR BLD AUTO: 1 % (ref 0–1.5)
BILIRUB SERPL-MCNC: 0.3 MG/DL (ref 0–1.2)
BILIRUB UR QL STRIP: NEGATIVE
BUN SERPL-MCNC: 11 MG/DL (ref 6–20)
BUN/CREAT SERPL: 16.9 (ref 7–25)
CALCIUM SPEC-SCNC: 9.1 MG/DL (ref 8.6–10.5)
CHLORIDE SERPL-SCNC: 105 MMOL/L (ref 98–107)
CLARITY UR: CLEAR
CO2 SERPL-SCNC: 24 MMOL/L (ref 22–29)
COLOR UR: YELLOW
CREAT SERPL-MCNC: 0.65 MG/DL (ref 0.57–1)
D DIMER PPP FEU-MCNC: <0.27 MCGFEU/ML (ref 0.01–0.5)
DEPRECATED RDW RBC AUTO: 38.1 FL (ref 37–54)
EGFRCR SERPLBLD CKD-EPI 2021: 117.9 ML/MIN/1.73
EOSINOPHIL # BLD AUTO: 0.07 10*3/MM3 (ref 0–0.4)
EOSINOPHIL NFR BLD AUTO: 0.7 % (ref 0.3–6.2)
ERYTHROCYTE [DISTWIDTH] IN BLOOD BY AUTOMATED COUNT: 12.2 % (ref 12.3–15.4)
EXPIRATION DATE: NORMAL
GLOBULIN UR ELPH-MCNC: 2.9 GM/DL
GLUCOSE SERPL-MCNC: 121 MG/DL (ref 65–99)
GLUCOSE UR STRIP-MCNC: NEGATIVE MG/DL
HCT VFR BLD AUTO: 42.5 % (ref 34–46.6)
HGB BLD-MCNC: 14.4 G/DL (ref 12–15.9)
HGB UR QL STRIP.AUTO: ABNORMAL
HOLD SPECIMEN: NORMAL
HYALINE CASTS UR QL AUTO: ABNORMAL /LPF
IMM GRANULOCYTES # BLD AUTO: 0.02 10*3/MM3 (ref 0–0.05)
IMM GRANULOCYTES NFR BLD AUTO: 0.2 % (ref 0–0.5)
INTERNAL NEGATIVE CONTROL: NEGATIVE
INTERNAL POSITIVE CONTROL: POSITIVE
KETONES UR QL STRIP: NEGATIVE
LEUKOCYTE ESTERASE UR QL STRIP.AUTO: NEGATIVE
LYMPHOCYTES # BLD AUTO: 2.6 10*3/MM3 (ref 0.7–3.1)
LYMPHOCYTES NFR BLD AUTO: 26.6 % (ref 19.6–45.3)
Lab: NORMAL
MAGNESIUM SERPL-MCNC: 2.2 MG/DL (ref 1.6–2.6)
MCH RBC QN AUTO: 28.9 PG (ref 26.6–33)
MCHC RBC AUTO-ENTMCNC: 33.9 G/DL (ref 31.5–35.7)
MCV RBC AUTO: 85.3 FL (ref 79–97)
MONOCYTES # BLD AUTO: 0.78 10*3/MM3 (ref 0.1–0.9)
MONOCYTES NFR BLD AUTO: 8 % (ref 5–12)
NEUTROPHILS NFR BLD AUTO: 6.19 10*3/MM3 (ref 1.7–7)
NEUTROPHILS NFR BLD AUTO: 63.5 % (ref 42.7–76)
NITRITE UR QL STRIP: NEGATIVE
NRBC BLD AUTO-RTO: 0 /100 WBC (ref 0–0.2)
NT-PROBNP SERPL-MCNC: 35.5 PG/ML (ref 0–450)
PH UR STRIP.AUTO: 6 [PH] (ref 5–8)
PLATELET # BLD AUTO: 366 10*3/MM3 (ref 140–450)
PMV BLD AUTO: 10 FL (ref 6–12)
POTASSIUM SERPL-SCNC: 3.8 MMOL/L (ref 3.5–5.2)
PROT SERPL-MCNC: 7.2 G/DL (ref 6–8.5)
PROT UR QL STRIP: NEGATIVE
RBC # BLD AUTO: 4.98 10*6/MM3 (ref 3.77–5.28)
RBC # UR STRIP: ABNORMAL /HPF
REF LAB TEST METHOD: ABNORMAL
SODIUM SERPL-SCNC: 139 MMOL/L (ref 136–145)
SP GR UR STRIP: 1.01 (ref 1–1.03)
SQUAMOUS #/AREA URNS HPF: ABNORMAL /HPF
T4 FREE SERPL-MCNC: 1.63 NG/DL (ref 0.93–1.7)
TROPONIN T SERPL-MCNC: <0.01 NG/ML (ref 0–0.03)
UROBILINOGEN UR QL STRIP: ABNORMAL
WBC # UR STRIP: ABNORMAL /HPF
WBC NRBC COR # BLD: 9.76 10*3/MM3 (ref 3.4–10.8)
WHOLE BLOOD HOLD COAG: NORMAL
WHOLE BLOOD HOLD SPECIMEN: NORMAL

## 2022-06-27 PROCEDURE — 99283 EMERGENCY DEPT VISIT LOW MDM: CPT

## 2022-06-27 PROCEDURE — 81001 URINALYSIS AUTO W/SCOPE: CPT | Performed by: STUDENT IN AN ORGANIZED HEALTH CARE EDUCATION/TRAINING PROGRAM

## 2022-06-27 PROCEDURE — 83880 ASSAY OF NATRIURETIC PEPTIDE: CPT | Performed by: STUDENT IN AN ORGANIZED HEALTH CARE EDUCATION/TRAINING PROGRAM

## 2022-06-27 PROCEDURE — 84439 ASSAY OF FREE THYROXINE: CPT | Performed by: STUDENT IN AN ORGANIZED HEALTH CARE EDUCATION/TRAINING PROGRAM

## 2022-06-27 PROCEDURE — 99214 OFFICE O/P EST MOD 30 MIN: CPT | Performed by: INTERNAL MEDICINE

## 2022-06-27 PROCEDURE — 93005 ELECTROCARDIOGRAM TRACING: CPT | Performed by: STUDENT IN AN ORGANIZED HEALTH CARE EDUCATION/TRAINING PROGRAM

## 2022-06-27 PROCEDURE — 87086 URINE CULTURE/COLONY COUNT: CPT | Performed by: STUDENT IN AN ORGANIZED HEALTH CARE EDUCATION/TRAINING PROGRAM

## 2022-06-27 PROCEDURE — 85379 FIBRIN DEGRADATION QUANT: CPT | Performed by: STUDENT IN AN ORGANIZED HEALTH CARE EDUCATION/TRAINING PROGRAM

## 2022-06-27 PROCEDURE — 83735 ASSAY OF MAGNESIUM: CPT | Performed by: STUDENT IN AN ORGANIZED HEALTH CARE EDUCATION/TRAINING PROGRAM

## 2022-06-27 PROCEDURE — 81025 URINE PREGNANCY TEST: CPT | Performed by: STUDENT IN AN ORGANIZED HEALTH CARE EDUCATION/TRAINING PROGRAM

## 2022-06-27 PROCEDURE — 84484 ASSAY OF TROPONIN QUANT: CPT | Performed by: STUDENT IN AN ORGANIZED HEALTH CARE EDUCATION/TRAINING PROGRAM

## 2022-06-27 PROCEDURE — 71045 X-RAY EXAM CHEST 1 VIEW: CPT

## 2022-06-27 PROCEDURE — 80053 COMPREHEN METABOLIC PANEL: CPT | Performed by: STUDENT IN AN ORGANIZED HEALTH CARE EDUCATION/TRAINING PROGRAM

## 2022-06-27 PROCEDURE — 85025 COMPLETE CBC W/AUTO DIFF WBC: CPT | Performed by: STUDENT IN AN ORGANIZED HEALTH CARE EDUCATION/TRAINING PROGRAM

## 2022-06-27 PROCEDURE — 36415 COLL VENOUS BLD VENIPUNCTURE: CPT

## 2022-06-27 RX ORDER — HYDROXYZINE HYDROCHLORIDE 25 MG/1
25 TABLET, FILM COATED ORAL EVERY 6 HOURS PRN
Qty: 30 TABLET | Refills: 0 | Status: SHIPPED | OUTPATIENT
Start: 2022-06-27 | End: 2022-06-27 | Stop reason: SDUPTHER

## 2022-06-27 RX ORDER — HYDROXYZINE HYDROCHLORIDE 25 MG/1
50 TABLET, FILM COATED ORAL ONCE
Status: COMPLETED | OUTPATIENT
Start: 2022-06-27 | End: 2022-06-27

## 2022-06-27 RX ORDER — ALUMINA, MAGNESIA, AND SIMETHICONE 2400; 2400; 240 MG/30ML; MG/30ML; MG/30ML
15 SUSPENSION ORAL ONCE
Status: COMPLETED | OUTPATIENT
Start: 2022-06-27 | End: 2022-06-27

## 2022-06-27 RX ORDER — LORAZEPAM 1 MG/1
1 TABLET ORAL DAILY PRN
Qty: 10 TABLET | Refills: 0 | Status: SHIPPED | OUTPATIENT
Start: 2022-06-27 | End: 2022-07-20

## 2022-06-27 RX ORDER — LIDOCAINE HYDROCHLORIDE 20 MG/ML
15 SOLUTION OROPHARYNGEAL ONCE
Status: COMPLETED | OUTPATIENT
Start: 2022-06-27 | End: 2022-06-27

## 2022-06-27 RX ORDER — SODIUM CHLORIDE 0.9 % (FLUSH) 0.9 %
10 SYRINGE (ML) INJECTION AS NEEDED
Status: DISCONTINUED | OUTPATIENT
Start: 2022-06-27 | End: 2022-06-27 | Stop reason: HOSPADM

## 2022-06-27 RX ORDER — HYDROXYZINE HYDROCHLORIDE 25 MG/1
25 TABLET, FILM COATED ORAL EVERY 6 HOURS PRN
Qty: 60 TABLET | Refills: 2 | Status: SHIPPED | OUTPATIENT
Start: 2022-06-27 | End: 2022-07-02

## 2022-06-27 RX ADMIN — LIDOCAINE HYDROCHLORIDE 15 ML: 20 SOLUTION ORAL; TOPICAL at 01:47

## 2022-06-27 RX ADMIN — SODIUM CHLORIDE 1000 ML: 9 INJECTION, SOLUTION INTRAVENOUS at 01:51

## 2022-06-27 RX ADMIN — ALUMINUM HYDROXIDE, MAGNESIUM HYDROXIDE, AND DIMETHICONE 15 ML: 400; 400; 40 SUSPENSION ORAL at 01:47

## 2022-06-27 RX ADMIN — HYDROXYZINE HYDROCHLORIDE 50 MG: 25 TABLET, FILM COATED ORAL at 01:45

## 2022-06-27 NOTE — PROGRESS NOTES
Cc: panic attack  You have chosen to receive care through a telehealth visit.  Do you consent to use a video/audio connection for your medical care today? Yes  Patient located at home residence.    HPI:  Angelika Vick is a 36 y.o. female who presents today for panic attack and anxiety.  She would like to discuss starting back on Zoloft.  She has had a few ER visits since her last appointment.  She has difficulty sleeping due to anxiety.    ROS:  Constitutional: no fevers, night sweats or unexplained weight loss  Eyes: no vision changes  ENT: no runny nose, ear pain, sore throat  Cardio: no chest pain, palpitations  Pulm: no shortness of breath, wheezing, or cough  GI: no abdominal pain or changes in bowel movements  : no difficulty urinating  MSK: no difficulty ambulating, no joint pain  Neuro: no weakness, dizziness or headache  Psych: + trouble sleeping  Endo: no change in appetite      Past Medical History:   Diagnosis Date   • Acid reflux    • Bilateral ovarian cysts    • Depression    • Gestational diabetes     with 2nd pregnancy   • Gestational hypertension     with last pregnancy   • Hypertension in pregnancy    • Migraines       Family History   Problem Relation Age of Onset   • Arthritis Mother    • Hypertension Mother    • No Known Problems Father    • Thyroid disease Sister    • Diabetes Sister    • No Known Problems Brother    • Diabetes Maternal Grandmother    • Diabetes Maternal Grandfather    • No Known Problems Paternal Grandmother    • No Known Problems Paternal Grandfather       Social History     Socioeconomic History   • Marital status:    Tobacco Use   • Smoking status: Former Smoker     Packs/day: 0.50     Years: 11.00     Pack years: 5.50     Types: Cigarettes     Start date:      Quit date: 10/6/2016     Years since quittin.7   • Smokeless tobacco: Never Used   Substance and Sexual Activity   • Alcohol use: Yes     Comment: Occasionally   • Drug use: No   • Sexual  activity: Yes     Partners: Male     Comment: pt expecting 3rd child.       Allergies   Allergen Reactions   • Dermagel Hand  [Alcohol] Rash      Immunization History   Administered Date(s) Administered   • COVID-19 (PFIZER) PURPLE CAP 06/14/2021, 07/06/2021   • FluLaval/Fluarix/Fluzone >6 10/22/2018, 12/08/2021   • Influenza, Unspecified 10/20/2020   • Tdap 08/31/2017        PE:  There were no vitals filed for this visit.   There is no height or weight on file to calculate BMI.    Gen Appearance: NAD      Current Outpatient Medications   Medication Sig Dispense Refill   • hydrOXYzine (ATARAX) 25 MG tablet Take 1 tablet by mouth Every 6 (Six) Hours As Needed for Anxiety. 60 tablet 2   • LORazepam (Ativan) 1 MG tablet Take 1 tablet by mouth Daily As Needed for Anxiety. 10 tablet 0   • sertraline (ZOLOFT) 50 MG tablet Take 1 tablet by mouth Daily. 90 tablet 1   • doxycycline (MONODOX) 100 MG capsule Take 1 capsule by mouth 2 (Two) Times a Day for 10 days. 20 capsule 0   • levothyroxine (SYNTHROID, LEVOTHROID) 125 MCG tablet TAKE 1 TABLET BY MOUTH DAILY 30 tablet 0     No current facility-administered medications for this visit.      Adding back on Zoloft daily.  Recommend Ativan or hydroxyzine as needed for now.  See back in 4 weeks for reassessment.  She has a therapy appointment pending.    Diagnoses and all orders for this visit:    1. Generalized anxiety disorder (Primary)    2. Insomnia, unspecified type    3. Panic attack  -     LORazepam (Ativan) 1 MG tablet; Take 1 tablet by mouth Daily As Needed for Anxiety.  Dispense: 10 tablet; Refill: 0    Other orders  -     sertraline (ZOLOFT) 50 MG tablet; Take 1 tablet by mouth Daily.  Dispense: 90 tablet; Refill: 1  -     hydrOXYzine (ATARAX) 25 MG tablet; Take 1 tablet by mouth Every 6 (Six) Hours As Needed for Anxiety.  Dispense: 60 tablet; Refill: 2         Return in about 4 weeks (around 7/25/2022).     Dictated Utilizing Dragon Dictation    Please note  that portions of this note were completed with a voice recognition program.    Part of this note may be an electronic transcription/translation of spoken language to printed text using the Dragon Dictation System.

## 2022-06-28 LAB — BACTERIA SPEC AEROBE CULT: NO GROWTH

## 2022-06-29 LAB
A PHAGOCYTOPH IGG TITR SER IF: NEGATIVE {TITER}
A PHAGOCYTOPH IGM TITR SER IF: NEGATIVE {TITER}
E CHAFFEENSIS IGG TITR SER IF: NEGATIVE {TITER}
E CHAFFEENSIS IGM TITR SER IF: NEGATIVE {TITER}
METANEPH FREE SERPL-MCNC: 25.6 PG/ML (ref 0–88)
NORMETANEPHRINE SERPL-MCNC: 70.4 PG/ML (ref 0–210.1)

## 2022-06-30 ENCOUNTER — TELEPHONE (OUTPATIENT)
Dept: CARDIOLOGY | Facility: HOSPITAL | Age: 37
End: 2022-06-30

## 2022-06-30 LAB
QT INTERVAL: 384 MS
QTC INTERVAL: 477 MS

## 2022-06-30 NOTE — TELEPHONE ENCOUNTER
----- Message from Consuelo aMrvin MA sent at 6/30/2022  9:20 AM EDT -----  Spoke to pt and informed her that it was being monitored and she had no further questions.  ----- Message -----  From: Kathryn Butler APRN  Sent: 6/30/2022   9:12 AM EDT  To: Consuelo Marvin MA    She is currently in a mcot and tell her that I am monitoring her heart rates via mcot. thanks  ----- Message -----  From: Consuelo Marvin MA  Sent: 6/30/2022   9:09 AM EDT  To: DELISA Perla    Pt called and stated over the last week when she wakes up in the morning she starts feeling real anxious and her heart starts racing and last for 1-2 hours. She states this has been every morning. Also when she wakes up at night it starts doing the same thing. This is something new and she has been trying to document as much as possible.

## 2022-07-02 ENCOUNTER — TELEPHONE (OUTPATIENT)
Dept: FAMILY MEDICINE CLINIC | Facility: CLINIC | Age: 37
End: 2022-07-02

## 2022-07-02 RX ORDER — PROPRANOLOL HYDROCHLORIDE 10 MG/1
10 TABLET ORAL 2 TIMES DAILY PRN
Qty: 30 TABLET | Refills: 0 | Status: SHIPPED | OUTPATIENT
Start: 2022-07-02 | End: 2022-07-20

## 2022-07-02 NOTE — TELEPHONE ENCOUNTER
After hours call from the answering service. Patient was started on zoloft, hydroxyzine, and ativan for anxiety earlier this week. She stopped zoloft yesterday due to side effects but still had shaking, muscle twitching, fast heart rate, warm feeling in her chest starting last night. She felt better for much of the day yesterday. We discussed it may take a week for side-effects to subside. Advised patient to stop hydroxyzine but continue 1/2 tab ativan bid prn. A full tab makes her drowsy. Will trial propranolol 10 mg bid prn until the office opens on Tuesday next week.

## 2022-07-05 ENCOUNTER — OFFICE VISIT (OUTPATIENT)
Dept: FAMILY MEDICINE CLINIC | Facility: CLINIC | Age: 37
End: 2022-07-05

## 2022-07-05 ENCOUNTER — TELEPHONE (OUTPATIENT)
Dept: FAMILY MEDICINE CLINIC | Facility: CLINIC | Age: 37
End: 2022-07-05

## 2022-07-05 VITALS
DIASTOLIC BLOOD PRESSURE: 84 MMHG | OXYGEN SATURATION: 98 % | BODY MASS INDEX: 35.08 KG/M2 | HEIGHT: 63 IN | WEIGHT: 198 LBS | SYSTOLIC BLOOD PRESSURE: 144 MMHG | HEART RATE: 87 BPM

## 2022-07-05 DIAGNOSIS — F41.9 ANXIETY: ICD-10-CM

## 2022-07-05 DIAGNOSIS — I49.3 FREQUENT PVCS: Primary | ICD-10-CM

## 2022-07-05 DIAGNOSIS — R00.2 PALPITATIONS: ICD-10-CM

## 2022-07-05 PROCEDURE — 99214 OFFICE O/P EST MOD 30 MIN: CPT | Performed by: INTERNAL MEDICINE

## 2022-07-05 RX ORDER — METOPROLOL TARTRATE 50 MG/1
50 TABLET, FILM COATED ORAL 2 TIMES DAILY
Qty: 60 TABLET | Refills: 1 | Status: SHIPPED | OUTPATIENT
Start: 2022-07-05 | End: 2022-07-27 | Stop reason: SDUPTHER

## 2022-07-05 NOTE — PROGRESS NOTES
"Chief Complaint   Patient presents with   • Thyroid Problem     Wants recheck of thyroid, sx of \"warming sensation\"    • Palpitations       HPI:  Angelika Vick is a 36 y.o. female who presents today for continued symptoms of palpitations.  Stopped Zoloft due to side effects over the weekend.  Most of the symptoms have resolved.  Continues to have warming sensation in her heart along with seemingly random palpitations.  Currently wearing heart monitor.    ROS:  Constitutional: no fevers, night sweats or unexplained weight loss  Eyes: no vision changes  ENT: no runny nose, ear pain, sore throat  Cardio: no chest pain, palpitations  Pulm: no shortness of breath, wheezing, or cough  GI: no abdominal pain or changes in bowel movements  : no difficulty urinating  MSK: no difficulty ambulating, no joint pain  Neuro: no weakness, dizziness or headache  Psych: no trouble sleeping  Endo: no change in appetite      Past Medical History:   Diagnosis Date   • Acid reflux    • Bilateral ovarian cysts    • Depression    • Gestational diabetes     with 2nd pregnancy   • Gestational hypertension     with last pregnancy   • Hypertension in pregnancy    • Migraines       Family History   Problem Relation Age of Onset   • Arthritis Mother    • Hypertension Mother    • No Known Problems Father    • Thyroid disease Sister    • Diabetes Sister    • No Known Problems Brother    • Diabetes Maternal Grandmother    • Diabetes Maternal Grandfather    • No Known Problems Paternal Grandmother    • No Known Problems Paternal Grandfather       Social History     Socioeconomic History   • Marital status:    Tobacco Use   • Smoking status: Former Smoker     Packs/day: 0.50     Years: 11.00     Pack years: 5.50     Types: Cigarettes     Start date:      Quit date: 10/6/2016     Years since quittin.7   • Smokeless tobacco: Never Used   Substance and Sexual Activity   • Alcohol use: Yes     Comment: Occasionally   • Drug use: " No   • Sexual activity: Yes     Partners: Male     Comment: pt expecting 3rd child.       Allergies   Allergen Reactions   • Dermagel Hand  [Alcohol] Rash      Immunization History   Administered Date(s) Administered   • COVID-19 (PFIZER) PURPLE CAP 06/14/2021, 07/06/2021   • FluLaval/Fluarix/Fluzone >6 10/22/2018, 12/08/2021   • Influenza, Unspecified 10/20/2020   • Tdap 08/31/2017        PE:  Vitals:    07/05/22 1059   BP: 144/84   Pulse: 87   SpO2: 98%      Body mass index is 35.07 kg/m².    Gen Appearance: NAD  HEENT: Normocephalic, PERRLA, no thyromegaly, trache midline  Heart: RRR, normal S1 and S2, no murmur  Lungs: CTA b/l, no wheezing, no crackles  Abdomen: Soft, non-tender, non-distended, no guarding and BSx4  MSK: Moves all extremities well, normal gait, no peripheral edema  Pulses: Palpable and equal b/l  Lymph nodes: No palpable lymphadenopathy   Neuro: No focal deficits      Current Outpatient Medications   Medication Sig Dispense Refill   • levothyroxine (SYNTHROID, LEVOTHROID) 125 MCG tablet TAKE 1 TABLET BY MOUTH DAILY 30 tablet 0   • LORazepam (Ativan) 1 MG tablet Take 1 tablet by mouth Daily As Needed for Anxiety. 10 tablet 0   • metoprolol tartrate (LOPRESSOR) 50 MG tablet Take 1 tablet by mouth 2 (Two) Times a Day. 60 tablet 1   • propranolol (INDERAL) 10 MG tablet Take 1 tablet by mouth 2 (Two) Times a Day As Needed (anxiety). 30 tablet 0     No current facility-administered medications for this visit.      Discussed with patient.  Starting on metoprolol daily which may help with symptoms of palpitations.  Unfortunately this may alter Holter monitor results.  Is already been wearing monitor for 2 weeks.  Reports unable to tolerate symptoms much longer.    Hold off on further anxiety treatment.  Patient suspects anxiety is stemming from physical symptoms initially.  Lorazepam does not help symptoms.    Diagnoses and all orders for this visit:    1. Frequent PVCs (Primary)  -      metoprolol tartrate (LOPRESSOR) 50 MG tablet; Take 1 tablet by mouth 2 (Two) Times a Day.  Dispense: 60 tablet; Refill: 1    2. Palpitations    3. Anxiety         No follow-ups on file.     Dictated Utilizing Dragon Dictation    Please note that portions of this note were completed with a voice recognition program.    Part of this note may be an electronic transcription/translation of spoken language to printed text using the Dragon Dictation System.

## 2022-07-06 ENCOUNTER — TELEPHONE (OUTPATIENT)
Dept: FAMILY MEDICINE CLINIC | Facility: CLINIC | Age: 37
End: 2022-07-06

## 2022-07-06 ENCOUNTER — TELEPHONE (OUTPATIENT)
Dept: CARDIOLOGY | Facility: HOSPITAL | Age: 37
End: 2022-07-06

## 2022-07-06 DIAGNOSIS — F41.9 ANXIETY AND DEPRESSION: ICD-10-CM

## 2022-07-06 DIAGNOSIS — F32.A ANXIETY AND DEPRESSION: ICD-10-CM

## 2022-07-06 DIAGNOSIS — E03.9 HYPOTHYROIDISM, UNSPECIFIED TYPE: ICD-10-CM

## 2022-07-06 NOTE — TELEPHONE ENCOUNTER
Patient called our office today CO stomach discomfort after starting metoprolol> Spoke with patient regarding common side effects, referred patient to PCP whom is prescriber for alternatives if neccessary.

## 2022-07-07 RX ORDER — LEVOTHYROXINE SODIUM 0.12 MG/1
125 TABLET ORAL DAILY
Qty: 30 TABLET | Refills: 0 | Status: SHIPPED | OUTPATIENT
Start: 2022-07-07 | End: 2022-08-04 | Stop reason: SDUPTHER

## 2022-07-07 NOTE — TELEPHONE ENCOUNTER
Rx Refill Note  Requested Prescriptions     Pending Prescriptions Disp Refills   • levothyroxine (SYNTHROID, LEVOTHROID) 125 MCG tablet [Pharmacy Med Name: LEVOTHYROXINE 0.125MG (125MCG) TAB] 30 tablet 0     Sig: TAKE 1 TABLET BY MOUTH DAILY      Last office visit with prescribing clinician: 7/5/2022      Next office visit with prescribing clinician: 6/13/2023            Fannie Gómez MA  07/07/22, 08:11 EDT

## 2022-07-07 NOTE — TELEPHONE ENCOUNTER
Caller: Nicanor Angelikanoemi Valencia    Relationship: Self    Best call back number: [phone]  Requested Prescriptions:   Requested Prescriptions     Pending Prescriptions Disp Refills   • levothyroxine (SYNTHROID, LEVOTHROID) 125 MCG tablet [Pharmacy Med Name: LEVOTHYROXINE 0.125MG (125MCG) TAB] 30 tablet 0     Sig: TAKE 1 TABLET BY MOUTH DAILY        Pharmacy where request should be sent: [id number] - [place], [place] - [facility] [address] AT [facility] [address] - [phone]  - [phone] FX     Additional details provided by patient: PATIENT IS GOING OUT OF TOWN IN THE MORNING AND NEEDS A REFILL     Does the patient have less than a 3 day supply:  [x] Yes  [] No    Meenakshi Vazquez Rep   07/07/22 07:37 EDT

## 2022-07-20 ENCOUNTER — TELEMEDICINE (OUTPATIENT)
Dept: CARDIOLOGY | Facility: HOSPITAL | Age: 37
End: 2022-07-20

## 2022-07-20 DIAGNOSIS — R42 POSTURAL DIZZINESS WITH PRESYNCOPE: ICD-10-CM

## 2022-07-20 DIAGNOSIS — R00.0 TACHYCARDIA: ICD-10-CM

## 2022-07-20 DIAGNOSIS — K21.9 CHEST PAIN DUE TO GERD: Primary | ICD-10-CM

## 2022-07-20 DIAGNOSIS — R07.9 CHEST PAIN DUE TO GERD: Primary | ICD-10-CM

## 2022-07-20 DIAGNOSIS — R55 POSTURAL DIZZINESS WITH PRESYNCOPE: ICD-10-CM

## 2022-07-20 PROCEDURE — 99214 OFFICE O/P EST MOD 30 MIN: CPT | Performed by: NURSE PRACTITIONER

## 2022-07-20 RX ORDER — FAMOTIDINE 20 MG/1
20 TABLET, FILM COATED ORAL 2 TIMES DAILY
Qty: 60 TABLET | Refills: 3 | Status: SHIPPED | OUTPATIENT
Start: 2022-07-20 | End: 2022-09-13

## 2022-07-21 VITALS — HEIGHT: 63 IN | WEIGHT: 198 LBS | BODY MASS INDEX: 35.08 KG/M2

## 2022-07-21 NOTE — PROGRESS NOTES
"Chief Complaint  Follow-up (Chest tightness, palpitations)    Subjective    History of Present Illness {CC  Problem List  Visit  Diagnosis   Encounters  Notes  Medications  Labs  Result Review Imaging  Media :23}     You have chosen to receive care through the use of telemedicine. Telemedicine enables health care providers at different locations to provide safe, effective, and convenient care through the use of technology. As with any health care service, there are risks associated with the use of telemedicine, including equipment failure, poor connections, and  issues.    • Do you understand the risks and benefits of telemedicine as I have explained them to you? Yes  • Have your questions regarding telemedicine been answered? Yes  • Do you consent to the use of telemedicine in your medical care today? Yes    History of Present Illness   36-year-old female with telehealth visit today for ongoing evaluation of her chest tightness palpitations.  She reports that she returned her monitor earlier today.  She reports over the last few days she has experienced significant chest tightness with associated burping.  Also reports increased palpitations.  Currently denies dizziness,  presyncope, syncope, Orthopnea, PND or pedal edema.   tested positive for COVID recently.  Objective     Vital Signs:   Vitals:    07/20/22 1520   Weight: 89.8 kg (198 lb)   Height: 160 cm (63\")     Body mass index is 35.07 kg/m².  Physical Exam  Vitals and nursing note reviewed.   Constitutional:       Appearance: Normal appearance.   HENT:      Head: Normocephalic.   Pulmonary:      Effort: Pulmonary effort is normal.   Neurological:      Mental Status: She is alert and oriented to person, place, and time.   Psychiatric:         Mood and Affect: Mood normal.         Behavior: Behavior normal.         Thought Content: Thought content normal.              Result Review  Data Reviewed:{ Labs  Result Review  " Imaging  Med Tab  Media :23}   Echo June 2022:  · Left ventricular ejection fraction appears to be 61 - 65%. Left ventricular systolic function is normal.  · Estimated right ventricular systolic pressure from tricuspid regurgitation is normal (<35 mmHg).  · Left ventricular diastolic function was normal.  · Saline test results are negative.               Assessment and Plan {CC Problem List  Visit Diagnosis  ROS  Review (Popup)  Health Maintenance  Quality  BestPractice  Medications  SmartSets  SnapShot Encounters  Media :23}   1. Chest pain due to GERD  Begin Pepcid 20 mg twice daily x1 month    2. Postural dizziness with presyncope  Extended Holter results pending  Encourage good hydration  3. Tachycardia  Continue metoprolol twice daily          Follow Up {Instructions Charge Capture  Follow-up Communications :23}   Return in about 3 weeks (around 8/10/2022) for Telemedicine visit, Monitor results, pvcs, chest pain.    Patient was given instructions and counseling regarding her condition or for health maintenance advice. Please see specific information pulled into the AVS if appropriate.  Patient was instructed to call the [facility] with any questions, concerns, or worsening symptoms.

## 2022-07-27 ENCOUNTER — TELEPHONE (OUTPATIENT)
Dept: CARDIOLOGY | Facility: HOSPITAL | Age: 37
End: 2022-07-27

## 2022-07-27 DIAGNOSIS — I49.3 FREQUENT PVCS: ICD-10-CM

## 2022-07-27 RX ORDER — METOPROLOL TARTRATE 50 MG/1
25 TABLET, FILM COATED ORAL 2 TIMES DAILY
Qty: 60 TABLET | Refills: 1
Start: 2022-07-27 | End: 2022-08-02 | Stop reason: DRUGHIGH

## 2022-07-27 NOTE — TELEPHONE ENCOUNTER
Patient called the office noting that her heart rate has been in the 40-50s. Recently tested positive for covid.  Patient to decrease metoprolol to tartrate to 25 mg twice daily and monitor heart rate closely.  I have reviewed cardiac event monitor with patient that shows an average heart rate of 67 bpm, PAC/PVC burden 2%.  Manually triggered events show sinus arrhythmia, sinus rhythm, PVCs, sinus tach.

## 2022-08-01 ENCOUNTER — TELEPHONE (OUTPATIENT)
Dept: CARDIOLOGY | Facility: HOSPITAL | Age: 37
End: 2022-08-01

## 2022-08-01 NOTE — TELEPHONE ENCOUNTER
----- Message from Consuelo Marvin MA sent at 7/29/2022  1:22 PM EDT -----  Pt understood and no further questions or concerns. Will call back if symptoms do not improve.  ----- Message -----  From: Kathryn Butler APRN  Sent: 7/29/2022  12:56 PM EDT  To: Consuelo Marvin MA    Please call the patient and ask her to take metoprolol only once a day   ----- Message -----  From: Consuelo Marvin MA  Sent: 7/29/2022  11:28 AM EDT  To: DELISA Perla    Pt called with concerns with a resting heart rate of 50. She did state that sometimes it may go to 55 but more just at 50.

## 2022-08-02 ENCOUNTER — TELEPHONE (OUTPATIENT)
Dept: CARDIOLOGY | Facility: HOSPITAL | Age: 37
End: 2022-08-02

## 2022-08-02 NOTE — TELEPHONE ENCOUNTER
Spoke with patient and will decrease metoprolol down to 12.5 mg twice daily.  Continue to monitor heart rate closely

## 2022-08-02 NOTE — TELEPHONE ENCOUNTER
----- Message from Diane Bliss CMA sent at 8/2/2022  8:10 AM EDT -----  [place] Telephone Note for:    Angelika Vick, 1985  Home Phone      [phone]  Mobile          [phone]      Reason for Call:  Patient called and states that her resting HR is still between 50-55 bpm. She states that although the Metoprolol was changed to 25mg QD, she is still seeing low HR and feeling dizziness and fatigue throughout the day. She states that at night she has experienced some palpitations.

## 2022-08-04 DIAGNOSIS — F32.A ANXIETY AND DEPRESSION: ICD-10-CM

## 2022-08-04 DIAGNOSIS — E03.9 HYPOTHYROIDISM, UNSPECIFIED TYPE: ICD-10-CM

## 2022-08-04 DIAGNOSIS — F41.9 ANXIETY AND DEPRESSION: ICD-10-CM

## 2022-08-04 RX ORDER — LEVOTHYROXINE SODIUM 0.12 MG/1
125 TABLET ORAL DAILY
Qty: 30 TABLET | Refills: 0 | Status: SHIPPED | OUTPATIENT
Start: 2022-08-04 | End: 2022-09-06 | Stop reason: SDUPTHER

## 2022-08-04 NOTE — TELEPHONE ENCOUNTER
Rx Refill Note  Requested Prescriptions     Pending Prescriptions Disp Refills   • levothyroxine (SYNTHROID, LEVOTHROID) 125 MCG tablet 30 tablet 0     Sig: Take 1 tablet by mouth Daily.      Last office visit with prescribing clinician: 7/5/2022      Next office visit with prescribing clinician: 6/13/2023            Fannie Gómez MA  08/04/22, 11:08 EDT

## 2022-08-08 PROCEDURE — 93272 ECG/REVIEW INTERPRET ONLY: CPT | Performed by: INTERNAL MEDICINE

## 2022-08-11 ENCOUNTER — TELEMEDICINE (OUTPATIENT)
Dept: CARDIOLOGY | Facility: HOSPITAL | Age: 37
End: 2022-08-11

## 2022-08-11 DIAGNOSIS — R55 POSTURAL DIZZINESS WITH PRESYNCOPE: ICD-10-CM

## 2022-08-11 DIAGNOSIS — R42 POSTURAL DIZZINESS WITH PRESYNCOPE: ICD-10-CM

## 2022-08-11 DIAGNOSIS — I49.3 FREQUENT PVCS: Primary | ICD-10-CM

## 2022-08-11 DIAGNOSIS — E78.2 MIXED HYPERLIPIDEMIA: ICD-10-CM

## 2022-08-11 DIAGNOSIS — R00.0 TACHYCARDIA: ICD-10-CM

## 2022-08-11 PROCEDURE — 99214 OFFICE O/P EST MOD 30 MIN: CPT | Performed by: NURSE PRACTITIONER

## 2022-08-20 VITALS — HEIGHT: 63 IN | WEIGHT: 198 LBS | HEART RATE: 55 BPM | BODY MASS INDEX: 35.08 KG/M2

## 2022-08-20 NOTE — PROGRESS NOTES
"Chief Complaint  Follow-up (Tachycardia )    Subjective    History of Present Illness {CC  Problem List  Visit  Diagnosis   Encounters  Notes  Medications  Labs  Result Review Imaging  Media :23}   You have chosen to receive care through the use of telemedicine. Telemedicine enables health care providers at different locations to provide safe, effective, and convenient care through the use of technology. As with any health care service, there are risks associated with the use of telemedicine, including equipment failure, poor connections, and  issues.    • Do you understand the risks and benefits of telemedicine as I have explained them to you? Yes  • Have your questions regarding telemedicine been answered? Yes  • Do you consent to the use of telemedicine in your medical care today? Yes    History of Present Illness   36 year old female with telehealth visit for ongoing evaluation of her tachycardia, palpitations. Patient notes that her heart rates have been running 50-60s. Recently had covid and notes that she has recovered from that.   Denies chest pain, dyspnea, dizziness, syncope.   Objective     Vital Signs:   Vitals:    08/11/22 1345   Pulse: 55   Weight: 89.8 kg (198 lb)   Height: 160 cm (63\")     Body mass index is 35.07 kg/m².  Physical Exam  Vitals and nursing note reviewed.   Constitutional:       Appearance: Normal appearance.   HENT:      Head: Normocephalic.   Pulmonary:      Effort: Pulmonary effort is normal.   Neurological:      Mental Status: She is alert and oriented to person, place, and time.   Psychiatric:         Mood and Affect: Mood normal.         Behavior: Behavior normal.         Thought Content: Thought content normal.              Result Review  Data Reviewed:{ Labs  Result Review  Imaging  Med Tab  Media :23}   Cardiac Event Monitor (06/22/2022 13:04)  Adult Transthoracic Echo Complete W/ Cont if Necessary Per Protocol (06/22/2022 13:42)         "   Assessment and Plan {CC Problem List  Visit Diagnosis  ROS  Review (Popup)  Health Maintenance  Quality  BestPractice  Medications  SmartSets  SnapShot Encounters  Media :23}   1. Frequent PVCs  Continue metoprolol     2. Postural dizziness with presyncope  Improved   Encouraged good hydration   3. Tachycardia  Continue metoprolol   4. Mixed hyperlipidemia  Diet controlled        Follow Up {Instructions Charge Capture  Follow-up Communications :23}   Return in about 4 weeks (around 9/8/2022) for Telemedicine visit pvcs.    Patient was given instructions and counseling regarding her condition or for health maintenance advice. Please see specific information pulled into the AVS if appropriate.  Patient was instructed to call the [facility] with any questions, concerns, or worsening symptoms.

## 2022-08-26 ENCOUNTER — TELEPHONE (OUTPATIENT)
Dept: CARDIOLOGY | Facility: HOSPITAL | Age: 37
End: 2022-08-26

## 2022-08-26 NOTE — TELEPHONE ENCOUNTER
Spoke with patient who notes that she would like to wean off metoprolol. Patient to begin metoprolol tartrate 12.5 mg once daily for the next 3 days and then discontinue

## 2022-08-26 NOTE — TELEPHONE ENCOUNTER
----- Message from Dionisio Cole MA sent at 8/26/2022  2:24 PM EDT -----  Angelika called today asking how and if she can discontinue her Betablocker medication. Requested you call and walk her through it.

## 2022-09-05 DIAGNOSIS — E03.9 HYPOTHYROIDISM, UNSPECIFIED TYPE: ICD-10-CM

## 2022-09-05 DIAGNOSIS — F41.9 ANXIETY AND DEPRESSION: ICD-10-CM

## 2022-09-05 DIAGNOSIS — F32.A ANXIETY AND DEPRESSION: ICD-10-CM

## 2022-09-06 DIAGNOSIS — F41.9 ANXIETY AND DEPRESSION: ICD-10-CM

## 2022-09-06 DIAGNOSIS — E03.9 HYPOTHYROIDISM, UNSPECIFIED TYPE: ICD-10-CM

## 2022-09-06 DIAGNOSIS — F32.A ANXIETY AND DEPRESSION: ICD-10-CM

## 2022-09-06 NOTE — TELEPHONE ENCOUNTER
Rx Refill Note  Requested Prescriptions     Pending Prescriptions Disp Refills   • levothyroxine (SYNTHROID, LEVOTHROID) 125 MCG tablet 30 tablet 0     Sig: Take 1 tablet by mouth Daily.      Last office visit with prescribing clinician: 7/5/2022      Next office visit with prescribing clinician: 6/13/2023            Otilia Sauer MA  09/06/22, 14:13 EDT

## 2022-09-07 RX ORDER — LEVOTHYROXINE SODIUM 0.12 MG/1
125 TABLET ORAL DAILY
Qty: 90 TABLET | Refills: 3 | Status: SHIPPED | OUTPATIENT
Start: 2022-09-07

## 2022-09-08 ENCOUNTER — TELEMEDICINE (OUTPATIENT)
Dept: CARDIOLOGY | Facility: HOSPITAL | Age: 37
End: 2022-09-08

## 2022-09-08 DIAGNOSIS — I49.3 FREQUENT PVCS: ICD-10-CM

## 2022-09-08 DIAGNOSIS — R07.0 THROAT DISCOMFORT: ICD-10-CM

## 2022-09-08 DIAGNOSIS — R00.0 TACHYCARDIA: Primary | ICD-10-CM

## 2022-09-08 DIAGNOSIS — K21.9 GASTROESOPHAGEAL REFLUX DISEASE, UNSPECIFIED WHETHER ESOPHAGITIS PRESENT: ICD-10-CM

## 2022-09-08 PROCEDURE — 99213 OFFICE O/P EST LOW 20 MIN: CPT | Performed by: NURSE PRACTITIONER

## 2022-09-08 RX ORDER — LEVOTHYROXINE SODIUM 0.12 MG/1
125 TABLET ORAL DAILY
Qty: 30 TABLET | Refills: 0 | OUTPATIENT
Start: 2022-09-08

## 2022-09-08 NOTE — TELEPHONE ENCOUNTER
Rx Refill Note  Requested Prescriptions     Pending Prescriptions Disp Refills   • levothyroxine (SYNTHROID, LEVOTHROID) 125 MCG tablet 30 tablet 0     Sig: Take 1 tablet by mouth Daily.      Last office visit with prescribing clinician: 7/5/2022      Next office visit with prescribing clinician: 6/13/2023            Tiffany Duque MA  09/08/22, 09:25 EDT

## 2022-09-13 VITALS — WEIGHT: 198 LBS | BODY MASS INDEX: 35.08 KG/M2 | HEIGHT: 63 IN

## 2022-09-13 NOTE — PROGRESS NOTES
"Chief Complaint  Palpitations    Subjective    History of Present Illness {CC  Problem List  Visit  Diagnosis   Encounters  Notes  Medications  Labs  Result Review Imaging  Media :23}   You have chosen to receive care through the use of telemedicine. Telemedicine enables health care providers at different locations to provide safe, effective, and convenient care through the use of technology. As with any health care service, there are risks associated with the use of telemedicine, including equipment failure, poor connections, and  issues.    • Do you understand the risks and benefits of telemedicine as I have explained them to you? Yes  • Have your questions regarding telemedicine been answered? Yes  • Do you consent to the use of telemedicine in your medical care today? Yes    History of Present Illness   36 year old female with telehealth visit today for ongoing evaluation of her palpitations and tachycardia. Patient recently weaned off metoprolol and notes that she is feeling great. Currently denies chest pain, dyspnea, dizziness, palpitations, syncope, orthopnea. Notes that energy level is good. HX of hyperlipidemia, hypothyroidism, gestational DM. Patient does report throat irritation and is asking for a referral to ENT.   Objective     Vital Signs:   Vitals:    09/08/22 1345   Weight: 89.8 kg (198 lb)   Height: 160 cm (63\")     Body mass index is 35.07 kg/m².  Physical Exam  Vitals and nursing note reviewed.   Constitutional:       Appearance: Normal appearance.   HENT:      Head: Normocephalic.   Pulmonary:      Effort: Pulmonary effort is normal.   Neurological:      Mental Status: She is alert and oriented to person, place, and time.   Psychiatric:         Mood and Affect: Mood normal.         Behavior: Behavior normal.         Thought Content: Thought content normal.              Result Review  Data Reviewed:{ Labs  Result Review  Imaging  Med Tab  Media :23}   Cardiac " Event Monitor (06/22/2022 13:04)  Adult Transthoracic Echo Complete W/ Cont if Necessary Per Protocol (06/22/2022 13:42)               Assessment and Plan {CC Problem List  Visit Diagnosis  ROS  Review (Popup)  Health Maintenance  Quality  BestPractice  Medications  SmartSets  SnapShot Encounters  Media :23}   1. Tachycardia  Resolved   Now off BB     2. Frequent PVCs  Resolved  Now off BB     3. Gastroesophageal reflux disease, unspecified whether esophagitis present    - Ambulatory Referral to ENT (Otolaryngology)    4. Throat discomfort    - Ambulatory Referral to ENT (Otolaryngology)          Follow Up {Instructions Charge Capture  Follow-up Communications :23}   Return if symptoms worsen or fail to improve.    Patient was given instructions and counseling regarding her condition or for health maintenance advice. Please see specific information pulled into the AVS if appropriate.  Patient was instructed to call the [facility] with any questions, concerns, or worsening symptoms.

## 2022-10-06 NOTE — PROGRESS NOTES
Chief Complaint   Patient presents with   • Thyroid Problem       HPI:  Angelika Vick is a 35 y.o. female who presents today for extreme fatigue for 1 week duration.  She suspects her thyroid may be off.  Denies any changes in routine physical activity or diet.  No new or worsening ongoing stressors.  No changes in thyroid dose.  She is taking medication daily 30 minutes before food.  She is currently weaning down on her Zoloft as this is causing a numbness feeling that she does not like.  She is following with psychiatry.  Denies any changes in sleep.  She reports she is sleeping around 8 hours per night.    ROS:  Constitutional: no fevers, night sweats or unexplained weight loss  Eyes: no vision changes  ENT: no runny nose, ear pain, sore throat  Cardio: no chest pain, palpitations  Pulm: no shortness of breath, wheezing, or cough  GI: no abdominal pain or changes in bowel movements  : no difficulty urinating  MSK: no difficulty ambulating, no joint pain  Neuro: no weakness, dizziness or headache  Psych: no trouble sleeping  Endo: no change in appetite      Past Medical History:   Diagnosis Date   • Acid reflux    • Bilateral ovarian cysts    • Depression    • Gestational diabetes     with 2nd pregnancy   • Gestational hypertension     with last pregnancy   • Hypertension in pregnancy    • Migraines       Family History   Problem Relation Age of Onset   • Arthritis Mother    • Hypertension Mother    • Thyroid disease Sister    • Diabetes Sister    • Diabetes Maternal Grandfather       Social History     Socioeconomic History   • Marital status: Single     Spouse name: Not on file   • Number of children: Not on file   • Years of education: Not on file   • Highest education level: Not on file   Tobacco Use   • Smoking status: Former Smoker     Packs/day: 0.50     Types: Cigarettes     Quit date: 10/6/2016     Years since quittin.3   • Smokeless tobacco: Never Used   Substance and Sexual Activity   •  Alcohol use: Yes     Comment: Occasionally   • Drug use: No   • Sexual activity: Yes     Partners: Male     Comment: pt expecting 3rd child.       No Known Allergies   Immunization History   Administered Date(s) Administered   • Flulaval/Fluarix/Fluzone Quad 10/22/2018   • Tdap 08/31/2017        PE:  Vitals:    02/03/21 0936   BP: 124/68   Pulse: 73   SpO2: 99%      Body mass index is 35.43 kg/m².    Gen Appearance: NAD  HEENT: Normocephalic, PERRLA, no thyromegaly, trache midline  Heart: RRR, normal S1 and S2, no murmur  Lungs: CTA b/l, no wheezing, no crackles  Abdomen: Soft, non-tender, non-distended, no guarding and BSx4  MSK: Moves all extremities well, normal gait, no peripheral edema  Pulses: Palpable and equal b/l  Lymph nodes: No palpable lymphadenopathy   Neuro: No focal deficits      Current Outpatient Medications   Medication Sig Dispense Refill   • levothyroxine (SYNTHROID, LEVOTHROID) 150 MCG tablet TAKE 1 TABLET BY MOUTH DAILY 90 tablet 1   • sertraline (ZOLOFT) 100 MG tablet Take 1 tablet by mouth Daily. 90 tablet 1     No current facility-administered medications for this visit.       She has physical upcoming next week.  Recommend going ahead to check blood work today.  Her thyroid would be the obvious cause, however this is normal we will need to discuss alternative diagnosis.  Continue current medications for now.    Diagnoses and all orders for this visit:    1. Fatigue, unspecified type (Primary)  Checking blood work initially but differential includes low thyroid, sleep disorder, worsening anxiety/depression, anemia.  2. Hypothyroidism due to Hashimoto's thyroiditis  Checking thyroid levels.  3. Preventative health care  -     CBC & Differential; Future  -     Comprehensive Metabolic Panel; Future  -     Hemoglobin A1c; Future  -     Lipid Panel; Future  -     TSH+Free T4; Future  -     Urinalysis With Culture If Indicated - Urine, Clean Catch; Future  -     Vitamin D 25 Hydroxy; Future    4.  Generalized anxiety disorder  Weaning down on Zoloft.  Anxiety symptoms stable at this time.       No follow-ups on file.     Please note that portions of this document were completed with a voice recognition program. Efforts were made to edit the dictations, but occasionally words are mis-transcribed.   Yes

## 2023-03-24 ENCOUNTER — TELEMEDICINE (OUTPATIENT)
Dept: FAMILY MEDICINE CLINIC | Facility: CLINIC | Age: 38
End: 2023-03-24
Payer: COMMERCIAL

## 2023-03-24 DIAGNOSIS — E03.9 HYPOTHYROIDISM, UNSPECIFIED TYPE: Primary | ICD-10-CM

## 2023-03-24 DIAGNOSIS — E66.9 OBESITY (BMI 35.0-39.9 WITHOUT COMORBIDITY): ICD-10-CM

## 2023-03-24 DIAGNOSIS — G47.19 EXCESSIVE DAYTIME SLEEPINESS: ICD-10-CM

## 2023-03-24 DIAGNOSIS — R06.81 WITNESSED EPISODE OF APNEA: ICD-10-CM

## 2023-03-24 NOTE — PROGRESS NOTES
Cc: fatigue, excessive daytime sleepiness  You have chosen to receive care through a telehealth visit.  Do you consent to use a video/audio connection for your medical care today? Yes  Patient located at home  Provider located at office    HPI:  Angelika Vcik is a 37 y.o. female who presents today for follow-up ongoing fatigue.  She is requesting to see an endocrinologist for her low thyroid.  She reports ongoing days of extreme fatigue.  Cannot identify any triggers.  She does have witnessed apnea events at night, morning headaches and excessive daytime sleepiness.    ROS:  Constitutional: no fevers, night sweats or unexplained weight loss  Eyes: no vision changes  ENT: no runny nose, ear pain, sore throat  Cardio: no chest pain, palpitations  Pulm: no shortness of breath, wheezing, or cough  GI: no abdominal pain or changes in bowel movements  : no difficulty urinating  MSK: no difficulty ambulating, no joint pain  Neuro: no weakness, dizziness or headache  Psych: + trouble sleeping  Endo: no change in appetite      Past Medical History:   Diagnosis Date   • Acid reflux    • Bilateral ovarian cysts    • Depression    • Gestational diabetes     with 2nd pregnancy   • Gestational hypertension     with last pregnancy   • Hypertension in pregnancy    • Migraines       Family History   Problem Relation Age of Onset   • Arthritis Mother    • Hypertension Mother    • No Known Problems Father    • Thyroid disease Sister    • Diabetes Sister    • No Known Problems Brother    • Diabetes Maternal Grandmother    • Diabetes Maternal Grandfather    • No Known Problems Paternal Grandmother    • No Known Problems Paternal Grandfather       Social History     Socioeconomic History   • Marital status:    Tobacco Use   • Smoking status: Former     Packs/day: 0.50     Years: 11.00     Pack years: 5.50     Types: Cigarettes     Start date:      Quit date: 10/6/2016     Years since quittin.4   • Smokeless  tobacco: Never   Substance and Sexual Activity   • Alcohol use: Yes     Comment: Occasionally   • Drug use: No   • Sexual activity: Yes     Partners: Male     Comment: pt expecting 3rd child.       Allergies   Allergen Reactions   • Dermagel Hand  [Alcohol] Rash      Immunization History   Administered Date(s) Administered   • COVID-19 (PFIZER) PURPLE CAP 06/14/2021, 07/06/2021   • FluLaval/Fluzone >6mos 10/22/2018, 12/08/2021   • Influenza, Unspecified 10/20/2020   • Tdap 08/31/2017        PE:  There were no vitals filed for this visit.   There is no height or weight on file to calculate BMI.    Gen Appearance: NAD        Current Outpatient Medications   Medication Sig Dispense Refill   • levothyroxine (SYNTHROID, LEVOTHROID) 125 MCG tablet Take 1 tablet by mouth Daily. 90 tablet 3     No current facility-administered medications for this visit.        Diagnoses and all orders for this visit:    1. Hypothyroidism, unspecified type (Primary)  -     Ambulatory Referral to Endocrinology  -     TSH+Free T4; Future  -     T3; Future  Recommend obtaining updated thyroid labs.  Refer to endocrinology.  2. Excessive daytime sleepiness  -     Home Sleep Study; Future  Blood risk factors for sleep apnea including excessive daytime sleepiness, witnessed apnea episodes, obesity.  Recommend checking home sleep study.  3. Witnessed episode of apnea  -     Home Sleep Study; Future    4. Obesity (BMI 35.0-39.9 without comorbidity)  -     Home Sleep Study; Future         No follow-ups on file.     Dictated Utilizing Dragon Dictation    Please note that portions of this note were completed with a voice recognition program.    Part of this note may be an electronic transcription/translation of spoken language to printed text using the Dragon Dictation System.

## 2023-03-31 LAB
T4 FREE SERPL-MCNC: 1.67 NG/DL (ref 0.93–1.7)
TSH SERPL DL<=0.005 MIU/L-ACNC: 2.76 UIU/ML (ref 0.27–4.2)

## 2023-05-10 ENCOUNTER — OFFICE VISIT (OUTPATIENT)
Dept: ENDOCRINOLOGY | Facility: CLINIC | Age: 38
End: 2023-05-10
Payer: COMMERCIAL

## 2023-05-10 VITALS
DIASTOLIC BLOOD PRESSURE: 77 MMHG | WEIGHT: 189 LBS | OXYGEN SATURATION: 98 % | BODY MASS INDEX: 33.49 KG/M2 | SYSTOLIC BLOOD PRESSURE: 110 MMHG | HEART RATE: 74 BPM | HEIGHT: 63 IN

## 2023-05-10 DIAGNOSIS — E06.3 HYPOTHYROIDISM DUE TO HASHIMOTO'S THYROIDITIS: Primary | ICD-10-CM

## 2023-05-10 DIAGNOSIS — R53.82 CHRONIC FATIGUE: ICD-10-CM

## 2023-05-10 DIAGNOSIS — E03.8 HYPOTHYROIDISM DUE TO HASHIMOTO'S THYROIDITIS: Primary | ICD-10-CM

## 2023-05-10 PROCEDURE — 99204 OFFICE O/P NEW MOD 45 MIN: CPT | Performed by: INTERNAL MEDICINE

## 2023-05-10 NOTE — PROGRESS NOTES
Chief Complaint   Patient presents with   • Hypothyroidism        Referring Provider  Augustine Muniz, *     HPI   Angelika Vick is a 37 y.o. female had concerns including Hypothyroidism.   Referred by PCP for hypothyroidism.   Has been on thyroid medication since 2018.     Is currently on levothyroxine 125 mcg daily. Takes with coffee. Last dose increase last year.   Having issues with weight management, difficulty losing. PCP recommended sleep eval.   Has fluctuating episodes of fatigue, sometimes trouble getting out of bed.   Pt hasn't wanted to complete the home study. She doesn't snore. Wakes overnight at times. Has PVCs and when she wakes sometimes experiences. Wants to wait on sleep eval until thyroid levels improved.     Has three children. Doesn't want more.     Has IUD - last three cycles she only had one day of light bleeding. Are occurring regularly/monthly.     Past Medical History:   Diagnosis Date   • Acid reflux    • Bilateral ovarian cysts    • Depression    • Gestational diabetes     with 2nd pregnancy   • Gestational hypertension     with last pregnancy   • Hashimoto's thyroiditis 06/2018   • Hypertension in pregnancy 2012   • Hypothyroidism 06/2018   • Migraines    • Polycystic ovary syndrome 2010     Past Surgical History:   Procedure Laterality Date   • CYST REMOVAL     • GANGLION CYST EXCISION Right       Family History   Problem Relation Age of Onset   • Arthritis Mother    • Hypertension Mother    • No Known Problems Father    • Thyroid disease Sister    • Diabetes Sister    • No Known Problems Brother    • Diabetes Maternal Grandmother    • Diabetes Maternal Grandfather    • No Known Problems Paternal Grandmother    • No Known Problems Paternal Grandfather    • Diabetes Sister    • Obesity Sister    • Thyroid disease Sister       Social History     Socioeconomic History   • Marital status:    Tobacco Use   • Smoking status: Former     Packs/day: 1.00     Years: 10.00      "Pack years: 10.00     Types: Cigarettes     Start date: 2005     Quit date: 10/6/2016     Years since quittin.5   • Smokeless tobacco: Never   Substance and Sexual Activity   • Alcohol use: Yes     Alcohol/week: 1.0 - 2.0 standard drink     Types: 1 - 2 Drinks containing 0.5 oz of alcohol per week     Comment: Occasionally   • Drug use: No   • Sexual activity: Yes     Partners: Male     Birth control/protection: I.U.D.     Comment: pt expecting 3rd child.       Allergies   Allergen Reactions   • Dermagel Hand  [Alcohol] Rash      Current Outpatient Medications on File Prior to Visit   Medication Sig Dispense Refill   • levothyroxine (SYNTHROID, LEVOTHROID) 125 MCG tablet Take 1 tablet by mouth Daily. 90 tablet 3     No current facility-administered medications on file prior to visit.        Review of Systems   Constitutional: Positive for fatigue.   Eyes: Negative.    Respiratory: Negative.    Cardiovascular: Negative.    Gastrointestinal: Negative.    Endocrine: Negative.    Genitourinary: Positive for menstrual problem.   Musculoskeletal: Negative.    Skin: Negative.    Allergic/Immunologic: Negative.    Neurological: Negative.    Hematological: Negative.    Psychiatric/Behavioral: Positive for sleep disturbance.        /77   Pulse 74   Ht 160 cm (63\")   Wt 85.7 kg (189 lb)   SpO2 98%   BMI 33.48 kg/m²      Physical Exam    Constitutional:  well developed; well nourished  no acute distress   ENT/Thyroid: no thyromegaly  no palpable nodules   Eyes: EOM intact  Conjunctiva: clear   Respiratory:  breathing is unlabored  clear to auscultation bilaterally   Cardiovascular:  regular rate and rhythm, S1, S2 normal, no murmur, click, rub or gallop   Chest:  Not performed.   Abdomen: Not performed.   : Not performed.   Musculoskeletal: negative findings:  ROM of all joints is normal, no deformities present   Skin: dry and warm   Neuro: normal without focal findings and mental status, speech " normal, alert and oriented x3   Psych: oriented to time, place and person, mood and affect are within normal limits     Labs/Imaging  CMP  Lab Results   Component Value Date    GLUCOSE 121 (H) 06/27/2022    BUN 11 06/27/2022    CREATININE 0.65 06/27/2022    EGFRIFNONA 75 02/03/2021    BCR 16.9 06/27/2022    K 3.8 06/27/2022    CO2 24.0 06/27/2022    CALCIUM 9.1 06/27/2022    ALBUMIN 4.30 06/27/2022    AST 10 06/27/2022    ALT 9 06/27/2022        CBC w/DIFF   Lab Results   Component Value Date    WBC 9.76 06/27/2022    RBC 4.98 06/27/2022    HGB 14.4 06/27/2022    HCT 42.5 06/27/2022    MCV 85.3 06/27/2022    MCH 28.9 06/27/2022    MCHC 33.9 06/27/2022    RDW 12.2 (L) 06/27/2022    RDWSD 38.1 06/27/2022    MPV 10.0 06/27/2022     06/27/2022    NEUTRORELPCT 63.5 06/27/2022    LYMPHORELPCT 26.6 06/27/2022    MONORELPCT 8.0 06/27/2022    EOSRELPCT 0.7 06/27/2022    BASORELPCT 1.0 06/27/2022    AUTOIGPER 0.2 06/27/2022    NEUTROABS 6.19 06/27/2022    LYMPHSABS 2.60 06/27/2022    MONOSABS 0.78 06/27/2022    EOSABS 0.07 06/27/2022    BASOSABS 0.10 06/27/2022    AUTOIGNUM 0.02 06/27/2022    NRBC 0.0 06/27/2022       TSH  Lab Results   Component Value Date    TSH 2.760 03/31/2023    TSH 4.740 (H) 06/24/2022    TSH 4.390 (H) 06/20/2022    TSH 3.620 06/10/2022    TSH 3.200 12/08/2021     T4  Lab Results   Component Value Date    FREET4 1.67 03/31/2023    FREET4 1.63 06/27/2022    FREET4 1.49 06/20/2022     Lab Results   Component Value Date    F3ZYPAZ 5.00 10/17/2018     TPO  Lab Results   Component Value Date    THYROIDAB >600 (H) 10/22/2018     Lab Results   Component Value Date    CEYJ93FR 47.0 06/10/2022       Assessment and Plan    Diagnoses and all orders for this visit:    1. Hypothyroidism due to Hashimoto's thyroiditis (Primary)  TSH recently normal but pt with fatigue.   Advised to take her levothyroxine 125 mcg with water (rather than coffee) as hot liquids can decrease the absorption of the pill. TSH target  low normal due to symptoms.   Recheck labs in 6 weeks and will titrate levothyroxine for TSH in lower range of normal. Consider combination T4/T3 regimen if symptoms aren't improved despite optimal TSH.   -     T4, Free; Future  -     TSH; Future    2. Chronic fatigue  May be in part thyroid related. Disrupted sleep is also likely contributing. Will manage thyroid as above. Pt has declined sleep eval at this time.        Return in about 4 months (around 9/10/2023) for next scheduled follow up. The patient was instructed to contact the clinic with any interval questions or concerns.    Beatrice Gomes, DO   Endocrinologist    Please note that portions of this note were completed with a voice recognition program.

## 2023-08-09 LAB
T4 FREE SERPL-MCNC: 1.5 NG/DL (ref 0.93–1.7)
TSH SERPL DL<=0.005 MIU/L-ACNC: 1.15 UIU/ML (ref 0.27–4.2)

## 2023-09-05 DIAGNOSIS — F41.9 ANXIETY AND DEPRESSION: ICD-10-CM

## 2023-09-05 DIAGNOSIS — F32.A ANXIETY AND DEPRESSION: ICD-10-CM

## 2023-09-05 DIAGNOSIS — E03.9 HYPOTHYROIDISM, UNSPECIFIED TYPE: ICD-10-CM

## 2023-09-05 RX ORDER — LEVOTHYROXINE SODIUM 0.12 MG/1
125 TABLET ORAL DAILY
Qty: 90 TABLET | Refills: 1 | Status: SHIPPED | OUTPATIENT
Start: 2023-09-05

## 2023-09-05 NOTE — TELEPHONE ENCOUNTER
Rx Refill Note  Requested Prescriptions     Pending Prescriptions Disp Refills    levothyroxine (SYNTHROID, LEVOTHROID) 125 MCG tablet [Pharmacy Med Name: LEVOTHYROXINE 0.125MG (125MCG) TAB] 90 tablet 3     Sig: TAKE 1 TABLET BY MOUTH DAILY      Last office visit with prescribing clinician: 7/5/2022   Last telemedicine visit with prescribing clinician: 3/24/2023   Next office visit with prescribing clinician: Visit date not found                         Would you like a call back once the refill request has been completed: [] Yes [] No    If the office needs to give you a call back, can they leave a voicemail: [] Yes [] No    Otilia Sauer MA  09/05/23, 14:06 EDT

## 2023-09-05 NOTE — TELEPHONE ENCOUNTER
Rx Refill Note  Requested Prescriptions     Pending Prescriptions Disp Refills    levothyroxine (SYNTHROID, LEVOTHROID) 125 MCG tablet 90 tablet 3     Sig: Take 1 tablet by mouth Daily.      Last office visit with prescribing clinician: 3/24/23    Next office visit with prescribing clinician:   Myla Miles MA  09/05/23, 16:14 EDT

## 2023-09-06 RX ORDER — LEVOTHYROXINE SODIUM 0.12 MG/1
125 TABLET ORAL DAILY
Qty: 90 TABLET | Refills: 3 | Status: SHIPPED | OUTPATIENT
Start: 2023-09-06

## 2023-09-19 ENCOUNTER — OFFICE VISIT (OUTPATIENT)
Dept: ENDOCRINOLOGY | Facility: CLINIC | Age: 38
End: 2023-09-19
Payer: COMMERCIAL

## 2023-09-19 VITALS
HEIGHT: 63 IN | DIASTOLIC BLOOD PRESSURE: 75 MMHG | WEIGHT: 184 LBS | BODY MASS INDEX: 32.6 KG/M2 | SYSTOLIC BLOOD PRESSURE: 112 MMHG | OXYGEN SATURATION: 99 % | HEART RATE: 63 BPM

## 2023-09-19 DIAGNOSIS — E06.3 HYPOTHYROIDISM DUE TO HASHIMOTO'S THYROIDITIS: Primary | ICD-10-CM

## 2023-09-19 DIAGNOSIS — E03.8 HYPOTHYROIDISM DUE TO HASHIMOTO'S THYROIDITIS: Primary | ICD-10-CM

## 2023-09-19 PROBLEM — B34.9 VIRAL SYNDROME: Status: RESOLVED | Noted: 2019-01-12 | Resolved: 2023-09-19

## 2023-09-19 PROBLEM — Z3A.38 PREGNANCY WITH 38 COMPLETED WEEKS GESTATION: Status: RESOLVED | Noted: 2019-10-01 | Resolved: 2023-09-19

## 2023-09-19 PROCEDURE — 99214 OFFICE O/P EST MOD 30 MIN: CPT | Performed by: INTERNAL MEDICINE

## 2023-09-19 RX ORDER — LIOTHYRONINE SODIUM 5 UG/1
5 TABLET ORAL DAILY
Qty: 30 TABLET | Refills: 5 | Status: SHIPPED | OUTPATIENT
Start: 2023-09-19 | End: 2024-09-18

## 2023-09-19 RX ORDER — LEVOTHYROXINE SODIUM 112 UG/1
112 TABLET ORAL DAILY
Qty: 30 TABLET | Refills: 5 | Status: SHIPPED | OUTPATIENT
Start: 2023-09-19

## 2023-09-19 NOTE — PROGRESS NOTES
"Chief Complaint   Patient presents with    Hypothyroidism        HPI   Angelika Vick is a 37 y.o. female had concerns including Hypothyroidism.      She is now taking levothyroxine 125 mcg separate from other meds and TSH is improved. She feels better but not great. Inquires about armour.     The following portions of the patient's history were reviewed and updated as appropriate: allergies, current medications, past family history, past medical history, past social history, past surgical history, and problem list.    Review of Systems   Constitutional:  Positive for fatigue.      /75   Pulse 63   Ht 160 cm (63\")   Wt 83.5 kg (184 lb)   SpO2 99%   BMI 32.59 kg/m²      Physical Exam  Vitals reviewed.   Constitutional:       Appearance: Normal appearance.   Cardiovascular:      Rate and Rhythm: Normal rate.   Pulmonary:      Effort: Pulmonary effort is normal.   Neurological:      General: No focal deficit present.      Mental Status: She is alert. Mental status is at baseline.   Psychiatric:         Mood and Affect: Mood normal.         Behavior: Behavior normal.            LABS AND IMAGING   CMP  Lab Results   Component Value Date    GLUCOSE 121 (H) 06/27/2022    BUN 11 06/27/2022    CREATININE 0.65 06/27/2022    EGFRIFNONA 75 02/03/2021    BCR 16.9 06/27/2022    K 3.8 06/27/2022    CO2 24.0 06/27/2022    CALCIUM 9.1 06/27/2022    ALBUMIN 4.30 06/27/2022    AST 10 06/27/2022    ALT 9 06/27/2022        CBC w/DIFF   Lab Results   Component Value Date    WBC 9.76 06/27/2022    RBC 4.98 06/27/2022    HGB 14.4 06/27/2022    HCT 42.5 06/27/2022    MCV 85.3 06/27/2022    MCH 28.9 06/27/2022    MCHC 33.9 06/27/2022    RDW 12.2 (L) 06/27/2022    RDWSD 38.1 06/27/2022    MPV 10.0 06/27/2022     06/27/2022    NEUTRORELPCT 63.5 06/27/2022    LYMPHORELPCT 26.6 06/27/2022    MONORELPCT 8.0 06/27/2022    EOSRELPCT 0.7 06/27/2022    BASORELPCT 1.0 06/27/2022    AUTOIGPER 0.2 06/27/2022    NEUTROABS 6.19 " 06/27/2022    LYMPHSABS 2.60 06/27/2022    MONOSABS 0.78 06/27/2022    EOSABS 0.07 06/27/2022    BASOSABS 0.10 06/27/2022    AUTOIGNUM 0.02 06/27/2022    NRBC 0.0 06/27/2022     TSH  Lab Results   Component Value Date    TSH 1.150 08/09/2023    TSH 2.760 03/31/2023    TSH 4.740 (H) 06/24/2022     T4  Lab Results   Component Value Date    FREET4 1.50 08/09/2023    FREET4 1.67 03/31/2023    FREET4 1.63 06/27/2022     Lab Results   Component Value Date    J9KQGXV 5.00 10/17/2018     TPO  Lab Results   Component Value Date    THYROIDAB >600 (H) 10/22/2018       Assessment and Plan    Diagnoses and all orders for this visit:    1. Hypothyroidism due to Hashimoto's thyroiditis (Primary)  Clinically and biochemically euthyroid on levothyroxine 125 mcg daily. Symptoms improved but still with fatigue. Option to add T3, try increase dose (but if TSH suppressed will need a dose reduction), or try alternating 125/137 mcg dose. Pt wants to try T3.   Decrease levothyroxine to 112 mcg and add liothyronine 5 mcg daily. Repeat levels in 6 weeks.   -     liothyronine (CYTOMEL) 5 MCG tablet; Take 1 tablet by mouth Daily.  Dispense: 30 tablet; Refill: 5  -     levothyroxine (SYNTHROID, LEVOTHROID) 112 MCG tablet; Take 1 tablet by mouth Daily.  Dispense: 30 tablet; Refill: 5  -     T4, Free; Future  -     TSH; Future  -     T3; Future         Return in about 6 months (around 3/19/2024) for next scheduled follow up. The patient was instructed to contact the clinic with any interval questions or concerns.    Beatrice Gomes, DO   Endocrinologist    Please note that portions of this note were completed with a voice recognition program.

## 2023-09-27 ENCOUNTER — LAB (OUTPATIENT)
Dept: LAB | Facility: HOSPITAL | Age: 38
End: 2023-09-27
Payer: COMMERCIAL

## 2023-09-27 ENCOUNTER — OFFICE VISIT (OUTPATIENT)
Dept: FAMILY MEDICINE CLINIC | Facility: CLINIC | Age: 38
End: 2023-09-27
Payer: COMMERCIAL

## 2023-09-27 ENCOUNTER — HOSPITAL ENCOUNTER (OUTPATIENT)
Dept: CT IMAGING | Facility: HOSPITAL | Age: 38
Discharge: HOME | End: 2023-09-27
Admitting: PHYSICIAN ASSISTANT
Payer: COMMERCIAL

## 2023-09-27 VITALS
SYSTOLIC BLOOD PRESSURE: 122 MMHG | WEIGHT: 186 LBS | HEIGHT: 63 IN | HEART RATE: 56 BPM | BODY MASS INDEX: 32.96 KG/M2 | DIASTOLIC BLOOD PRESSURE: 84 MMHG | OXYGEN SATURATION: 98 %

## 2023-09-27 DIAGNOSIS — R10.31 RIGHT LOWER QUADRANT ABDOMINAL PAIN: ICD-10-CM

## 2023-09-27 DIAGNOSIS — R11.0 NAUSEA: ICD-10-CM

## 2023-09-27 DIAGNOSIS — R10.31 RIGHT LOWER QUADRANT ABDOMINAL PAIN: Primary | ICD-10-CM

## 2023-09-27 LAB
ALBUMIN SERPL-MCNC: 4.2 G/DL (ref 3.5–5.2)
ALBUMIN/GLOB SERPL: 1.4 G/DL
ALP SERPL-CCNC: 71 U/L (ref 39–117)
ALT SERPL W P-5'-P-CCNC: 18 U/L (ref 1–33)
ANION GAP SERPL CALCULATED.3IONS-SCNC: 12 MMOL/L (ref 5–15)
AST SERPL-CCNC: 17 U/L (ref 1–32)
BASOPHILS # BLD AUTO: 0.11 10*3/MM3 (ref 0–0.2)
BASOPHILS NFR BLD AUTO: 1.1 % (ref 0–1.5)
BILIRUB BLD-MCNC: NEGATIVE MG/DL
BILIRUB SERPL-MCNC: 0.3 MG/DL (ref 0–1.2)
BUN SERPL-MCNC: 10 MG/DL (ref 6–20)
BUN/CREAT SERPL: 13.2 (ref 7–25)
CALCIUM SPEC-SCNC: 9.5 MG/DL (ref 8.6–10.5)
CHLORIDE SERPL-SCNC: 105 MMOL/L (ref 98–107)
CLARITY, POC: CLEAR
CO2 SERPL-SCNC: 25 MMOL/L (ref 22–29)
COLOR UR: YELLOW
CREAT SERPL-MCNC: 0.76 MG/DL (ref 0.57–1)
DEPRECATED RDW RBC AUTO: 41.3 FL (ref 37–54)
EGFRCR SERPLBLD CKD-EPI 2021: 104.3 ML/MIN/1.73
EOSINOPHIL # BLD AUTO: 0.3 10*3/MM3 (ref 0–0.4)
EOSINOPHIL NFR BLD AUTO: 2.9 % (ref 0.3–6.2)
ERYTHROCYTE [DISTWIDTH] IN BLOOD BY AUTOMATED COUNT: 12.7 % (ref 12.3–15.4)
EXPIRATION DATE: ABNORMAL
GLOBULIN UR ELPH-MCNC: 3.1 GM/DL
GLUCOSE SERPL-MCNC: 106 MG/DL (ref 65–99)
GLUCOSE UR STRIP-MCNC: NEGATIVE MG/DL
HCT VFR BLD AUTO: 46.1 % (ref 34–46.6)
HGB BLD-MCNC: 15 G/DL (ref 12–15.9)
IMM GRANULOCYTES # BLD AUTO: 0.03 10*3/MM3 (ref 0–0.05)
IMM GRANULOCYTES NFR BLD AUTO: 0.3 % (ref 0–0.5)
KETONES UR QL: NEGATIVE
LEUKOCYTE EST, POC: NEGATIVE
LYMPHOCYTES # BLD AUTO: 2.17 10*3/MM3 (ref 0.7–3.1)
LYMPHOCYTES NFR BLD AUTO: 20.8 % (ref 19.6–45.3)
Lab: ABNORMAL
MCH RBC QN AUTO: 28.8 PG (ref 26.6–33)
MCHC RBC AUTO-ENTMCNC: 32.5 G/DL (ref 31.5–35.7)
MCV RBC AUTO: 88.5 FL (ref 79–97)
MONOCYTES # BLD AUTO: 0.79 10*3/MM3 (ref 0.1–0.9)
MONOCYTES NFR BLD AUTO: 7.6 % (ref 5–12)
NEUTROPHILS NFR BLD AUTO: 67.3 % (ref 42.7–76)
NEUTROPHILS NFR BLD AUTO: 7.03 10*3/MM3 (ref 1.7–7)
NITRITE UR-MCNC: NEGATIVE MG/ML
NRBC BLD AUTO-RTO: 0 /100 WBC (ref 0–0.2)
PH UR: 7 [PH] (ref 5–8)
PLATELET # BLD AUTO: 360 10*3/MM3 (ref 140–450)
PMV BLD AUTO: 9.8 FL (ref 6–12)
POTASSIUM SERPL-SCNC: 4.7 MMOL/L (ref 3.5–5.2)
PROT SERPL-MCNC: 7.3 G/DL (ref 6–8.5)
PROT UR STRIP-MCNC: NEGATIVE MG/DL
RBC # BLD AUTO: 5.21 10*6/MM3 (ref 3.77–5.28)
RBC # UR STRIP: ABNORMAL /UL
SODIUM SERPL-SCNC: 142 MMOL/L (ref 136–145)
SP GR UR: 1 (ref 1–1.03)
UROBILINOGEN UR QL: NORMAL
WBC NRBC COR # BLD: 10.43 10*3/MM3 (ref 3.4–10.8)

## 2023-09-27 PROCEDURE — 87088 URINE BACTERIA CULTURE: CPT | Performed by: PHYSICIAN ASSISTANT

## 2023-09-27 PROCEDURE — 85025 COMPLETE CBC W/AUTO DIFF WBC: CPT

## 2023-09-27 PROCEDURE — 87186 SC STD MICRODIL/AGAR DIL: CPT | Performed by: PHYSICIAN ASSISTANT

## 2023-09-27 PROCEDURE — 74176 CT ABD & PELVIS W/O CONTRAST: CPT

## 2023-09-27 PROCEDURE — 87086 URINE CULTURE/COLONY COUNT: CPT | Performed by: PHYSICIAN ASSISTANT

## 2023-09-27 PROCEDURE — 80053 COMPREHEN METABOLIC PANEL: CPT

## 2023-09-27 RX ORDER — NITROFURANTOIN 25; 75 MG/1; MG/1
100 CAPSULE ORAL 2 TIMES DAILY
Qty: 14 CAPSULE | Refills: 0 | Status: SHIPPED | OUTPATIENT
Start: 2023-09-27 | End: 2023-10-04

## 2023-09-27 NOTE — PROGRESS NOTES
"    Chief Complaint   Patient presents with    right lower abdominal pain     X 3 days     Nausea       HPI     Angelika Vick is a pleasant 37 y.o. female with a PMH of hypothyroidism, gestational diabetes, and mixed hyperlipidemia who presents for evaluation of \"chief complaint.\"     Patient c/o RLQ abdominal pain and nausea for 4 days. Increased as bladder is full. A little bit of relief after voiding but pain comes back. Bowel habit has been normal. Dull, consistent pain, a lot of pressure, rated 7/10. She does have some pain with bending forward. Denies fever, chills, night sweats, vomiting, dysuria, urinary frequency, and a history of frequent UTIs. No history of abdominal surgeries. She feels pain is progressively worsening. It has been 10+ years since last UTI.     Past Medical History:   Diagnosis Date    Acid reflux     Bilateral ovarian cysts     Depression     Gestational diabetes     with 2nd pregnancy    Gestational hypertension     with last pregnancy    Hashimoto's thyroiditis 2018    Hypertension in pregnancy     Hypothyroidism 2018    Migraines     Polycystic ovary syndrome        Past Surgical History:   Procedure Laterality Date    CYST REMOVAL      GANGLION CYST EXCISION Right        Family History   Problem Relation Age of Onset    Arthritis Mother     Hypertension Mother     No Known Problems Father     Thyroid disease Sister     Diabetes Sister     No Known Problems Brother     Diabetes Maternal Grandmother     Diabetes Maternal Grandfather     No Known Problems Paternal Grandmother     No Known Problems Paternal Grandfather     Diabetes Sister     Obesity Sister     Thyroid disease Sister        Social History     Socioeconomic History    Marital status:    Tobacco Use    Smoking status: Former     Packs/day: 1.00     Years: 10.00     Pack years: 10.00     Types: Cigarettes     Start date: 2005     Quit date: 10/6/2016     Years since quittin.9    Smokeless " tobacco: Never   Substance and Sexual Activity    Alcohol use: Yes     Alcohol/week: 1.0 - 2.0 standard drink     Types: 1 - 2 Drinks containing 0.5 oz of alcohol per week     Comment: Occasionally    Drug use: No    Sexual activity: Yes     Partners: Male     Birth control/protection: I.U.D.     Comment: pt expecting 3rd child.        Allergies   Allergen Reactions    Dermagel Hand  [Alcohol] Rash       ROS    Review of Systems    Vitals:    09/27/23 0831   BP: 122/84   Pulse: 56   SpO2: 98%     Body mass index is 32.95 kg/m².      Current Outpatient Medications:     levothyroxine (SYNTHROID, LEVOTHROID) 112 MCG tablet, Take 1 tablet by mouth Daily., Disp: 30 tablet, Rfl: 5    liothyronine (CYTOMEL) 5 MCG tablet, Take 1 tablet by mouth Daily., Disp: 30 tablet, Rfl: 5    PE    Physical Exam  Vitals reviewed.   Constitutional:       General: She is not in acute distress.     Appearance: She is well-developed. She is not ill-appearing.   HENT:      Head: Normocephalic and atraumatic.   Eyes:      Conjunctiva/sclera: Conjunctivae normal.   Cardiovascular:      Rate and Rhythm: Normal rate and regular rhythm.      Heart sounds: Normal heart sounds. No murmur heard.  Pulmonary:      Effort: Pulmonary effort is normal.      Breath sounds: Normal breath sounds.   Abdominal:      Palpations: Abdomen is soft.      Tenderness: There is abdominal tenderness in the right lower quadrant. There is rebound. There is no guarding. Positive signs include psoas sign.      Comments: Tenderness over McBurney's point right lower quadrant with mild rebound and positive psoas sign.   Musculoskeletal:      Cervical back: Normal range of motion.   Skin:     General: Skin is warm and dry.   Neurological:      Mental Status: She is alert.      Gait: Gait normal.   Psychiatric:         Speech: Speech normal.         Behavior: Behavior normal.        A/P    Problem List Items Addressed This Visit          Gastrointestinal Abdominal      Right lower quadrant abdominal pain - Primary    Current Assessment & Plan     UA shows 1+ blood. No leukocytes, nitrites, or urinary complaints.   Order STAT labs and CT abd/pelvis to evaluate for KS, early appendicitis. Urine culture to r/o UTI.   Counseled patient to present to the ER if she has progressive, severe pain, vomiting, or fever.          Relevant Orders    POCT urinalysis dipstick, automated (Completed)    CBC & Differential    Comprehensive Metabolic Panel    Urine Culture - , Urine, Clean Catch    CT Abdomen Pelvis Without Contrast     Other Visit Diagnoses       Nausea                Plan of care was reviewed with patient at the conclusion of today's visit. Education was provided regarding diagnoses, management, prescribed or recommended OTC products, and the importance of compliance with follow-up appointments. The patient was counseled regarding the risks, benefits, and possible side-effects of treatment. I advised the patient to keep me informed of any acute changes in their status including new, worsening, or persistent symptoms. Patient expresses understanding and agreement with the management plan.        TARA Vinson

## 2023-09-27 NOTE — ASSESSMENT & PLAN NOTE
UA shows 1+ blood. No leukocytes, nitrites, or urinary complaints.   Order STAT labs and CT abd/pelvis to evaluate for KS, early appendicitis. Urine culture to r/o UTI.   Counseled patient to present to the ER if she has progressive, severe pain, vomiting, or fever.

## 2023-09-29 LAB — BACTERIA SPEC AEROBE CULT: ABNORMAL

## 2023-10-18 LAB
T3 SERPL-MCNC: 98 NG/DL (ref 71–180)
T4 FREE SERPL-MCNC: 1.25 NG/DL (ref 0.93–1.7)
TSH SERPL DL<=0.005 MIU/L-ACNC: 4.28 UIU/ML (ref 0.27–4.2)

## 2023-10-23 DIAGNOSIS — E06.3 HYPOTHYROIDISM DUE TO HASHIMOTO'S THYROIDITIS: ICD-10-CM

## 2023-10-23 DIAGNOSIS — E03.8 HYPOTHYROIDISM DUE TO HASHIMOTO'S THYROIDITIS: ICD-10-CM

## 2023-10-23 RX ORDER — LEVOTHYROXINE SODIUM 0.12 MG/1
125 TABLET ORAL DAILY
Qty: 30 TABLET | Refills: 5 | Status: SHIPPED | OUTPATIENT
Start: 2023-10-23

## 2023-12-17 ENCOUNTER — HOSPITAL ENCOUNTER (EMERGENCY)
Facility: HOSPITAL | Age: 38
Discharge: HOME | End: 2023-12-17
Attending: EMERGENCY MEDICINE
Payer: COMMERCIAL

## 2023-12-17 VITALS
OXYGEN SATURATION: 98 % | SYSTOLIC BLOOD PRESSURE: 140 MMHG | HEART RATE: 86 BPM | BODY MASS INDEX: 32.25 KG/M2 | RESPIRATION RATE: 16 BRPM | DIASTOLIC BLOOD PRESSURE: 92 MMHG | WEIGHT: 182 LBS | HEIGHT: 63 IN | TEMPERATURE: 98.6 F

## 2023-12-17 DIAGNOSIS — R04.0 EPISTAXIS: Primary | ICD-10-CM

## 2023-12-17 PROCEDURE — 99283 EMERGENCY DEPT VISIT LOW MDM: CPT

## 2023-12-17 RX ORDER — OXYMETAZOLINE HYDROCHLORIDE 0.05 G/100ML
1 SPRAY NASAL ONCE
Status: COMPLETED | OUTPATIENT
Start: 2023-12-17 | End: 2023-12-17

## 2023-12-17 RX ADMIN — OXYMETAZOLINE HCL 1 SPRAY: 0.05 SPRAY NASAL at 01:03

## 2023-12-21 NOTE — ED PROVIDER NOTES
Subjective   History of Present Illness  Nose bleed x1 hr and right-sided sinus pain. States bleeding has persisted but the flow of blood is slower. Pt deneis traumatic injury, but sts she was in an aerial silks performance earlier today. Denies blood thinner usage.    Patient is a pleasant 38-year-old female who presents to the emergency department with epistaxis.  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  She does have to hang upside down during some of the performance.  This evening during the performance she developed a epistaxis from the right nostril and this is the reason for presentation to the emergency department.  This is [employer] performance is a new experience for her.  She denies any trauma or other complaint.  She states that she did have some discomfort over her maxillary sinus areas but otherwise denies any recent complaints.  Denies fever, chills, chest pain, shortness of breath, abdominal pain, vomiting, diarrhea, or other acute complaints.      Review of Systems   All other systems reviewed and are negative.      Past Medical History:   Diagnosis Date    Acid reflux     Bilateral ovarian cysts     Depression     Gestational diabetes     with 2nd pregnancy    Gestational hypertension     with last pregnancy    Hashimoto's thyroiditis 06/2018    Hypertension in pregnancy 2012    Hypothyroidism 06/2018    Migraines     Mixed hyperlipidemia 02/08/2019    Polycystic ovary syndrome 2010       Allergies   Allergen Reactions    Dermagel Hand  [Alcohol] Rash       Past Surgical History:   Procedure Laterality Date    CYST REMOVAL      GANGLION CYST EXCISION Right        Family History   Problem Relation Age of Onset    Arthritis Mother     Hypertension Mother     No Known Problems Father     Thyroid disease Sister     Diabetes Sister     No Known Problems Brother     Diabetes Maternal Grandmother     Diabetes Maternal Grandfather     No Known Problems Paternal  Grandmother     No Known Problems Paternal Grandfather     Diabetes Sister     Obesity Sister     Thyroid disease Sister        Social History     Socioeconomic History    Marital status:    Tobacco Use    Smoking status: Former     Packs/day: 1.00     Years: 10.00     Additional pack years: 0.00     Total pack years: 10.00     Types: Cigarettes     Start date: 2005     Quit date: 10/6/2016     Years since quittin.2    Smokeless tobacco: Never   Vaping Use    Vaping Use: Never used   Substance and Sexual Activity    Alcohol use: Yes     Alcohol/week: 1.0 - 2.0 standard drink of alcohol     Types: 1 - 2 Drinks containing 0.5 oz of alcohol per week     Comment: Occasionally    Drug use: No    Sexual activity: Yes     Partners: Male     Birth control/protection: I.U.D.     Comment: pt expecting 3rd child.            Objective   Physical Exam  Vitals and nursing note reviewed.   Constitutional:       General: She is not in acute distress.  HENT:      Head: Normocephalic and atraumatic.      Nose:      Comments: Small amount of dried blood in the right nostril.  There is no active bleeding at this time.  Eyes:      Conjunctiva/sclera: Conjunctivae normal.      Pupils: Pupils are equal, round, and reactive to light.   Neck:      Thyroid: No thyromegaly.   Cardiovascular:      Rate and Rhythm: Normal rate and regular rhythm.      Heart sounds: Normal heart sounds. No murmur heard.     No friction rub. No gallop.   Pulmonary:      Effort: Pulmonary effort is normal. No respiratory distress.      Breath sounds: Normal breath sounds.   Abdominal:      General: Bowel sounds are normal.      Palpations: Abdomen is soft.      Tenderness: There is no abdominal tenderness.   Musculoskeletal:         General: Normal range of motion.      Cervical back: Normal range of motion and neck supple.   Lymphadenopathy:      Cervical: No cervical adenopathy.   Skin:     General: Skin is warm and dry.      Capillary Refill:  "Capillary refill takes less than 2 seconds.   Neurological:      Mental Status: She is alert and oriented to person, place, and time.   Psychiatric:         Mood and Affect: Mood normal.         Behavior: Behavior normal.         Thought Content: Thought content normal.         Procedures           ED Course      No results found for this or any previous visit (from the past 24 hour(s)).  Note: In addition to lab results from this visit, the labs listed above may include labs taken at another facility or during a different encounter within the last 24 hours. Please correlate lab times with ED admission and discharge times for further clarification of the services performed during this visit.    No orders to display     Vitals:    12/17/23 0026 12/17/23 0027   BP: 140/92    Pulse: 86    Resp: 16    Temp:  98.6 °F (37 °C)   SpO2: 98%    Weight: 82.6 kg (182 lb)    Height: 160 cm (63\")      Medications   oxymetazoline (AFRIN) nasal spray 1 spray (1 spray Right Nare Given 12/17/23 0103)     ECG/EMG Results (last 24 hours)       ** No results found for the last 24 hours. **          No orders to display                                              Medical Decision Making  Patient's episode of epistaxis that is not fully resolved.  I suspect that the vascular room pressure from the hanging upside down is contributed to the epistaxis.  Patient had no lightheadedness or other systemic symptoms.  Further workup including laboratory workup is not indicated at this time.  Patient has been given a nasal clamp and should she have any recurrent bleeding at home she can attempt placement of the nasal clamp and if it is severe bleed she understands to return to the emergency department.  Both she and her friend are comfortable with the plan and discharge at this time.    Problems Addressed:  Epistaxis: complicated acute illness or injury    Risk  OTC drugs.        Final diagnoses:   Epistaxis       ED Disposition  ED Disposition  "      ED Disposition   Discharge    Condition   Stable    Comment   --           DISCHARGE    Patient discharged in stable condition.    Reviewed implications of results, diagnosis, meds, responsibility to follow up, warning signs and symptoms of possible worsening, potential complications and reasons to return to ER.    Patient/Family voiced understanding of above instructions.    Discussed plan for discharge, as there is no emergent indication for admission.  Pt/family is agreeable and understands need for follow up and possible repeat testing.  Pt/family is aware that discharge does not mean that nothing is wrong but that it indicates no emergency is currently present that requires admission and they must continue care with follow-up as given below or with a physician of their choice.     FOLLOW-UP  Augustine Muniz,   [address] [address]  [phone]    Schedule an appointment as soon as possible for a visit       Ayo Mcpherson MD  [address]  [address]  [facility] [address]  [phone]      As needed    [facility] EMERGENCY DEPARTMENT  [address] [facility] [id number] [phone]    If symptoms worsen         Medication List      No changes were made to your prescriptions during this visit.            Ace Kellogg DO  12/21/23 2545

## 2024-01-03 ENCOUNTER — HOSPITAL ENCOUNTER (OUTPATIENT)
Dept: GENERAL RADIOLOGY | Facility: HOSPITAL | Age: 39
Discharge: HOME | End: 2024-01-03
Admitting: FAMILY MEDICINE
Payer: COMMERCIAL

## 2024-01-03 ENCOUNTER — OFFICE VISIT (OUTPATIENT)
Age: 39
End: 2024-01-03
Payer: COMMERCIAL

## 2024-01-03 VITALS
DIASTOLIC BLOOD PRESSURE: 90 MMHG | HEIGHT: 63 IN | SYSTOLIC BLOOD PRESSURE: 118 MMHG | WEIGHT: 190 LBS | BODY MASS INDEX: 33.66 KG/M2 | HEART RATE: 71 BPM | TEMPERATURE: 98.9 F | OXYGEN SATURATION: 97 %

## 2024-01-03 DIAGNOSIS — M25.561 ACUTE PAIN OF RIGHT KNEE: ICD-10-CM

## 2024-01-03 DIAGNOSIS — M25.561 ACUTE PAIN OF RIGHT KNEE: Primary | ICD-10-CM

## 2024-01-03 PROCEDURE — 73562 X-RAY EXAM OF KNEE 3: CPT

## 2024-01-03 RX ORDER — IBUPROFEN 200 MG
200 TABLET ORAL EVERY 6 HOURS PRN
COMMUNITY

## 2024-01-03 NOTE — PROGRESS NOTES
"     New Patient Office Visit      Date: 2024  Patient Name: Angelika Vick  : 1985   MRN: [id number]     Chief Complaint:    Chief Complaint   Patient presents with    Knee Injury     Right knee       History of Present Illness: Angelika Vick is a 38 y.o. female who is here today for  right knee pain. Injury occurred about 4 days ago. Patient was at practice for aerial silks when she was doing a maneuver and she felt \"pops\" in her right knee.  States that knee didn't swell but she does feel like there is fluid in her knee.  States that has pain that is dull in nature, priamrily on the inside of her knee.  The pain comes and goes, seems worse after moving around for a bit.  Pain will get better with rest    Subjective      Review of Systems:   Review of Systems   Constitutional:  Negative for appetite change and unexpected weight loss.   HENT:  Negative for trouble swallowing.    Eyes:  Negative for blurred vision and double vision.   Respiratory:  Negative for cough and shortness of breath.    Cardiovascular:  Negative for chest pain and leg swelling.   Gastrointestinal:  Negative for blood in stool.   Endocrine: Negative for cold intolerance, heat intolerance and polyuria.   Musculoskeletal:  Negative for joint swelling.   Skin:  Negative for color change and bruise.   Neurological:  Negative for numbness and memory problem.   Hematological:  Does not bruise/bleed easily.   Psychiatric/Behavioral:  Negative for suicidal ideas and depressed mood. The patient is not nervous/anxious.        Past Medical History:   Past Medical History:   Diagnosis Date    Acid reflux     Bilateral ovarian cysts     Depression     Gestational diabetes     with 2nd pregnancy    Gestational hypertension     with last pregnancy    Hashimoto's thyroiditis 2018    Hypertension in pregnancy 2012    Hypothyroidism 2018    Migraines     Mixed hyperlipidemia 2019    Polycystic ovary syndrome 2010       Past " Surgical History:   Past Surgical History:   Procedure Laterality Date    CYST REMOVAL      GANGLION CYST EXCISION Right        Family History:   Family History   Problem Relation Age of Onset    Arthritis Mother     Hypertension Mother     No Known Problems Father     Thyroid disease Sister     Diabetes Sister     No Known Problems Brother     Diabetes Maternal Grandmother     Diabetes Maternal Grandfather     No Known Problems Paternal Grandmother     No Known Problems Paternal Grandfather     Diabetes Sister     Obesity Sister     Thyroid disease Sister        Social History:   Social History     Socioeconomic History    Marital status:    Tobacco Use    Smoking status: Former     Packs/day: 1.00     Years: 10.00     Additional pack years: 0.00     Total pack years: 10.00     Types: Cigarettes     Start date: 2005     Quit date: 10/6/2016     Years since quittin.2    Smokeless tobacco: Never   Vaping Use    Vaping Use: Never used   Substance and Sexual Activity    Alcohol use: Yes     Alcohol/week: 1.0 - 2.0 standard drink of alcohol     Types: 1 - 2 Drinks containing 0.5 oz of alcohol per week     Comment: Occasionally    Drug use: No    Sexual activity: Yes     Partners: Male     Birth control/protection: I.U.D.     Comment: pt expecting 3rd child.        Medications:     Current Outpatient Medications:     ibuprofen (ADVIL,MOTRIN) 200 MG tablet, Take 1 tablet by mouth Every 6 (Six) Hours As Needed for Mild Pain., Disp: , Rfl:     levothyroxine (SYNTHROID, LEVOTHROID) 125 MCG tablet, Take 1 tablet by mouth Daily., Disp: 30 tablet, Rfl: 5    predniSONE (DELTASONE) 10 MG (21) dose pack, Daily taper 6,5,4,3,2,1 (Patient not taking: Reported on 1/3/2024), Disp: 21 each, Rfl: 0    Allergies:   Allergies   Allergen Reactions    Dermagel Hand  [Alcohol] Rash       Objective     Physical Exam:  Vital Signs:   Vitals:    24 1019   BP: 118/90   BP Location: Left arm   Patient Position:  "Sitting   Pulse: 71   Temp: 98.9 °F (37.2 °C)   SpO2: 97%   Weight: 86.2 kg (190 lb)   Height: 160 cm (62.99\")     Body mass index is 33.67 kg/m².      Physical Exam  Vitals and nursing note reviewed.   Constitutional:       Appearance: Normal appearance.   HENT:      Head: Normocephalic and atraumatic.   Eyes:      General: Lids are normal.      Conjunctiva/sclera: Conjunctivae normal.   Cardiovascular:      Rate and Rhythm: Normal rate and regular rhythm.   Pulmonary:      Effort: Pulmonary effort is normal.      Breath sounds: Normal breath sounds and air entry.   Abdominal:      General: Abdomen is flat. Bowel sounds are normal.      Palpations: Abdomen is soft.   Musculoskeletal:      Cervical back: Full passive range of motion without pain and normal range of motion.      Comments: Patient with some pain Varus test on right knee.   Neurological:      General: No focal deficit present.      Mental Status: She is alert and oriented to person, place, and time.   Psychiatric:         Attention and Perception: Attention normal.         Mood and Affect: Mood normal.         Behavior: Behavior normal. Behavior is cooperative.         POCT Results (if applicable):   Results for orders placed or performed during the hospital encounter of 11/21/23   POC Rapid Strep A    Specimen: Swab   Result Value Ref Range    Rapid Strep A Screen Negative     Internal Control Passed     Lot Number [id number]     Expiration Date 5-17-26    Covid-19 + Flu A&B AG, Veritor ([id number])    Specimen: Swab   Result Value Ref Range    SARS Antigen Not Detected Not Detected, Presumptive Negative    Influenza A Antigen COLETTE Not Detected Not Detected    Influenza B Antigen COLETTE Not Detected Not Detected    Internal Control Passed Passed    Lot Number [id number]     Expiration Date 4-2-24             Assessment / Plan      Assessment/Plan:   Diagnoses and all orders for this visit:    1. Acute pain of right knee (Primary)  -     Cancel: XR Knee 1 or 2 " View Right; Future  -     Ambulatory Referral to Physical Therapy  -     XR Knee 3 View Right; Future  - Unsure if this is a strain vs other.  Will start with x-ray and PT.  Patient to conitnue to wear brace if it gives her comfort.  If pain doesn't start getting better over next week will consider MRI>       BMI is >= 30 and <35. (Class 1 Obesity). The following options were offered after discussion;: exercise counseling/recommendations      Follow Up:   Return in about 6 months (around 7/3/2024) for Annual physical.    Daniel Metz, DO   [facility]

## 2024-01-04 ENCOUNTER — TREATMENT (OUTPATIENT)
Dept: PHYSICAL THERAPY | Facility: CLINIC | Age: 39
End: 2024-01-04
Payer: COMMERCIAL

## 2024-01-04 DIAGNOSIS — M25.561 ACUTE PAIN OF RIGHT KNEE: Primary | ICD-10-CM

## 2024-01-04 PROCEDURE — 97161 PT EVAL LOW COMPLEX 20 MIN: CPT | Performed by: PHYSICAL THERAPIST

## 2024-01-04 NOTE — PROGRESS NOTES
Physical Therapy Initial Evaluation and Plan of Care   [address] [place] [address]      Patient: Angelika Vick   : 1985  Diagnosis/ICD-10 Code:  Acute pain of right knee [M25.561]  Referring practitioner: Daniel Metz DO    Subjective Evaluation    History of Present Illness  Date of onset: 2023  Mechanism of injury: Pt reports that she was practicing aerial stunts and trying to straighten her knee feeling a pop in the side of her R knee. Pt reports that she has never had knee issues before. Pt reports that this happened this past Saturday and was able to bear weight after injury. Pt reports that she feels unstable and that she has weakness in the knee. Pt denies any catching or buckling in the R knee. Pt reports that she can bear weight, walking, and stand on one leg without increased pain although feels very unsteady.       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     Right   No palpable tenderness to the distal biceps femoris, distal semimembranosus, distal semitendinosus and rectus femoris.     Tenderness   Left Knee   No tenderness in the ITB, LCL (distal), LCL (proximal), MCL (distal), MCL (proximal), medial joint line, patellar tendon, pes anserinus and quadriceps tendon.     Right Knee   Tenderness in the patellar tendon. No tenderness in the ITB, LCL (distal), LCL (proximal),  MCL (distal), MCL (proximal), medial joint line, pes anserinus and quadriceps tendon.     Neurological Testing     Reflexes   Left   Patellar (L4): normal (2+)  Achilles (S1): normal (2+)    Right   Patellar (L4): normal (2+)  Achilles (S1): normal (2+)    Active Range of Motion   Left Knee   Flexion: WFL  Extension: WFL    Right Knee   Flexion: WFL  Extension: WFL    Strength/Myotome Testing     Left Knee   Flexion: 4+  Extension: 4+  Quadriceps contraction: good    Right Knee   Flexion: 4+  Extension: 4+  Quadriceps contraction: good    Ambulation     Observational Gait   Gait: within functional limits   Walking speed and stride length within functional limits.           Assessment & Plan       Assessment  Impairments: activity intolerance, impaired physical strength, lacks appropriate home exercise program, pain with function and weight-bearing intolerance   Functional limitations: walking, uncomfortable because of pain, standing and stooping   Assessment details: Patient is a 38 year old female who comes to physical therapy with acute R knee pain following injury to the R knee. Pt with no notable positive tests that may indicate ligament or meniscus tears. Signs and symptoms are consistent with tendonitis of the R patellar tendon resulting in pain, decreased ROM, decreased strength, and inability to perform all essential functional activities. PT to continue with assessment of knee throughout treatment but no major injuries noted today. Pt will benefit from skilled PT services to address the above issues.       Prognosis: good    Goals  Plan Goals: SHORT TERM GOALS:  2 weeks       1. Pt independent with HEP  2. Pt to demonstrate jignesh hip strength 4/5 or greater to improve stability with ambulation  3. Pt to report being able to walk for 10 minutes without increasing pain in the right knee    LONG TERM GOALS:   6 weeks  1. Pt to demonstrate ability to perform full functional squat with good form and control of the  knees and without increasing pain  2. Pt to demonstrate jignesh hip strength to 4+/5 or greater to improve safety with ambulation on uneven surfaces  3. Pt to return to work full duty without increased pain in the right knee   4. Pt to demonstrate ability to perform step up/down 8 inch step x10 safely and without pain in the right knee         Plan  Therapy options: will be seen for skilled therapy services  Planned modality interventions: cryotherapy  Planned therapy interventions: balance/weight-bearing training, flexibility, functional ROM exercises, home exercise program, joint mobilization, manual therapy, motor coordination training, neuromuscular re-education, soft tissue mobilization, strengthening, stretching and therapeutic activities  Frequency: 2x week  Duration in weeks: 4  Treatment plan discussed with: patient        Timed Treatment:  Manual Therapy:         mins  39927;  Therapeutic Exercise:         mins  35396;     Neuromuscular Angelika:        mins  12587;    Therapeutic Activity:          mins  70054;     Gait Training:           mins  57481;     Ultrasound:          mins  17134;    Electrical Stimulation:         mins  85379 ( );  Dry Needling          mins self-pay  Iontophoresis          mins 15062    Untimed Treatments:  Electrical Stimulation:         mins  18847 ( );  Dry Needling:                     mins  Ultrasound:                         mins  17323;      Timed Treatment:      mins   Total Treatment:     39   mins    PT SIGNATURE: Hayder Arzola PT   [place] License: [id number]  DATE TREATMENT INITIATED: 1/4/2024    Initial Certification  Certification Period: 4/2/2024  I certify that the therapy services are furnished while this patient is under my care.  The services outlined above are required by this patient, and will be reviewed every 90 days.     PHYSICIAN: Daniel Metz, DO      DATE:     Please sign and return via fax to [phone].. Thank you, [facility]  Therapy.

## 2024-01-05 ENCOUNTER — TELEPHONE (OUTPATIENT)
Dept: PHYSICAL THERAPY | Facility: CLINIC | Age: 39
End: 2024-01-05
Payer: COMMERCIAL

## 2024-01-05 NOTE — TELEPHONE ENCOUNTER
Caller: Angelika Vick    Relationship: Self    What was the call regarding: HAVING WORSE PAIN AND NEW PAIN IN  A DIFFERENT SPOT IN HER KNEE SINCE THERAPY YESTERDAY.  SHE WOULD LIKE TO SPEAK TO ROSHNI.

## 2024-01-05 NOTE — TELEPHONE ENCOUNTER
PT CONTACTED PATIENT DUE TO CONCERNS WITH NEW SYMPTOMS AND PAINS SHE WAS HAVING. PATIENT REPORTED THAT NOT WEARING THE BRACE CAUSED GREATER PATELLOFEMORAL PAIN AND WAS HAVING PAIN WITH HIP ABDUCTION ACTIVITIES AS WELL. PATIENT STATES THAT PAIN RETURNED TO THE INSIDE OF HER KNEE WHEN WEARING THE BRACE WHICH WAS MORE NORMAL. PATIENT ENCOURAGED TO WEAR BRACE AND AVOID EXERCISES THAT MAKE PAIN WORSE. PT WILL EVALUATE KNEE FURTHER IN CLINIC.

## 2024-01-08 ENCOUNTER — PATIENT ROUNDING (BHMG ONLY) (OUTPATIENT)
Age: 39
End: 2024-01-08
Payer: COMMERCIAL

## 2024-01-08 ENCOUNTER — TREATMENT (OUTPATIENT)
Dept: PHYSICAL THERAPY | Facility: CLINIC | Age: 39
End: 2024-01-08
Payer: COMMERCIAL

## 2024-01-08 DIAGNOSIS — M25.561 ACUTE PAIN OF RIGHT KNEE: Primary | ICD-10-CM

## 2024-01-08 PROCEDURE — 97112 NEUROMUSCULAR REEDUCATION: CPT | Performed by: PHYSICAL THERAPIST

## 2024-01-08 PROCEDURE — 97110 THERAPEUTIC EXERCISES: CPT | Performed by: PHYSICAL THERAPIST

## 2024-01-08 NOTE — PROGRESS NOTES
A [employer] message has been sent to the patient for PATIENT ROUNDING with [facility].

## 2024-01-08 NOTE — PROGRESS NOTES
Physical Therapy Daily Treatment Note      Visit #: 2    Angelika Vick reports that her pain fluctuates from medial to lateral R knee. Pt reports that when she wears her brace it does help. Pt states that her HEP has been going well and that the exercises have not been bothering her. Pt reports however, that her knee does not feel any better now than last week.         Objective Pt present to PT today with no distress at rest.     Pt with possible minimal hypermobility with testing of R ACL.     Pt with no increased pain in the R knee with activities today although did have some increased soreness.       See Exercise, Manual, and Modality Logs for complete treatment.     Assessment/Plan  Pt continues to have limited activity tolerance in the R knee with pain throughout the day. Pt to continue with PT as tolerated and will follow up with PCP if pain does not improve in a couple more weeks.       Progress per Plan of Care      Visit Diagnosis:    ICD-10-CM ICD-9-CM   1. Acute pain of right knee  M25.561 719.46              Timed Treatment:  Manual Therapy:         mins  61505;  Therapeutic Exercise:    26     mins  44964;     Neuromuscular Angelika:    17    mins  41066;    Therapeutic Activity:          mins  91407;     Gait Training:           mins  26235;     Ultrasound:          mins  25897;    Electrical Stimulation:         mins  91711 ( );  Dry Needling          mins self-pay  Iontophoresis          mins 17797    Untimed Treatments:  Electrical Stimulation:         mins  28789 ( );  Dry Needling:                     mins  Ultrasound:                         mins  15016;        Timed Treatment:   43   mins   Total Treatment:     50   mins    Hayder Arzola, PT  Physical Therapist

## 2024-01-11 ENCOUNTER — TREATMENT (OUTPATIENT)
Dept: PHYSICAL THERAPY | Facility: CLINIC | Age: 39
End: 2024-01-11
Payer: COMMERCIAL

## 2024-01-11 DIAGNOSIS — M25.561 ACUTE PAIN OF RIGHT KNEE: Primary | ICD-10-CM

## 2024-01-11 PROCEDURE — 97110 THERAPEUTIC EXERCISES: CPT | Performed by: PHYSICAL THERAPIST

## 2024-01-11 PROCEDURE — 97112 NEUROMUSCULAR REEDUCATION: CPT | Performed by: PHYSICAL THERAPIST

## 2024-01-11 NOTE — PROGRESS NOTES
Physical Therapy Daily Treatment Note      Visit #: 3    Angelika Vick reports 0/10 pain today at rest.  Pt reports that she had some increased pain in the knee after being up on it for a long time on Tuesday. Pt reports that she rested a lot on Wednesday and felt a little better. Pt reports that she was a little sore after last session although not painful. Pt reports that she has been experiencing popping in the R knee that is accompanied by pain sometimes.         Objective Pt present to PT today with no distress at rest.    Pt with some fatigue in the knee although no increased pain in the R knee with activities.     Pt educated on quad stretching today.       See Exercise, Manual, and Modality Logs for complete treatment.     Assessment/Plan  Pt continues to have some pain in the R knee with popping. Pt is doing well with activities in the clinic to help strengthen hip and knee without increased pain. Pt to continue with PT as tolerated for the next couple weeks.       Progress per Plan of Care      Visit Diagnosis:    ICD-10-CM ICD-9-CM   1. Acute pain of right knee  M25.561 719.46              Timed Treatment:  Manual Therapy:         mins  68663;  Therapeutic Exercise:    31     mins  13010;     Neuromuscular Angelika:    15    mins  65937;    Therapeutic Activity:          mins  61331;     Gait Training:           mins  04101;     Ultrasound:          mins  35780;    Electrical Stimulation:         mins  54170 ( );  Dry Needling          mins self-pay  Iontophoresis          mins 48860    Untimed Treatments:  Electrical Stimulation:         mins  96835 ( );  Dry Needling:                     mins  Ultrasound:                         mins  37820;        Timed Treatment:   46   mins   Total Treatment:     46   mins    Hayder Arzola, PT  Physical Therapist

## 2024-01-15 ENCOUNTER — TREATMENT (OUTPATIENT)
Dept: PHYSICAL THERAPY | Facility: CLINIC | Age: 39
End: 2024-01-15
Payer: COMMERCIAL

## 2024-01-15 DIAGNOSIS — M25.561 ACUTE PAIN OF RIGHT KNEE: Primary | ICD-10-CM

## 2024-01-15 PROCEDURE — 97112 NEUROMUSCULAR REEDUCATION: CPT | Performed by: PHYSICAL THERAPIST

## 2024-01-15 PROCEDURE — 97110 THERAPEUTIC EXERCISES: CPT | Performed by: PHYSICAL THERAPIST

## 2024-01-15 NOTE — PROGRESS NOTES
Physical Therapy Daily Treatment Note      Visit #: 4    Angelika Vick reports 0/10 pain today at rest.  Pt reports that everything was much better and is much more encouraged after last session. Pt reports that she is wearing the brace still every now and then although the knee is feeling much better overall.         Objective Pt present to PT today with no distress at rest.     Pt asked about quad stretching and PT showed proper form. Pt felt no pull or stretch so activity was held.     Pt with no increased pain or issue in the R knee although reports knee feeling different throughout session.       See Exercise, Manual, and Modality Logs for complete treatment.     Assessment/Plan  Pt seems to be doing well and responding better to exercises. Pt to continue with PT as tolerated to help improve R knee stability, function, and pain free activity tolerance.       Progress per Plan of Care      Visit Diagnosis:    ICD-10-CM ICD-9-CM   1. Acute pain of right knee  M25.561 719.46              Timed Treatment:  Manual Therapy:         mins  38277;  Therapeutic Exercise:    30     mins  37878;     Neuromuscular Angelika:    25    mins  01699;    Therapeutic Activity:          mins  73942;     Gait Training:           mins  79199;     Ultrasound:          mins  67196;    Electrical Stimulation:         mins  41057 ( );  Dry Needling          mins self-pay  Iontophoresis          mins 12881    Untimed Treatments:  Electrical Stimulation:         mins  17387 ( );  Dry Needling:                     mins  Ultrasound:                         mins  79558;        Timed Treatment:   55   mins   Total Treatment:     55   mins    Hayder Arzola, PT  Physical Therapist

## 2024-01-18 ENCOUNTER — TELEPHONE (OUTPATIENT)
Dept: PHYSICAL THERAPY | Facility: CLINIC | Age: 39
End: 2024-01-18

## 2024-01-18 NOTE — TELEPHONE ENCOUNTER
Caller: Angelika Vick    Relationship: Self         What was the call regarding: SICK KIDS

## 2024-01-22 NOTE — PROGRESS NOTES
INPATIENT CONSULT-[facility] CARDIOLOGY    Name: Surjit Fish    Age: 68 y.o.    Date of Admission: 1/22/2024 12:31 PM    Date of Service: 1/22/2024    Reason for Consultation: Increasing shortness of breath and fatigue    Referring Physician: [facility]    History of Present Illness: The patient is a 68 y.o. year old male who called my office 3 days ago, Friday 1/19, complaining of increasing dizziness, shortness of breath on activity and fatigue.  Pacemaker evaluation revealed his generator is at [place] and he is pacing in VVI mode.  Was told to report here after discussed with Dr. Annie Acevedo.    Past Medical History:  Previous patient of Dr. Cody Wilburn from [place], [place].  Hypertension, reported PSVT, sinus node dysfunction/sick sinus syndrome with [employer] K064 dual-chamber permanent pacemaker placed in 2013.  Last echo 2021 showed normal LVEF with stage I diastolic dysfunction and mild MR/TR.    Review of Systems:     Constitutional: No fever, chills, sweats  Cardiac: As per HPI  Pulmonary: As per HPI  HEENT: No visual disturbances or difficult swallowing  GI: No nausea, vomiting, diarrhea, abdominal pain, rectal bleeding  : No dysuria or hematuria  Endocrine: No excessive thirst, heat or cold intolerance.   Musculoskeletal: Joint pain but no muscle aches. No claudication  Skin: No skin breakdown or rashes  Neuro: No headache, confusion, or seizures  Psych: No depression, anxiety      Family History:  No family history on file.    Social History:  Social History     Socioeconomic History    Marital status:      Spouse name: Not on file    Number of children: Not on file    Years of education: Not on file    Highest education level: Not on file   Occupational History    Not on file   Tobacco Use    Smoking status: Not on file    Smokeless tobacco: Not on file   Substance and Sexual Activity    Alcohol use: Not on file    Drug use: Not on file    Sexual activity: Not on file   Other Topics  PROGRESS NOTE    Data:  Angelika Vick is a 35 y.o. female who met with the undersigned for a scheduled individual outpatient therapy session from 8:46 - 9:30am.       Clinical Maneuvering/Intervention:      The pt talked about struggling with anxiety, financial stress, wedding plans, and feeling down/empty. Stressors were processed individually and in detail. Venting of frustrations was conducted in order to help the pt feel less tense emotionally and gain insight into issues. Feelings were processed and validated several times in session. Education about why people feel empty was provided and examples of 'filling one's tank,' were addressed. She picked up on the idea rather quickly, and could see how working again has become more important to her lately. She wants to go into real estate, but also is nervous about it and does not know if she can do it. Maladaptive thought patterns were identified, challenged, and evaluated for validity in order to allow for the pt to chose different and more adaptive ways of thinking. Perspective taking was conducted multiple times in order to help her feel less stuck, less overwhelmed, and see challenges as much more manageable. She was assisted with seeing how not only are things going well now in life, but that she can do things to enjoy it more now and in the future. An action plan was designed to empower the pt to know what to do. Simple steps of one, two, three were laid out in order to help the pt feel more in control of things and feel less stressed.  She will continue to work her budget/plan the wedding, learn about real estate (take her time getting into it), and then get on the same page with her  about paying off debt/saving for things that they both really want. This was worded in terms of 'homework,' but also an intervention to help her feel less distressed. The pt expressed gratitude for today's session and said that she feels better.    Mental Status  Exam  Hygiene:  good  Dress: normal  Attitude:  cooperative and proactive  Motor Activity: normal  Speech: normal  Mood:  anxious  Affect:  congruent  Thought Processes: normal  Thought Content:  normal  Suicidal Thoughts:  not endorsed  Homicidal Thoughts:  not endorsed  Crisis Safety Plan: not needed   Hallucinations:  none      Patient's Support Network Includes:  family, friends      Progress toward goal: there is evidence to suggest that she is learning ways to enjoy life more and becoming a stronger person.       Functional Status: moderate to high      Prognosis: good    Assessment      The pt presented to be struggling with anxiety at a mild level and feeling stressed out with the many responsibilities on her as of late. She tends to benefit from empowering therapy, but therapy that helps her identify and take responsibility for her actions.      Plan      In order to diminish symptoms of anxiety and stress, she is to put into practice the action plan developed today (ongoing). She will practice the positive mindset exercises conducted today as well, in order to see her stressors as less overwhelming (next few weeks).    Kasey Peters, PhD, LP

## 2024-01-23 ENCOUNTER — TREATMENT (OUTPATIENT)
Dept: PHYSICAL THERAPY | Facility: CLINIC | Age: 39
End: 2024-01-23
Payer: COMMERCIAL

## 2024-01-23 DIAGNOSIS — M25.561 ACUTE PAIN OF RIGHT KNEE: Primary | ICD-10-CM

## 2024-01-23 PROCEDURE — 97140 MANUAL THERAPY 1/> REGIONS: CPT | Performed by: PHYSICAL THERAPIST

## 2024-01-23 PROCEDURE — 97110 THERAPEUTIC EXERCISES: CPT | Performed by: PHYSICAL THERAPIST

## 2024-01-23 PROCEDURE — 97112 NEUROMUSCULAR REEDUCATION: CPT | Performed by: PHYSICAL THERAPIST

## 2024-01-23 NOTE — PROGRESS NOTES
Physical Therapy Daily Treatment Note      Visit #: 5    Angelika Vick reports 2/10 pain today at rest.  Pt reports that she was feeling much better although she went to squat down to look in a lower cabinet and caused a lot of pain in the R knee. Pt reports that her pain has been in the medial and lateral R knee. Pt reports that she was doing pretty well prior to this incident.         Objective Pt present to PT today with no distress at rest.     Pt with no pain with stretching of the R knee or PROM with OP.     Pt with mild tenderness in the pes anserinus of the R knee.     PT performed some STM on ITB and adductors/hamstrings of the R knee today.       See Exercise, Manual, and Modality Logs for complete treatment.     Assessment/Plan  Pt continues to be pretty comfortable at rest and is moving better although had a flare up with squatting. Pt to follow up later this week and will progress to activities to strengthening WB and squatting without pain next session.       Progress per Plan of Care      Visit Diagnosis:    ICD-10-CM ICD-9-CM   1. Acute pain of right knee  M25.561 719.46              Timed Treatment:  Manual Therapy:    12     mins  40181;  Therapeutic Exercise:    30     mins  24463;     Neuromuscular Angelika:    16    mins  57767;    Therapeutic Activity:          mins  21916;     Gait Training:           mins  56667;     Ultrasound:          mins  74540;    Electrical Stimulation:         mins  05101 ( );  Dry Needling          mins self-pay  Iontophoresis          mins 72926    Untimed Treatments:  Electrical Stimulation:         mins  31069 ( );  Dry Needling:                     mins  Ultrasound:                         mins  36382;        Timed Treatment:   58   mins   Total Treatment:     58   mins    Hayder Arzola, PT  Physical Therapist

## 2024-01-25 ENCOUNTER — TREATMENT (OUTPATIENT)
Dept: PHYSICAL THERAPY | Facility: CLINIC | Age: 39
End: 2024-01-25
Payer: COMMERCIAL

## 2024-01-25 DIAGNOSIS — M25.561 ACUTE PAIN OF RIGHT KNEE: Primary | ICD-10-CM

## 2024-01-25 PROCEDURE — 97110 THERAPEUTIC EXERCISES: CPT | Performed by: PHYSICAL THERAPIST

## 2024-01-25 PROCEDURE — 97140 MANUAL THERAPY 1/> REGIONS: CPT | Performed by: PHYSICAL THERAPIST

## 2024-01-25 PROCEDURE — 97112 NEUROMUSCULAR REEDUCATION: CPT | Performed by: PHYSICAL THERAPIST

## 2024-01-25 NOTE — PROGRESS NOTES
Physical Therapy Daily Treatment Note      Visit #: 6    Angelika Vick reports 0/10 pain today at rest.  Pt reports that she was very sore after last session although feels good today. Pt reports that the knee did not have any pain following last session although just felt really worked out.         Objective Pt present to PT today with no distress at rest.     Pt with no increased pain in the R knee with activities today.     Pt with no tenderness through the knee with palpation today.       See Exercise, Manual, and Modality Logs for complete treatment.     Assessment/Plan  Pt wants to return to hiking and walking more and was educated on ways to progressively return without putting the knee at risk. Pt to continue with PT to help improve R knee strength, stability, and pain free function.       Progress per Plan of Care      Visit Diagnosis:    ICD-10-CM ICD-9-CM   1. Acute pain of right knee  M25.561 719.46              Timed Treatment:  Manual Therapy:    10     mins  75762;  Therapeutic Exercise:    27     mins  07560;     Neuromuscular Angelika:    17    mins  56716;    Therapeutic Activity:          mins  62261;     Gait Training:           mins  93317;     Ultrasound:          mins  16736;    Electrical Stimulation:         mins  31574 ( );  Dry Needling          mins self-pay  Iontophoresis          mins 30288    Untimed Treatments:  Electrical Stimulation:         mins  27730 (MC );  Dry Needling:                     mins  Ultrasound:                         mins  10922;        Timed Treatment:   54   mins   Total Treatment:     54   mins    Hayder Arzola, PT  Physical Therapist

## 2024-01-29 ENCOUNTER — TREATMENT (OUTPATIENT)
Dept: PHYSICAL THERAPY | Facility: CLINIC | Age: 39
End: 2024-01-29
Payer: COMMERCIAL

## 2024-01-29 DIAGNOSIS — M25.561 ACUTE PAIN OF RIGHT KNEE: Primary | ICD-10-CM

## 2024-01-29 PROCEDURE — 97110 THERAPEUTIC EXERCISES: CPT | Performed by: PHYSICAL THERAPIST

## 2024-01-29 PROCEDURE — 97112 NEUROMUSCULAR REEDUCATION: CPT | Performed by: PHYSICAL THERAPIST

## 2024-01-29 PROCEDURE — 97140 MANUAL THERAPY 1/> REGIONS: CPT | Performed by: PHYSICAL THERAPIST

## 2024-01-29 NOTE — PROGRESS NOTES
Physical Therapy Daily Treatment Note      Visit #: 7    Angelika Vick reports 0/10 pain today at rest.  Pt reports that she walked a lot this weekend and her knee did pretty well although had a few instances of pain when turning on the R knee after her knee was very fatigued. Pt reports that the pain lasted for about 5 mins then go away. Pt reports that the pain was pretty bad and made her sit down.         Objective Pt present to PT today with no distress at rest.     Pt with no increased pain in the R knee with activities in the clinic today.       See Exercise, Manual, and Modality Logs for complete treatment.     Assessment/Plan  Pt continues to do well with activities in the clinic to help improve knee and hip stability. Pt to continue with PT to help improve function, pain free WB activities, and stability of the R knee.       Progress per Plan of Care      Visit Diagnosis:    ICD-10-CM ICD-9-CM   1. Acute pain of right knee  M25.561 719.46              Timed Treatment:  Manual Therapy:    11     mins  04443;  Therapeutic Exercise:    30     mins  48053;     Neuromuscular Angelika:    16    mins  80109;    Therapeutic Activity:          mins  49881;     Gait Training:           mins  17066;     Ultrasound:          mins  10225;    Electrical Stimulation:         mins  03468 ( );  Dry Needling          mins self-pay  Iontophoresis          mins 93985    Untimed Treatments:  Electrical Stimulation:         mins  82629 ( );  Dry Needling:                     mins  Ultrasound:                         mins  03148;        Timed Treatment:   57   mins   Total Treatment:     67   mins    Hayder Arzola, PT  Physical Therapist

## 2024-02-08 ENCOUNTER — TREATMENT (OUTPATIENT)
Dept: PHYSICAL THERAPY | Facility: CLINIC | Age: 39
End: 2024-02-08
Payer: COMMERCIAL

## 2024-02-08 DIAGNOSIS — M25.561 ACUTE PAIN OF RIGHT KNEE: Primary | ICD-10-CM

## 2024-02-08 PROCEDURE — 97110 THERAPEUTIC EXERCISES: CPT | Performed by: PHYSICAL THERAPIST

## 2024-02-08 PROCEDURE — 97530 THERAPEUTIC ACTIVITIES: CPT | Performed by: PHYSICAL THERAPIST

## 2024-02-08 PROCEDURE — 97112 NEUROMUSCULAR REEDUCATION: CPT | Performed by: PHYSICAL THERAPIST

## 2024-02-08 NOTE — PROGRESS NOTES
Physical Therapy Daily Treatment Note      Visit #: 8    Angelika Vick reports 0/10 pain today at rest.  Pt reports that she was doing a little aerial work and had her R knee pop again hurting at about 4/10. Pt reports that she was not doing anything strenuous on the knee. Pt reports that things have not felt as good since this happened last night.         Objective Pt present to PT today with no distress at rest.     Pt with no ligamentous laxity noted with testing.     PT performed McConnel taping for PF stability and had no pain with activities afterwards.     Pt to try tape for the next couple days and will be shown technique if beneficial.       See Exercise, Manual, and Modality Logs for complete treatment.     Assessment/Plan  Pt continues to have PF pain in the R knee with flare up yesterday during some aerial work. Pt to continue with activities to stabilize the hip and knee to prevent pain and restore normal function.       Progress per Plan of Care      Visit Diagnosis:    ICD-10-CM ICD-9-CM   1. Acute pain of right knee  M25.561 719.46              Timed Treatment:  Manual Therapy:         mins  55943;  Therapeutic Exercise:    30     mins  98668;     Neuromuscular Angelika:    13    mins  72322;    Therapeutic Activity:     10     mins  84730;     Gait Training:           mins  71405;     Ultrasound:          mins  97611;    Electrical Stimulation:         mins  16916 ( );  Dry Needling          mins self-pay  Iontophoresis          mins 83173    Untimed Treatments:  Electrical Stimulation:         mins  68724 ( );  Dry Needling:                     mins  Ultrasound:                         mins  45999;        Timed Treatment:   53   mins   Total Treatment:     53   mins    Hayder Arzola, PT  Physical Therapist

## 2024-02-13 ENCOUNTER — TREATMENT (OUTPATIENT)
Dept: PHYSICAL THERAPY | Facility: CLINIC | Age: 39
End: 2024-02-13
Payer: COMMERCIAL

## 2024-02-13 DIAGNOSIS — M25.561 ACUTE PAIN OF RIGHT KNEE: Primary | ICD-10-CM

## 2024-02-13 PROCEDURE — 97530 THERAPEUTIC ACTIVITIES: CPT | Performed by: PHYSICAL THERAPIST

## 2024-02-13 PROCEDURE — 97110 THERAPEUTIC EXERCISES: CPT | Performed by: PHYSICAL THERAPIST

## 2024-02-13 PROCEDURE — 97112 NEUROMUSCULAR REEDUCATION: CPT | Performed by: PHYSICAL THERAPIST

## 2024-02-20 ENCOUNTER — TREATMENT (OUTPATIENT)
Dept: PHYSICAL THERAPY | Facility: CLINIC | Age: 39
End: 2024-02-20
Payer: COMMERCIAL

## 2024-02-20 DIAGNOSIS — M25.561 ACUTE PAIN OF RIGHT KNEE: Primary | ICD-10-CM

## 2024-02-20 PROCEDURE — 97110 THERAPEUTIC EXERCISES: CPT | Performed by: PHYSICAL THERAPIST

## 2024-02-20 PROCEDURE — 97112 NEUROMUSCULAR REEDUCATION: CPT | Performed by: PHYSICAL THERAPIST

## 2024-02-27 ENCOUNTER — TREATMENT (OUTPATIENT)
Dept: PHYSICAL THERAPY | Facility: CLINIC | Age: 39
End: 2024-02-27
Payer: COMMERCIAL

## 2024-02-27 DIAGNOSIS — M25.561 ACUTE PAIN OF RIGHT KNEE: Primary | ICD-10-CM

## 2024-02-27 PROCEDURE — 97110 THERAPEUTIC EXERCISES: CPT | Performed by: PHYSICAL THERAPIST

## 2024-02-27 PROCEDURE — 97140 MANUAL THERAPY 1/> REGIONS: CPT | Performed by: PHYSICAL THERAPIST

## 2024-02-27 PROCEDURE — 97112 NEUROMUSCULAR REEDUCATION: CPT | Performed by: PHYSICAL THERAPIST

## 2024-02-27 NOTE — PROGRESS NOTES
Physical Therapy Daily Treatment Note      Visit #: 11    Angelika Vick reports 0/10 pain today at rest.  Pt reports that she feels pretty good and is pleased with how the R knee has been feeling. 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 Pt reports that she has not gotten her tape yet. Pt reports that her knee feels stronger.         Objective Pt present to PT today with no distress at rest.     Pt with no increased pain in the R knee with activities in the clinic.     Pt provided with activities for home and instruction.       See Exercise, Manual, and Modality Logs for complete treatment.     Assessment/Plan  Pt continues to have some weakness and instability in the R knee that seems to be improving. Pt to continue with PT for 2 weeks and then be discharged if going well.       Progress per Plan of Care      Visit Diagnosis:    ICD-10-CM ICD-9-CM   1. Acute pain of right knee  M25.561 719.46              Timed Treatment:  Manual Therapy:    13     mins  72223;  Therapeutic Exercise:    20     mins  98296;     Neuromuscular Angelika:    13    mins  15088;    Therapeutic Activity:          mins  94183;     Gait Training:           mins  08499;     Ultrasound:          mins  92674;    Electrical Stimulation:         mins  75619 ( );  Dry Needling          mins self-pay  Iontophoresis          mins 42401    Untimed Treatments:  Electrical Stimulation:         mins  22622 ( );  Dry Needling:                     mins  Ultrasound:                         mins  08213;        Timed Treatment:   46   mins   Total Treatment:     57   mins    Hayder Arzola, PT  Physical Therapist

## 2024-03-05 DIAGNOSIS — E06.3 HYPOTHYROIDISM DUE TO HASHIMOTO'S THYROIDITIS: ICD-10-CM

## 2024-03-05 DIAGNOSIS — E03.8 HYPOTHYROIDISM DUE TO HASHIMOTO'S THYROIDITIS: ICD-10-CM

## 2024-03-05 NOTE — TELEPHONE ENCOUNTER
Caller: Angelika Vick    Relationship: Self    Best call back number: [phone]     Requested Prescriptions:   Requested Prescriptions     Pending Prescriptions Disp Refills    levothyroxine (SYNTHROID, LEVOTHROID) 125 MCG tablet 30 tablet 5     Sig: Take 1 tablet by mouth Daily.        Pharmacy where request should be sent: [facility]ACY #[id number] - [place], [place] - [address] [place] [address] [phone] [place] [phone]      Last office visit with prescribing clinician: 1/3/2024   Last telemedicine visit with prescribing clinician: Visit date not found   Next office visit with prescribing clinician: 7/11/2024     Additional details provided by patient: PATIENT WENT OUT OF TOWN AND ACCIDENTALLY LEFT MEDICATION BEHIND.      Does the patient have less than a 3 day supply:  [x] Yes  [] No    Would you like a call back once the refill request has been completed: [] Yes [x] No    If the office needs to give you a call back, can they leave a voicemail: [] Yes [x] No    Jaleesa Zavala   03/05/24 11:46 EST

## 2024-03-07 RX ORDER — LEVOTHYROXINE SODIUM 0.12 MG/1
125 TABLET ORAL DAILY
Qty: 30 TABLET | Refills: 5 | Status: SHIPPED | OUTPATIENT
Start: 2024-03-07

## 2024-03-11 ENCOUNTER — TREATMENT (OUTPATIENT)
Dept: PHYSICAL THERAPY | Facility: CLINIC | Age: 39
End: 2024-03-11
Payer: COMMERCIAL

## 2024-03-11 DIAGNOSIS — M25.561 ACUTE PAIN OF RIGHT KNEE: Primary | ICD-10-CM

## 2024-03-11 PROCEDURE — 97110 THERAPEUTIC EXERCISES: CPT | Performed by: PHYSICAL THERAPIST

## 2024-03-11 PROCEDURE — 97140 MANUAL THERAPY 1/> REGIONS: CPT | Performed by: PHYSICAL THERAPIST

## 2024-03-11 PROCEDURE — 97112 NEUROMUSCULAR REEDUCATION: CPT | Performed by: PHYSICAL THERAPIST

## 2024-03-11 NOTE — PROGRESS NOTES
Physical Therapy Daily Treatment Note      Visit #: 12    Angelika Vick reports 0/10 pain today at rest.  Pt reports that her R knee has felt a little off and weak compared to the L knee. Pt reports that she has gotten her own tape which has been doing well although has left a little bruising on the R medial knee. Pt reports that her knee has felt good climbing some silks.         Objective Pt present to PT today with no distress at rest.     Pt did well with motor control and hip and knee stabilization activities today without pain in the R knee.     Pt with no increased pain in the R knee with full PROM with OP from PT.       See Exercise, Manual, and Modality Logs for complete treatment.     Assessment/Plan  Pt continues to have weakness in the R knee although reports feeling about 75% of full function and is improving each week. Pt has been attending classes that she would have never been able to do a month ago. Pt to continue with weekly PT to help monitor knee and progress activities as tolerated.       Progress per Plan of Care      Visit Diagnosis:    ICD-10-CM ICD-9-CM   1. Acute pain of right knee  M25.561 719.46              Timed Treatment:  Manual Therapy:    12     mins  74413;  Therapeutic Exercise:    16     mins  35202;     Neuromuscular Angelika:    10    mins  86079;    Therapeutic Activity:          mins  07188;     Gait Training:           mins  43382;     Ultrasound:          mins  80581;    Electrical Stimulation:         mins  00581 ( );  Dry Needling          mins self-pay  Iontophoresis          mins 96041    Untimed Treatments:  Electrical Stimulation:         mins  83227 ( );  Dry Needling:                     mins  Ultrasound:                         mins  61532;        Timed Treatment:   38   mins   Total Treatment:     64   mins    Hayder Arzola, PT  Physical Therapist

## 2024-03-18 ENCOUNTER — TREATMENT (OUTPATIENT)
Dept: PHYSICAL THERAPY | Facility: CLINIC | Age: 39
End: 2024-03-18
Payer: COMMERCIAL

## 2024-03-18 DIAGNOSIS — M25.561 ACUTE PAIN OF RIGHT KNEE: Primary | ICD-10-CM

## 2024-03-18 PROCEDURE — 97112 NEUROMUSCULAR REEDUCATION: CPT | Performed by: PHYSICAL THERAPIST

## 2024-03-18 PROCEDURE — 97140 MANUAL THERAPY 1/> REGIONS: CPT | Performed by: PHYSICAL THERAPIST

## 2024-03-18 PROCEDURE — 97110 THERAPEUTIC EXERCISES: CPT | Performed by: PHYSICAL THERAPIST

## 2024-03-18 NOTE — PROGRESS NOTES
Physical Therapy Daily Treatment Note      Visit #: 13    Angelika Vick reports 0/10 pain today at rest.  Pt reports that her R knee has been doing well and has been doing more on her R knee. Pt reports that she has taken more classes and did more around the house last week with some soreness although did not get any worse. Pt reports that her knee feels like it is almost at 90% function but still feels a little unstable when squatting.         Objective Pt present to PT today with no distress at rest.     Pt with no increased pain in the R knee with activities today.       See Exercise, Manual, and Modality Logs for complete treatment.     Assessment/Plan  Pt continues to have some weakness and feelings of instability in the R knee although is doing much better with daily and recreational activities. Pt to continue with PT to help improve R knee stability, strength, and functional mobility.       Progress per Plan of Care      Visit Diagnosis:    ICD-10-CM ICD-9-CM   1. Acute pain of right knee  M25.561 719.46              Timed Treatment:  Manual Therapy:    12     mins  69359;  Therapeutic Exercise:    16     mins  92794;     Neuromuscular Angelika:    10    mins  05954;    Therapeutic Activity:          mins  88551;     Gait Training:           mins  82473;     Ultrasound:          mins  58407;    Electrical Stimulation:         mins  13131 ( );  Dry Needling          mins self-pay  Iontophoresis          mins 12364    Untimed Treatments:  Electrical Stimulation:         mins  10055 ( );  Dry Needling:                     mins  Ultrasound:                         mins  30758;        Timed Treatment:   38   mins   Total Treatment:     62   mins    Hayder Arzola, PT  Physical Therapist

## 2024-03-25 ENCOUNTER — TREATMENT (OUTPATIENT)
Dept: PHYSICAL THERAPY | Facility: CLINIC | Age: 39
End: 2024-03-25
Payer: COMMERCIAL

## 2024-03-25 DIAGNOSIS — M25.561 ACUTE PAIN OF RIGHT KNEE: Primary | ICD-10-CM

## 2024-03-25 PROCEDURE — 97110 THERAPEUTIC EXERCISES: CPT | Performed by: PHYSICAL THERAPIST

## 2024-03-25 PROCEDURE — 97112 NEUROMUSCULAR REEDUCATION: CPT | Performed by: PHYSICAL THERAPIST

## 2024-03-25 NOTE — PROGRESS NOTES
Physical Therapy Daily Treatment Note      Visit #: 14    Angeliak Vick reports 0/10 pain today at rest.  Pt reports that she went to class last week and irritated the knee again doing the same motion as her initial injury. Pt reports that she rested it but the knee is still sore. Pt reports that if she does the movement slowly, she is fine.         Objective Pt present to PT today with no distress at rest.     Pt with tenderness in the lateral joint line of the R knee and in the R distal ITB attachment.     Pt with discomfort with lateral glide of the R patella.       See Exercise, Manual, and Modality Logs for complete treatment.     Assessment/Plan  Pt continues to have instability in the PF joint and will continue with strengthening and stabilization activities in the clinic. PT suggested that patient contacts her PCP to get a referral to ortho just to make sure there is nothing more going on in the knee. Pt to follow up next week to continue activities as tolerated.       Progress per Plan of Care      Visit Diagnosis:    ICD-10-CM ICD-9-CM   1. Acute pain of right knee  M25.561 719.46              Timed Treatment:  Manual Therapy:          mins  45696;  Therapeutic Exercise:    30     mins  46684;     Neuromuscular Angelika:    26    mins  68576;    Therapeutic Activity:          mins  23627;     Gait Training:           mins  31298;     Ultrasound:          mins  21803;    Electrical Stimulation:         mins  01410 ( );  Dry Needling          mins self-pay  Iontophoresis          mins 50528    Untimed Treatments:  Electrical Stimulation:         mins  37184 ( );  Dry Needling:                     mins  Ultrasound:                         mins  20270;        Timed Treatment:   56   mins   Total Treatment:     66   mins    Hayder Arzola, PT  Physical Therapist

## 2024-03-26 ENCOUNTER — TELEPHONE (OUTPATIENT)
Age: 39
End: 2024-03-26
Payer: COMMERCIAL

## 2024-03-26 NOTE — TELEPHONE ENCOUNTER
Caller: Angelika Vick    Relationship: Self    Best call back number:     What orders are you requesting (i.e. lab or imaging): MRI PER PHYSICAL THERAPY.    In what timeframe would the patient need to come in: WHENEVER POSSIBLE    Where will you receive your lab/imaging services: [facility]    Additional notes: THE PATIENT CALLED AND STATED THE PHYSICAL THERAPY TOLD HER SHE NEEDED AN MRI.    PLEASE CALL PATIENT TO LET HER KNOW WHAT SHE NEEDS TO DO TO GET THIS DONE.

## 2024-03-28 ENCOUNTER — OFFICE VISIT (OUTPATIENT)
Age: 39
End: 2024-03-28
Payer: COMMERCIAL

## 2024-03-28 VITALS
HEART RATE: 75 BPM | BODY MASS INDEX: 33.73 KG/M2 | DIASTOLIC BLOOD PRESSURE: 86 MMHG | WEIGHT: 190.4 LBS | TEMPERATURE: 99.4 F | HEIGHT: 63 IN | OXYGEN SATURATION: 98 % | SYSTOLIC BLOOD PRESSURE: 130 MMHG

## 2024-03-28 DIAGNOSIS — F41.9 ANXIETY: Primary | ICD-10-CM

## 2024-03-28 DIAGNOSIS — M25.561 ACUTE PAIN OF RIGHT KNEE: ICD-10-CM

## 2024-03-28 DIAGNOSIS — F90.9 ATTENTION DEFICIT HYPERACTIVITY DISORDER (ADHD), UNSPECIFIED ADHD TYPE: ICD-10-CM

## 2024-03-28 PROCEDURE — 99214 OFFICE O/P EST MOD 30 MIN: CPT | Performed by: FAMILY MEDICINE

## 2024-03-28 RX ORDER — ARIPIPRAZOLE 2 MG/1
2 TABLET ORAL DAILY
Qty: 30 TABLET | Refills: 3 | Status: SHIPPED | OUTPATIENT
Start: 2024-03-28

## 2024-03-28 NOTE — PROGRESS NOTES
Follow Up Office Visit      Date: 2024   Patient Name: Angelika Vick  : 1985   MRN: [id number]     Chief Complaint:    Chief Complaint   Patient presents with    Knee Injury     Follow up  Requesting MRI       History of Present Illness: Angelika Vick is a 38 y.o. female who is here today for follow up for right knee injury.  Pain is more on the lateral and inferior aspect of her knee.  If she shifts her weight a cerytain way will get a sharp pain.    Pt  has been doing physical therapy for about 2 months.  Pain has gotten only slightly better.    Also states that she has been seeing a therapist for her anxiety.  States that she is typically anxious most days.  States that she has been on some SSRI medication in the past but developed Serotonin syndrome.      Subjective      Review of Systems:   Review of Systems   Constitutional:  Negative for appetite change and unexpected weight loss.   HENT:  Negative for trouble swallowing.    Eyes:  Negative for blurred vision and double vision.   Respiratory:  Negative for cough and shortness of breath.    Cardiovascular:  Negative for chest pain and leg swelling.   Gastrointestinal:  Negative for blood in stool.   Endocrine: Negative for cold intolerance, heat intolerance and polyuria.   Musculoskeletal:  Negative for joint swelling.   Skin:  Negative for color change and bruise.   Neurological:  Negative for numbness and memory problem.   Hematological:  Does not bruise/bleed easily.   Psychiatric/Behavioral:  Negative for suicidal ideas and depressed mood. The patient is not nervous/anxious.        I have reviewed the patients family history, social history, past medical history, past surgical history and have updated it as appropriate.     Medications:     Current Outpatient Medications:     ibuprofen (ADVIL,MOTRIN) 200 MG tablet, Take 1 tablet by mouth Every 6 (Six) Hours As Needed for Mild Pain., Disp: , Rfl:     levothyroxine (SYNTHROID,  "LEVOTHROID) 125 MCG tablet, Take 1 tablet by mouth Daily., Disp: 30 tablet, Rfl: 5    ARIPiprazole (Abilify) 2 MG tablet, Take 1 tablet by mouth Daily., Disp: 30 tablet, Rfl: 3    Allergies:   Allergies   Allergen Reactions    Dermagel Hand  [Alcohol] Rash       Objective     Physical Exam: Please see above  Vital Signs:   Vitals:    03/28/24 0832   BP: 130/86   Pulse: 75   Temp: 99.4 °F (37.4 °C)   SpO2: 98%   Weight: 86.4 kg (190 lb 6.4 oz)   Height: 160 cm (62.99\")     Body mass index is 33.74 kg/m².    Physical Exam  Vitals and nursing note reviewed.   Constitutional:       Appearance: Normal appearance.   HENT:      Head: Normocephalic and atraumatic.   Eyes:      General: Lids are normal.      Conjunctiva/sclera: Conjunctivae normal.   Cardiovascular:      Rate and Rhythm: Normal rate and regular rhythm.   Pulmonary:      Effort: Pulmonary effort is normal.      Breath sounds: Normal breath sounds and air entry.   Abdominal:      General: Abdomen is flat. Bowel sounds are normal.      Palpations: Abdomen is soft.   Musculoskeletal:      Cervical back: Full passive range of motion without pain and normal range of motion.   Neurological:      General: No focal deficit present.      Mental Status: She is alert and oriented to person, place, and time.   Psychiatric:         Attention and Perception: Attention normal.         Mood and Affect: Mood normal.         Behavior: Behavior normal. Behavior is cooperative.         Procedures    Results:   Labs:   Hemoglobin A1C   Date Value Ref Range Status   06/10/2022 5.00 4.80 - 5.60 % Final     TSH   Date Value Ref Range Status   10/17/2023 4.280 (H) 0.270 - 4.200 uIU/mL Final   06/24/2022 4.740 (H) 0.270 - 4.200 uIU/mL Final        POCT Results (if applicable):   Results for orders placed or performed during the hospital encounter of 02/15/24   Covid-19 + Flu A&B AG, Veritor    Specimen: Swab   Result Value Ref Range    SARS Antigen Detected (A) Not Detected, " Presumptive Negative    Influenza A Antigen COLETTE Not Detected Not Detected    Influenza B Antigen COLETTE Not Detected Not Detected    Internal Control Passed Passed    Lot Number [id number]     Expiration Date 02/05/2025    POC Rapid Strep A    Specimen: Swab   Result Value Ref Range    Rapid Strep A Screen Negative     Internal Control Passed     Lot Number [id number]     Expiration Date 13,025        Imaging:   No valid procedures specified.         Assessment / Plan      Assessment/Plan:   Diagnoses and all orders for this visit:    1. Anxiety (Primary)  -     ARIPiprazole (Abilify) 2 MG tablet; Take 1 tablet by mouth Daily.  Dispense: 30 tablet; Refill: 3    2. Attention deficit hyperactivity disorder (ADHD), unspecified ADHD type  -     Ambulatory Referral to Behavioral Health    3. Acute pain of right knee  -     MRI Knee Right Without Contrast; Future               Vaccine Counseling:      Follow Up:   Return in about 4 weeks (around 4/25/2024) for Recheck.      Daniel Metz, DO   [facility]

## 2024-04-01 ENCOUNTER — TREATMENT (OUTPATIENT)
Dept: PHYSICAL THERAPY | Facility: CLINIC | Age: 39
End: 2024-04-01
Payer: COMMERCIAL

## 2024-04-01 DIAGNOSIS — M25.561 ACUTE PAIN OF RIGHT KNEE: Primary | ICD-10-CM

## 2024-04-01 PROCEDURE — 97110 THERAPEUTIC EXERCISES: CPT | Performed by: PHYSICAL THERAPIST

## 2024-04-01 PROCEDURE — 97112 NEUROMUSCULAR REEDUCATION: CPT | Performed by: PHYSICAL THERAPIST

## 2024-04-01 NOTE — PROGRESS NOTES
Physical Therapy Daily Treatment Note      Visit #: 15    Angelika Vcik reports 0/10 pain today at rest.  Pt reports that she had a lot of pain in the R knee on Friday without known cause. Pt reports that she had a numb feeling through the front of the knee and could barely walk         Objective Pt present to PT today with no distress at rest.     Pt with minimal laxity in the R knee noted with anterior drawer although no large translation.     Pt with negative thessaly's and steph testing on the R knee.     Pt with minimal tenderness at the distal R ITB insertion.       See Exercise, Manual, and Modality Logs for complete treatment.     Assessment/Plan  Pt continues to have issues in the R knee that seems to stem from the PF joint and some instability in the knee. Pt to continue with PT to help improve R knee stability, pain free function, and activity tolerance.       Progress per Plan of Care      Visit Diagnosis:    ICD-10-CM ICD-9-CM   1. Acute pain of right knee  M25.561 719.46              Timed Treatment:  Manual Therapy:         mins  38524;  Therapeutic Exercise:    14     mins  28555;     Neuromuscular Angelika:    14    mins  60764;    Therapeutic Activity:          mins  37191;     Gait Training:           mins  23358;     Ultrasound:          mins  97486;    Electrical Stimulation:         mins  82763 ( );  Dry Needling          mins self-pay  Iontophoresis          mins 51924    Untimed Treatments:  Electrical Stimulation:         mins  37007 ( );  Dry Needling:                     mins  Ultrasound:                         mins  23817;        Timed Treatment:   28   mins   Total Treatment:     56   mins    Hayder Arzola, PT  Physical Therapist

## 2024-04-10 ENCOUNTER — HOSPITAL ENCOUNTER (OUTPATIENT)
Dept: MRI IMAGING | Facility: HOSPITAL | Age: 39
Discharge: HOME | End: 2024-04-10
Admitting: FAMILY MEDICINE
Payer: COMMERCIAL

## 2024-04-10 DIAGNOSIS — M25.561 ACUTE PAIN OF RIGHT KNEE: ICD-10-CM

## 2024-04-10 PROCEDURE — 73721 MRI JNT OF LWR EXTRE W/O DYE: CPT

## 2024-04-11 ENCOUNTER — TREATMENT (OUTPATIENT)
Dept: PHYSICAL THERAPY | Facility: CLINIC | Age: 39
End: 2024-04-11
Payer: COMMERCIAL

## 2024-04-11 DIAGNOSIS — M25.561 ACUTE PAIN OF RIGHT KNEE: Primary | ICD-10-CM

## 2024-04-11 DIAGNOSIS — M25.869 IMPINGEMENT SYNDROME INVOLVING PATELLAR FAT PAD: Primary | ICD-10-CM

## 2024-04-11 PROCEDURE — 97140 MANUAL THERAPY 1/> REGIONS: CPT | Performed by: PHYSICAL THERAPIST

## 2024-04-11 PROCEDURE — 97110 THERAPEUTIC EXERCISES: CPT | Performed by: PHYSICAL THERAPIST

## 2024-04-11 PROCEDURE — 97112 NEUROMUSCULAR REEDUCATION: CPT | Performed by: PHYSICAL THERAPIST

## 2024-04-11 NOTE — PROGRESS NOTES
Physical Therapy Daily Treatment Note      Visit #: 16    Angelika Vick reports 2-3/10 pain today at rest.  Pt reports that her MRI shows evidence of fat pad impingement in the R knee. Pt reports that she has been more aware of the R knee since she got the MRI report. Pt states that she wonders what needs to be done for the knee at this point.         Objective Pt present to PT today with no distress at rest.     Pt educated on importance of strength and good muscle control in the R knee.     Pt did well with taping today and no pain with activities performed in the clinic.     Patellar tendon taping performed on the R knee to see if this helps.       See Exercise, Manual, and Modality Logs for complete treatment.     Assessment/Plan  Pt continues to have pain in the R knee and will progress as tolerated and try difference taping techniques to help improve pain. Pt to follow up next week.       Progress per Plan of Care      Visit Diagnosis:    ICD-10-CM ICD-9-CM   1. Acute pain of right knee  M25.561 719.46              Timed Treatment:  Manual Therapy:    12     mins  41348;  Therapeutic Exercise:    27     mins  09391;     Neuromuscular Angelika:    15    mins  90568;    Therapeutic Activity:          mins  16176;     Gait Training:           mins  45404;     Ultrasound:          mins  74378;    Electrical Stimulation:         mins  66510 ( );  Dry Needling          mins self-pay  Iontophoresis          mins 63287    Untimed Treatments:  Electrical Stimulation:         mins  38233 ( );  Dry Needling:                     mins  Ultrasound:                         mins  19093;        Timed Treatment:   54   mins   Total Treatment:     54   mins    Hayder Arzola, PT  Physical Therapist

## 2024-04-15 ENCOUNTER — TELEPHONE (OUTPATIENT)
Dept: CARDIOLOGY | Facility: HOSPITAL | Age: 39
End: 2024-04-15

## 2024-04-15 NOTE — TELEPHONE ENCOUNTER
Caller: Angelika Vick    Relationship to patient: Self    Best call back number: [phone]    Chief complaint: PATIENT WOULD LIKE TO DISCUSS POTS WITH PROVIDER. STATES SHE IS CONCERNED WITH DIZZYNESS THAT COMES AND GOES AND LIGHTHEADEDNESS THAT OCCURS EVERYDAY WHEN SHE STANDS UP. REPORTS THIS HAS BEEN GOING ON FOR MANY MONTHS NOW. WAS LAST SEEN 9.8.22.     Type of visit: FU     Requested date: ASAP              
06-Jan-2021 16:31:52

## 2024-04-17 ENCOUNTER — OFFICE VISIT (OUTPATIENT)
Dept: ORTHOPEDIC SURGERY | Facility: CLINIC | Age: 39
End: 2024-04-17
Payer: COMMERCIAL

## 2024-04-17 VITALS — TEMPERATURE: 98 F | WEIGHT: 192 LBS | BODY MASS INDEX: 34.02 KG/M2 | HEIGHT: 63 IN

## 2024-04-17 DIAGNOSIS — M25.561 CHRONIC PAIN OF RIGHT KNEE: ICD-10-CM

## 2024-04-17 DIAGNOSIS — M22.41 CHONDROMALACIA OF RIGHT PATELLA: Primary | ICD-10-CM

## 2024-04-17 DIAGNOSIS — G89.29 CHRONIC PAIN OF RIGHT KNEE: ICD-10-CM

## 2024-04-17 DIAGNOSIS — M25.861 IMPINGEMENT SYNDROME INVOLVING PATELLAR FAT PAD OF RIGHT KNEE: ICD-10-CM

## 2024-04-17 RX ORDER — LIDOCAINE HYDROCHLORIDE 20 MG/ML
2 INJECTION, SOLUTION INFILTRATION; PERINEURAL
Status: COMPLETED | OUTPATIENT
Start: 2024-04-17 | End: 2024-04-17

## 2024-04-17 RX ORDER — MELOXICAM 15 MG/1
15 TABLET ORAL DAILY
Qty: 30 TABLET | Refills: 1 | Status: SHIPPED | OUTPATIENT
Start: 2024-04-17

## 2024-04-17 RX ORDER — METHYLPREDNISOLONE ACETATE 40 MG/ML
40 INJECTION, SUSPENSION INTRA-ARTICULAR; INTRALESIONAL; INTRAMUSCULAR; SOFT TISSUE
Status: COMPLETED | OUTPATIENT
Start: 2024-04-17 | End: 2024-04-17

## 2024-04-17 RX ADMIN — LIDOCAINE HYDROCHLORIDE 2 ML: 20 INJECTION, SOLUTION INFILTRATION; PERINEURAL at 15:17

## 2024-04-17 RX ADMIN — METHYLPREDNISOLONE ACETATE 40 MG: 40 INJECTION, SUSPENSION INTRA-ARTICULAR; INTRALESIONAL; INTRAMUSCULAR; SOFT TISSUE at 15:17

## 2024-04-17 NOTE — PROGRESS NOTES
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 States she is unsure how this injured her knee as she was not using the knee at all. She has been doing PT since January.)    Subjective     History of Present Illness:  Angelika Vick is a 38 y.o. female presenting today for evaluation of chronic right knee pain ongoing for approximately 3 months.  Today patient complains of pain behind and below the right patella.  Patient did have an incident in 2024 which is described above.  She is tried NSAIDs and physical therapy over the last 3 months without any significant relief.  Patient continues to have pain at all times in this area.  She notes that she has pain at rest and with activity though the pain tends to worsen with activity such as prolonged walking standing and prolonged sitting.  She notes that she is still in physical therapy.  She has not tried cortisone injections at this time.  MRI of the right knee did reveal mild patellar chondromalacia as well as mild edema in the quadriceps fat pad indicating fat pad impingement syndrome.  She denies any instability in the knee.    Review of Systems   Constitutional:  Negative for fever.   HENT:  Negative for dental problem and voice change.    Eyes:  Negative for visual disturbance.   Respiratory:  Negative for shortness of breath.    Cardiovascular:  Negative for chest pain.   Gastrointestinal:  Negative for abdominal pain.   Genitourinary:  Negative for dysuria.   Musculoskeletal:  Positive for arthralgias (right knee). Negative for gait problem and joint swelling.   Skin:  Negative for rash.   Neurological:  Negative for speech difficulty.   Hematological:  Does not bruise/bleed easily.    Psychiatric/Behavioral:  Negative for confusion.         Past Medical History:   Diagnosis Date    Acid reflux     Bilateral ovarian cysts     Depression     Gestational diabetes     with 2nd pregnancy    Gestational hypertension     with last pregnancy    Hashimoto's thyroiditis 2018    Hypertension in pregnancy 2012    Hypothyroidism 2018    Migraines     Mixed hyperlipidemia 2019    Polycystic ovary syndrome 2010        Past Surgical History:   Procedure Laterality Date    CYST REMOVAL      GANGLION CYST EXCISION Right        Family History   Problem Relation Age of Onset    Arthritis Mother     Hypertension Mother     No Known Problems Father     Thyroid disease Sister     Diabetes Sister     No Known Problems Brother     Diabetes Maternal Grandmother     Diabetes Maternal Grandfather     No Known Problems Paternal Grandmother     No Known Problems Paternal Grandfather     Diabetes Sister     Obesity Sister     Thyroid disease Sister        Social History     Socioeconomic History    Marital status:    Tobacco Use    Smoking status: Former     Current packs/day: 0.00     Average packs/day: 1 pack/day for 11.3 years (11.3 ttl pk-yrs)     Types: Cigarettes     Start date: 2005     Quit date: 10/6/2016     Years since quittin.5    Smokeless tobacco: Never   Vaping Use    Vaping status: Never Used   Substance and Sexual Activity    Alcohol use: Yes     Alcohol/week: 1.0 - 2.0 standard drink of alcohol     Types: 1 - 2 Drinks containing 0.5 oz of alcohol per week     Comment: Occasionally    Drug use: No    Sexual activity: Yes     Partners: Male     Birth control/protection: I.U.D.     Comment: pt expecting 3rd child.          Current Outpatient Medications:     ARIPiprazole (Abilify) 2 MG tablet, Take 1 tablet by mouth Daily., Disp: 30 tablet, Rfl: 3    ibuprofen (ADVIL,MOTRIN) 200 MG tablet, Take 1 tablet by mouth Every 6 (Six) Hours As Needed for Mild Pain., Disp: , Rfl:      "levothyroxine (SYNTHROID, LEVOTHROID) 125 MCG tablet, Take 1 tablet by mouth Daily., Disp: 30 tablet, Rfl: 5    meloxicam (MOBIC) 15 MG tablet, Take 1 tablet by mouth Daily., Disp: 30 tablet, Rfl: 1    Allergies   Allergen Reactions    Dermagel Hand  [Alcohol] Rash           Objective   Temp 98 °F (36.7 °C)   Ht 160 cm (62.99\")   Wt 87.1 kg (192 lb)   BMI 34.02 kg/m²            Physical Exam  Musculoskeletal:      Right knee: No effusion.      Instability Tests: Medial Ann test negative and lateral Ann test negative.       Right Knee Exam     Muscle Strength   The patient has normal right knee strength.    Tenderness   The patient is experiencing no tenderness.     Range of Motion   The patient has normal right knee ROM.    Tests   Ann:  Medial - negative Lateral - negative  Varus: negative Valgus: negative  Lachman:  Anterior - negative    Posterior - negative  Drawer:  Anterior - negative    Posterior - negative  Pivot shift: negative  Patellar apprehension: negative    Other   Erythema: absent  Sensation: normal  Pulse: present  Swelling: none  Effusion: no effusion present    Comments:  Mildly positive patellar grind test.  No patellar hypermobility.           Extremity DVT signs are negative by clinical screen.     Independent Review of Radiographic Studies:    Reviewed images and agree with radiologist interpretation.    Independent review of three-view knee exam plain films done on 1/3/2024 was done in office today for the evaluation of pain, no comparison views available.  There are no acute osseous abnormalities noted.  No significant degenerative changes noted.    Large Joint Arthrocentesis: R knee  Date/Time: 4/17/2024 3:17 PM  Consent given by: patient  Site marked: site marked  Timeout: Immediately prior to procedure a time out was called to verify the correct patient, procedure, equipment, support staff and site/side marked as required   Supporting Documentation  Indications: " pain   Procedure Details  Location: knee - R knee  Preparation: Patient was prepped and draped in the usual sterile fashion  Needle size: 22 G  Medications administered: 40 mg methylPREDNISolone acetate 40 MG/ML; 2 mL lidocaine 2%  Patient tolerance: patient tolerated the procedure well with no immediate complications        Assessment and Plan   Diagnoses and all orders for this visit:    1. Chondromalacia of right patella (Primary)    2. Chronic pain of right knee  -     meloxicam (MOBIC) 15 MG tablet; Take 1 tablet by mouth Daily.  Dispense: 30 tablet; Refill: 1    3. Impingement syndrome involving patellar fat pad of right knee  -     meloxicam (MOBIC) 15 MG tablet; Take 1 tablet by mouth Daily.  Dispense: 30 tablet; Refill: 1    Other orders  -     Large Joint Arthrocentesis: R knee       Discussion of orthopedic goals  Orthopedic activities reviewed and patient expressed appreciation  Regular exercise as tolerated  Risk, benefits, and merits of treatment alternatives reviewed with the patient and questions answered  Patient guided on mobility and conditioning exercises  Ice, heat, and/or modalities as beneficial  Call or notify for any adverse effect from injection therapy  Physical therapy ongoing  Take prescribed medications as instructed only as tolerated  Counseling on diet, nutrition, fitness exercise, and weight reduction goals    Recommendations/Plan:  Exercise, medications, injections, other patient advice, and return appointment as noted.  Patient is encouraged to call or return for any issues or concerns.    Patient is given a cortisone injection into the right knee for symptomatic relief.  Also discussed low impact exercise and continuing physical therapy.  Advised ice and heat to the area as well as over-the-counter muscle creams such as Biofreeze and capsaicin cream.  She was also prescribed meloxicam for symptomatic relief.  Advised patient to follow-up with me in 6 weeks if her symptoms fail to  improve.  If so consider gel injections.    Return in about 6 weeks (around 5/29/2024), or if symptoms worsen or fail to improve.  Patient agreeable to call or return sooner for any concerns.

## 2024-04-19 ENCOUNTER — TREATMENT (OUTPATIENT)
Dept: PHYSICAL THERAPY | Facility: CLINIC | Age: 39
End: 2024-04-19
Payer: COMMERCIAL

## 2024-04-19 DIAGNOSIS — M25.561 ACUTE PAIN OF RIGHT KNEE: Primary | ICD-10-CM

## 2024-04-19 PROCEDURE — 97140 MANUAL THERAPY 1/> REGIONS: CPT | Performed by: PHYSICAL THERAPIST

## 2024-04-19 PROCEDURE — 97112 NEUROMUSCULAR REEDUCATION: CPT | Performed by: PHYSICAL THERAPIST

## 2024-04-19 PROCEDURE — 97110 THERAPEUTIC EXERCISES: CPT | Performed by: PHYSICAL THERAPIST

## 2024-04-19 NOTE — PROGRESS NOTES
Physical Therapy Daily Treatment Note      Visit #: 17    Angelika Vick reports 0/10 pain today at rest.  Pt reports that she got a steroid shot 2 days ago and woke up feeling really good this morning. Pt reports that she was pretty discouraged before getting the shot which has really helped. Pt reports that she wants to hold off on taping to see how things respond to exercise today.         Objective Pt present to PT today with no distress at rest.     Pt with no increased pain in the R knee although did have some notable lack of quad control with ecc lowering.       See Exercise, Manual, and Modality Logs for complete treatment.     Assessment/Plan  Pt continues to do well with exercises and is feeling better since having a steroid injection. Pt to continue as tolerated next week.       Progress per Plan of Care      Visit Diagnosis:    ICD-10-CM ICD-9-CM   1. Acute pain of right knee  M25.561 719.46              Timed Treatment:  Manual Therapy:    9     mins  93560;  Therapeutic Exercise:    27     mins  26496;     Neuromuscular Angelika:    17    mins  73371;    Therapeutic Activity:          mins  39071;     Gait Training:           mins  70845;     Ultrasound:          mins  85346;    Electrical Stimulation:         mins  74331 ( );  Dry Needling          mins self-pay  Iontophoresis          mins 74902    Untimed Treatments:  Electrical Stimulation:         mins  51585 ( );  Dry Needling:                     mins  Ultrasound:                         mins  95965;        Timed Treatment:   53   mins   Total Treatment:     63   mins    Hayder Arzola, PT  Physical Therapist

## 2024-04-22 ENCOUNTER — HOSPITAL ENCOUNTER (OUTPATIENT)
Dept: CARDIOLOGY | Facility: HOSPITAL | Age: 39
Discharge: HOME | End: 2024-04-22
Payer: COMMERCIAL

## 2024-04-22 ENCOUNTER — OFFICE VISIT (OUTPATIENT)
Dept: CARDIOLOGY | Facility: HOSPITAL | Age: 39
End: 2024-04-22
Payer: COMMERCIAL

## 2024-04-22 VITALS
TEMPERATURE: 98.3 F | HEART RATE: 93 BPM | WEIGHT: 187.31 LBS | HEIGHT: 63 IN | OXYGEN SATURATION: 100 % | SYSTOLIC BLOOD PRESSURE: 128 MMHG | RESPIRATION RATE: 20 BRPM | DIASTOLIC BLOOD PRESSURE: 75 MMHG | BODY MASS INDEX: 33.19 KG/M2

## 2024-04-22 DIAGNOSIS — R42 DIZZINESS: ICD-10-CM

## 2024-04-22 DIAGNOSIS — I49.3 PVC'S (PREMATURE VENTRICULAR CONTRACTIONS): Primary | ICD-10-CM

## 2024-04-22 DIAGNOSIS — I49.3 PVC'S (PREMATURE VENTRICULAR CONTRACTIONS): ICD-10-CM

## 2024-04-22 PROCEDURE — 99214 OFFICE O/P EST MOD 30 MIN: CPT | Performed by: NURSE PRACTITIONER

## 2024-04-22 NOTE — PROGRESS NOTES
"Chief Complaint  Follow-up and Dizziness    Subjective    History of Present Illness {CC  Problem List  Visit  Diagnosis   Encounters  Notes  Medications  Labs  Result Review Imaging  Media :23}       History of Present Illness   38 year old female presents to the office today for ongoing evaluation of her dizziness. Notes that she has been experiencing dizziness intermittently for the past few weeks. Notes that it worsens after exercise. Has a hx of pvcs and notes that she has been experiencing those intermittently.  Blood pressure well-controlled 120s to 130s systolic.  Heart rate usually 70s to 80s however most recently has increased to the 90s.  History of mixed hyperlipidemia, hypothyroidism due to Hashimoto's thyroiditis, GERD, depression.  Objective     Vital Signs:   Vitals:    04/22/24 1458 04/22/24 1500   BP: 130/84 128/75   BP Location: Left arm Left arm   Patient Position: Standing Sitting   Cuff Size: Adult Adult   Pulse: 109 93   Resp:  20   Temp:  98.3 °F (36.8 °C)   TempSrc:  Temporal   SpO2: 99% 100%   Weight:  85 kg (187 lb 5 oz)   Height:  160 cm (62.99\")     Body mass index is 33.19 kg/m².  Physical Exam  Vitals and nursing note reviewed.   Constitutional:       Appearance: Normal appearance.   HENT:      Head: Normocephalic.   Eyes:      Pupils: Pupils are equal, round, and reactive to light.   Cardiovascular:      Rate and Rhythm: Normal rate and regular rhythm.      Pulses: Normal pulses.      Heart sounds: Normal heart sounds. No murmur heard.  Pulmonary:      Effort: Pulmonary effort is normal.      Breath sounds: Normal breath sounds.   Abdominal:      General: Bowel sounds are normal.      Palpations: Abdomen is soft.   Musculoskeletal:         General: Normal range of motion.      Cervical back: Normal range of motion.      Right lower leg: No edema.      Left lower leg: No edema.   Skin:     General: Skin is warm and dry.      Capillary Refill: Capillary refill takes less than " 2 seconds.   Neurological:      Mental Status: She is alert and oriented to person, place, and time.   Psychiatric:         Mood and Affect: Mood normal.         Thought Content: Thought content normal.            Result Review  Data Reviewed:{ Labs  Result Review  Imaging  Med Tab  Media :23}   Cardiac Event Monitor (06/22/2022 13:04)  Adult Transthoracic Echo Complete W/ Cont if Necessary Per Protocol (06/22/2022 13:42)  CBC & Differential (09/27/2023 09:00)  Comprehensive Metabolic Panel (09/27/2023 09:00)  T4, Free (10/17/2023 09:55)  TSH (10/17/2023 09:55)  T3 (10/17/2023 09:55)           Assessment and Plan {CC Problem List  Visit Diagnosis  ROS  Review (Popup)  Health Maintenance  Quality  BestPractice  Medications  SmartSets  SnapShot Encounters  Media :23}   1. PVC's (premature ventricular contractions)    - Mobile Cardiac Outpatient Telemetry; Future    2. Dizziness  Encouraged good hydration  Monitor bp closely   - Mobile Cardiac Outpatient Telemetry; Future          Follow Up {Instructions Charge Capture  Follow-up Communications :23}   Return in about 31 days (around 5/23/2024) for Monitor results, Telemedicine visit.    Patient was given instructions and counseling regarding her condition or for health maintenance advice. Please see specific information pulled into the AVS if appropriate.  Patient was instructed to call the [facility] with any questions, concerns, or worsening symptoms.

## 2024-04-22 NOTE — PROGRESS NOTES
[facility] Heart Monitor Documentation    Angelika Valencia Nicanor  1985  [id number]  04/22/24      [] ZIO XT Patch  Model [id number] Prescribed for N/A Days    Serial Number: (N + 9 Digits) N   Apply-By Date on Box:   USPS Tracking Number:   USPS Tracking        [] [employer] MINI PLUS Mobile Cardiac Telemetry  Model BGMINIPLUS Prescribed for 14 Days    Serial Number: (BGM + 7 Digits) [id number]  Shipped-By Date on Box: 808812  UPS Tracking Number: [id number]  UPS Tracking      [] [employer] MINI Holter Monitor  Model BGMINIEL Prescribed for N/A Days    Serial Number: (7 Digits)   Shipped-By Date on Box:   UPS Tracking Number: [id number]  UPS Tracking        This monitor was applied to the patient's chest and checked for proper functioning.  Ms. Angelika Vick was instructed in the proper use of this monitor.  She was given the opportunity to ask questions and left the office with the device 's instruction manual.    Consuelo Marvin MA, 15:43 EDT, 04/22/24                  [facility]

## 2024-04-23 ENCOUNTER — TELEPHONE (OUTPATIENT)
Dept: PHYSICAL THERAPY | Facility: CLINIC | Age: 39
End: 2024-04-23

## 2024-04-28 ENCOUNTER — HOSPITAL ENCOUNTER (EMERGENCY)
Facility: HOSPITAL | Age: 39
Discharge: HOME | End: 2024-04-28
Attending: STUDENT IN AN ORGANIZED HEALTH CARE EDUCATION/TRAINING PROGRAM | Admitting: STUDENT IN AN ORGANIZED HEALTH CARE EDUCATION/TRAINING PROGRAM
Payer: COMMERCIAL

## 2024-04-28 ENCOUNTER — APPOINTMENT (OUTPATIENT)
Dept: GENERAL RADIOLOGY | Facility: HOSPITAL | Age: 39
End: 2024-04-28
Payer: COMMERCIAL

## 2024-04-28 VITALS
HEIGHT: 63 IN | HEART RATE: 56 BPM | RESPIRATION RATE: 20 BRPM | OXYGEN SATURATION: 99 % | BODY MASS INDEX: 32.96 KG/M2 | SYSTOLIC BLOOD PRESSURE: 109 MMHG | WEIGHT: 186 LBS | TEMPERATURE: 98.4 F | DIASTOLIC BLOOD PRESSURE: 50 MMHG

## 2024-04-28 DIAGNOSIS — R55 SYNCOPE, UNSPECIFIED SYNCOPE TYPE: Primary | ICD-10-CM

## 2024-04-28 LAB
ALBUMIN SERPL-MCNC: 3.9 G/DL (ref 3.5–5.2)
ALBUMIN/GLOB SERPL: 1.3 G/DL
ALP SERPL-CCNC: 58 U/L (ref 39–117)
ALT SERPL W P-5'-P-CCNC: 10 U/L (ref 1–33)
ANION GAP SERPL CALCULATED.3IONS-SCNC: 10.6 MMOL/L (ref 5–15)
AST SERPL-CCNC: 12 U/L (ref 1–32)
BASOPHILS # BLD AUTO: 0.09 10*3/MM3 (ref 0–0.2)
BASOPHILS NFR BLD AUTO: 0.7 % (ref 0–1.5)
BILIRUB SERPL-MCNC: 0.2 MG/DL (ref 0–1.2)
BUN SERPL-MCNC: 16 MG/DL (ref 6–20)
BUN/CREAT SERPL: 19.3 (ref 7–25)
CALCIUM SPEC-SCNC: 8.8 MG/DL (ref 8.6–10.5)
CHLORIDE SERPL-SCNC: 106 MMOL/L (ref 98–107)
CO2 SERPL-SCNC: 22.4 MMOL/L (ref 22–29)
CREAT SERPL-MCNC: 0.83 MG/DL (ref 0.57–1)
DEPRECATED RDW RBC AUTO: 41 FL (ref 37–54)
EGFRCR SERPLBLD CKD-EPI 2021: 93.3 ML/MIN/1.73
EOSINOPHIL # BLD AUTO: 0.12 10*3/MM3 (ref 0–0.4)
EOSINOPHIL NFR BLD AUTO: 1 % (ref 0.3–6.2)
ERYTHROCYTE [DISTWIDTH] IN BLOOD BY AUTOMATED COUNT: 12.6 % (ref 12.3–15.4)
GLOBULIN UR ELPH-MCNC: 2.9 GM/DL
GLUCOSE SERPL-MCNC: 122 MG/DL (ref 65–99)
HCG SERPL QL: NEGATIVE
HCT VFR BLD AUTO: 41.6 % (ref 34–46.6)
HGB BLD-MCNC: 13.7 G/DL (ref 12–15.9)
HOLD SPECIMEN: NORMAL
IMM GRANULOCYTES # BLD AUTO: 0.06 10*3/MM3 (ref 0–0.05)
IMM GRANULOCYTES NFR BLD AUTO: 0.5 % (ref 0–0.5)
LYMPHOCYTES # BLD AUTO: 2 10*3/MM3 (ref 0.7–3.1)
LYMPHOCYTES NFR BLD AUTO: 16.2 % (ref 19.6–45.3)
MAGNESIUM SERPL-MCNC: 2.2 MG/DL (ref 1.6–2.6)
MCH RBC QN AUTO: 28.8 PG (ref 26.6–33)
MCHC RBC AUTO-ENTMCNC: 32.9 G/DL (ref 31.5–35.7)
MCV RBC AUTO: 87.6 FL (ref 79–97)
MONOCYTES # BLD AUTO: 0.84 10*3/MM3 (ref 0.1–0.9)
MONOCYTES NFR BLD AUTO: 6.8 % (ref 5–12)
NEUTROPHILS NFR BLD AUTO: 74.8 % (ref 42.7–76)
NEUTROPHILS NFR BLD AUTO: 9.23 10*3/MM3 (ref 1.7–7)
NRBC BLD AUTO-RTO: 0 /100 WBC (ref 0–0.2)
PLATELET # BLD AUTO: 311 10*3/MM3 (ref 140–450)
PMV BLD AUTO: 9.3 FL (ref 6–12)
POTASSIUM SERPL-SCNC: 4.1 MMOL/L (ref 3.5–5.2)
PROT SERPL-MCNC: 6.8 G/DL (ref 6–8.5)
RBC # BLD AUTO: 4.75 10*6/MM3 (ref 3.77–5.28)
SODIUM SERPL-SCNC: 139 MMOL/L (ref 136–145)
TROPONIN T SERPL HS-MCNC: <6 NG/L
WBC NRBC COR # BLD AUTO: 12.34 10*3/MM3 (ref 3.4–10.8)
WHOLE BLOOD HOLD COAG: NORMAL
WHOLE BLOOD HOLD SPECIMEN: NORMAL

## 2024-04-28 PROCEDURE — 83735 ASSAY OF MAGNESIUM: CPT

## 2024-04-28 PROCEDURE — 25810000003 SODIUM CHLORIDE 0.9 % SOLUTION: Performed by: STUDENT IN AN ORGANIZED HEALTH CARE EDUCATION/TRAINING PROGRAM

## 2024-04-28 PROCEDURE — 80053 COMPREHEN METABOLIC PANEL: CPT

## 2024-04-28 PROCEDURE — 84703 CHORIONIC GONADOTROPIN ASSAY: CPT | Performed by: STUDENT IN AN ORGANIZED HEALTH CARE EDUCATION/TRAINING PROGRAM

## 2024-04-28 PROCEDURE — 93005 ELECTROCARDIOGRAM TRACING: CPT

## 2024-04-28 PROCEDURE — 84484 ASSAY OF TROPONIN QUANT: CPT

## 2024-04-28 PROCEDURE — 96374 THER/PROPH/DIAG INJ IV PUSH: CPT

## 2024-04-28 PROCEDURE — 96361 HYDRATE IV INFUSION ADD-ON: CPT

## 2024-04-28 PROCEDURE — 85025 COMPLETE CBC W/AUTO DIFF WBC: CPT

## 2024-04-28 PROCEDURE — 71045 X-RAY EXAM CHEST 1 VIEW: CPT

## 2024-04-28 PROCEDURE — 99284 EMERGENCY DEPT VISIT MOD MDM: CPT

## 2024-04-28 PROCEDURE — 25010000002 ONDANSETRON PER 1 MG: Performed by: STUDENT IN AN ORGANIZED HEALTH CARE EDUCATION/TRAINING PROGRAM

## 2024-04-28 RX ORDER — SODIUM CHLORIDE 0.9 % (FLUSH) 0.9 %
10 SYRINGE (ML) INJECTION AS NEEDED
Status: DISCONTINUED | OUTPATIENT
Start: 2024-04-28 | End: 2024-04-28 | Stop reason: HOSPADM

## 2024-04-28 RX ORDER — ONDANSETRON 2 MG/ML
4 INJECTION INTRAMUSCULAR; INTRAVENOUS ONCE
Status: COMPLETED | OUTPATIENT
Start: 2024-04-28 | End: 2024-04-28

## 2024-04-28 RX ADMIN — SODIUM CHLORIDE 1000 ML: 9 INJECTION, SOLUTION INTRAVENOUS at 16:45

## 2024-04-28 RX ADMIN — ONDANSETRON 4 MG: 2 INJECTION INTRAMUSCULAR; INTRAVENOUS at 17:26

## 2024-04-28 NOTE — Clinical Note
[facility] EMERGENCY DEPARTMENT  [address] [address]  Phone: [phone]    Angelika Vick was seen and treated in our emergency department on 4/28/2024.  She may return to work on 04/30/2024.         Thank you for choosing [facility].    Wallace Diaz, DO

## 2024-04-28 NOTE — ED PROVIDER NOTES
EMERGENCY DEPARTMENT ENCOUNTER    Pt Name: Angelika Vick  MRN: [id number]  Pt :   1985  Room Number:    Date of encounter:  2024  PCP: Daniel Metz DO  ED Physician: Wallace Diaz DO      HPI:  Chief Complaint: Syncope    Context: Angelika Vick is a 38 y.o. female with past medical history of Hashimoto's thyroiditis and PVCs who presents to the ED following syncopal episode.  This occurred just prior to arrival at approximately 3:15 PM.  Patient was at her son's baseball game.  States she became hot and began experiencing nausea.  She then stood up from the bleachers and became lightheaded and then passed out.  This was witnessed by bystanders.  Patient was unconscious for approximately 15 seconds and then regained consciousness.  No seizure activity.  She did not bite her tongue or experience urinary or bowel incontinence.  Presents via EMS.  Vital signs stable and route.  Upon arrival, patient is alert orient x 4.  Says that she feels better.  Has no active medical complaints.  Denies fever, chills, headache, chest pain or shortness of breath, abdominal or back pain, problems urination or bowel movements.  She is currently following with cardiology in the outpatient setting for frequent PVCs, palpitations and dizziness.  No previous cardiac history, DVT or PE.  No current hormone use.    PAST MEDICAL HISTORY  Past Medical History:   Diagnosis Date    Acid reflux     Bilateral ovarian cysts     Depression     Gestational diabetes     with 2nd pregnancy    Gestational hypertension     with last pregnancy    Hashimoto's thyroiditis 2018    Hypertension in pregnancy 2012    Hypothyroidism 2018    Migraines     Mixed hyperlipidemia 2019    Polycystic ovary syndrome 2010     Current Outpatient Medications   Medication Instructions    ARIPiprazole (ABILIFY) 2 mg, Oral, Daily    ibuprofen (ADVIL,MOTRIN) 200 mg, Every 6 Hours PRN    levothyroxine (SYNTHROID, LEVOTHROID) 125  mcg, Oral, Daily    meloxicam (MOBIC) 15 mg, Oral, Daily        PAST SURGICAL HISTORY  Past Surgical History:   Procedure Laterality Date    CYST REMOVAL      GANGLION CYST EXCISION Right        FAMILY HISTORY  Family History   Problem Relation Age of Onset    Arthritis Mother     Hypertension Mother     No Known Problems Father     Thyroid disease Sister     Diabetes Sister     No Known Problems Brother     Diabetes Maternal Grandmother     Diabetes Maternal Grandfather     No Known Problems Paternal Grandmother     No Known Problems Paternal Grandfather     Diabetes Sister     Obesity Sister     Thyroid disease Sister        SOCIAL HISTORY  Social History     Socioeconomic History    Marital status:    Tobacco Use    Smoking status: Former     Current packs/day: 0.00     Average packs/day: 1 pack/day for 11.3 years (11.3 ttl pk-yrs)     Types: Cigarettes     Start date: 2005     Quit date: 10/6/2016     Years since quittin.5    Smokeless tobacco: Never   Vaping Use    Vaping status: Never Used   Substance and Sexual Activity    Alcohol use: Yes     Alcohol/week: 1.0 - 2.0 standard drink of alcohol     Types: 1 - 2 Drinks containing 0.5 oz of alcohol per week     Comment: Occasionally    Drug use: No    Sexual activity: Yes     Partners: Male     Birth control/protection: I.U.D.     Comment: pt expecting 3rd child.        ALLERGIES  Dermagel hand  [alcohol]    REVIEW OF SYSTEMS  All systems reviewed and negative except for those discussed in HPI.     PHYSICAL EXAM  ED Triage Vitals [24 1604]   Temp Heart Rate Resp BP SpO2   98.4 °F (36.9 °C) 63 20 125/82 99 %      Temp src Heart Rate Source Patient Position BP Location FiO2 (%)   Oral -- -- -- --     I have reviewed the triage vital signs and nursing notes.    General: Alert.  Nontoxic appearance.  No acute distress.  Head: Normocephalic.  Atraumatic.  Eyes: Pupils 2 mm.  PERRLA.  EOMI.  No scleral icterus.  Cardiovascular: Regular  rate and rhythm.  No murmurs.  No rubs.  2+ distal pulses bilaterally.  Respiratory: Equal breath sounds bilaterally.  No rales.  No rhonchi.  No wheezing.  GI: Abdomen is soft.  Nondistended.  Nontender to palpation.  No rebound.  No guarding.  No CVA tenderness.  MSK: No muscle atrophy.  Neurologic: Oriented x 3.  Speech intact without dysarthria or aphasia. Cranial nerves II-XII intact.  Strength 5/5 bilateral upper and lower extremities.  Sensation to touch grossly intact bilaterally.  No focal deficits.  No pronator drift.  Normal finger-nose test.    Skin: No erythema. No edema.  Psych: Normal mood.  Pleasant affect.     LAB RESULTS  Recent Results (from the past 24 hour(s))   Comprehensive Metabolic Panel    Collection Time: 04/28/24  4:45 PM    Specimen: Blood   Result Value Ref Range    Glucose 122 (H) 65 - 99 mg/dL    BUN 16 6 - 20 mg/dL    Creatinine 0.83 0.57 - 1.00 mg/dL    Sodium 139 136 - 145 mmol/L    Potassium 4.1 3.5 - 5.2 mmol/L    Chloride 106 98 - 107 mmol/L    CO2 22.4 22.0 - 29.0 mmol/L    Calcium 8.8 8.6 - 10.5 mg/dL    Total Protein 6.8 6.0 - 8.5 g/dL    Albumin 3.9 3.5 - 5.2 g/dL    ALT (SGPT) 10 1 - 33 U/L    AST (SGOT) 12 1 - 32 U/L    Alkaline Phosphatase 58 39 - 117 U/L    Total Bilirubin 0.2 0.0 - 1.2 mg/dL    Globulin 2.9 gm/dL    A/G Ratio 1.3 g/dL    BUN/Creatinine Ratio 19.3 7.0 - 25.0    Anion Gap 10.6 5.0 - 15.0 mmol/L    eGFR 93.3 >60.0 mL/min/1.73   Magnesium    Collection Time: 04/28/24  4:45 PM    Specimen: Blood   Result Value Ref Range    Magnesium 2.2 1.6 - 2.6 mg/dL   Single High Sensitivity Troponin T    Collection Time: 04/28/24  4:45 PM    Specimen: Blood   Result Value Ref Range    HS Troponin T <6 <14 ng/L   Green Top (Gel)    Collection Time: 04/28/24  4:45 PM   Result Value Ref Range    Extra Tube Hold for add-ons.    Lavender Top    Collection Time: 04/28/24  4:45 PM   Result Value Ref Range    Extra Tube hold for add-on    Light Blue Top    Collection Time:  04/28/24  4:45 PM   Result Value Ref Range    Extra Tube Hold for add-ons.    CBC Auto Differential    Collection Time: 04/28/24  4:45 PM    Specimen: Blood   Result Value Ref Range    WBC 12.34 (H) 3.40 - 10.80 10*3/mm3    RBC 4.75 3.77 - 5.28 10*6/mm3    Hemoglobin 13.7 12.0 - 15.9 g/dL    Hematocrit 41.6 34.0 - 46.6 %    MCV 87.6 79.0 - 97.0 fL    MCH 28.8 26.6 - 33.0 pg    MCHC 32.9 31.5 - 35.7 g/dL    RDW 12.6 12.3 - 15.4 %    RDW-SD 41.0 37.0 - 54.0 fl    MPV 9.3 6.0 - 12.0 fL    Platelets 311 140 - 450 10*3/mm3    Neutrophil % 74.8 42.7 - 76.0 %    Lymphocyte % 16.2 (L) 19.6 - 45.3 %    Monocyte % 6.8 5.0 - 12.0 %    Eosinophil % 1.0 0.3 - 6.2 %    Basophil % 0.7 0.0 - 1.5 %    Immature Grans % 0.5 0.0 - 0.5 %    Neutrophils, Absolute 9.23 (H) 1.70 - 7.00 10*3/mm3    Lymphocytes, Absolute 2.00 0.70 - 3.10 10*3/mm3    Monocytes, Absolute 0.84 0.10 - 0.90 10*3/mm3    Eosinophils, Absolute 0.12 0.00 - 0.40 10*3/mm3    Basophils, Absolute 0.09 0.00 - 0.20 10*3/mm3    Immature Grans, Absolute 0.06 (H) 0.00 - 0.05 10*3/mm3    nRBC 0.0 0.0 - 0.2 /100 WBC   hCG, Serum, Qualitative    Collection Time: 04/28/24  4:45 PM    Specimen: Blood   Result Value Ref Range    HCG Qualitative Negative Negative       RADIOLOGY  No Radiology Exams Resulted Within Past 24 Hours    PROCEDURES  Procedures  Wells' Criteria for Pulmonary Embolism - MDCalc  Calculated on Apr 28 2024 5:04 PM  0.0 points -> Low risk group: 1.3% chance of PE in an ED population. Another study assigned scores ? 4 as “PE Unlikely” and had a 3% incidence of PE.    PERC Rule for Pulmonary Embolism - MDCalc  Calculated on Apr 28 2024 5:04 PM  0 criteria -> No need for further workup, as <2% chance of PE. If no criteria are positive and clinician’s pre-test probability is <15%, PERC Rule criteria are satisfied.    MEDICATIONS GIVEN IN ER  Medications   sodium chloride 0.9 % flush 10 mL (has no administration in time range)   sodium chloride 0.9 % bolus 1,000 mL  (1,000 mL Intravenous New Bag 4/28/24 1645)   ondansetron (ZOFRAN) injection 4 mg (4 mg Intravenous Given 4/28/24 1726)       MEDICAL DECISION MAKING  38 y.o. female with past medical history listed above who presents following syncopal episode.    Patient arrives via EMS.  I have reviewed the EMS documentation/notes and included that information in my HPI.    Vital signs within normal limits.  Pulse ox 99% on room air.    Based on clinical presentation and physical exam, differential diagnosis includes, but is not limited to, vasovagal syncope, orthostatic hypotension, dehydration, cardiac arrhythmia.  Able to rule out pulmonary embolism clinically given low Wells score for PE and negative PERC rule.    External notes reviewed.  Cardiology office notes from 4/22/2024 reviewed.  Patient presented with dizziness and frequent PVCs.  Plan was for outpatient cardiac telemetry.    At least 3 different tests have been ordered on this patient.    Please see ED course below for my interpretation of the ED workup.  ED Course as of 04/28/24 1845   Sun Apr 28, 2024   1619 ECG 12 Lead ED Triage Standing Order; Syncope  EKG per my interpretation sinus bradycardia, rate 59, normal axis, normal ST segment elevation depression, normal T waves, normal QRS QTc intervals, no Brugada pattern, no Fer-Parkinson-White pattern. [JS]   1826 I reviewed the labs listed above. Clinically unremarkable.    Notable abnormalities are highlighted below.    Old laboratory data was reviewed from the medical records and compared to today's results.   [JS]   1826 WBC(!): 12.34 [JS]   1839 XR Chest 1 View  I have independently reviewed and interpreted the chest x-ray.  My interpretation is clear lung fields bilaterally.  No pneumothorax.   [JS]   1839 Cardiac telemetry was reviewed and interpreted by me. Reveals per my interpretation normal sinus rhythm at a rate of 75. [JS]      ED Course User Index  [JS] Wallace Diaz DO      Patient observed in  the ED for extended period of time. On re-evaluation, patient resting comfortably.  States symptoms have improved following therapy. Vital signs remained stable on room air.  Patient was ambulatory in the ED with steady gait.  Able to tolerate oral intake appropriately.    I discussed the findings of the ED workup with the patient and her  at bedside. Shared medical decision discussed with the patient in regards to disposition.  Patient was offered medical admission for observation, but politely declined. Patient was informed of the indication, benefits, alternative diagnostic options, and risks including, but not limited to missed and/or delayed diagnosis, therefore leading to failure to treat. The patient is clinically not intoxicated, free from distracting pain, appears to have intact insight, judgment and reason and in my medical opinion has the capacity to make medical decisions.    I recommended outpatient follow-up with PCP/cardiology.  Patient was deemed medically stable for discharge with close outpatient follow-up and strict ED return precautions. Patient agreeable with plan and disposition.    Chronic conditions affecting care: None    Social determinants of health impacting treatment or disposition: None    REPEAT VITAL SIGNS  AS OF 18:45 EDT VITALS:  BP - 109/50  HR - 56  TEMP - 98.4 °F (36.9 °C) (Oral)  O2 SATS - 99%    DIAGNOSIS  Final diagnoses:   Syncope, unspecified syncope type       DISPOSITION  ED Disposition       ED Disposition   Discharge    Condition   Stable    Comment   --                     Please note that portions of this document were completed with voice recognition software.        Wallace Diaz DO  04/29/24 2543

## 2024-04-29 ENCOUNTER — TELEPHONE (OUTPATIENT)
Age: 39
End: 2024-04-29

## 2024-04-29 ENCOUNTER — TELEPHONE (OUTPATIENT)
Dept: CARDIOLOGY | Facility: HOSPITAL | Age: 39
End: 2024-04-29
Payer: COMMERCIAL

## 2024-04-29 DIAGNOSIS — I45.5 SINUS PAUSE: ICD-10-CM

## 2024-04-29 DIAGNOSIS — R55 SYNCOPE AND COLLAPSE: Primary | ICD-10-CM

## 2024-04-29 NOTE — TELEPHONE ENCOUNTER
Caller: Angelika Vick    Relationship: Self    Best call back number: [phone]     What is the medical concern/diagnosis: YEARLY PAP, PAIN IN VAGINAL AREA    What specialty or service is being requested: GYN    What is the provider, practice or medical service name: ANY    What is the office location:     What is the office phone number:     Any additional details: PHONE CALL PLEASE

## 2024-04-29 NOTE — TELEPHONE ENCOUNTER
I informed patient we originally was keeping her appointment today to discuss symptoms and to place a referral for Gynecology. Patient cancelled appointment. I provided her with the phone number to gynecology and she wants to schedule with them to be evaluated.

## 2024-04-29 NOTE — TELEPHONE ENCOUNTER
Caller: Angelika Vick    Relationship: Self    Best call back number: [phone]     What is the best time to reach you: ANYTIME    Who are you requesting to speak with (clinical staff, provider,  specific staff member): DR BACON    What was the call regarding: PATIENT WOULD LIKE TO DISCUSS RECENT EMERGENCY ROOM VISIT.

## 2024-04-29 NOTE — TELEPHONE ENCOUNTER
Pt called and stated that she passed out 4/28/24 and went to the ED. Did not record episode on her monitor.

## 2024-04-29 NOTE — TELEPHONE ENCOUNTER
Spoke with patient and reviewed M cot tracing that occurred yesterday afternoon at 3:16 PM.  Patient did experience nausea while sitting at her son's ballgame and stood up to go to the bathroom and had a syncopal spell.  Heart rate per M cot at that time dropped down to 20 with a pause of 4.5 seconds.  Patient was evaluated in the ER yesterday.  Will refer to EP for further evaluation.  Patient to continue wearing M cot until EP evaluation.  Patient verbalized understanding.

## 2024-05-01 ENCOUNTER — APPOINTMENT (OUTPATIENT)
Dept: GENERAL RADIOLOGY | Facility: HOSPITAL | Age: 39
End: 2024-05-01
Payer: COMMERCIAL

## 2024-05-01 ENCOUNTER — DOCUMENTATION (OUTPATIENT)
Dept: FAMILY MEDICINE CLINIC | Facility: CLINIC | Age: 39
End: 2024-05-01
Payer: COMMERCIAL

## 2024-05-01 ENCOUNTER — HOSPITAL ENCOUNTER (EMERGENCY)
Facility: HOSPITAL | Age: 39
Discharge: HOME | End: 2024-05-01
Attending: EMERGENCY MEDICINE
Payer: COMMERCIAL

## 2024-05-01 VITALS
BODY MASS INDEX: 32.78 KG/M2 | RESPIRATION RATE: 16 BRPM | HEART RATE: 89 BPM | DIASTOLIC BLOOD PRESSURE: 82 MMHG | WEIGHT: 185 LBS | TEMPERATURE: 98.9 F | HEIGHT: 63 IN | SYSTOLIC BLOOD PRESSURE: 128 MMHG | OXYGEN SATURATION: 98 %

## 2024-05-01 DIAGNOSIS — M25.561 ACUTE PAIN OF RIGHT KNEE: Primary | ICD-10-CM

## 2024-05-01 PROCEDURE — 73562 X-RAY EXAM OF KNEE 3: CPT

## 2024-05-01 PROCEDURE — 99283 EMERGENCY DEPT VISIT LOW MDM: CPT

## 2024-05-01 RX ORDER — HYDROCODONE BITARTRATE AND ACETAMINOPHEN 5; 325 MG/1; MG/1
1 TABLET ORAL ONCE
Status: COMPLETED | OUTPATIENT
Start: 2024-05-01 | End: 2024-05-01

## 2024-05-01 RX ORDER — NABUMETONE 500 MG/1
500 TABLET, FILM COATED ORAL 2 TIMES DAILY PRN
Qty: 30 TABLET | Refills: 0 | Status: SHIPPED | OUTPATIENT
Start: 2024-05-01 | End: 2024-05-15

## 2024-05-01 RX ADMIN — HYDROCODONE BITARTRATE AND ACETAMINOPHEN 1 TABLET: 5; 325 TABLET ORAL at 11:36

## 2024-05-01 NOTE — ED PROVIDER NOTES
Pt Name: Angelika Vick  MRN: [id number]  : 1985  Date of Encounter: 2024    PCP: Daniel Metz DO      Subjective    History of Present Illness:    Chief Complaint: Right knee pain      History of Present Illness: Angelika Vick is a 38 y.o. female who presents to the ER complaining of right knee pain.  Patient states she has previous knee injury and was playing on the playground with her daughter swinging on the monkey bars when she landed she felt pain immediately and is unable to completely straighten her leg.  Patient rates her pain when it is in a neutral position as 4 out of 10 when she tries to straighten it is 9 out of 10.        Nurses Notes reviewed and agree, including vitals, allergies, social history and prior medical history.       Allergies:    Dermagel hand  [alcohol]    Past Medical History:   Diagnosis Date    Acid reflux     Bilateral ovarian cysts     Depression     Gestational diabetes     with 2nd pregnancy    Gestational hypertension     with last pregnancy    Hashimoto's thyroiditis 2018    Hypertension in pregnancy     Hypothyroidism 2018    Migraines     Mixed hyperlipidemia 2019    Polycystic ovary syndrome 2010       Past Surgical History:   Procedure Laterality Date    CYST REMOVAL      GANGLION CYST EXCISION Right        Social History     Socioeconomic History    Marital status:    Tobacco Use    Smoking status: Former     Current packs/day: 0.00     Average packs/day: 1 pack/day for 11.3 years (11.3 ttl pk-yrs)     Types: Cigarettes     Start date: 2005     Quit date: 10/6/2016     Years since quittin.5    Smokeless tobacco: Never   Vaping Use    Vaping status: Never Used   Substance and Sexual Activity    Alcohol use: Yes     Alcohol/week: 1.0 - 2.0 standard drink of alcohol     Types: 1 - 2 Drinks containing 0.5 oz of alcohol per week     Comment: Occasionally    Drug use: No    Sexual activity: Yes     Partners:  Male     Birth control/protection: I.U.D.     Comment: pt expecting 3rd child.        Family History   Problem Relation Age of Onset    Arthritis Mother     Hypertension Mother     No Known Problems Father     Thyroid disease Sister     Diabetes Sister     No Known Problems Brother     Diabetes Maternal Grandmother     Diabetes Maternal Grandfather     No Known Problems Paternal Grandmother     No Known Problems Paternal Grandfather     Diabetes Sister     Obesity Sister     Thyroid disease Sister        REVIEW OF SYSTEMS:     All systems reviewed and not pertinent unless noted.    Review of Systems   Musculoskeletal:  Positive for arthralgias, joint swelling and myalgias.   All other systems reviewed and are negative.      Objective    Physical Exam  Vitals and nursing note reviewed.   Constitutional:       Appearance: Normal appearance.   HENT:      Head: Normocephalic and atraumatic.   Eyes:      Extraocular Movements: Extraocular movements intact.      Pupils: Pupils are equal, round, and reactive to light.   Cardiovascular:      Rate and Rhythm: Normal rate and regular rhythm.      Pulses: Normal pulses.      Heart sounds: Normal heart sounds.   Pulmonary:      Effort: Pulmonary effort is normal.      Breath sounds: Normal breath sounds.   Abdominal:      General: Abdomen is flat. Bowel sounds are normal.      Palpations: Abdomen is soft.   Musculoskeletal:         General: Tenderness present.      Cervical back: Normal range of motion and neck supple.      Comments: Mild tenderness to palpation right knee, decreased range of motion   Skin:     Capillary Refill: Capillary refill takes less than 2 seconds.   Neurological:      General: No focal deficit present.      Mental Status: She is alert and oriented to person, place, and time. Mental status is at baseline.      GCS: GCS eye subscore is 4. GCS verbal subscore is 5. GCS motor subscore is 6.      Sensory: Sensation is intact.      Motor: Motor function is  intact.      Gait: Gait is intact.   Psychiatric:         Attention and Perception: Attention and perception normal.         Mood and Affect: Mood and affect normal.         Speech: Speech normal.         Behavior: Behavior normal. Behavior is cooperative.               Procedures    ED Course:    ED Course as of 05/01/24 1140   Wed May 01, 2024   1125 Right knee x-ray shows no effusion, no acute fracture. [KH]   1129 Due to patient's previous knee history will place patient in a knee immobilizer and request patient follow-up with orthopedics. [KH]   1131 And patient with chronic knee pain of the right knee, follows with orthopedics.  Patient was noted to be playing on the playground with her child and had an acute episode of pain with inability to flex and extend.  X-ray was noted to be negative.  Plan for discharge home with further follow-up by orthopedics will place in a brace due to chronic knee weakness and need for stable stability. [CR]      ED Course User Index  [CR] Tin Mcmanus DO  [KH] Paulie Vásquez APRN       LAB Results:    Lab Results (last 24 hours)       ** No results found for the last 24 hours. **             If labs were ordered, I have independently reviewed the results and considered them in the diagnosis and treatment plan for the patient    RADIOLOGY    No radiology results from the last 24 hrs     If I have ordered, I have independently reviewed the above noted radiographic studies.  Please see the radiologist dictation for the official interpretation    Medications given to patient in the ER    Medications   HYDROcodone-acetaminophen (NORCO) 5-325 MG per tablet 1 tablet (1 tablet Oral Given 5/1/24 1136)                 Shared Decision Making: After my consideration the clinical presentation and laboratory/radiology studies obtained, I discussed the findings with the patient/patient representative who is in agreement with the treatment plan and final disposition. Risks and  benefits of discharge and/or observation admission were discussed.  Final disposition of the patient will be discharged home quest patient follow-up with primary care provider and orthopedics within the next 7 days for reevaluation    Medical Decision Making  Angelika Vick is a 38 y.o. female who presents to the ER complaining of right knee pain.  Patient states she has previous knee injury and was playing on the playground with her daughter swinging on the monkey bars when she landed she felt pain immediately and is unable to completely straighten her leg.  Patient rates her pain when it is in a neutral position as 4 out of 10 when she tries to straighten it is 9 out of 10.    DDX: includes but is not limited to: Knee fracture, knee effusion, acute knee pain, other    Amount and/or Complexity of Data Reviewed  Radiology: ordered and independent interpretation performed. Decision-making details documented in ED Course.     Details: I have personally reviewed and documented all results  Discussion of management or test interpretation with external provider(s): Discussed assessment, treatment and plan with ER attending    Risk  Risk Details: I have discussed with patient the finding of the test preformed today. Patient has been diagnosed with acute right knee pain and will be discharged home.  Patient requested to follow-up with primary care provider, orthopedic within the next 7 days for reevaluation. Strict return precautions have been given and patient verbalizes understanding          Final diagnoses:   Acute pain of right knee         Please note that portions of this document were completed using voice recognition dictation software.       Paulie Vásquez, APRN  05/01/24 [id number]

## 2024-05-01 NOTE — PROGRESS NOTES
I received a call from after-hours answering service; patient injured her right knee acutely today while playing with her child at a local playground. She was seen in the emergency room without any obvious bony abnormalities, with suspected internal derangement of her right knee. She does have a past history of internal derangement additionally. She was discharged home and instructed to follow-up with orthopedic surgery, an appointment has already been scheduled on 5/3/2024 with our orthopedic department in [facility].    She has been utilizing meloxicam on a daily basis. Tylenol has not relieved her discomfort. I have instructed her to discontinue use of meloxicam, as I have sent a new prescription for nabumetone, to be taken twice daily with food to her pharmacy. I have instructed her not to utilize any other nonsteroidals concurrently with this 1.  She is to notify orthopedic physician on Friday that she is on this medication additionally.

## 2024-05-02 ENCOUNTER — TELEPHONE (OUTPATIENT)
Age: 39
End: 2024-05-02
Payer: COMMERCIAL

## 2024-05-02 DIAGNOSIS — M25.869 IMPINGEMENT SYNDROME INVOLVING PATELLAR FAT PAD: Primary | ICD-10-CM

## 2024-05-02 RX ORDER — TRAMADOL HYDROCHLORIDE 50 MG/1
50 TABLET ORAL EVERY 6 HOURS PRN
Qty: 10 TABLET | Refills: 0 | Status: SHIPPED | OUTPATIENT
Start: 2024-05-02

## 2024-05-02 NOTE — TELEPHONE ENCOUNTER
Caller: Angelika Vick    Relationship: Self    Best call back number: [phone]     Which medication are you concerned about:     traMADol (Ultram) 50 MG tablet       Who prescribed you this medication: DR BACON    When did you start taking this medication: HAS NOT STARTED TAKING THIS. WAS GIVEN A PACKET. PATIENT DID  THE MEDICATION.    What are your concerns: THE PACKET SUGGEST THIS MEDICATION INTERACTS WITH ANOTHER MEDICATION SHE IS ALREADY TAKING. ASKING FOR ANOTHER OPTION..    PLEASE CALL TO ADVISE

## 2024-05-02 NOTE — TELEPHONE ENCOUNTER
Caller: Angelika Vick    Relationship: Self    Best call back number: [phone]    Which medication are you concerned about:     TYLENOL    What are your concerns:     INJURED KNEE MORE-WENT TO EMERGENCY ROOM ([facility]) YESTERDAY.    THEY TOLD HER TO TAKE TYLENOL OR IBUPROFEN BUT SHE COULD NOT TAKE IBUPROFEN DUE TO BEING ON MELOXICAM    SHE CALLED ON CALL PHYSICIAN AND THEY PRESCRIBED HER ANOTHER ANTI-INFLAMMATORY BESIDES MELOXICAM.  SHE THINKS IT IS NEBUTONE    CAN SHE TAKE MORE TYLENOL THAN WHAT THE BOTTLE SAYS, WHICH IS 4,000 MG?  SHE NEEDS TO TAKE MORE THAN EVERY SIX HOURS

## 2024-05-02 NOTE — TELEPHONE ENCOUNTER
Spoke with patient, informed her that other than the tramadol she can take tylenol and meloxicam until she sees ortho tomorrow and see what they suggest. Patient verbalized understanding.

## 2024-05-03 ENCOUNTER — OFFICE VISIT (OUTPATIENT)
Dept: ORTHOPEDIC SURGERY | Facility: CLINIC | Age: 39
End: 2024-05-03
Payer: COMMERCIAL

## 2024-05-03 ENCOUNTER — TELEPHONE (OUTPATIENT)
Dept: CARDIOLOGY | Facility: HOSPITAL | Age: 39
End: 2024-05-03
Payer: COMMERCIAL

## 2024-05-03 VITALS — BODY MASS INDEX: 32.78 KG/M2 | HEIGHT: 63 IN | TEMPERATURE: 98.6 F | WEIGHT: 185 LBS

## 2024-05-03 DIAGNOSIS — M25.561 ACUTE PAIN OF RIGHT KNEE: ICD-10-CM

## 2024-05-03 DIAGNOSIS — M23.91 KNEE LOCKING, RIGHT: Primary | ICD-10-CM

## 2024-05-03 RX ORDER — CYCLOBENZAPRINE HCL 10 MG
10 TABLET ORAL NIGHTLY PRN
Qty: 30 TABLET | Refills: 0 | Status: SHIPPED | OUTPATIENT
Start: 2024-05-03

## 2024-05-03 NOTE — PROGRESS NOTES
Office Note     Name: Angelika Vick    : 1985     MRN: [id number]     Chief Complaint  Follow-up, Pain, and Injury of the Right Knee (States she was at the park with her kids, she was hanging from her hands swinging her legs and her leg locked 24. She went to the ER and they had to straighten her leg, presents in knee immobilizer. She is unable to walk on her knee and is having a lot of pain.)    Subjective     History of Present Illness:  Angelika Vick is a 38 y.o. female presenting today for evaluation of acute right knee mechanical locking and pain.  Patient states that while she was at the park she was swinging from monkey bars and bending her knees when she bit her right knee back and it locked in flexion and she was unable to straighten the right knee.  She was seen at the ER following the incident where the ER extended her knee to unlock it and then placed in a knee immobilizer with orthopedic follow-up.  Today she complains of increased right knee pain throughout the whole knee but focally over the lateral joint compartment.  She has also had increased swelling and difficulty bearing weight on the knee due to pain.  She presents today in a wheelchair and knee immobilizer.  She has been in physical therapy for the right knee over the past few weeks.    Review of Systems   Constitutional:  Negative for fever.   HENT:  Negative for dental problem and voice change.    Eyes:  Negative for visual disturbance.   Respiratory:  Negative for shortness of breath.    Cardiovascular:  Negative for chest pain.   Gastrointestinal:  Negative for abdominal pain.   Genitourinary:  Negative for dysuria.   Musculoskeletal:  Positive for arthralgias (right knee) and joint swelling (presents in wheel chair). Negative for gait problem.   Skin:  Negative for rash.   Neurological:  Negative for speech difficulty.   Hematological:  Does not bruise/bleed easily.   Psychiatric/Behavioral:  Negative for  confusion.         Past Medical History:   Diagnosis Date    Acid reflux     Bilateral ovarian cysts     Depression     Gestational diabetes     with 2nd pregnancy    Gestational hypertension     with last pregnancy    Hashimoto's thyroiditis 2018    Hypertension in pregnancy 2012    Hypothyroidism 2018    Migraines     Mixed hyperlipidemia 2019    Polycystic ovary syndrome 2010        Past Surgical History:   Procedure Laterality Date    CYST REMOVAL      GANGLION CYST EXCISION Right        Family History   Problem Relation Age of Onset    Arthritis Mother     Hypertension Mother     No Known Problems Father     Thyroid disease Sister     Diabetes Sister     No Known Problems Brother     Diabetes Maternal Grandmother     Diabetes Maternal Grandfather     No Known Problems Paternal Grandmother     No Known Problems Paternal Grandfather     Diabetes Sister     Obesity Sister     Thyroid disease Sister        Social History     Socioeconomic History    Marital status:    Tobacco Use    Smoking status: Former     Current packs/day: 0.00     Average packs/day: 1 pack/day for 11.3 years (11.3 ttl pk-yrs)     Types: Cigarettes     Start date: 2005     Quit date: 10/6/2016     Years since quittin.5    Smokeless tobacco: Never   Vaping Use    Vaping status: Never Used   Substance and Sexual Activity    Alcohol use: Yes     Alcohol/week: 1.0 - 2.0 standard drink of alcohol     Types: 1 - 2 Drinks containing 0.5 oz of alcohol per week     Comment: Occasionally    Drug use: No    Sexual activity: Yes     Partners: Male     Birth control/protection: I.U.D.     Comment: pt expecting 3rd child.          Current Outpatient Medications:     ARIPiprazole (Abilify) 2 MG tablet, Take 1 tablet by mouth Daily., Disp: 30 tablet, Rfl: 3    cyclobenzaprine (FLEXERIL) 10 MG tablet, Take 1 tablet by mouth At Night As Needed for Muscle Spasms., Disp: 30 tablet, Rfl: 0    levothyroxine (SYNTHROID, LEVOTHROID) 125 MCG  "tablet, Take 1 tablet by mouth Daily., Disp: 30 tablet, Rfl: 5    nabumetone (RELAFEN) 500 MG tablet, Take 1 tablet by mouth 2 (Two) Times a Day As Needed for Mild Pain for up to 14 days. To be taken with food; not to be used with any other non-steroidal medications, Disp: 30 tablet, Rfl: 0    traMADol (Ultram) 50 MG tablet, Take 1 tablet by mouth Every 6 (Six) Hours As Needed for Moderate Pain. (Patient not taking: Reported on 5/3/2024), Disp: 10 tablet, Rfl: 0    Allergies   Allergen Reactions    Dermagel Hand  [Alcohol] Rash           Objective   Temp 98.6 °F (37 °C)   Ht 160 cm (62.99\")   Wt 83.9 kg (185 lb)   LMP 04/06/2024 (Approximate)   BMI 32.78 kg/m²            Physical Exam  Musculoskeletal:      Right knee: No effusion.      Instability Tests: Lateral Ann test positive. Medial Ann test negative.       Right Knee Exam     Tenderness   The patient is experiencing tenderness in the lateral retinaculum and lateral joint line.    Range of Motion   Extension:  -10   Flexion:  90     Tests   Ann:  Medial - negative Lateral - positive  Varus: negative Valgus: negative  Lachman:  Anterior - trace    Posterior - trace  Drawer:  Anterior - trace    Posterior - trace  Patellar apprehension: 2+    Other   Erythema: absent  Sensation: normal  Pulse: present  Swelling: none  Effusion: no effusion present    Comments:  Hypermobility of bilateral patella was noted.  Patient also is able to hyperextend her left knee to approximately +10 degrees.  She is also able to hyper abduct her thumbs to touch her thumbs to her wrist.      Left Knee Exam     Range of Motion   Extension:  10   Flexion:  130            Extremity DVT signs are negative by clinical screen.     Independent Review of Radiographic Studies:    Study Result    Narrative & Impression   PROCEDURE: XR KNEE 3 VW RIGHT-     History: Right knee pain     COMPARISON: None.     FINDINGS:  A 2 view exam demonstrates no acute fracture or " dislocation.  The joint spaces are preserved. No soft tissue abnormality is seen.     IMPRESSION:  No acute fracture. Consider MRI if symptoms persist.                    Images were reviewed, interpreted, and dictated by Dr. Belle Navas MD  Transcribed by Glendy Sauer PA-C.     This report was signed and finalized on 5/1/2024 1:33 PM by Belle Navas MD.        Bilateral knee sunrise view done in office for the evaluation of pain and mechanical locking, comparison sunrise view not available.  There were no acute osseous abnormalities noted.  No significant lateral patellar tilt.  The patella is in its proper position in the femoral groove.    Procedures    Assessment and Plan   Diagnoses and all orders for this visit:    1. Knee locking, right (Primary)  -     MRI Knee Right Without Contrast; Future  -     Cancel: XR Knee 1 or 2 View Right  -     XR Knee 1 or 2 View Bilateral    2. Acute pain of right knee  -     cyclobenzaprine (FLEXERIL) 10 MG tablet; Take 1 tablet by mouth At Night As Needed for Muscle Spasms.  Dispense: 30 tablet; Refill: 0  -     Cancel: XR Knee 1 or 2 View Right  -     XR Knee 1 or 2 View Bilateral       Discussion of orthopedic goals  Orthopedic activities reviewed and patient expressed appreciation  Regular exercise as tolerated  Risk, benefits, and merits of treatment alternatives reviewed with the patient and questions answered  Patient guided on mobility and conditioning exercises  Ice, heat, and/or modalities as beneficial  Elevate leg for residual swelling  Physical therapy ongoing  Take prescribed medications as instructed only as tolerated  Reduced physical activity as appropriate and avoid offending activity  Weight bearing parameters reviewed  Use brace as instructed  Assistive device encouraged for safety and support  Counseling on diet, nutrition, fitness exercise, and weight reduction goals    Recommendations/Plan:  Exercise, medications, injections, other patient advice,  and return appointment as noted.  Brace: Patellar stabilizing knee brace, wheeled walker  Patient is encouraged to call or return for any issues or concerns.    Differential diagnosis:  Lateral meniscus tear with flap causing mechanical locking, patellar subluxation perhaps due to connective tissue disorder.    MRI of the right knee without contrast was ordered to evaluate for the presence of a new lateral meniscus tear causing mechanical locking as well as damage due to possible patellar subluxation.  The nature of connective tissue disorder was discussed with the patient and I strongly encouraged her to follow-up with her PCP for further discussion and evaluation of potential connective tissue disorder including Jeanine-Danlos syndrome.  I advised conservative therapy for her knee and weightbearing as tolerated using assistive device such as walker or cane until her next follow-up with me.  I will call her with results of MRI once available to discuss further disposition.  I did advise holding physical therapy for 1 to 2 weeks until her knee pain calms down at which point she may resume therapy.    The assessment and plan of this patient was discussed with Dr. Matthew Harden.  Dr. Matthew Harden did assist with physical exam and treatment plan.    Return for Following MRI.  Patient agreeable to call or return sooner for any concerns.

## 2024-05-03 NOTE — TELEPHONE ENCOUNTER
----- Message from Kathryn Butler sent at 5/2/2024  3:33 PM EDT -----  Pt called and stated that she was prescribed tramadol today and had concerns taking it with abilify. Spoke to Kathryn and she recommended to hold off until she saw Dr Maldonado on 5/7/24.  Pt agreed and no further questions at this time.   Report given to RN in CDU. PT is a&o x4 and aware of plan of care. pt is breathing equal and unlabored.

## 2024-05-05 ENCOUNTER — HOSPITAL ENCOUNTER (EMERGENCY)
Facility: HOSPITAL | Age: 39
Discharge: HOME | End: 2024-05-05
Attending: EMERGENCY MEDICINE | Admitting: EMERGENCY MEDICINE
Payer: COMMERCIAL

## 2024-05-05 ENCOUNTER — APPOINTMENT (OUTPATIENT)
Dept: GENERAL RADIOLOGY | Facility: HOSPITAL | Age: 39
End: 2024-05-05
Payer: COMMERCIAL

## 2024-05-05 VITALS
WEIGHT: 185 LBS | TEMPERATURE: 99 F | SYSTOLIC BLOOD PRESSURE: 130 MMHG | DIASTOLIC BLOOD PRESSURE: 84 MMHG | BODY MASS INDEX: 32.78 KG/M2 | OXYGEN SATURATION: 98 % | RESPIRATION RATE: 16 BRPM | HEIGHT: 63 IN | HEART RATE: 89 BPM

## 2024-05-05 DIAGNOSIS — I49.3 PVC'S (PREMATURE VENTRICULAR CONTRACTIONS): ICD-10-CM

## 2024-05-05 DIAGNOSIS — I10 HYPERTENSION, UNSPECIFIED TYPE: ICD-10-CM

## 2024-05-05 DIAGNOSIS — R00.2 PALPITATIONS: Primary | ICD-10-CM

## 2024-05-05 LAB
ALBUMIN SERPL-MCNC: 4.4 G/DL (ref 3.5–5.2)
ALBUMIN/GLOB SERPL: 1.3 G/DL
ALP SERPL-CCNC: 70 U/L (ref 39–117)
ALT SERPL W P-5'-P-CCNC: 12 U/L (ref 1–33)
ANION GAP SERPL CALCULATED.3IONS-SCNC: 12 MMOL/L (ref 5–15)
AST SERPL-CCNC: 12 U/L (ref 1–32)
BASOPHILS # BLD AUTO: 0.06 10*3/MM3 (ref 0–0.2)
BASOPHILS NFR BLD AUTO: 0.5 % (ref 0–1.5)
BILIRUB SERPL-MCNC: 0.4 MG/DL (ref 0–1.2)
BUN SERPL-MCNC: 14 MG/DL (ref 6–20)
BUN/CREAT SERPL: 18.2 (ref 7–25)
CALCIUM SPEC-SCNC: 9.2 MG/DL (ref 8.6–10.5)
CHLORIDE SERPL-SCNC: 101 MMOL/L (ref 98–107)
CO2 SERPL-SCNC: 23 MMOL/L (ref 22–29)
CREAT SERPL-MCNC: 0.77 MG/DL (ref 0.57–1)
DEPRECATED RDW RBC AUTO: 39.4 FL (ref 37–54)
EGFRCR SERPLBLD CKD-EPI 2021: 102 ML/MIN/1.73
EOSINOPHIL # BLD AUTO: 0.05 10*3/MM3 (ref 0–0.4)
EOSINOPHIL NFR BLD AUTO: 0.4 % (ref 0.3–6.2)
ERYTHROCYTE [DISTWIDTH] IN BLOOD BY AUTOMATED COUNT: 12.2 % (ref 12.3–15.4)
GLOBULIN UR ELPH-MCNC: 3.5 GM/DL
GLUCOSE SERPL-MCNC: 103 MG/DL (ref 65–99)
HCT VFR BLD AUTO: 44.8 % (ref 34–46.6)
HGB BLD-MCNC: 14.9 G/DL (ref 12–15.9)
HOLD SPECIMEN: NORMAL
IMM GRANULOCYTES # BLD AUTO: 0.04 10*3/MM3 (ref 0–0.05)
IMM GRANULOCYTES NFR BLD AUTO: 0.3 % (ref 0–0.5)
LYMPHOCYTES # BLD AUTO: 2.09 10*3/MM3 (ref 0.7–3.1)
LYMPHOCYTES NFR BLD AUTO: 17.9 % (ref 19.6–45.3)
MAGNESIUM SERPL-MCNC: 2.1 MG/DL (ref 1.6–2.6)
MCH RBC QN AUTO: 29.1 PG (ref 26.6–33)
MCHC RBC AUTO-ENTMCNC: 33.3 G/DL (ref 31.5–35.7)
MCV RBC AUTO: 87.5 FL (ref 79–97)
MONOCYTES # BLD AUTO: 0.66 10*3/MM3 (ref 0.1–0.9)
MONOCYTES NFR BLD AUTO: 5.7 % (ref 5–12)
NEUTROPHILS NFR BLD AUTO: 75.2 % (ref 42.7–76)
NEUTROPHILS NFR BLD AUTO: 8.77 10*3/MM3 (ref 1.7–7)
NRBC BLD AUTO-RTO: 0 /100 WBC (ref 0–0.2)
NT-PROBNP SERPL-MCNC: <36 PG/ML (ref 0–450)
PLATELET # BLD AUTO: 342 10*3/MM3 (ref 140–450)
PMV BLD AUTO: 9.1 FL (ref 6–12)
POTASSIUM SERPL-SCNC: 3.7 MMOL/L (ref 3.5–5.2)
PROT SERPL-MCNC: 7.9 G/DL (ref 6–8.5)
QT INTERVAL: 378 MS
QTC INTERVAL: 459 MS
RBC # BLD AUTO: 5.12 10*6/MM3 (ref 3.77–5.28)
SODIUM SERPL-SCNC: 136 MMOL/L (ref 136–145)
T4 FREE SERPL-MCNC: 1.39 NG/DL (ref 0.92–1.68)
TROPONIN T SERPL HS-MCNC: <6 NG/L
TSH SERPL DL<=0.05 MIU/L-ACNC: 6.56 UIU/ML (ref 0.27–4.2)
WBC NRBC COR # BLD AUTO: 11.67 10*3/MM3 (ref 3.4–10.8)
WHOLE BLOOD HOLD COAG: NORMAL
WHOLE BLOOD HOLD SPECIMEN: NORMAL

## 2024-05-05 PROCEDURE — 99284 EMERGENCY DEPT VISIT MOD MDM: CPT

## 2024-05-05 PROCEDURE — 83880 ASSAY OF NATRIURETIC PEPTIDE: CPT | Performed by: EMERGENCY MEDICINE

## 2024-05-05 PROCEDURE — 85025 COMPLETE CBC W/AUTO DIFF WBC: CPT | Performed by: EMERGENCY MEDICINE

## 2024-05-05 PROCEDURE — 84484 ASSAY OF TROPONIN QUANT: CPT | Performed by: EMERGENCY MEDICINE

## 2024-05-05 PROCEDURE — 80053 COMPREHEN METABOLIC PANEL: CPT | Performed by: EMERGENCY MEDICINE

## 2024-05-05 PROCEDURE — 93005 ELECTROCARDIOGRAM TRACING: CPT | Performed by: EMERGENCY MEDICINE

## 2024-05-05 PROCEDURE — 84443 ASSAY THYROID STIM HORMONE: CPT | Performed by: EMERGENCY MEDICINE

## 2024-05-05 PROCEDURE — 71045 X-RAY EXAM CHEST 1 VIEW: CPT

## 2024-05-05 PROCEDURE — 84439 ASSAY OF FREE THYROXINE: CPT | Performed by: EMERGENCY MEDICINE

## 2024-05-05 PROCEDURE — 83735 ASSAY OF MAGNESIUM: CPT | Performed by: EMERGENCY MEDICINE

## 2024-05-05 RX ORDER — SODIUM CHLORIDE 0.9 % (FLUSH) 0.9 %
10 SYRINGE (ML) INJECTION AS NEEDED
Status: DISCONTINUED | OUTPATIENT
Start: 2024-05-05 | End: 2024-05-05 | Stop reason: HOSPADM

## 2024-05-06 ENCOUNTER — TELEPHONE (OUTPATIENT)
Dept: CARDIOLOGY | Facility: HOSPITAL | Age: 39
End: 2024-05-06
Payer: COMMERCIAL

## 2024-05-07 ENCOUNTER — OFFICE VISIT (OUTPATIENT)
Dept: CARDIOLOGY | Facility: CLINIC | Age: 39
End: 2024-05-07
Payer: COMMERCIAL

## 2024-05-07 VITALS
SYSTOLIC BLOOD PRESSURE: 152 MMHG | BODY MASS INDEX: 33.31 KG/M2 | DIASTOLIC BLOOD PRESSURE: 78 MMHG | HEIGHT: 63 IN | OXYGEN SATURATION: 97 % | WEIGHT: 188 LBS

## 2024-05-07 DIAGNOSIS — R00.2 PALPITATIONS: ICD-10-CM

## 2024-05-07 DIAGNOSIS — R55 VASOVAGAL SYNCOPE: Primary | ICD-10-CM

## 2024-05-07 RX ORDER — ACETAMINOPHEN 80 MG/1
80 TABLET, CHEWABLE ORAL EVERY 4 HOURS PRN
COMMUNITY

## 2024-05-13 ENCOUNTER — TREATMENT (OUTPATIENT)
Dept: PHYSICAL THERAPY | Facility: CLINIC | Age: 39
End: 2024-05-13
Payer: COMMERCIAL

## 2024-05-13 DIAGNOSIS — M25.561 ACUTE PAIN OF RIGHT KNEE: Primary | ICD-10-CM

## 2024-05-13 PROCEDURE — 97140 MANUAL THERAPY 1/> REGIONS: CPT | Performed by: PHYSICAL THERAPIST

## 2024-05-13 PROCEDURE — 97530 THERAPEUTIC ACTIVITIES: CPT | Performed by: PHYSICAL THERAPIST

## 2024-05-13 PROCEDURE — 97110 THERAPEUTIC EXERCISES: CPT | Performed by: PHYSICAL THERAPIST

## 2024-05-13 NOTE — PROGRESS NOTES
Physical Therapy Daily Progress Note    Patient: Angelika Vick   : 1985  Diagnosis/ICD-10 Code:  Acute pain of right knee [M25.561]  Referring practitioner: Daniel Metz DO  Date of Initial Visit: Type: THERAPY  Noted: 2024  Today's Date: 2024  Patient seen for 18 sessions         Angelika Vick reports that her knee is not doing well.  Locked out 2 weeks ago.  Notes that she was swinging her legs when hanging from the monkey bars.  She went to the ED and had the knee straightened with brace application thereafter.  Visited with orthopaedic surgery and is scheduled for an MRI in .        Subjective     Objective   See Exercise, Manual, and Modality Logs for complete treatment.     *AROM knee ext/flx:  5-0-135  *(+) tenderness along proximal patellar tendon    Assessment/Plan  Mrs. Vick has attended physical therapy for a total of 18 visits s/p R knee injury in January.  Also, reinjured the right knee 2 weeks ago. Has full knee mobility today.  Has no pain with knee loading exercises.  Provided instruction on gym routine to improve posterior thigh and hip strengthening.  Will f/u next week.           Manual Therapy:    9     mins  79377;  Therapeutic Exercise:    20     mins  59524;     Neuromuscular Angelika:        mins  03147;    Therapeutic Activity:     10     mins  40443;     Gait Training:           mins  37896;     Ultrasound:          mins  49090;    Electrical Stimulation:        mins  61770 ( );  Dry Needling          mins self-pay    Timed Treatment:   39   mins   Total Treatment:     39   mins    Ayo Up PT  Physical Therapist

## 2024-05-14 LAB
QT INTERVAL: 378 MS
QTC INTERVAL: 459 MS

## 2024-05-20 ENCOUNTER — OFFICE VISIT (OUTPATIENT)
Age: 39
End: 2024-05-20
Payer: COMMERCIAL

## 2024-05-20 ENCOUNTER — OFFICE VISIT (OUTPATIENT)
Dept: PSYCHIATRY | Facility: CLINIC | Age: 39
End: 2024-05-20
Payer: COMMERCIAL

## 2024-05-20 VITALS
DIASTOLIC BLOOD PRESSURE: 86 MMHG | HEIGHT: 63 IN | BODY MASS INDEX: 34.55 KG/M2 | HEART RATE: 69 BPM | OXYGEN SATURATION: 98 % | SYSTOLIC BLOOD PRESSURE: 124 MMHG | WEIGHT: 195 LBS

## 2024-05-20 VITALS
HEART RATE: 67 BPM | DIASTOLIC BLOOD PRESSURE: 86 MMHG | SYSTOLIC BLOOD PRESSURE: 112 MMHG | WEIGHT: 195.4 LBS | HEIGHT: 63 IN | TEMPERATURE: 98.3 F | OXYGEN SATURATION: 98 % | BODY MASS INDEX: 34.62 KG/M2

## 2024-05-20 DIAGNOSIS — Z13.39 ATTENTION DEFICIT HYPERACTIVITY DISORDER EVALUATION: Primary | ICD-10-CM

## 2024-05-20 DIAGNOSIS — J01.10 ACUTE NON-RECURRENT FRONTAL SINUSITIS: ICD-10-CM

## 2024-05-20 DIAGNOSIS — J02.9 SORE THROAT: Primary | ICD-10-CM

## 2024-05-20 LAB
EXPIRATION DATE: NORMAL
INTERNAL CONTROL: NORMAL
Lab: NORMAL
S PYO AG THROAT QL: NEGATIVE

## 2024-05-20 PROCEDURE — 90792 PSYCH DIAG EVAL W/MED SRVCS: CPT | Performed by: NURSE PRACTITIONER

## 2024-05-20 PROCEDURE — 99213 OFFICE O/P EST LOW 20 MIN: CPT | Performed by: FAMILY MEDICINE

## 2024-05-20 PROCEDURE — 87880 STREP A ASSAY W/OPTIC: CPT | Performed by: FAMILY MEDICINE

## 2024-05-20 RX ORDER — PREDNISONE 5 MG/1
1 TABLET ORAL TAKE AS DIRECTED
Qty: 21 TABLET | Refills: 0 | Status: SHIPPED | OUTPATIENT
Start: 2024-05-20

## 2024-05-20 RX ORDER — AMOXICILLIN AND CLAVULANATE POTASSIUM 875; 125 MG/1; MG/1
1 TABLET, FILM COATED ORAL 2 TIMES DAILY
Qty: 14 TABLET | Refills: 0 | Status: SHIPPED | OUTPATIENT
Start: 2024-05-20

## 2024-05-20 NOTE — PROGRESS NOTES
Follow Up Office Visit      Date: 2024   Patient Name: Angelika Vick  : 1985   MRN: [id number]     Chief Complaint:    Chief Complaint   Patient presents with    Sore Throat     Coughing           History of Present Illness: Angelika Vick is a 38 y.o. female who is here today for coughing, sore throat.  Sxs have been going on for about 4 days at this point    States that her son had strep.    Rashes with little bumps that look like blisters that pop up when she goes out into the sun.  States that it is mostly just arms and legs but hands are the worse spot.  Has been noticing it for years but seems to be getting worse.     Subjective      Review of Systems:   Review of Systems   HENT:  Positive for sore throat.    Respiratory:  Positive for cough.        I have reviewed the patients family history, social history, past medical history, past surgical history and have updated it as appropriate.     Medications:     Current Outpatient Medications:     acetaminophen (TYLENOL) 500 MG tablet, Take 80 mg by mouth Every 4 (Four) Hours As Needed for Mild Pain., Disp: , Rfl:     levothyroxine (SYNTHROID, LEVOTHROID) 125 MCG tablet, Take 1 tablet by mouth Daily., Disp: 30 tablet, Rfl: 5    amoxicillin-clavulanate (AUGMENTIN) 875-125 MG per tablet, Take 1 tablet by mouth 2 (Two) Times a Day., Disp: 14 tablet, Rfl: 0    fluconazole (Diflucan) 100 MG tablet, Take 1 tablet by mouth Daily., Disp: 1 tablet, Rfl: 1    metoprolol tartrate (LOPRESSOR) 25 MG tablet, 1/2 -1 tablet po bid prn for pvcs, fast heart beat, Disp: 60 tablet, Rfl: 3    predniSONE 5 MG (21) tablet therapy pack dose pack, Take 1 tablet by mouth Take As Directed. Take as directed on package instructions., Disp: 21 tablet, Rfl: 0    Allergies:   Allergies   Allergen Reactions    Dermagel Hand  [Alcohol] Rash       Objective     Physical Exam: Please see above  Vital Signs:   Vitals:    24 0827   BP: 112/86   Pulse: 67  "  Temp: 98.3 °F (36.8 °C)   SpO2: 98%   Weight: 88.6 kg (195 lb 6.4 oz)   Height: 160 cm (63\")     Body mass index is 34.61 kg/m².    Physical Exam    Procedures    Results:   Labs:   Hemoglobin A1C   Date Value Ref Range Status   06/10/2022 5.00 4.80 - 5.60 % Final     TSH   Date Value Ref Range Status   05/05/2024 6.560 (H) 0.270 - 4.200 uIU/mL Final        POCT Results (if applicable):   Results for orders placed or performed in visit on 05/20/24   POCT rapid strep A    Specimen: Swab   Result Value Ref Range    Rapid Strep A Screen Negative Negative, VALID, INVALID, Not Performed    Internal Control Passed Passed    Lot Number [id number]     Expiration Date 07/27/2024        Imaging:   No valid procedures specified.         Assessment / Plan      Assessment/Plan:   Diagnoses and all orders for this visit:    1. Sore throat (Primary)  -     POCT rapid strep A    2. Acute non-recurrent frontal sinusitis  -     amoxicillin-clavulanate (AUGMENTIN) 875-125 MG per tablet; Take 1 tablet by mouth 2 (Two) Times a Day.  Dispense: 14 tablet; Refill: 0  -     predniSONE 5 MG (21) tablet therapy pack dose pack; Take 1 tablet by mouth Take As Directed. Take as directed on package instructions.  Dispense: 21 tablet; Refill: 0               Vaccine Counseling:      Follow Up:   No follow-ups on file.      Daniel Metz, DO   [facility]  "
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
## 2024-05-21 DIAGNOSIS — E03.9 HYPOTHYROIDISM, UNSPECIFIED TYPE: Primary | ICD-10-CM

## 2024-05-21 RX ORDER — FLUCONAZOLE 100 MG/1
100 TABLET ORAL DAILY
Qty: 1 TABLET | Refills: 1 | Status: SHIPPED | OUTPATIENT
Start: 2024-05-21

## 2024-05-23 ENCOUNTER — TELEMEDICINE (OUTPATIENT)
Dept: CARDIOLOGY | Facility: HOSPITAL | Age: 39
End: 2024-05-23
Payer: COMMERCIAL

## 2024-05-23 VITALS — HEIGHT: 63 IN | WEIGHT: 195 LBS | BODY MASS INDEX: 34.55 KG/M2 | HEART RATE: 75 BPM

## 2024-05-23 DIAGNOSIS — I49.3 PVC'S (PREMATURE VENTRICULAR CONTRACTIONS): Primary | ICD-10-CM

## 2024-05-23 DIAGNOSIS — R42 DIZZINESS: ICD-10-CM

## 2024-05-23 DIAGNOSIS — R55 VASOVAGAL SYNCOPE: ICD-10-CM

## 2024-05-23 NOTE — PROGRESS NOTES
"Chief Complaint  Follow-up (Pvcs, syncope )    Subjective    History of Present Illness {CC  Problem List  Visit  Diagnosis   Encounters  Notes  Medications  Labs  Result Review Imaging  Media :23}   You have chosen to receive care through the use of telemedicine. Telemedicine enables health care providers at different locations to provide safe, effective, and convenient care through the use of technology. As with any health care service, there are risks associated with the use of telemedicine, including equipment failure, poor connections, and  issues.    Do you understand the risks and benefits of telemedicine as I have explained them to you? Yes  Have your questions regarding telemedicine been answered? Yes  Do you consent to the use of telemedicine in your medical care today? Yes     History of Present Illness   38 year old female presents for telemedicine visit today for ongoing evaluation of her dizziness, pvcs and syncope. Notes that she had been experiencing dizziness intermittently for the past few weeks but that has improved.  Has a hx of pvcs and notes that she has been experiencing those intermittently.  Blood pressure well-controlled 120s to 130s systolic.  Heart rate usually 70s to 80s however most recently has increased to the 90s.  History of mixed hyperlipidemia, hypothyroidism due to Hashimoto's thyroiditis, GERD, depression. Did have 2 sinus pauses on cardiac event monitor on 4/28/24 with the first lasting 4.5 seconds and the second one lasting 5 seconds. She has been evaluated by Dr Maldonado and he notes that those pauses were in setting of vasovagal syncope. She reports that she has returned to the gym and has only had one episode of mild dizziness. Notes she is still experiencing intermittent palpitations.   Objective     Vital Signs:   Vitals:    05/23/24 0845   Pulse: 75   Weight: 88.5 kg (195 lb)   Height: 160 cm (63\")     Body mass index is 34.54 kg/m².  Physical " Exam  Vitals and nursing note reviewed.   Constitutional:       Appearance: Normal appearance.   HENT:      Head: Normocephalic.   Pulmonary:      Effort: Pulmonary effort is normal.   Neurological:      Mental Status: She is alert and oriented to person, place, and time.   Psychiatric:         Mood and Affect: Mood normal.         Behavior: Behavior normal.         Thought Content: Thought content normal.              Result Review  Data Reviewed:{ Labs  Result Review  Imaging  Med Tab  Media :23}   Cardiac Event Monitor (06/22/2022 13:04)  Adult Transthoracic Echo Complete W/ Cont if Necessary Per Protocol (06/22/2022 13:42)  CBC & Differential (09/27/2023 09:00)  Comprehensive Metabolic Panel (09/27/2023 09:00)  T4, Free (10/17/2023 09:55)  TSH (10/17/2023 09:55)  T3 (10/17/2023 09:55)      Cardiac event monitor showed an avg hr of 75 bpm,minimum of 33 bpm and max of 152 bpm  Pac burden 2%, pvc burden 1% occasional ST  2 pauses 4.5, 5 seconds noted on 4/28 in setting of vasovagal syncope      Assessment and Plan {CC Problem List  Visit Diagnosis  ROS  Review (Popup)  Health Maintenance  Quality  BestPractice  Medications  SmartSets  SnapShot Encounters  Media :23}   1. PVC's (premature ventricular contractions)    Low burden on recent extended holter  May use metoprolol tartrate 12.5-25 mg prn bid     2. Dizziness  Encouraged good hydration  Monitor bp closely       3.vasovagal syncope   Encouraged good hydration    Follow Up {Instructions Charge Capture  Follow-up Communications :23}   Return if symptoms worsen or fail to improve.    Patient was given instructions and counseling regarding her condition or for health maintenance advice. Please see specific information pulled into the AVS if appropriate.  Patient was instructed to call the [facility] with any questions, concerns, or worsening symptoms.

## 2024-05-30 ENCOUNTER — TREATMENT (OUTPATIENT)
Dept: PHYSICAL THERAPY | Facility: CLINIC | Age: 39
End: 2024-05-30
Payer: COMMERCIAL

## 2024-05-30 DIAGNOSIS — M25.561 ACUTE PAIN OF RIGHT KNEE: Primary | ICD-10-CM

## 2024-05-30 PROCEDURE — 97110 THERAPEUTIC EXERCISES: CPT | Performed by: PHYSICAL THERAPIST

## 2024-05-30 PROCEDURE — 97112 NEUROMUSCULAR REEDUCATION: CPT | Performed by: PHYSICAL THERAPIST

## 2024-05-30 NOTE — PROGRESS NOTES
Physical Therapy Daily Treatment Note      Visit #: 19    Angelika Vick reports 0/10 pain today at rest.  Pt reports that she had pain for 4-5 days following her knee locking about 2 weeks ago. Pt reports that she is feeling good today. Pt reports that she scored 8/9 on EDS testing and her doctor thinks she has EDS.         Objective Pt present to PT today with no distress at rest.     Pt with no mobility deficits in the R knee.     Pt with no discomfort or guarding with PF mobs.       See Exercise, Manual, and Modality Logs for complete treatment.     Assessment/Plan  Pt continues to have weakness and instability in the R knee. Pt to follow up with PT again after vacation next week.       Progress per Plan of Care      Visit Diagnosis:    ICD-10-CM ICD-9-CM   1. Acute pain of right knee  M25.561 719.46              Timed Treatment:  Manual Therapy:         mins  81389;  Therapeutic Exercise:    12     mins  17695;     Neuromuscular Angelika:    11    mins  64481;    Therapeutic Activity:          mins  55706;     Gait Training:           mins  89238;     Ultrasound:          mins  33452;    Electrical Stimulation:         mins  52890 ( );  Dry Needling          mins self-pay  Iontophoresis          mins 49133    Untimed Treatments:  Electrical Stimulation:         mins  93337 ( );  Dry Needling:                     mins  Ultrasound:                         mins  12172;        Timed Treatment:   23   mins   Total Treatment:     58   mins    Hayder Arzola, PT  Physical Therapist

## 2024-06-10 ENCOUNTER — TELEPHONE (OUTPATIENT)
Dept: PHYSICAL THERAPY | Facility: CLINIC | Age: 39
End: 2024-06-10

## 2024-06-10 NOTE — TELEPHONE ENCOUNTER
Caller: Angelika Vick    Relationship: Self         What was the call regarding: CAN NOT MAKE IT IN

## 2024-06-11 ENCOUNTER — OFFICE VISIT (OUTPATIENT)
Age: 39
End: 2024-06-11
Payer: COMMERCIAL

## 2024-06-11 VITALS
HEIGHT: 63 IN | WEIGHT: 187.6 LBS | SYSTOLIC BLOOD PRESSURE: 112 MMHG | DIASTOLIC BLOOD PRESSURE: 82 MMHG | OXYGEN SATURATION: 97 % | BODY MASS INDEX: 33.24 KG/M2 | HEART RATE: 85 BPM

## 2024-06-11 DIAGNOSIS — E03.8 HYPOTHYROIDISM DUE TO HASHIMOTO'S THYROIDITIS: Primary | ICD-10-CM

## 2024-06-11 DIAGNOSIS — E06.3 HYPOTHYROIDISM DUE TO HASHIMOTO'S THYROIDITIS: Primary | ICD-10-CM

## 2024-06-11 DIAGNOSIS — E55.9 VITAMIN D DEFICIENCY: ICD-10-CM

## 2024-06-11 PROCEDURE — 99214 OFFICE O/P EST MOD 30 MIN: CPT | Performed by: PHYSICIAN ASSISTANT

## 2024-06-11 RX ORDER — MELOXICAM 10 MG/1
CAPSULE ORAL
COMMUNITY

## 2024-06-11 RX ORDER — LEVOTHYROXINE SODIUM 137 UG/1
137 TABLET ORAL DAILY
Qty: 30 TABLET | Refills: 2 | Status: SHIPPED | OUTPATIENT
Start: 2024-06-11

## 2024-06-11 NOTE — ASSESSMENT & PLAN NOTE
Possible contributor to chronic fatigue.  Advise restarting supplement vitamin D 25 mcg daily.  Recheck level in future labs.

## 2024-06-11 NOTE — ASSESSMENT & PLAN NOTE
Increased TSH 6.2 earlier this month with worsening fatigue, difficulty sleeping, appetite changes. Denies missed doses of medication.  Discussed possible weight fluctuation contributor.   Discussed monitoring vs increase levothyroxine 137 mcg - patient would like to see if improvement with dose increase.  Advised repeat TFT in 6 weeks for monitoring, contact office if concerns with dose change.  Not currently planning pregnancy - discussed need for T4 dose adjustments during times of pregnancy and advised to contact office if positive pregnancy test in future.

## 2024-06-17 RX ORDER — FLUCONAZOLE 100 MG/1
100 TABLET ORAL DAILY
Qty: 1 TABLET | Refills: 0 | Status: SHIPPED | OUTPATIENT
Start: 2024-06-17

## 2024-06-18 ENCOUNTER — HOSPITAL ENCOUNTER (OUTPATIENT)
Dept: MRI IMAGING | Facility: HOSPITAL | Age: 39
Discharge: HOME | End: 2024-06-18
Admitting: STUDENT IN AN ORGANIZED HEALTH CARE EDUCATION/TRAINING PROGRAM
Payer: COMMERCIAL

## 2024-06-18 DIAGNOSIS — M23.91 KNEE LOCKING, RIGHT: ICD-10-CM

## 2024-06-18 PROCEDURE — 73721 MRI JNT OF LWR EXTRE W/O DYE: CPT

## 2024-06-19 ENCOUNTER — TELEPHONE (OUTPATIENT)
Dept: ORTHOPEDIC SURGERY | Facility: CLINIC | Age: 39
End: 2024-06-19
Payer: COMMERCIAL

## 2024-06-19 NOTE — TELEPHONE ENCOUNTER
"Relay     \"Your MRI was normal and did not reveal any acute or degenerative abnormalities. You should f/u with Lenin in office if you continue to have knee pain.  \"                "

## 2024-06-19 NOTE — TELEPHONE ENCOUNTER
Patient returned call and message was relayed. Patient stated she was still having pain and had an appt on Friday.  She would like to keep that appt to discuss further treatment options.

## 2024-06-21 ENCOUNTER — TREATMENT (OUTPATIENT)
Dept: PHYSICAL THERAPY | Facility: CLINIC | Age: 39
End: 2024-06-21
Payer: COMMERCIAL

## 2024-06-21 ENCOUNTER — OFFICE VISIT (OUTPATIENT)
Dept: ORTHOPEDIC SURGERY | Facility: CLINIC | Age: 39
End: 2024-06-21
Payer: COMMERCIAL

## 2024-06-21 VITALS — TEMPERATURE: 98.4 F | HEIGHT: 63 IN | BODY MASS INDEX: 33.13 KG/M2 | WEIGHT: 187 LBS

## 2024-06-21 DIAGNOSIS — M25.561 PATELLOFEMORAL INSTABILITY OF RIGHT KNEE WITH PAIN: Primary | ICD-10-CM

## 2024-06-21 DIAGNOSIS — M22.41 CHONDROMALACIA OF RIGHT PATELLA: ICD-10-CM

## 2024-06-21 DIAGNOSIS — M25.561 ACUTE PAIN OF RIGHT KNEE: Primary | ICD-10-CM

## 2024-06-21 DIAGNOSIS — M23.91 KNEE LOCKING, RIGHT: ICD-10-CM

## 2024-06-21 DIAGNOSIS — G89.29 CHRONIC PAIN OF RIGHT KNEE: ICD-10-CM

## 2024-06-21 DIAGNOSIS — M25.561 CHRONIC PAIN OF RIGHT KNEE: ICD-10-CM

## 2024-06-21 DIAGNOSIS — M25.361 PATELLOFEMORAL INSTABILITY OF RIGHT KNEE WITH PAIN: Primary | ICD-10-CM

## 2024-06-21 PROCEDURE — 97110 THERAPEUTIC EXERCISES: CPT | Performed by: PHYSICAL THERAPIST

## 2024-06-21 PROCEDURE — 97530 THERAPEUTIC ACTIVITIES: CPT | Performed by: PHYSICAL THERAPIST

## 2024-06-21 PROCEDURE — 97112 NEUROMUSCULAR REEDUCATION: CPT | Performed by: PHYSICAL THERAPIST

## 2024-06-21 NOTE — PROGRESS NOTES
Office Note     Name: Angelika Vick    : 1985     MRN: [id number]     Chief Complaint  Follow-up of the Right Knee (States she is doing better, she is still doing PT once weekly. She is still having pain in her knee.)    Subjective     History of Present Illness:  Angelika Vick is a 38 y.o. female presenting today for follow-up for right knee pain.  Patient states that she is doing better though she does continue to have pain, apprehension and mild instability in the peripatellar region.  She is currently in physical therapy and physical therapy is going well.  She denies any exacerbations since her previous visit with me.  She is also here to follow-up with MRI results, those are shown below.  Furthermore she states the cortisone injection given her in April significantly helped her pain.  She states she does not need another at this time but may need another in the future.    Review of Systems   Constitutional:  Negative for fever.   HENT:  Negative for dental problem and voice change.    Eyes:  Negative for visual disturbance.   Respiratory:  Negative for shortness of breath.    Cardiovascular:  Negative for chest pain.   Gastrointestinal:  Negative for abdominal pain.   Genitourinary:  Negative for dysuria.   Musculoskeletal:  Positive for arthralgias (right knee). Negative for gait problem and joint swelling.   Skin:  Negative for rash.   Neurological:  Negative for speech difficulty.   Hematological:  Does not bruise/bleed easily.   Psychiatric/Behavioral:  Negative for confusion.         Past Medical History:   Diagnosis Date    Acid reflux     Bilateral ovarian cysts     Depression     Gestational diabetes     with 2nd pregnancy    Gestational hypertension     with last pregnancy    Hashimoto's thyroiditis 2018    Hypertension in pregnancy 2012    Hypothyroidism 2018    Migraines     Mixed hyperlipidemia 2019    Polycystic ovary syndrome 2010    Vitamin D deficiency  2024        Past Surgical History:   Procedure Laterality Date    CYST REMOVAL      GANGLION CYST EXCISION Right        Family History   Problem Relation Age of Onset    Arthritis Mother     Hypertension Mother     No Known Problems Father     Thyroid disease Sister     Diabetes Sister     No Known Problems Brother     Diabetes Maternal Grandmother     Diabetes Maternal Grandfather     No Known Problems Paternal Grandmother     No Known Problems Paternal Grandfather     Diabetes Sister     Obesity Sister     Thyroid disease Sister        Social History     Socioeconomic History    Marital status:    Tobacco Use    Smoking status: Former     Current packs/day: 0.00     Average packs/day: 1 pack/day for 11.3 years (11.3 ttl pk-yrs)     Types: Cigarettes     Start date: 2005     Quit date: 10/6/2016     Years since quittin.7     Passive exposure: Past    Smokeless tobacco: Never   Vaping Use    Vaping status: Never Used   Substance and Sexual Activity    Alcohol use: Yes     Alcohol/week: 1.0 - 2.0 standard drink of alcohol     Types: 1 - 2 Drinks containing 0.5 oz of alcohol per week     Comment: Occasionally    Drug use: Yes     Types: Marijuana     Comment: Daily    Sexual activity: Yes     Partners: Male     Birth control/protection: I.U.D.         Current Outpatient Medications:     acetaminophen (TYLENOL) 500 MG tablet, Take 80 mg by mouth Every 4 (Four) Hours As Needed for Mild Pain., Disp: , Rfl:     fluconazole (Diflucan) 100 MG tablet, Take 1 tablet by mouth Daily., Disp: 1 tablet, Rfl: 0    levothyroxine (SYNTHROID, LEVOTHROID) 137 MCG tablet, Take 1 tablet by mouth Daily., Disp: 30 tablet, Rfl: 2    Meloxicam 10 MG capsule, Take  by mouth., Disp: , Rfl:     metoprolol tartrate (LOPRESSOR) 25 MG tablet, 1/2 -1 tablet po bid prn for pvcs, fast heart beat, Disp: 60 tablet, Rfl: 3    Allergies   Allergen Reactions    Dermagel Hand  [Alcohol] Rash           Objective   Temp 98.4 °F  "(36.9 °C)   Ht 160 cm (62.99\")   Wt 84.8 kg (187 lb)   BMI 33.13 kg/m²            Physical Exam  Ortho Exam   Extremity DVT signs are negative by clinical screen.     Independent Review of Radiographic Studies:    2 view plain films of both knees were done in office today for the evaluation of pain and joint condition, comparison views available.  There are no acute osseous abnormalities noted.  The right knee does reveal mild patellofemoral joint space narrowing without osteophyte formation noted.  Otherwise the medial lateral compartments and left patellofemoral compartment is preserved.  There does appear to be mild patella chester.    Procedures    Assessment and Plan   Diagnoses and all orders for this visit:    1. Patellofemoral instability of right knee with pain (Primary)  -     Ambulatory Referral to Physical Therapy    2. Knee locking, right  -     XR Knee 1 or 2 View Bilateral  -     Ambulatory Referral to Physical Therapy    3. Chronic pain of right knee  -     XR Knee 1 or 2 View Bilateral    4. Chondromalacia of right patella  -     Ambulatory Referral to Physical Therapy       Discussion of orthopedic goals  Orthopedic activities reviewed and patient expressed appreciation  Regular exercise as tolerated  Risk, benefits, and merits of treatment alternatives reviewed with the patient and questions answered  Patient guided on mobility and conditioning exercises  Ice, heat, and/or modalities as beneficial  Elevate leg for residual swelling  Physical therapy ongoing  Reduced physical activity as appropriate and avoid offending activity  Use brace as instructed  Counseling on diet, nutrition, fitness exercise, and weight reduction goals    Recommendations/Plan:  Exercise, medications, injections, other patient advice, and return appointment as noted.  Patient is encouraged to call or return for any issues or concerns.    Patient was advised to continue physical therapy and she was given a new PT order to " focus on quadriceps strengthening and patellofemoral stability.  I advised the use of the patella stabilizing brace in the short-term to improve confidence and provide stabilization although I advised that PT will be what provides significant long-term relief.  I advised that we may repeat cortisone injection in a month or so if she has another symptomatic flare otherwise she should focus on over-the-counter analgesics and physical therapy.  Advise follow-up with me as needed.    Return if symptoms worsen or fail to improve.  Patient agreeable to call or return sooner for any concerns.

## 2024-06-21 NOTE — PROGRESS NOTES
Physical Therapy Daily Treatment Note      Visit #: 20    Angelika Vick reports 2/10 pain today at rest.  Pt reports that her MRI did not show any issues although did show that the inflammation in the front of the knee had resolved. Pt reports that the doctor wants her to work on quad and VMO strengthening.         Objective Pt present to PT today with no distress at rest.     Pt with fatigue although no pain in the R knee with activities.     Pt provided with HEP for gym and the house to help concentrate on knee strengthening and stabilization.      See Exercise, Manual, and Modality Logs for complete treatment.     Assessment/Plan  Pt continues to have some knee pain. Pt and PT discussed return to doing aerial work and PT suggested holding for 4 weeks before returning to allow for strengthening of the knee. Pt to follow up weekly to progress as tolerated.       Progress per Plan of Care      Visit Diagnosis:    ICD-10-CM ICD-9-CM   1. Acute pain of right knee  M25.561 719.46              Timed Treatment:  Manual Therapy:         mins  35734;  Therapeutic Exercise:    20     mins  31486;     Neuromuscular Angelika:    10    mins  17131;    Therapeutic Activity:     8     mins  02392;     Gait Training:           mins  43697;     Ultrasound:          mins  54786;    Electrical Stimulation:         mins  86340 ( );  Dry Needling          mins self-pay  Iontophoresis          mins 87838    Untimed Treatments:  Electrical Stimulation:         mins  70462 ( );  Dry Needling:                     mins  Ultrasound:                         mins  68768;        Timed Treatment:   38   mins   Total Treatment:     55   mins    Hayder Arzola, PT  Physical Therapist

## 2024-06-24 ENCOUNTER — TREATMENT (OUTPATIENT)
Dept: PHYSICAL THERAPY | Facility: CLINIC | Age: 39
End: 2024-06-24
Payer: COMMERCIAL

## 2024-06-24 DIAGNOSIS — M25.561 ACUTE PAIN OF RIGHT KNEE: Primary | ICD-10-CM

## 2024-06-25 NOTE — PROGRESS NOTES
Physical Therapy Daily Treatment Note      Visit #: 21    Angelika Vick reports 0/10 pain today at rest.  Pt reports that her muscles in her knee and glute were sore after last session. Pt reports that she has been feeling good overall and working with the HEP at home.         Objective Pt present to PT today with no distress at rest.     Pt with no increased pain in the R knee although did have reported fatigue.     Pt with no motion limitation or tightness in the quads and hamstrings.       See Exercise, Manual, and Modality Logs for complete treatment.     Assessment/Plan  Pt continues to do well with hip and knee strengthening activities and will continue with PT as tolerated at home and in the clinic for progression of activities.       Progress per Plan of Care      Visit Diagnosis:    ICD-10-CM ICD-9-CM   1. Acute pain of right knee  M25.561 719.46              Timed Treatment:  Manual Therapy:         mins  86013;  Therapeutic Exercise:    13     mins  73111;     Neuromuscular Angelika:    10    mins  43987;    Therapeutic Activity:          mins  80482;     Gait Training:           mins  82132;     Ultrasound:          mins  21528;    Electrical Stimulation:         mins  17142 ( );  Dry Needling          mins self-pay  Iontophoresis          mins 61799    Untimed Treatments:  Electrical Stimulation:         mins  14527 ( );  Dry Needling:                     mins  Ultrasound:                         mins  08193;        Timed Treatment:   23   mins   Total Treatment:     55   mins    Hayder Arzola, PT  Physical Therapist

## 2024-07-01 ENCOUNTER — TREATMENT (OUTPATIENT)
Dept: PHYSICAL THERAPY | Facility: CLINIC | Age: 39
End: 2024-07-01
Payer: COMMERCIAL

## 2024-07-01 ENCOUNTER — TELEPHONE (OUTPATIENT)
Dept: CARDIOLOGY | Facility: HOSPITAL | Age: 39
End: 2024-07-01
Payer: COMMERCIAL

## 2024-07-01 DIAGNOSIS — M25.561 ACUTE PAIN OF RIGHT KNEE: Primary | ICD-10-CM

## 2024-07-01 NOTE — TELEPHONE ENCOUNTER
----- Message from Kathryn Butler sent at 7/1/2024 12:40 PM EDT -----  She can take another dose in 1 hour.  ----- Message -----  From: Consuelo Marvin MA  Sent: 7/1/2024  12:39 PM EDT  To: DELISA Perla    Pt called and stated that she took 1/2 of a metoprolol and still feeling some pvc's and wants to know how long in between metoprolol should she wait before taking another half. Her heart rate was 74.

## 2024-07-01 NOTE — PROGRESS NOTES
Physical Therapy Daily Treatment Note      Visit #: 22    Angelika Vick reports 0/10 pain today at rest.  Pt reports that her R knee is doing well and not having pain. Pt states that she has not been able to get to the gym much this past week.         Objective Pt present to PT today with no distress at rest.     Pt with no increased pain in the R knee with activities today.       See Exercise, Manual, and Modality Logs for complete treatment.     Assessment/Plan  Pt to continue with strength and stabilization activities in the clinic and at home to help improve R knee function without pain. Pt to follow up again next week.       Progress per Plan of Care      Visit Diagnosis:    ICD-10-CM ICD-9-CM   1. Acute pain of right knee  M25.561 719.46              Timed Treatment:  Manual Therapy:         mins  98373;  Therapeutic Exercise:    27     mins  12986;     Neuromuscular Angelika:    12    mins  12722;    Therapeutic Activity:          mins  95937;     Gait Training:           mins  05112;     Ultrasound:          mins  72300;    Electrical Stimulation:         mins  47466 ( );  Dry Needling          mins self-pay  Iontophoresis          mins 40141    Untimed Treatments:  Electrical Stimulation:         mins  68964 (MC );  Dry Needling:                     mins  Ultrasound:                         mins  54597;        Timed Treatment:   39   mins   Total Treatment:     47   mins    Hayder Arzola, PT  Physical Therapist

## 2024-07-02 ENCOUNTER — TELEPHONE (OUTPATIENT)
Dept: CARDIOLOGY | Facility: HOSPITAL | Age: 39
End: 2024-07-02
Payer: COMMERCIAL

## 2024-07-02 NOTE — TELEPHONE ENCOUNTER
Patient would like to know if she can take Ulsom to help her sleep at night with her other medications.

## 2024-07-11 ENCOUNTER — OFFICE VISIT (OUTPATIENT)
Age: 39
End: 2024-07-11
Payer: COMMERCIAL

## 2024-07-11 VITALS
HEIGHT: 63 IN | BODY MASS INDEX: 33.38 KG/M2 | DIASTOLIC BLOOD PRESSURE: 78 MMHG | OXYGEN SATURATION: 98 % | HEART RATE: 77 BPM | WEIGHT: 188.4 LBS | SYSTOLIC BLOOD PRESSURE: 110 MMHG | TEMPERATURE: 99.4 F

## 2024-07-11 DIAGNOSIS — E78.2 MIXED HYPERLIPIDEMIA: Primary | ICD-10-CM

## 2024-07-11 PROCEDURE — 99395 PREV VISIT EST AGE 18-39: CPT | Performed by: FAMILY MEDICINE

## 2024-07-11 NOTE — PROGRESS NOTES
Female Physical Note      Date: 2024   Patient Name: Angelika Vick  : 1985   MRN: [id number]     Chief Complaint:    Chief Complaint   Patient presents with    Annual Exam     Physical          History of Present Illness: Angelika Vick is a 38 y.o. female who is here today for their annual health maintenance and physical. Patient overall states she is doing well.  Has been following up with cardiology as well as ortho.      Subjective      Review of Systems:   Review of Systems   Constitutional:  Negative for appetite change and unexpected weight loss.   HENT:  Negative for trouble swallowing.    Eyes:  Negative for blurred vision and double vision.   Respiratory:  Negative for cough and shortness of breath.    Cardiovascular:  Negative for chest pain and leg swelling.   Gastrointestinal:  Negative for blood in stool.   Endocrine: Negative for cold intolerance, heat intolerance and polyuria.   Musculoskeletal:  Negative for joint swelling.   Skin:  Negative for color change and bruise.   Neurological:  Negative for numbness and memory problem.   Hematological:  Does not bruise/bleed easily.   Psychiatric/Behavioral:  Negative for suicidal ideas and depressed mood. The patient is not nervous/anxious.        Past Medical History, Social History, Family History and Care Team were all reviewed with patient and updated as appropriate.     Medications:     Current Outpatient Medications:     acetaminophen (TYLENOL) 500 MG tablet, Take 80 mg by mouth Every 4 (Four) Hours As Needed for Mild Pain., Disp: , Rfl:     levothyroxine (SYNTHROID, LEVOTHROID) 137 MCG tablet, Take 1 tablet by mouth Daily., Disp: 30 tablet, Rfl: 2    metoprolol tartrate (LOPRESSOR) 25 MG tablet, 1/2 -1 tablet po bid prn for pvcs, fast heart beat, Disp: 60 tablet, Rfl: 3    Allergies:   Allergies   Allergen Reactions    Dermagel Hand  [Alcohol] Rash       Immunizations:  Health Maintenance Summary             Overdue - PAP SMEAR (Every 3 Years) Never done      No completion, postpone, or frequency change history exists for this topic.              Overdue - ANNUAL PHYSICAL (Yearly) Overdue since 6/8/2023 06/08/2022  Registry Metric: Last Annual Physical              Ordered - LIPID PANEL (Yearly) Ordered on 7/11/2024      06/10/2022  Lipid Panel    02/03/2021  Lipid Panel    12/05/2018  Lipid Panel              Postponed - COVID-19 Vaccine (3 - 2023-24 season) Postponed until 7/11/2025 07/11/2024  Postponed until 7/11/2025 by Shanna Guo MA (Product Unavailable)    07/06/2021  Imm Admin: COVID-19 (PFIZER) Purple Cap Monovalent    06/14/2021  Imm Admin: COVID-19 (PFIZER) PURPLE CAP              INFLUENZA VACCINE (Yearly - August to March) Next due on 8/1/2024 02/02/2024  Postponed until 3/31/2024 by Cassia Fernandez LPN (Patient Refused)    12/08/2021  Imm Admin: FluLaval/Fluzone >6mos    10/20/2020  Imm Admin: Influenza, Unspecified    10/22/2018  Imm Admin: FluLaval/Fluzone >6mos              BMI FOLLOWUP (Yearly) Next due on 1/3/2025      01/03/2024  [employer]: WORKFLOW - QUALITY MEASUREMENT - DOCUMENTED WEIGHT FOLLOW-UP PLAN    12/08/2021  [employer]: BMI EDUCATION FOR OVERWEIGHT              TDAP/TD VACCINES (2 - Td or Tdap) Next due on 8/31/2027 08/31/2017  Imm Admin: Tdap              HEPATITIS C SCREENING  Completed      02/27/2019  Hepatitis C Antibody    01/09/2017  Hepatitis C Antibody    01/06/2017  Hepatitis C Antibody              Pneumococcal Vaccine 0-64 (Series Information) Aged Out      No completion, postpone, or frequency change history exists for this topic.                     No orders of the defined types were placed in this encounter.       Colorectal Screening:   not due   Last Completed Colonoscopy       This patient has no relevant Health Maintenance data.          Pap:  scheduled   Last Completed Pap Smear       This patient has no relevant Health Maintenance data.  "          Mammogram:  not due   Last Completed Mammogram       This patient has no relevant Health Maintenance data.               Hep C (Age 18-79 once): done  HIV (Age 15-65 once): No results found for: \"HIV1X2\"  A1c:   Hemoglobin A1C   Date Value Ref Range Status   06/10/2022 5.00 4.80 - 5.60 % Final      Lipid panel:  No results found for: \"LIPIDEXCLUSI\"    The ASCVD Risk score (Thom ESPINOSA, et al., 2019) failed to calculate for the following reasons:    The 2019 ASCVD risk score is only valid for ages 40 to 79      Ophthalmologist: up to date  Dentist: up to date    Tobacco Use: Medium Risk (7/11/2024)    Patient History     Smoking Tobacco Use: Former     Smokeless Tobacco Use: Never     Passive Exposure: Past       Social History     Substance and Sexual Activity   Alcohol Use Yes    Alcohol/week: 1.0 - 2.0 standard drink of alcohol    Types: 1 - 2 Drinks containing 0.5 oz of alcohol per week    Comment: Occasionally        Social History     Substance and Sexual Activity   Drug Use Yes    Types: Marijuana    Comment: Daily        Diet/Physical activity:patient is physically active.        Depression: PHQ-2 Depression Screening  PHQ-9 Total Score: 0         Objective     Physical Exam:  Vital Signs:   Vitals:    07/11/24 0828   BP: 110/78   Pulse: 77   Temp: 99.4 °F (37.4 °C)   SpO2: 98%   Weight: 85.5 kg (188 lb 6.4 oz)   Height: 160 cm (62.99\")     Body mass index is 33.38 kg/m².     Physical Exam  Vitals and nursing note reviewed.   Constitutional:       Appearance: Normal appearance.   HENT:      Head: Normocephalic and atraumatic.   Eyes:      General: Lids are normal.      Conjunctiva/sclera: Conjunctivae normal.   Cardiovascular:      Rate and Rhythm: Normal rate and regular rhythm.   Pulmonary:      Effort: Pulmonary effort is normal.      Breath sounds: Normal breath sounds and air entry.   Abdominal:      General: Abdomen is flat. Bowel sounds are normal.      Palpations: Abdomen is soft. "   Musculoskeletal:      Cervical back: Full passive range of motion without pain and normal range of motion.   Neurological:      General: No focal deficit present.      Mental Status: She is alert and oriented to person, place, and time.   Psychiatric:         Attention and Perception: Attention normal.         Mood and Affect: Mood normal.         Behavior: Behavior normal. Behavior is cooperative.         POCT Results (if applicable);   Results for orders placed or performed in visit on 05/20/24   POCT rapid strep A    Specimen: Swab   Result Value Ref Range    Rapid Strep A Screen Negative Negative, VALID, INVALID, Not Performed    Internal Control Passed Passed    Lot Number [id number]     Expiration Date 07/27/2024         Procedures    Assessment / Plan      Assessment/Plan:   Diagnoses and all orders for this visit:    1. Mixed hyperlipidemia (Primary)  -     Lipid panel; Future         Healthcare Maintenance:  Counseling provided based on age appropriate USPSTF guidelines.       Angelika Vick voices understanding and acceptance of this advice and will call back with any further questions or concerns. AVS with preventive healthcare tips printed for patient.     Vaccine Counseling:      Follow Up:   Return in about 1 year (around 7/11/2025) for Annual physical.    I have spent a total of 30 min on reviewing test results/preparing to see patient, counseling patient, performing medically appropriate exam and documenting clinical information in the electronic or other health record.     Daniel Metz, DO  [facility] [employer] [facility]

## 2024-07-15 ENCOUNTER — TREATMENT (OUTPATIENT)
Dept: PHYSICAL THERAPY | Facility: CLINIC | Age: 39
End: 2024-07-15
Payer: COMMERCIAL

## 2024-07-15 DIAGNOSIS — M25.561 ACUTE PAIN OF RIGHT KNEE: Primary | ICD-10-CM

## 2024-07-16 NOTE — PROGRESS NOTES
Physical Therapy Daily Treatment Note      Visit #: 23    Angelika Vick reports 0/10 pain today at rest.  Pt reports that she went on a 5 mile hike without pain in the R knee. Pt reports that she has been doing well and has been doing her exercises to strengthen the R knee.         Objective Pt present to PT today with no distress at rest.     Pt with no increased pain in the R knee with activities in the clinic to help improve R knee stability and strength.       See Exercise, Manual, and Modality Logs for complete treatment.     Assessment/Plan  Pt to continue with PT through next week and then return to aerial exercise slowly. Pt to follow up next week and continue with strengthening as tolerated.       Progress per Plan of Care      Visit Diagnosis:    ICD-10-CM ICD-9-CM   1. Acute pain of right knee  M25.561 719.46              Timed Treatment:  Manual Therapy:         mins  47922;  Therapeutic Exercise:    13     mins  52752;     Neuromuscular Angelika:    10    mins  31608;    Therapeutic Activity:          mins  27156;     Gait Training:           mins  55773;     Ultrasound:          mins  22332;    Electrical Stimulation:         mins  05832 ( );  Dry Needling          mins self-pay  Iontophoresis          mins 40121    Untimed Treatments:  Electrical Stimulation:         mins  68529 (MC );  Dry Needling:                     mins  Ultrasound:                         mins  98746;        Timed Treatment:   23   mins   Total Treatment:     49   mins    Hayder Arzola, PT  Physical Therapist

## 2024-07-22 ENCOUNTER — TREATMENT (OUTPATIENT)
Dept: PHYSICAL THERAPY | Facility: CLINIC | Age: 39
End: 2024-07-22
Payer: COMMERCIAL

## 2024-07-22 DIAGNOSIS — M25.561 ACUTE PAIN OF RIGHT KNEE: Primary | ICD-10-CM

## 2024-07-23 ENCOUNTER — OFFICE VISIT (OUTPATIENT)
Age: 39
End: 2024-07-23
Payer: COMMERCIAL

## 2024-07-23 ENCOUNTER — LAB (OUTPATIENT)
Dept: LAB | Facility: HOSPITAL | Age: 39
End: 2024-07-23
Payer: COMMERCIAL

## 2024-07-23 VITALS
BODY MASS INDEX: 33.77 KG/M2 | DIASTOLIC BLOOD PRESSURE: 76 MMHG | WEIGHT: 190.6 LBS | OXYGEN SATURATION: 98 % | HEART RATE: 65 BPM | HEIGHT: 63 IN | SYSTOLIC BLOOD PRESSURE: 112 MMHG

## 2024-07-23 DIAGNOSIS — E78.2 MIXED HYPERLIPIDEMIA: ICD-10-CM

## 2024-07-23 DIAGNOSIS — E55.9 VITAMIN D DEFICIENCY: ICD-10-CM

## 2024-07-23 DIAGNOSIS — E06.3 HYPOTHYROIDISM DUE TO HASHIMOTO'S THYROIDITIS: ICD-10-CM

## 2024-07-23 DIAGNOSIS — E03.8 HYPOTHYROIDISM DUE TO HASHIMOTO'S THYROIDITIS: Primary | ICD-10-CM

## 2024-07-23 DIAGNOSIS — E03.8 HYPOTHYROIDISM DUE TO HASHIMOTO'S THYROIDITIS: ICD-10-CM

## 2024-07-23 DIAGNOSIS — E06.3 HYPOTHYROIDISM DUE TO HASHIMOTO'S THYROIDITIS: Primary | ICD-10-CM

## 2024-07-23 LAB
25(OH)D3 SERPL-MCNC: 38.1 NG/ML (ref 30–100)
CHOLEST SERPL-MCNC: 177 MG/DL (ref 0–200)
HDLC SERPL-MCNC: 59 MG/DL (ref 40–60)
LDLC SERPL CALC-MCNC: 103 MG/DL (ref 0–100)
LDLC/HDLC SERPL: 1.73 {RATIO}
T3 SERPL-MCNC: 101 NG/DL (ref 80–200)
T4 FREE SERPL-MCNC: 1.56 NG/DL (ref 0.92–1.68)
TRIGL SERPL-MCNC: 81 MG/DL (ref 0–150)
TSH SERPL DL<=0.05 MIU/L-ACNC: 0.55 UIU/ML (ref 0.27–4.2)
VLDLC SERPL-MCNC: 15 MG/DL (ref 5–40)

## 2024-07-23 PROCEDURE — 80061 LIPID PANEL: CPT

## 2024-07-23 PROCEDURE — 84443 ASSAY THYROID STIM HORMONE: CPT

## 2024-07-23 PROCEDURE — 84439 ASSAY OF FREE THYROXINE: CPT

## 2024-07-23 PROCEDURE — 84480 ASSAY TRIIODOTHYRONINE (T3): CPT

## 2024-07-23 PROCEDURE — 99213 OFFICE O/P EST LOW 20 MIN: CPT | Performed by: PHYSICIAN ASSISTANT

## 2024-07-23 PROCEDURE — 36415 COLL VENOUS BLD VENIPUNCTURE: CPT

## 2024-07-23 PROCEDURE — 82306 VITAMIN D 25 HYDROXY: CPT

## 2024-07-23 NOTE — PROGRESS NOTES
"Physical Therapy Daily Treatment Note      Visit #: 24    Angelika Vick reports 0/10 pain today at rest.  Pt reports that she has pain and \"shifting\" in the knee at random times over the last couple days. Pt reports that she had some pain when moving a carpet with her foot and while swimming this past week although her knee feels good whenever her muscles are engaged.         Objective Pt present to PT today with no distress at rest.     Pt with no increased pain in the R knee with strengthening activities today.    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       See Exercise, Manual, and Modality Logs for complete treatment.     Assessment/Plan  Pt to follow up again next week as she returns to more normal activities.       Progress per Plan of Care      Visit Diagnosis:    ICD-10-CM ICD-9-CM   1. Acute pain of right knee  M25.561 719.46              Timed Treatment:  Manual Therapy:         mins  80415;  Therapeutic Exercise:    18     mins  52552;     Neuromuscular Angelika:    12    mins  38796;    Therapeutic Activity:     8     mins  59825;     Gait Training:           mins  82421;     Ultrasound:          mins  72931;    Electrical Stimulation:         mins  47095 ( );  Dry Needling          mins self-pay  Iontophoresis          mins 52063    Untimed Treatments:  Electrical Stimulation:         mins  78882 ( );  Dry Needling:                     mins  Ultrasound:                         mins  02977;        Timed Treatment:   38   mins   Total Treatment:     47   mins    Hayder Arzola, PT  Physical Therapist        "

## 2024-07-23 NOTE — PROGRESS NOTES
"Chief Complaint   Patient presents with    Hypothyroidism      HPI  Angelika Vick is a 38 y.o. female presents today for evaluation of hypothyroidism. She was last seen for this 6/11/2024. Labs from 5/5/2024 TSH: 6.560, FT4: 1.39. Advised increase levothyroxine 137 mcg, vitamin D and B12 supplementation.     Has continued on levothyroxine 137 mcg dose and started vitamin D 1000 IU and B12 500 mcg.    Reported increased PVC/skipped beat few days after last visit. Has been improved over time, did have one other episode about 2 weeks ago short lived. Reports significant improvement in fatigue, appetite, and sleep since last visit- falling asleep fastier and sleeping through night. Denies heat/cold intolerance, anxiety/depression, chest pain, abdominal pain, constipation, edema, changes in hair/nails, numbness/tingling.    LMP: \"2 weeks ago\"  Birth control: has IUD     Past medical history: Hypothyroidism, gestational diabetes     Hypothyroidism:   Diagnosed with Hashimoto's hypothyroidism and has been on thyroid medication since 2018.  Labs from 10/22/2018 thyroid antibody: >600.    Labs from   5/5/2024 TSH: 6.560, FT4: 1.39  10/17/2023 TSH: 4.28 FT4: 1.25, total T3: 98 - increased levothyroxine 125 mcg and continue liothyronine 5 mg.  She reports worsening symptoms after continuing liothyronine and stopped this medication after about a month or so.     Reports since last visit had knee injury - taking Meloxicam daily since 3/2024 daily; has been less active during this time.      - Current regimen includes: levothyroxine 137 mcg daily.   Reports good compliance with levothyroxine and takes it at the same time every day, on an empty stomach, with water.      - Denies thyroid imaging.   - Admits family history of thyroid disease: sister hypothyroidism  - Denies hx of radiation to head/neck.  - Denies taking biotin supplement.   - Denies high iodine diet.       Past Medical History:   Diagnosis Date    Acid reflux  "    Bilateral ovarian cysts     Depression     Gestational diabetes     with 2nd pregnancy    Gestational hypertension     with last pregnancy    Hashimoto's thyroiditis 2018    Hypertension in pregnancy 2012    Hypothyroidism 2018    Migraines     Mixed hyperlipidemia 2019    Polycystic ovary syndrome 2010    Vitamin D deficiency 2024     Past Surgical History:   Procedure Laterality Date    CYST REMOVAL      GANGLION CYST EXCISION Right       Family History   Problem Relation Age of Onset    Arthritis Mother     Hypertension Mother     No Known Problems Father     Thyroid disease Sister     Diabetes Sister     No Known Problems Brother     Diabetes Maternal Grandmother     Diabetes Maternal Grandfather     No Known Problems Paternal Grandmother     No Known Problems Paternal Grandfather     Diabetes Sister     Obesity Sister     Thyroid disease Sister       Social History     Socioeconomic History    Marital status:    Tobacco Use    Smoking status: Former     Current packs/day: 0.00     Average packs/day: 1 pack/day for 11.3 years (11.3 ttl pk-yrs)     Types: Cigarettes     Start date: 2005     Quit date: 10/6/2016     Years since quittin.8     Passive exposure: Past    Smokeless tobacco: Never   Vaping Use    Vaping status: Never Used   Substance and Sexual Activity    Alcohol use: Yes     Alcohol/week: 1.0 - 2.0 standard drink of alcohol     Types: 1 - 2 Drinks containing 0.5 oz of alcohol per week     Comment: Occasionally    Drug use: Yes     Types: Marijuana     Comment: Daily    Sexual activity: Yes     Partners: Male     Birth control/protection: I.U.D.      Allergies   Allergen Reactions    Dermagel Hand  [Alcohol] Rash      Current Outpatient Medications on File Prior to Visit   Medication Sig Dispense Refill    acetaminophen (TYLENOL) 500 MG tablet Take 80 mg by mouth Every 4 (Four) Hours As Needed for Mild Pain.      levothyroxine (SYNTHROID, LEVOTHROID) 137 MCG  "tablet Take 1 tablet by mouth Daily. 30 tablet 2    metoprolol tartrate (LOPRESSOR) 25 MG tablet 1/2 -1 tablet po bid prn for pvcs, fast heart beat 60 tablet 3     No current facility-administered medications on file prior to visit.        Review of Systems   Constitutional:  Positive for fatigue. Negative for activity change, appetite change, unexpected weight gain and unexpected weight loss.   Respiratory:  Negative for shortness of breath.    Cardiovascular:  Negative for chest pain and palpitations.   Gastrointestinal:  Negative for constipation and diarrhea.   Endocrine: Negative for cold intolerance and heat intolerance.   Musculoskeletal:  Negative for myalgias.   Skin:  Negative for dry skin.   Neurological:  Negative for tremors and numbness.   Psychiatric/Behavioral:  Negative for sleep disturbance and depressed mood. The patient is not nervous/anxious.         /76 (BP Location: Right arm, Patient Position: Sitting, Cuff Size: Adult)   Pulse 65   Ht 160 cm (62.99\")   Wt 86.5 kg (190 lb 9.6 oz)   LMP 07/08/2024 (Exact Date)   SpO2 98%   BMI 33.77 kg/m²      Physical Exam  Constitutional:       Appearance: Normal appearance.   Cardiovascular:      Rate and Rhythm: Normal rate.   Pulmonary:      Effort: Pulmonary effort is normal.   Musculoskeletal:         General: Normal range of motion.   Skin:     General: Skin is warm and dry.   Neurological:      General: No focal deficit present.      Mental Status: She is alert and oriented to person, place, and time.   Psychiatric:         Mood and Affect: Mood normal.         Behavior: Behavior normal.         LABS AND IMAGING    CMP  Lab Results   Component Value Date    GLUCOSE 103 (H) 05/05/2024    BUN 14 05/05/2024    CREATININE 0.77 05/05/2024    EGFR 102.0 05/05/2024    BCR 18.2 05/05/2024    K 3.7 05/05/2024    CO2 23.0 05/05/2024    CALCIUM 9.2 05/05/2024    ALBUMIN 4.4 05/05/2024    AST 12 05/05/2024    ALT 12 05/05/2024        CBC w/DIFF   Lab " "Results   Component Value Date    WBC 11.67 (H) 05/05/2024    RBC 5.12 05/05/2024    HGB 14.9 05/05/2024    HCT 44.8 05/05/2024    MCV 87.5 05/05/2024    MCH 29.1 05/05/2024    MCHC 33.3 05/05/2024    RDW 12.2 (L) 05/05/2024    RDWSD 39.4 05/05/2024    MPV 9.1 05/05/2024     05/05/2024    NEUTRORELPCT 75.2 05/05/2024    LYMPHORELPCT 17.9 (L) 05/05/2024    MONORELPCT 5.7 05/05/2024    EOSRELPCT 0.4 05/05/2024    BASORELPCT 0.5 05/05/2024    AUTOIGPER 0.3 05/05/2024    NEUTROABS 8.77 (H) 05/05/2024    LYMPHSABS 2.09 05/05/2024    MONOSABS 0.66 05/05/2024    EOSABS 0.05 05/05/2024    BASOSABS 0.06 05/05/2024    AUTOIGNUM 0.04 05/05/2024    NRBC 0.0 05/05/2024       TSH  Lab Results   Component Value Date    TSH 6.560 (H) 05/05/2024    TSH 4.280 (H) 10/17/2023    TSH 1.150 08/09/2023       T4  Lab Results   Component Value Date    FREET4 1.39 05/05/2024    FREET4 1.25 10/17/2023    FREET4 1.50 08/09/2023     Lab Results   Component Value Date    U1BHXTG 5.00 10/17/2018       T3  No results found for: \"T3FREE\"  Lab Results   Component Value Date    J8CDVFN 98 10/17/2023       TRAb  No results found for: \"TSURCPAB\"    TPO  Lab Results   Component Value Date    THYROIDAB >600 (H) 10/22/2018       No valid procedures specified.    Assessment and Plan    Diagnoses and all orders for this visit:    1. Hypothyroidism due to Hashimoto's thyroiditis (Primary)  Assessment & Plan:  Clinically euthyroid/improvements in symptoms with increased levothyroxine dose.  Repeat TFT for monitoring.  Rx: continue current dose levothyroxine 137 mcg for now.   Discussed possible vitamin deficiencies additional contributor to prior symptoms- continue vitamin D/B12 or consider daily multivitamin.  Discussed possible PVC with iron deficiencies/around time of menses - advised to track symptoms on cherri for possible association. Consider multivitamin/iron supplement if noticing trend around that time.     Orders:  -     TSH  -     T4, Free     "   Return in 6 months (on 1/23/2025) for follow-up thyroid disease. The patient was instructed to contact the clinic with any interval questions or concerns.    Electronically signed by: Edith Carbajal PA-C   Endocrinology    Please note that portions of this note were completed with a voice recognition program.

## 2024-07-23 NOTE — ASSESSMENT & PLAN NOTE
Clinically euthyroid/improvements in symptoms with increased levothyroxine dose.  Repeat TFT for monitoring.  Rx: continue current dose levothyroxine 137 mcg for now.   Discussed possible vitamin deficiencies additional contributor to prior symptoms- continue vitamin D/B12 or consider daily multivitamin.  Discussed possible PVC with iron deficiencies/around time of menses - advised to track symptoms on cherri for possible association. Consider multivitamin/iron supplement if noticing trend around that time.

## 2024-07-24 ENCOUNTER — OFFICE VISIT (OUTPATIENT)
Dept: PSYCHIATRY | Facility: CLINIC | Age: 39
End: 2024-07-24
Payer: COMMERCIAL

## 2024-07-24 VITALS
HEIGHT: 63 IN | SYSTOLIC BLOOD PRESSURE: 113 MMHG | DIASTOLIC BLOOD PRESSURE: 82 MMHG | WEIGHT: 186 LBS | BODY MASS INDEX: 32.96 KG/M2 | HEART RATE: 62 BPM | OXYGEN SATURATION: 98 %

## 2024-07-24 DIAGNOSIS — E06.3 HYPOTHYROIDISM DUE TO HASHIMOTO'S THYROIDITIS: ICD-10-CM

## 2024-07-24 DIAGNOSIS — E03.8 HYPOTHYROIDISM DUE TO HASHIMOTO'S THYROIDITIS: ICD-10-CM

## 2024-07-24 DIAGNOSIS — F90.0 ADHD (ATTENTION DEFICIT HYPERACTIVITY DISORDER), INATTENTIVE TYPE: Primary | Chronic | ICD-10-CM

## 2024-07-24 DIAGNOSIS — F33.0 MILD EPISODE OF RECURRENT MAJOR DEPRESSIVE DISORDER: Chronic | ICD-10-CM

## 2024-07-24 PROCEDURE — 99214 OFFICE O/P EST MOD 30 MIN: CPT | Performed by: NURSE PRACTITIONER

## 2024-07-24 RX ORDER — LEVOTHYROXINE SODIUM 137 UG/1
137 TABLET ORAL DAILY
Qty: 90 TABLET | Refills: 3 | Status: SHIPPED | OUTPATIENT
Start: 2024-07-24

## 2024-07-24 NOTE — PROGRESS NOTES
"Chief Complaint  ADHD and Depression    Subjective          Angelika Vick presents to [facility] for medication management of her ADHD and depression.    History of Present Illness: Patient presents today for follow-up appointment after last being seen on 05/20/2024 for an initial evaluation.  Patient says \"I am pretty good, I am going to a concert tonight\".  Patient says she and her friend are going to see Leilani Mckenzie and Adri Calabrese, she is very excited about that.  Patient says she is also currently participating in physical therapy for her knee.  She says \"I just keep reinjuring it, I have got to get stronger\".  Patient says they took the summer off from home school but will likely look at restarting in the next couple weeks.  Patient says aside from that she has not been doing too much over the summer.  She has continued to struggle with the same difficulties that brought her to her initial evaluation.  She says her memory is still very poor and she continues to struggle with staying organized and on task.  She does say she saw her endocrinologist and they adjusted her Synthroid.  Since that her sleep has been much better.  At her last appointment she was referred for CPT 3 which she did complete.  Reviewed with patient today, testing did show 3 atypical scores, specifically indicating difficulties with inattentiveness and vigilance.  Patient says she has continued to eat well and has no significant concerns with her appetite.  She denies any SI/HI, A/V hallucinations.      The following portions of the patient's history were reviewed and updated as appropriate: allergies, current medications, past family history, past medical history, past social history, past surgical history and problem list.      Current Medications:   Current Outpatient Medications   Medication Sig Dispense Refill    acetaminophen (TYLENOL) 500 MG tablet Take 80 mg by mouth Every 4 (Four) " "Hours As Needed for Mild Pain.      levothyroxine (SYNTHROID, LEVOTHROID) 137 MCG tablet Take 1 tablet by mouth Daily. 90 tablet 3    metoprolol tartrate (LOPRESSOR) 25 MG tablet 1/2 -1 tablet po bid prn for pvcs, fast heart beat 60 tablet 3     No current facility-administered medications for this visit.       Mental Status Exam:   Hygiene:   good  Cooperation:  Cooperative  Eye Contact:  Good  Psychomotor Behavior:  Restless  Affect:  Appropriate  Mood: euthymic  Speech:  Normal  Thought Process:  Goal directed  Thought Content:  Mood congruent  Suicidal:  None  Homicidal:  None  Hallucinations:  None  Delusion:  None  Memory:  Intact  Orientation:  Person, Place, Time, and Situation  Reliability:  good  Insight:  Good  Judgement:  Good  Impulse Control:  Good  Physical/Medical Issues:   Hashimoto's, PCOS        Objective   Vital Signs:   /82   Pulse 62   Ht 160 cm (63\")   Wt 84.4 kg (186 lb)   SpO2 98%   BMI 32.95 kg/m²     Physical Exam  Neurological:      Mental Status: She is oriented to person, place, and time. Mental status is at baseline.      Coordination: Coordination is intact.      Gait: Gait is intact.   Psychiatric:         Behavior: Behavior is cooperative.        Result Review :     The following data was reviewed by: DELISA Parmar on 07/24/2024:    Data reviewed : Previous note, medication history           Assessment and Plan    Diagnoses and all orders for this visit:    1. ADHD (attention deficit hyperactivity disorder), inattentive type (Primary)    2. Mild episode of recurrent major depressive disorder         PHQ-9 Score:   PHQ-9 Total Score: 7      Depression Screening:  Patient screened positive for depression based on a PHQ-9 score of 7 on 7/24/2024. Follow-up recommendations include: Prescribed antidepressant medication treatment and Suicide Risk Assessment performed.        Tobacco Cessation:  Patient is a former tobacco user. No tobacco cessation education " necessary.  Patient does report she smokes cannabis on a daily basis. I encouraged her to reduce and/or stop.     Impression/Plan:  -This is a follow-up appointment.  Patient presents today to discuss the results of her CPT 3 as well as medication options.  Patient reports she has been continuing to have difficulty over the last 2 months with the same symptoms that brought her in for an ADHD evaluation to begin with.  She has continued difficulty with her inability to focus and stay on task, and says her memory is still a significant problem.  She does appear to be doing somewhat better today with regards to anxiety and irritability and is very pleasant throughout the appointment.  She does feel her children being off school for the summer does make things a little easier, and resuming home school in a couple weeks will likely cause a reemergence/worsening of many of her day-to-day difficulties.  Based on patient's continued symptoms as well as results for CPT 3, I do feel that she would benefit from medication initiation.  In addition to her difficulties with focus, concentration and memory, the patient has a history of mood dysfunction difficulties as well.  Based on this, as well as medication history, I would recommend initiation of Wellbutrin SR twice daily.  Discussed medication with her and she would be willing to do this.  We also going to clear this with her cardiologist.  Patient takes metoprolol for PVCs, and she would like to confirm with them there are no hesitations about her doing this.  Advised the patient not felt this was a very good idea and I will await to hear their response from her.  -Patient will contact cardiology to receive clearance before initiation of Wellbutrin  mg twice daily for ADHD and mood dysfunction.  We discussed risks versus benefits, as well as potential adverse effects associated with adding this medication to patient's daily regimen. Patient is in agreement with this  plan and was educated on the importance of compliance with all aspects of treatment and follow-up appointments. Patient is agreeable to call the office with any worsening of symptoms or onset of side effects.  -Patient's VANDA report reviewed and deemed appropriate.  Patient counseled on use of controlled substances.  -Reviewed available lab work.  -Schedule in person follow-up appointment for 6 weeks or as needed.      MEDS ORDERED DURING VISIT:  No orders of the defined types were placed in this encounter.        Follow Up   Return in about 6 weeks (around 9/4/2024), or if symptoms worsen or fail to improve, for Next scheduled follow up, In-Person Appt.  Patient was given instructions and counseling regarding her condition or for health maintenance advice. Please see specific information pulled into the AVS if appropriate.       TREATMENT PLAN/GOALS: Continue supportive psychotherapy efforts and medications as indicated. Treatment and medication options discussed during today's visit. Patient acknowledged and verbally consented to continue with current treatment plan and was educated on the importance of compliance with treatment and follow-up appointments.    MEDICATION ISSUES:  Discussed medication options and treatment plan of prescribed medication as well as the risks, benefits, and side effects including potential falls, possible impaired driving and metabolic adversities among others. Patient is agreeable to call the office with any worsening of symptoms or onset of side effects. Patient is agreeable to call 911 or go to the nearest ER should he/she begin having SI/HI.          This document has been electronically signed by DELISA Calvo, PMHNP-BC  July 24, 2024 12:56 EDT      Part of this note may be an electronic transcription/translation of spoken language to printed text using the Dragon Dictation System.

## 2024-07-29 ENCOUNTER — DOCUMENTATION (OUTPATIENT)
Dept: CARDIOLOGY | Facility: HOSPITAL | Age: 39
End: 2024-07-29
Payer: COMMERCIAL

## 2024-07-29 ENCOUNTER — TELEPHONE (OUTPATIENT)
Dept: CARDIOLOGY | Facility: HOSPITAL | Age: 39
End: 2024-07-29
Payer: COMMERCIAL

## 2024-07-29 ENCOUNTER — TREATMENT (OUTPATIENT)
Dept: PHYSICAL THERAPY | Facility: CLINIC | Age: 39
End: 2024-07-29
Payer: COMMERCIAL

## 2024-07-29 DIAGNOSIS — M25.561 ACUTE PAIN OF RIGHT KNEE: Primary | ICD-10-CM

## 2024-07-29 NOTE — TELEPHONE ENCOUNTER
Patient called and states that she needs cardiology clearance to be prescribed Wellbutrin by her doctor.

## 2024-07-29 NOTE — PROGRESS NOTES
Physical Therapy Daily Treatment Note      Visit #: 25    Angelika Vick reports 0/10 pain today at rest.  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 Pt states that she did not have any problems with the R knee although did not do anything really difficult or strenuous.         Objective Pt present to PT today with no distress at rest.     Pt with no increased pain in the R knee although did have fatigue and muscle burning.       See Exercise, Manual, and Modality Logs for complete treatment.     Assessment/Plan  Pt continues to do well with activities in the clinic and has not had issues in the knee returning to light aerial activities. Pt to continue with PT as she gets back into her normal activities.       Progress per Plan of Care      Visit Diagnosis:    ICD-10-CM ICD-9-CM   1. Acute pain of right knee  M25.561 719.46              Timed Treatment:  Manual Therapy:         mins  81603;  Therapeutic Exercise:    26     mins  43799;     Neuromuscular Angelika:    12    mins  75757;    Therapeutic Activity:          mins  76177;     Gait Training:           mins  98878;     Ultrasound:          mins  35561;    Electrical Stimulation:         mins  08006 ( );  Dry Needling          mins self-pay  Iontophoresis          mins 32282    Untimed Treatments:  Electrical Stimulation:         mins  18012 ( );  Dry Needling:                     mins  Ultrasound:                         mins  54421;        Timed Treatment:   38   mins   Total Treatment:     50   mins    Hayder Arzola, PT  Physical Therapist

## 2024-07-30 ENCOUNTER — TELEPHONE (OUTPATIENT)
Dept: PSYCHIATRY | Facility: CLINIC | Age: 39
End: 2024-07-30
Payer: COMMERCIAL

## 2024-07-30 DIAGNOSIS — F90.0 ADHD (ATTENTION DEFICIT HYPERACTIVITY DISORDER), INATTENTIVE TYPE: Primary | ICD-10-CM

## 2024-07-30 DIAGNOSIS — F33.0 MILD EPISODE OF RECURRENT MAJOR DEPRESSIVE DISORDER: ICD-10-CM

## 2024-07-30 RX ORDER — BUPROPION HYDROCHLORIDE 100 MG/1
100 TABLET, EXTENDED RELEASE ORAL 2 TIMES DAILY
Qty: 60 TABLET | Refills: 2 | Status: SHIPPED | OUTPATIENT
Start: 2024-07-30

## 2024-07-30 NOTE — TELEPHONE ENCOUNTER
Left Patient a message advising Wellbutrin  mg twice daily was sent to [facility] pharmacy. Will also send a mychart message to Patient.

## 2024-07-30 NOTE — TELEPHONE ENCOUNTER
Patient was calling to let provider know her cardiology left a note on her mychart regarding the Wellbutrin you wanted to prescribe her. Please review note yesterday and advise

## 2024-08-05 ENCOUNTER — TREATMENT (OUTPATIENT)
Dept: PHYSICAL THERAPY | Facility: CLINIC | Age: 39
End: 2024-08-05
Payer: COMMERCIAL

## 2024-08-05 DIAGNOSIS — M25.561 ACUTE PAIN OF RIGHT KNEE: Primary | ICD-10-CM

## 2024-08-07 NOTE — PROGRESS NOTES
Physical Therapy Daily Treatment Note      Visit #: 26    Angelika Vick reports 0/10 pain today at rest.  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 Pt states that her knee felt fine during class.         Objective Pt present to PT today with no distress at rest.     Pt with no issues with activities today with no pain in the R knee and no issues in the abdominals.       See Exercise, Manual, and Modality Logs for complete treatment.     Assessment/Plan  Pt continues to do well with her R knee and is slowly returning to normal activities and exercise classes. Pt to continue with strengthening and stabilization activities at home and in the gym and will follow up in 2 weeks.       Progress per Plan of Care      Visit Diagnosis:    ICD-10-CM ICD-9-CM   1. Acute pain of right knee  M25.561 719.46              Timed Treatment:  Manual Therapy:         mins  90682;  Therapeutic Exercise:    28     mins  39821;     Neuromuscular Angelika:    10    mins  38560;    Therapeutic Activity:          mins  90606;     Gait Training:           mins  97452;     Ultrasound:          mins  26113;    Electrical Stimulation:         mins  06093 ( );  Dry Needling          mins self-pay  Iontophoresis          mins 49944    Untimed Treatments:  Electrical Stimulation:         mins  39301 (MC );  Dry Needling:                     mins  Ultrasound:                         mins  84459;        Timed Treatment:    38  mins   Total Treatment:     49   mins    Hayder Arzola, PT  Physical Therapist

## 2024-08-13 NOTE — TELEPHONE ENCOUNTER
Last seen 5/23/24. No follow up on file. 30 day supply sent in. Future refills to be filled by PCP unless patient makes an appointment. Luh Owusu MA

## 2024-08-19 ENCOUNTER — TELEPHONE (OUTPATIENT)
Dept: PHYSICAL THERAPY | Facility: CLINIC | Age: 39
End: 2024-08-19

## 2024-08-19 NOTE — TELEPHONE ENCOUNTER
Caller: Angelika Vick    Relationship: Self         What was the call regarding: HAS TO TAKE CHILD TO DENTIST

## 2024-08-21 ENCOUNTER — TREATMENT (OUTPATIENT)
Dept: PHYSICAL THERAPY | Facility: CLINIC | Age: 39
End: 2024-08-21
Payer: COMMERCIAL

## 2024-08-21 DIAGNOSIS — M25.561 ACUTE PAIN OF RIGHT KNEE: Primary | ICD-10-CM

## 2024-08-22 NOTE — PROGRESS NOTES
Physical Therapy Daily Treatment Note      Visit #: 27    Angelika Vick reports 0/10 pain today at rest.  Pt reports that her R knee has been much better and has been to several classes doing light aerial work. Pt states that she had pain and a pop in the L shoulder yesterday in class. Pt reports that her L shoulder is very sore today.         Objective          Strength/Myotome Testing     Left Shoulder     Planes of Motion   External rotation at 0°: 4+   Internal rotation at 0°: 4+     Tests   Cervical     Left   Positive active compression ([place]).     Left Shoulder   Positive apprehension, Hawkin's, Neer's and Speed's.   Negative anterior slide.     Additional Tests Details  Pt with negative uppercut, biceps load tests    Positive labral shear    Pt present to PT today with no distress at rest.     Pt with no increased pain in the R knee with activities today.       See Exercise, Manual, and Modality Logs for complete treatment.     Assessment/Plan  Pt is going to follow up in 2 weeks for her R knee and L shoulder. Pt to acquire shoulder referral. Pt to continue with stabilization and strengthening of the R knee and hip and will look closer at the L shoulder.       Progress per Plan of Care      Visit Diagnosis:    ICD-10-CM ICD-9-CM   1. Acute pain of right knee  M25.561 719.46              Timed Treatment:  Manual Therapy:         mins  50906;  Therapeutic Exercise:    20     mins  48660;     Neuromuscular Angelika:        mins  90177;    Therapeutic Activity:     12     mins  51019;     Gait Training:           mins  75072;     Ultrasound:          mins  71958;    Electrical Stimulation:         mins  32097 ( );  Dry Needling          mins self-pay  Iontophoresis          mins 56596    Untimed Treatments:  Electrical Stimulation:         mins  40828 ( );  Dry Needling:                     mins  Ultrasound:                         mins  02810;        Timed Treatment:   32   mins   Total  Treatment:     50   mins    Hayder Arzola, PT  Physical Therapist

## 2024-08-26 RX ORDER — METOPROLOL TARTRATE 25 MG/1
TABLET, FILM COATED ORAL
Qty: 90 TABLET | Refills: 0 | Status: SHIPPED | OUTPATIENT
Start: 2024-08-26

## 2024-08-26 NOTE — TELEPHONE ENCOUNTER
Insurance requires a 90 day supply. I sent this in to [facility] in [place].   Luh Owusu MA

## 2024-08-28 ENCOUNTER — TELEPHONE (OUTPATIENT)
Dept: PHYSICAL THERAPY | Facility: CLINIC | Age: 39
End: 2024-08-28
Payer: COMMERCIAL

## 2024-08-28 ENCOUNTER — OFFICE VISIT (OUTPATIENT)
Dept: ORTHOPEDIC SURGERY | Facility: CLINIC | Age: 39
End: 2024-08-28
Payer: COMMERCIAL

## 2024-08-28 VITALS
DIASTOLIC BLOOD PRESSURE: 84 MMHG | HEIGHT: 63 IN | BODY MASS INDEX: 32.96 KG/M2 | SYSTOLIC BLOOD PRESSURE: 122 MMHG | WEIGHT: 186 LBS | TEMPERATURE: 98.6 F

## 2024-08-28 DIAGNOSIS — M22.41 CHONDROMALACIA OF RIGHT PATELLA: ICD-10-CM

## 2024-08-28 DIAGNOSIS — M25.361 PATELLOFEMORAL INSTABILITY OF RIGHT KNEE WITH PAIN: Primary | ICD-10-CM

## 2024-08-28 DIAGNOSIS — M25.561 PATELLOFEMORAL INSTABILITY OF RIGHT KNEE WITH PAIN: Primary | ICD-10-CM

## 2024-08-28 DIAGNOSIS — M25.561 ACUTE PAIN OF RIGHT KNEE: ICD-10-CM

## 2024-08-28 RX ORDER — CELECOXIB 100 MG/1
100 CAPSULE ORAL 2 TIMES DAILY
Qty: 60 CAPSULE | Refills: 0 | Status: SHIPPED | OUTPATIENT
Start: 2024-08-28

## 2024-08-28 NOTE — PROGRESS NOTES
Office Note     Name: Angelika Vick    : 1985     MRN: [id number]     Chief Complaint  Follow-up of the Right Knee (Patient sat on her knees last night, she went to open knees a little bit, felt something not right in right knee, then had pain after that. No swelling, she put knee immobilizer on and has been using it. She did ice it, taking ibuprofen. )    Subjective     History of Present Illness:  Angelika Vick is a 38 y.o. female presenting today for complaints of acute on chronic right knee pain after a suspected patellar subluxation that occurred yesterday.  The event as described above.  She notes that she was in physical therapy and was scheduled to be released soon before she had her acute exacerbation.  She states that the knee pain was well-controlled before the exacerbation.  She states the pain today is very severe.  She has been rotating ibuprofen and tramadol without significant relief.  She has also been doing ice.  She currently has her knee in a knee immobilizer and is using a wheelchair as she cannot put any weight on the knee.  She denies paresthesias.  She notes the pain is primarily in the peripatellar area but she also has pain in the posterior knee at the lateral hamstring and proximal calf.    Review of Systems   Constitutional:  Negative for fever.   HENT:  Negative for dental problem and voice change.    Eyes:  Negative for visual disturbance.   Respiratory:  Negative for shortness of breath.    Cardiovascular:  Negative for chest pain.   Gastrointestinal:  Negative for abdominal pain.   Genitourinary:  Negative for dysuria.   Musculoskeletal:  Positive for arthralgias and gait problem. Negative for joint swelling.   Skin:  Negative for rash.   Neurological:  Negative for speech difficulty.   Hematological:  Does not bruise/bleed easily.   Psychiatric/Behavioral:  Negative for confusion.         Past Medical History:   Diagnosis Date    Acid reflux     Bilateral  ovarian cysts     Depression     Gestational diabetes     with 2nd pregnancy    Gestational hypertension     with last pregnancy    Hashimoto's thyroiditis 2018    Hypertension in pregnancy 2012    Hypothyroidism 2018    Migraines     Mixed hyperlipidemia 2019    Polycystic ovary syndrome 2010    Vitamin D deficiency 2024        Past Surgical History:   Procedure Laterality Date    CYST REMOVAL      GANGLION CYST EXCISION Right        Family History   Problem Relation Age of Onset    Arthritis Mother     Hypertension Mother     No Known Problems Father     Thyroid disease Sister     Diabetes Sister     No Known Problems Brother     Diabetes Maternal Grandmother     Diabetes Maternal Grandfather     No Known Problems Paternal Grandmother     No Known Problems Paternal Grandfather     Diabetes Sister     Obesity Sister     Thyroid disease Sister        Social History     Socioeconomic History    Marital status:    Tobacco Use    Smoking status: Former     Current packs/day: 0.00     Average packs/day: 1 pack/day for 11.3 years (11.3 ttl pk-yrs)     Types: Cigarettes     Start date: 2005     Quit date: 10/6/2016     Years since quittin.8     Passive exposure: Past    Smokeless tobacco: Never   Vaping Use    Vaping status: Never Used   Substance and Sexual Activity    Alcohol use: Yes     Alcohol/week: 1.0 - 2.0 standard drink of alcohol     Types: 1 - 2 Drinks containing 0.5 oz of alcohol per week     Comment: Occasionally    Drug use: Yes     Types: Marijuana     Comment: Daily    Sexual activity: Yes     Partners: Male     Birth control/protection: I.U.D.         Current Outpatient Medications:     acetaminophen (TYLENOL) 500 MG tablet, Take 80 mg by mouth Every 4 (Four) Hours As Needed for Mild Pain., Disp: , Rfl:     buPROPion SR (Wellbutrin SR) 100 MG 12 hr tablet, Take 1 tablet by mouth 2 (Two) Times a Day., Disp: 60 tablet, Rfl: 2    levothyroxine (SYNTHROID, LEVOTHROID) 137 MCG  "tablet, Take 1 tablet by mouth Daily., Disp: 90 tablet, Rfl: 3    metoprolol tartrate (LOPRESSOR) 25 MG tablet, TAKE 1/2 TO 1 TABLET BY MOUTH TWICE DAILY AS NEEDED FOR PVCS OR FAST HEARTBEAT, Disp: 90 tablet, Rfl: 0    celecoxib (CeleBREX) 100 MG capsule, Take 1 capsule by mouth 2 (Two) Times a Day., Disp: 60 capsule, Rfl: 0    Allergies   Allergen Reactions    Dermagel Hand  [Alcohol] Rash           Objective   /84   Temp 98.6 °F (37 °C)   Ht 160 cm (63\")   Wt 84.4 kg (186 lb)   BMI 32.95 kg/m²            Physical Exam  Musculoskeletal:      Right knee: Effusion present.       Right Knee Exam     Tenderness   The patient is experiencing tenderness in the patella (Lateral hamstring and proximal calf tenderness).    Other   Erythema: absent  Sensation: normal  Pulse: present  Swelling: none  Effusion: effusion present    Comments:  Knee exam limited due to significant pain.  Patient unwilling to flex or extend the knee due to pain.  There is a mild joint effusion detected.  There is also tenderness at the lateral hamstring and proximal calf.  There is no erythema or rash no bruising or erythema.  Negative Homans' sign.  Gross sensation intact light touch, pedal pulses 2+.           Extremity DVT signs are negative by clinical screen.     Independent Review of Radiographic Studies:    3 view plain films of the right knee were done in office today for the evaluation of pain including a bilateral AP sunrise view.  No acute osseous abnormalities are seen.  The right knee does reveal mild patellofemoral joint space narrowing without osteophyte formation.  No significant patellar tilt and the patella is in its proper place and position.  Mild patella chester is again noted.  The medial and lateral compartments are preserved.    Procedures    Assessment and Plan   Diagnoses and all orders for this visit:    1. Patellofemoral instability of right knee with pain (Primary)  -     XR Knee 3 View Right; Future  - "     celecoxib (CeleBREX) 100 MG capsule; Take 1 capsule by mouth 2 (Two) Times a Day.  Dispense: 60 capsule; Refill: 0    2. Acute pain of right knee  -     XR Knee 3 View Right; Future  -     celecoxib (CeleBREX) 100 MG capsule; Take 1 capsule by mouth 2 (Two) Times a Day.  Dispense: 60 capsule; Refill: 0    3. Chondromalacia of right patella       Discussion of orthopedic goals  Orthopedic activities reviewed and patient expressed appreciation  Regular exercise as tolerated  Risk, benefits, and merits of treatment alternatives reviewed with the patient and questions answered  Patient guided on mobility and conditioning exercises  Ice, heat, and/or modalities as beneficial  Elevate leg for residual swelling  Physical therapy ongoing  Take prescribed medications as instructed only as tolerated  Reduced physical activity as appropriate and avoid offending activity  Weight bearing parameters reviewed  Use brace as instructed  Assistive device encouraged for safety and support  Counseling on diet, nutrition, fitness exercise, and weight reduction goals    Recommendations/Plan:  Exercise, medications, injections, other patient advice, and return appointment as noted.  Patient is encouraged to call or return for any issues or concerns.    Start Celebrex and discontinue other NSAID use.  May continue to take tramadol or Tylenol for breakthrough pain.  Advised ice to the knee to help reduce swelling and gentle range of motion to reduce stiffness.  Advised use of knee immobilizer for now due to significant pain however once pain calms down she should switch to using Carmona brace throughout the day.  Once acute flareup is gone I advised to contact continue her physical therapy and I discussed the importance of quad strengthening to reduce the risk of future subluxations.  Also encouraged her to talk to her PCP about suspected Erler's Danlos syndrome for further evaluation.  Advised that I may do a cortisone injection into  the knee in approximately 3 to 4 weeks if still desired.  Cortisone injection deferred at this point to allow for healing.    Return if symptoms worsen or fail to improve.  Patient agreeable to call or return sooner for any concerns.

## 2024-08-28 NOTE — TELEPHONE ENCOUNTER
Caller: Angelika Vick    Relationship: Self    Best call back number: [phone]       What was the call regarding: HAS HURT HERSELF AGAIN, THINKS SHE HAS SUBLEXED HER KNEE CAP, TRYING TO GET INTO ORTHO, DID NOT KNOW IF YOU NEEDED TO SEE HER SOONER THAN NEXT WEEK.

## 2024-09-03 ENCOUNTER — TELEPHONE (OUTPATIENT)
Age: 39
End: 2024-09-03
Payer: COMMERCIAL

## 2024-09-03 ENCOUNTER — OFFICE VISIT (OUTPATIENT)
Dept: PSYCHIATRY | Facility: CLINIC | Age: 39
End: 2024-09-03
Payer: COMMERCIAL

## 2024-09-03 ENCOUNTER — TREATMENT (OUTPATIENT)
Dept: PHYSICAL THERAPY | Facility: CLINIC | Age: 39
End: 2024-09-03
Payer: COMMERCIAL

## 2024-09-03 VITALS
SYSTOLIC BLOOD PRESSURE: 118 MMHG | DIASTOLIC BLOOD PRESSURE: 78 MMHG | OXYGEN SATURATION: 98 % | WEIGHT: 188.2 LBS | HEART RATE: 60 BPM | BODY MASS INDEX: 33.35 KG/M2 | HEIGHT: 63 IN

## 2024-09-03 DIAGNOSIS — F33.0 MILD EPISODE OF RECURRENT MAJOR DEPRESSIVE DISORDER: Chronic | ICD-10-CM

## 2024-09-03 DIAGNOSIS — F90.0 ADHD (ATTENTION DEFICIT HYPERACTIVITY DISORDER), INATTENTIVE TYPE: Primary | Chronic | ICD-10-CM

## 2024-09-03 DIAGNOSIS — M25.561 ACUTE PAIN OF RIGHT KNEE: Primary | ICD-10-CM

## 2024-09-03 PROCEDURE — 99214 OFFICE O/P EST MOD 30 MIN: CPT | Performed by: NURSE PRACTITIONER

## 2024-09-03 RX ORDER — BUPROPION HYDROCHLORIDE 200 MG/1
200 TABLET, EXTENDED RELEASE ORAL 2 TIMES DAILY
Qty: 60 TABLET | Refills: 2 | Status: SHIPPED | OUTPATIENT
Start: 2024-09-03

## 2024-09-03 NOTE — PROGRESS NOTES
"Chief Complaint  ADHD and Depression    Subjective              Angelika Vick presents to [facility] for medication management of her ADHD and depression.    History of Present Illness: Patient presents today for follow-up appointment after last being seen on 07/24/2024.  At that appointment she was started on Wellbutrin  mg twice daily.  Patient presents today and reports \"I am okay, it has been an interesting 6 weeks\".  Patient says the last 6 weeks have been somewhat difficult just due to various life circumstances.  She says starting medication has been somewhat beneficial for certain areas of her life.  She says her overall anxiety has been improved and says \"it has been a while since I have been able to say that something helped my anxiety\".  Patient says there has also been some possible improvement in her ADHD symptoms.  She says her  has described her as \"less squirrely\".  She says she does continue to struggle with getting overwhelmed and having to prioritize different task, but says that is nothing new.  Patient says the only negative aspect that may be related to the medication is she seems to be a little more annoyed with people and getting angry quicker.  She says she is not convinced it is related to the medication because she also has been going through a lot of different frustrations with life lately and says that has probably played a role as well.  She says her appetite has also been decreased a little bit, which she is happy about.  She denies any concerns regarding sleep dysfunction.  They restarted home school with their children and report that has gone well so far.  She denies any SI/HI, A/V hallucinations.      The following portions of the patient's history were reviewed and updated as appropriate: allergies, current medications, past family history, past medical history, past social history, past surgical history and problem " "list.      Current Medications:   Current Outpatient Medications   Medication Sig Dispense Refill    buPROPion SR (Wellbutrin SR) 200 MG 12 hr tablet Take 1 tablet by mouth 2 (Two) Times a Day. 60 tablet 2    celecoxib (CeleBREX) 100 MG capsule Take 1 capsule by mouth 2 (Two) Times a Day. 60 capsule 0    levothyroxine (SYNTHROID, LEVOTHROID) 137 MCG tablet Take 1 tablet by mouth Daily. 90 tablet 3    metoprolol tartrate (LOPRESSOR) 25 MG tablet TAKE 1/2 TO 1 TABLET BY MOUTH TWICE DAILY AS NEEDED FOR PVCS OR FAST HEARTBEAT 90 tablet 0     No current facility-administered medications for this visit.       Mental Status Exam:   Hygiene:   good  Cooperation:  Cooperative  Eye Contact:  Good  Psychomotor Behavior:  Restless  Affect:  Appropriate  Mood: euthymic  Speech:  Normal  Thought Process:  Goal directed  Thought Content:  Mood congruent  Suicidal:  None  Homicidal:  None  Hallucinations:  None  Delusion:  None  Memory:  Intact  Orientation:  Person, Place, Time, and Situation  Reliability:  good  Insight:  Good  Judgement:  Good  Impulse Control:  Good  Physical/Medical Issues:   Hashimoto's, PCOS        Objective   Vital Signs:   /78   Pulse 60   Ht 160 cm (63\")   Wt 85.4 kg (188 lb 3.2 oz)   SpO2 98%   BMI 33.34 kg/m²     Physical Exam  Neurological:      Mental Status: She is oriented to person, place, and time. Mental status is at baseline.      Coordination: Coordination is intact.      Gait: Gait is intact.   Psychiatric:         Behavior: Behavior is cooperative.        Result Review :     The following data was reviewed by: DELISA Parmar on 09/03/2024:    Data reviewed : Previous note, medication history           Assessment and Plan    Diagnoses and all orders for this visit:    1. ADHD (attention deficit hyperactivity disorder), inattentive type (Primary)  -     buPROPion SR (Wellbutrin SR) 200 MG 12 hr tablet; Take 1 tablet by mouth 2 (Two) Times a Day.  Dispense: 60 tablet; Refill: " 2    2. Mild episode of recurrent major depressive disorder  -     buPROPion SR (Wellbutrin SR) 200 MG 12 hr tablet; Take 1 tablet by mouth 2 (Two) Times a Day.  Dispense: 60 tablet; Refill: 2           PHQ-9 Score:   PHQ-9 Total Score: 9      Depression Screening:  Patient screened positive for depression based on a PHQ-9 score of 9 on 9/3/2024. Follow-up recommendations include: Prescribed antidepressant medication treatment and Suicide Risk Assessment performed.        Tobacco Cessation:  Patient is a former tobacco user. No tobacco cessation education necessary.  Patient does report she smokes cannabis on a daily basis. I encouraged her to reduce and/or stop.     Impression/Plan:  -This is a follow-up appointment.  Patient presents today and says she feels she has been doing a little bit better in some ways since her last appointment.  She was started on Wellbutrin her last appointment and feels there has been some benefit to it, but as discussed above also feels it may be increasing her irritability.  Patient struggles to say definitively if she thinks that is the case because of various life circumstances that have also made things more difficult.  She says she has been taking it on a consistent basis and feels it has improved her overall anxiety.  I would like to increase the dose today to 200 mg twice daily.  I advised the patient if she feels her sleep gets worse after the increase we will decrease her dose to once daily, but may not have to do that because she is taking her second dose at around 2:30 in the afternoon on a consistent basis.  She says she is being very consistent with the medication.  I also discussed her metoprolol.  She has an upcoming appointment with her cardiologist.  Patient takes metoprolol as needed for her PVCs.  I asked her to inquire about whether or not she could potentially take propranolol instead of metoprolol, which could also help with anxiety if taken as needed.  Advised  patient this may not be possible, but she is going to discuss it.  -Increase Wellbutrin SR to 200 mg twice daily for ADHD and mood dysfunction.   -Patient's VANDA report reviewed and deemed appropriate.  Patient counseled on use of controlled substances.  -Reviewed available lab work.  -Schedule in person follow-up appointment for 5 weeks or as needed.      MEDS ORDERED DURING VISIT:  New Medications Ordered This Visit   Medications    buPROPion SR (Wellbutrin SR) 200 MG 12 hr tablet     Sig: Take 1 tablet by mouth 2 (Two) Times a Day.     Dispense:  60 tablet     Refill:  2         Follow Up   Return in about 5 weeks (around 10/8/2024), or if symptoms worsen or fail to improve, for Next scheduled follow up, In-Person Appt.  Patient was given instructions and counseling regarding her condition or for health maintenance advice. Please see specific information pulled into the AVS if appropriate.       TREATMENT PLAN/GOALS: Continue supportive psychotherapy efforts and medications as indicated. Treatment and medication options discussed during today's visit. Patient acknowledged and verbally consented to continue with current treatment plan and was educated on the importance of compliance with treatment and follow-up appointments.    MEDICATION ISSUES:  Discussed medication options and treatment plan of prescribed medication as well as the risks, benefits, and side effects including potential falls, possible impaired driving and metabolic adversities among others. Patient is agreeable to call the office with any worsening of symptoms or onset of side effects. Patient is agreeable to call 911 or go to the nearest ER should he/she begin having SI/HI.          This document has been electronically signed by DELISA Calvo, PMHNP-BC  September 3, 2024 15:03 EDT      Part of this note may be an electronic transcription/translation of spoken language to printed text using the Dragon Dictation System.

## 2024-09-03 NOTE — TELEPHONE ENCOUNTER
Caller: Angelika Vick     Relationship: SELF    Best call back number: [phone]    What is your medical concern? RIGHT KNEE - SUBLEXD KNEE CAP    How long has this issue been going on? LAST TUESDAY 27TH     Is your provider already aware of this issue? NO    Have you been treated for this issue? YES, ORTHO ON 28TH AND PT TODAY     THEY WANT HER TO HAVE AN EDS DIAGNOSIS, IS THIS SOMETHING YOU DO OR DO YOU NEED TO REFER HER TO SOMEONE ELSE.  PLEASE ADVISE.

## 2024-09-05 NOTE — PROGRESS NOTES
Physical Therapy Daily Treatment Note      Visit #: 28    Angelika Vick reports 0/10 pain today at rest.  Pt reports that she hurt her R knee again about 10 days ago with the R patella subluxing while performing stretches with knees fully flexed. Pt reports seeing her ortho who said there was not much to do besides going back to PT. Pt states that she is supposed to follow up if things do not change. Pt states that she is very frustrated with the situation because she felt like she was close to 100% and getting back into her normal exercise and training. Pt states that yesterday was the first day she felt like she could walk well without pain.         Objective Pt present to PT today with no distress at rest.     Pt with 95 degrees of flexion without pain in the R knee today.     Pt with poor R quad contraction.     Pt with tenderness over the medial retinaculum of the R knee.     Pt ambulating with flexed R knee and knee brace.       See Exercise, Manual, and Modality Logs for complete treatment.     Assessment/Plan  PT and pt discussed possible options for continued treatment and triage. Pt would like to get a second opinion to at least get better direction on care. Pt provided with [facility] for early rehab and will follow up next week.             Visit Diagnosis:    ICD-10-CM ICD-9-CM   1. Acute pain of right knee  M25.561 719.46              Timed Treatment:  Manual Therapy:    13     mins  89179;  Therapeutic Exercise:    20     mins  46994;     Neuromuscular Angelika:        mins  28002;    Therapeutic Activity:      10    mins  68105;     Gait Training:           mins  55356;     Ultrasound:          mins  87400;    Electrical Stimulation:         mins  17096 ( );  Dry Needling          mins self-pay  Iontophoresis          mins 59718    Untimed Treatments:  Electrical Stimulation:         mins  04171 ( );  Dry Needling:                     mins  Ultrasound:                         mins  27192;         Timed Treatment:   43   mins   Total Treatment:     60   mins    Hayder Arzola, PT  Physical Therapist

## 2024-09-09 ENCOUNTER — OFFICE VISIT (OUTPATIENT)
Dept: ORTHOPEDIC SURGERY | Facility: CLINIC | Age: 39
End: 2024-09-09
Payer: COMMERCIAL

## 2024-09-09 VITALS
DIASTOLIC BLOOD PRESSURE: 80 MMHG | WEIGHT: 188 LBS | BODY MASS INDEX: 33.31 KG/M2 | HEIGHT: 63 IN | SYSTOLIC BLOOD PRESSURE: 120 MMHG

## 2024-09-09 DIAGNOSIS — M25.361 PATELLOFEMORAL INSTABILITY OF RIGHT KNEE WITH PAIN: Primary | ICD-10-CM

## 2024-09-09 DIAGNOSIS — M25.561 PATELLOFEMORAL INSTABILITY OF RIGHT KNEE WITH PAIN: Primary | ICD-10-CM

## 2024-09-09 DIAGNOSIS — M25.561 ACUTE PAIN OF RIGHT KNEE: ICD-10-CM

## 2024-09-09 DIAGNOSIS — M24.20 LIGAMENTOUS LAXITY OF MULTIPLE SITES: ICD-10-CM

## 2024-09-09 DIAGNOSIS — Q68.2 PATELLA ALTA: ICD-10-CM

## 2024-09-09 PROCEDURE — 99214 OFFICE O/P EST MOD 30 MIN: CPT | Performed by: ORTHOPAEDIC SURGERY

## 2024-09-09 NOTE — PROGRESS NOTES
[facility] Note        Subjective     Pain of the Right Knee      HPI    Angelika Vick is a 38 y.o. female.  She is here after a second right patella subluxation.  First 1 was April.  The second 1 was a couple weeks ago.  She has had x-rays.  She had an MRI in .  The left knee is starting to feel unstable.  She has been in physical therapy at [facility], [place], operated by [facility] in [place].  No prior injury or surgery.  Her patella stabilizing knee sleeve that she has is not helping much.    Past Medical History:   Diagnosis Date    Acid reflux     Bilateral ovarian cysts     Depression     Gestational diabetes     with 2nd pregnancy    Gestational hypertension     with last pregnancy    Hashimoto's thyroiditis 2018    Hypertension in pregnancy     Hypothyroidism 2018    Migraines     Mixed hyperlipidemia 2019    Polycystic ovary syndrome 2010    Vitamin D deficiency 2024      Past Surgical History:   Procedure Laterality Date    CYST REMOVAL      GANGLION CYST EXCISION Right       Family History   Problem Relation Age of Onset    Arthritis Mother     Hypertension Mother     No Known Problems Father     Thyroid disease Sister     Diabetes Sister     No Known Problems Brother     Diabetes Maternal Grandmother     Diabetes Maternal Grandfather     No Known Problems Paternal Grandmother     No Known Problems Paternal Grandfather     Diabetes Sister     Obesity Sister     Thyroid disease Sister      Social History     Socioeconomic History    Marital status:    Tobacco Use    Smoking status: Former     Current packs/day: 0.00     Average packs/day: 1 pack/day for 11.3 years (11.3 ttl pk-yrs)     Types: Cigarettes     Start date: 2005     Quit date: 10/6/2016     Years since quittin.9     Passive exposure: Past    Smokeless tobacco: Never   Vaping Use    Vaping status: Never Used   Substance and Sexual Activity    Alcohol use: Yes     Alcohol/week: 1.0 - 2.0 standard drink of  "alcohol     Types: 1 - 2 Drinks containing 0.5 oz of alcohol per week     Comment: Occasionally    Drug use: Yes     Types: Marijuana     Comment: Daily    Sexual activity: Yes     Partners: Male     Birth control/protection: I.U.D.      Current Outpatient Medications on File Prior to Visit   Medication Sig Dispense Refill    buPROPion SR (Wellbutrin SR) 200 MG 12 hr tablet Take 1 tablet by mouth 2 (Two) Times a Day. 60 tablet 2    celecoxib (CeleBREX) 100 MG capsule Take 1 capsule by mouth 2 (Two) Times a Day. 60 capsule 0    levothyroxine (SYNTHROID, LEVOTHROID) 137 MCG tablet Take 1 tablet by mouth Daily. 90 tablet 3    metoprolol tartrate (LOPRESSOR) 25 MG tablet TAKE 1/2 TO 1 TABLET BY MOUTH TWICE DAILY AS NEEDED FOR PVCS OR FAST HEARTBEAT 90 tablet 0     No current facility-administered medications on file prior to visit.      Allergies   Allergen Reactions    Dermagel Hand  [Alcohol] Rash          Review of Systems   Constitutional: Negative.    HENT: Negative.     Eyes: Negative.    Respiratory: Negative.     Cardiovascular: Negative.    Gastrointestinal: Negative.    Endocrine: Negative.    Genitourinary: Negative.    Musculoskeletal:  Positive for arthralgias.   Skin: Negative.    Allergic/Immunologic: Negative.    Neurological: Negative.    Hematological: Negative.    Psychiatric/Behavioral: Negative.          I reviewed the patient's chief complaint, history of present illness, review of systems, past medical history, surgical history, family history, social history, medications and allergy list.        Objective      Physical Exam  /80   Ht 160 cm (62.99\")   Wt 85.3 kg (188 lb)   BMI 33.31 kg/m²     Body mass index is 33.31 kg/m².    General  Mental Status - alert  General Appearance - cooperative, well groomed, not in acute distress  Orientation - Oriented X3  Build & Nutrition - well developed and well nourished  Posture - normal posture  Gait - normal gait       Ortho " Exam  Bilateral MPFL laxity.  Her knees hyperextend.  She has multi ligamentous laxity.  Firm endpoint on Lachman.  Stable varus valgus.  Full range of motion.    Imaging/Studies Reviewed and Interpreted:  Imaging Results (Last 24 Hours)       ** No results found for the last 24 hours. **          I viewed and personally interpreted MRI from June 18 which shows no tears.  I also viewed in person interpreted x-rays from September 3 which are negative except for mild patella chester  Assessment    Assessment:  1. Patellofemoral instability of right knee with pain    2. Acute pain of right knee    3. Patella chester    4. Ligamentous laxity of multiple sites        Plan:  Continue over-the-counter medication as needed for discomfort  She is going to continue physical therapy at [facility].  She needs VMO strengthening.  No surgical indications at this time.  She may pursue genetic testing for Ehrler's-Danlos syndrome.  I will order a new patella stabilizing brace for her.  If we have 1 different than when she has  She will follow-up as needed        Wilder Gómez MD  09/09/24  13:22 EDT      Dictated Utilizing Dragon Dictation.

## 2024-09-10 ENCOUNTER — OFFICE VISIT (OUTPATIENT)
Age: 39
End: 2024-09-10
Payer: COMMERCIAL

## 2024-09-10 ENCOUNTER — HOSPITAL ENCOUNTER (OUTPATIENT)
Dept: GENERAL RADIOLOGY | Facility: HOSPITAL | Age: 39
Discharge: HOME | End: 2024-09-10
Admitting: FAMILY MEDICINE
Payer: COMMERCIAL

## 2024-09-10 ENCOUNTER — TREATMENT (OUTPATIENT)
Dept: PHYSICAL THERAPY | Facility: CLINIC | Age: 39
End: 2024-09-10
Payer: COMMERCIAL

## 2024-09-10 VITALS
HEART RATE: 56 BPM | BODY MASS INDEX: 33.63 KG/M2 | DIASTOLIC BLOOD PRESSURE: 88 MMHG | SYSTOLIC BLOOD PRESSURE: 126 MMHG | HEIGHT: 63 IN | TEMPERATURE: 98 F | WEIGHT: 189.8 LBS | OXYGEN SATURATION: 98 %

## 2024-09-10 DIAGNOSIS — M25.512 ACUTE PAIN OF LEFT SHOULDER: Primary | ICD-10-CM

## 2024-09-10 DIAGNOSIS — M25.561 ACUTE PAIN OF RIGHT KNEE: Primary | ICD-10-CM

## 2024-09-10 DIAGNOSIS — Q79.60 EHLERS-DANLOS DISEASE: ICD-10-CM

## 2024-09-10 DIAGNOSIS — M25.512 ACUTE PAIN OF LEFT SHOULDER: ICD-10-CM

## 2024-09-10 PROCEDURE — 97112 NEUROMUSCULAR REEDUCATION: CPT | Performed by: PHYSICAL THERAPIST

## 2024-09-10 PROCEDURE — 99213 OFFICE O/P EST LOW 20 MIN: CPT | Performed by: FAMILY MEDICINE

## 2024-09-10 PROCEDURE — 97110 THERAPEUTIC EXERCISES: CPT | Performed by: PHYSICAL THERAPIST

## 2024-09-10 PROCEDURE — 73030 X-RAY EXAM OF SHOULDER: CPT

## 2024-09-10 NOTE — PROGRESS NOTES
Physical Therapy Daily Treatment Note      Visit #: 29    Angelika Vick reports 0/10 pain today at rest.  Pt reports that her ortho appointment went well and got a lot more information and direction for treatment for her R knee. Pt states that she should focus on inner thigh and VMO strength. Pt states that ortho suspects that the knee might have not been fully healed when she re-injured it. Pt states that she is supposed to wear a J-brace for 3 months and avoid heavy activity for that time as well.         Objective Pt present to PT today with no distress at rest.     Pt with improved R knee function noted with activities today.       See Exercise, Manual, and Modality Logs for complete treatment.     Assessment/Plan  Pt continues to have guarding the in the R knee with weakness following patellar dislocation. Pt to follow up again next week although is progressing well. Pt wants to continue with PT until she feels comfortable doing HEP independently.       Progress per Plan of Care      Visit Diagnosis:    ICD-10-CM ICD-9-CM   1. Acute pain of right knee  M25.561 719.46              Timed Treatment:  Manual Therapy:         mins  99002;  Therapeutic Exercise:    25     mins  64861;     Neuromuscular Angelika:    13    mins  60239;    Therapeutic Activity:          mins  68382;     Gait Training:           mins  46483;     Ultrasound:          mins  87727;    Electrical Stimulation:         mins  07691 ( );  Dry Needling          mins self-pay  Iontophoresis          mins 83257    Untimed Treatments:  Electrical Stimulation:         mins  94649 (MC );  Dry Needling:                     mins  Ultrasound:                         mins  94388;        Timed Treatment:   38   mins   Total Treatment:     53   mins    Hayder Arzola, PT  Physical Therapist

## 2024-09-10 NOTE — PROGRESS NOTES
Follow Up Office Visit      Date: 09/10/2024   Patient Name: Angelika Vick  : 1985   MRN: [id number]     Chief Complaint:    Chief Complaint   Patient presents with    Knee Pain     Right knee  Discuss possible EDS        History of Present Illness: Angelika Vick is a 38 y.o. female who is here today for right knee issues as well as left shoulder pain.  Patient states that she continue to have issues with her right knee.  States that she has been seeing orthopedics and that they have recommend that she follow-up with her primary care physician for possible Jeanine-Danlos syndrome workup.  Also the patient states that she was doing silk dance when she felt a pop in her left shoulder.  States that since that point time she has had pain whenever she raises her shoulder..    Subjective      Review of Systems:   Review of Systems   Constitutional:  Negative for appetite change and unexpected weight loss.   HENT:  Negative for trouble swallowing.    Eyes:  Negative for blurred vision and double vision.   Respiratory:  Negative for cough and shortness of breath.    Cardiovascular:  Negative for chest pain and leg swelling.   Gastrointestinal:  Negative for blood in stool.   Endocrine: Negative for cold intolerance, heat intolerance and polyuria.   Musculoskeletal:  Negative for joint swelling.   Skin:  Negative for color change and bruise.   Neurological:  Negative for numbness and memory problem.   Hematological:  Does not bruise/bleed easily.   Psychiatric/Behavioral:  Negative for suicidal ideas and depressed mood. The patient is not nervous/anxious.        I have reviewed the patients family history, social history, past medical history, past surgical history and have updated it as appropriate.     Medications:     Current Outpatient Medications:     buPROPion SR (Wellbutrin SR) 200 MG 12 hr tablet, Take 1 tablet by mouth 2 (Two) Times a Day., Disp: 60 tablet, Rfl: 2    celecoxib (CeleBREX) 100  "MG capsule, Take 1 capsule by mouth 2 (Two) Times a Day., Disp: 60 capsule, Rfl: 0    levothyroxine (SYNTHROID, LEVOTHROID) 137 MCG tablet, Take 1 tablet by mouth Daily., Disp: 90 tablet, Rfl: 3    metoprolol tartrate (LOPRESSOR) 25 MG tablet, TAKE 1/2 TO 1 TABLET BY MOUTH TWICE DAILY AS NEEDED FOR PVCS OR FAST HEARTBEAT, Disp: 90 tablet, Rfl: 0    Allergies:   Allergies   Allergen Reactions    Dermagel Hand  [Alcohol] Rash       Objective     Physical Exam: Please see above  Vital Signs:   Vitals:    09/10/24 1033   BP: 126/88   Pulse: 56   Temp: 98 °F (36.7 °C)   SpO2: 98%   Weight: 86.1 kg (189 lb 12.8 oz)   Height: 160 cm (62.99\")     Body mass index is 33.63 kg/m².    Physical Exam  Vitals and nursing note reviewed.   Constitutional:       Appearance: Normal appearance.   HENT:      Head: Normocephalic and atraumatic.   Eyes:      General: Lids are normal.      Conjunctiva/sclera: Conjunctivae normal.   Cardiovascular:      Rate and Rhythm: Normal rate and regular rhythm.   Pulmonary:      Effort: Pulmonary effort is normal.      Breath sounds: Normal breath sounds and air entry.   Abdominal:      General: Abdomen is flat. Bowel sounds are normal.      Palpations: Abdomen is soft.   Musculoskeletal:      Cervical back: Full passive range of motion without pain and normal range of motion.      Comments: Patient is noted to have hyper flexibility in multiple joints including her pinky on both hands, thumb on both hands, elbows, and knees.   Neurological:      General: No focal deficit present.      Mental Status: She is alert and oriented to person, place, and time.   Psychiatric:         Attention and Perception: Attention normal.         Mood and Affect: Mood normal.         Behavior: Behavior normal. Behavior is cooperative.         Procedures    Results:   Labs:   Hemoglobin A1C   Date Value Ref Range Status   06/10/2022 5.00 4.80 - 5.60 % Final     TSH   Date Value Ref Range Status   07/23/2024 " 0.551 0.270 - 4.200 uIU/mL Final        POCT Results (if applicable):   Results for orders placed or performed in visit on 07/23/24   T4, Free    Specimen: Blood   Result Value Ref Range    Free T4 1.56 0.92 - 1.68 ng/dL   TSH    Specimen: Blood   Result Value Ref Range    TSH 0.551 0.270 - 4.200 uIU/mL   T3    Specimen: Blood   Result Value Ref Range    T3, Total 101.0 80.0 - 200.0 ng/dl   Vitamin D,25-Hydroxy    Specimen: Blood   Result Value Ref Range    25 Hydroxy, Vitamin D 38.1 30.0 - 100.0 ng/ml   Lipid panel    Specimen: Blood   Result Value Ref Range    Total Cholesterol 177 0 - 200 mg/dL    Triglycerides 81 0 - 150 mg/dL    HDL Cholesterol 59 40 - 60 mg/dL    LDL Cholesterol  103 (H) 0 - 100 mg/dL    VLDL Cholesterol 15 5 - 40 mg/dL    LDL/HDL Ratio 1.73        Imaging:   No valid procedures specified.         Assessment / Plan      Assessment/Plan:   Diagnoses and all orders for this visit:    1. Acute pain of left shoulder (Primary)  -     XR Shoulder 2+ View Left; Future    2. Jeanine-Danlos disease  -     Ambulatory Referral to Genetic Counseling/Testing               Vaccine Counseling:      Follow Up:   No follow-ups on file.      Daniel Metz,    [facility]

## 2024-09-19 ENCOUNTER — TELEPHONE (OUTPATIENT)
Dept: PSYCHIATRY | Facility: CLINIC | Age: 39
End: 2024-09-19
Payer: COMMERCIAL

## 2024-09-23 ENCOUNTER — OFFICE VISIT (OUTPATIENT)
Dept: ENDOCRINOLOGY | Facility: CLINIC | Age: 39
End: 2024-09-23
Payer: COMMERCIAL

## 2024-09-23 VITALS
DIASTOLIC BLOOD PRESSURE: 84 MMHG | SYSTOLIC BLOOD PRESSURE: 132 MMHG | BODY MASS INDEX: 33.13 KG/M2 | WEIGHT: 187 LBS | HEIGHT: 63 IN | OXYGEN SATURATION: 100 % | HEART RATE: 60 BPM

## 2024-09-23 DIAGNOSIS — E06.3 HYPOTHYROIDISM DUE TO HASHIMOTO'S THYROIDITIS: Primary | ICD-10-CM

## 2024-09-23 DIAGNOSIS — E03.8 HYPOTHYROIDISM DUE TO HASHIMOTO'S THYROIDITIS: Primary | ICD-10-CM

## 2024-09-23 LAB
T4 FREE SERPL-MCNC: 1.49 NG/DL (ref 0.92–1.68)
TSH SERPL DL<=0.05 MIU/L-ACNC: 1.22 UIU/ML (ref 0.27–4.2)

## 2024-09-23 PROCEDURE — 84439 ASSAY OF FREE THYROXINE: CPT | Performed by: INTERNAL MEDICINE

## 2024-09-23 PROCEDURE — 84443 ASSAY THYROID STIM HORMONE: CPT | Performed by: INTERNAL MEDICINE

## 2024-09-23 PROCEDURE — 99213 OFFICE O/P EST LOW 20 MIN: CPT | Performed by: INTERNAL MEDICINE

## 2024-09-24 DIAGNOSIS — M25.561 ACUTE PAIN OF RIGHT KNEE: ICD-10-CM

## 2024-09-24 DIAGNOSIS — M22.41 CHONDROMALACIA OF RIGHT PATELLA: ICD-10-CM

## 2024-09-24 DIAGNOSIS — M25.361 PATELLOFEMORAL INSTABILITY OF RIGHT KNEE WITH PAIN: Primary | ICD-10-CM

## 2024-09-24 DIAGNOSIS — M25.561 PATELLOFEMORAL INSTABILITY OF RIGHT KNEE WITH PAIN: Primary | ICD-10-CM

## 2024-09-24 DIAGNOSIS — G89.29 CHRONIC PAIN OF RIGHT KNEE: ICD-10-CM

## 2024-09-24 DIAGNOSIS — M25.561 CHRONIC PAIN OF RIGHT KNEE: ICD-10-CM

## 2024-09-24 DIAGNOSIS — M24.20 LIGAMENTOUS LAXITY OF MULTIPLE SITES: ICD-10-CM

## 2024-09-24 RX ORDER — CELECOXIB 100 MG/1
100 CAPSULE ORAL 2 TIMES DAILY
Qty: 60 CAPSULE | Refills: 1 | Status: SHIPPED | OUTPATIENT
Start: 2024-09-24

## 2024-09-26 ENCOUNTER — TELEPHONE (OUTPATIENT)
Dept: CARDIOLOGY | Facility: HOSPITAL | Age: 39
End: 2024-09-26

## 2024-09-26 NOTE — TELEPHONE ENCOUNTER
Caller: Angelika Vick     Relationship: SELF    Best call back number: [phone]    What is your medical concern? PATIENT WAS RECENTLY DIAGNOSED WITH LEXIE DANLOS SYNDROME. SHE WAS TOLD THAT THIS COULD AFFECT HER HEART VALVES. SHE WOULD LIKE TO KNOW IF SHE SHOULD SCHEDULE AN APPOINTMENT TO COME IN AND GO OVER THIS WITH CARLENE VALENCIA. PLEASE REACH OUT TO THE PATIENT ASAP.

## 2024-10-08 ENCOUNTER — OFFICE VISIT (OUTPATIENT)
Dept: CARDIOLOGY | Facility: HOSPITAL | Age: 39
End: 2024-10-08
Payer: COMMERCIAL

## 2024-10-08 VITALS
BODY MASS INDEX: 32.87 KG/M2 | HEART RATE: 97 BPM | HEIGHT: 63 IN | WEIGHT: 185.5 LBS | RESPIRATION RATE: 16 BRPM | SYSTOLIC BLOOD PRESSURE: 140 MMHG | DIASTOLIC BLOOD PRESSURE: 81 MMHG | OXYGEN SATURATION: 98 % | TEMPERATURE: 97.2 F

## 2024-10-08 DIAGNOSIS — R42 DIZZINESS: ICD-10-CM

## 2024-10-08 DIAGNOSIS — Q79.60 EHLERS-DANLOS DISEASE: ICD-10-CM

## 2024-10-08 DIAGNOSIS — I49.3 PVC'S (PREMATURE VENTRICULAR CONTRACTIONS): Primary | ICD-10-CM

## 2024-10-08 PROCEDURE — 99214 OFFICE O/P EST MOD 30 MIN: CPT | Performed by: NURSE PRACTITIONER

## 2024-10-08 NOTE — PROGRESS NOTES
"Chief Complaint  Follow-up (Pvcs )    Subjective    History of Present Illness {CC  Problem List  Visit  Diagnosis   Encounters  Notes  Medications  Labs  Result Review Imaging  Media :23}       History of Present Illness   38-year-old female for ongoing evaluation of her PVCs and Jeanine Danlos syndrome.  Recently diagnosed with Jeanine-Danlos syndrome and would like to have repeat echo to evaluate cardiac valves.  Patient is trying to find a specialist in the area to establish care with.  She does report occasional PVCs and notes she is taking metoprolol as needed about 1 time a month she reports PVCs usually last for day or so and improve on their own.  Currently denies chest pain, dyspnea, presyncope or syncope.  Does report intermittent dizziness with position changes. Hydrates well.   Objective     Vital Signs:   Vitals:    10/08/24 0938   BP: 140/81   BP Location: Left arm   Patient Position: Sitting   Pulse: 97   Resp: 16   Temp: 97.2 °F (36.2 °C)   TempSrc: Temporal   SpO2: 98%   Weight: 84.1 kg (185 lb 8 oz)   Height: 159.8 cm (62.9\")     Body mass index is 32.96 kg/m².  Physical Exam  Vitals and nursing note reviewed.   Constitutional:       Appearance: Normal appearance.   HENT:      Head: Normocephalic.   Eyes:      Pupils: Pupils are equal, round, and reactive to light.   Cardiovascular:      Rate and Rhythm: Normal rate and regular rhythm.      Pulses: Normal pulses.      Heart sounds: Normal heart sounds. No murmur heard.  Pulmonary:      Effort: Pulmonary effort is normal.      Breath sounds: Normal breath sounds.   Abdominal:      General: Bowel sounds are normal.      Palpations: Abdomen is soft.   Musculoskeletal:         General: Normal range of motion.      Cervical back: Normal range of motion.      Right lower leg: No edema.      Left lower leg: No edema.   Skin:     General: Skin is warm and dry.      Capillary Refill: Capillary refill takes less than 2 seconds.   Neurological:      " Mental Status: She is alert and oriented to person, place, and time.   Psychiatric:         Mood and Affect: Mood normal.         Thought Content: Thought content normal.              Result Review  Data Reviewed:{ Labs  Result Review  Imaging  Med Tab  Media :23}   Cardiac Event Monitor (06/22/2022 13:04)  Adult Transthoracic Echo Complete W/ Cont if Necessary Per Protocol (06/22/2022 13:42)    T4, Free (09/23/2024 09:43)  TSH (09/23/2024 09:43)  Lipid panel (07/23/2024 09:41)  Vitamin D,25-Hydroxy (07/23/2024 09:41)  T3 (07/23/2024 09:41)  TSH (07/23/2024 09:41)  T4, Free (07/23/2024 09:41)     Assessment and Plan {CC Problem List  Visit Diagnosis  ROS  Review (Popup)  Health Maintenance  Quality  BestPractice  Medications  SmartSets  SnapShot Encounters  Media :23}   1. PVC's (premature ventricular contractions)  May use metoprolol prn   - Adult Transthoracic Echo Complete W/ Cont if Necessary Per Protocol; Future    2. Dizziness  Continue good hydration   - Adult Transthoracic Echo Complete W/ Cont if Necessary Per Protocol; Future    3. Jeanine-Danlos disease  Ongoing work up   - Adult Transthoracic Echo Complete W/ Cont if Necessary Per Protocol; Future        Follow Up {Instructions Charge Capture  Follow-up Communications :23}   Return if symptoms worsen or fail to improve.    Patient was given instructions and counseling regarding her condition or for health maintenance advice. Please see specific information pulled into the AVS if appropriate.  Patient was instructed to call the [facility] with any questions, concerns, or worsening symptoms.

## 2024-10-10 ENCOUNTER — OFFICE VISIT (OUTPATIENT)
Dept: PSYCHIATRY | Facility: CLINIC | Age: 39
End: 2024-10-10
Payer: COMMERCIAL

## 2024-10-10 VITALS
HEIGHT: 63 IN | HEART RATE: 78 BPM | DIASTOLIC BLOOD PRESSURE: 76 MMHG | OXYGEN SATURATION: 99 % | SYSTOLIC BLOOD PRESSURE: 124 MMHG | WEIGHT: 186 LBS | BODY MASS INDEX: 32.96 KG/M2

## 2024-10-10 DIAGNOSIS — F90.0 ADHD (ATTENTION DEFICIT HYPERACTIVITY DISORDER), INATTENTIVE TYPE: Chronic | ICD-10-CM

## 2024-10-10 DIAGNOSIS — F33.0 MILD EPISODE OF RECURRENT MAJOR DEPRESSIVE DISORDER: Primary | Chronic | ICD-10-CM

## 2024-10-10 PROCEDURE — 99214 OFFICE O/P EST MOD 30 MIN: CPT | Performed by: NURSE PRACTITIONER

## 2024-10-10 RX ORDER — BUPROPION HYDROCHLORIDE 150 MG/1
150 TABLET, EXTENDED RELEASE ORAL EVERY MORNING
Qty: 30 TABLET | Refills: 2 | Status: SHIPPED | OUTPATIENT
Start: 2024-10-10

## 2024-10-10 NOTE — PROGRESS NOTES
"Chief Complaint  Depression and ADHD    Subjective              Angelika Vick presents to [facility] for medication management of her ADHD and depression.    History of Present Illness: Patient presents today for follow-up appointment after last being seen on 09/03/2024.  At that appointment her Wellbutrin was increased to 200 mg twice daily.  Between appointments the patient called the office due to not tolerating that increase and was discontinued from the medication.  Patient says today \"I guess I am doing alright\".  She says her anxiety became more difficult to manage after discontinuing Wellbutrin.  Patient says following the increase at her last appointment she felt \"crazy\".  She says she was more irritable and \"chery, I was just picking fights with my spouse and my kids\".  Patient says she was struggling with feeling on edge every day.  Patient reports her birthday was yesterday and she had a good day.  Her children stayed home and they had a board game day.  Patient says she is somewhat frustrated with the results of the increase in Wellbutrin because the medication was working well for her when she was taking it at the previous dose, it just did not seem to be enough, which is why was increased.  Patient says aside from that things seem to be going okay.  She has some upcoming cardiology appointments and is going to talk to them about other possible medication options.  She says her appetite and sleep have been about the same and she denies any new concerns with either.  She denies any SI/HI, A/V hallucinations.      The following portions of the patient's history were reviewed and updated as appropriate: allergies, current medications, past family history, past medical history, past social history, past surgical history and problem list.      Current Medications:   Current Outpatient Medications   Medication Sig Dispense Refill    celecoxib (CeleBREX) 100 MG " "capsule Take 1 capsule by mouth 2 (Two) Times a Day. 60 capsule 1    levothyroxine (SYNTHROID, LEVOTHROID) 137 MCG tablet Take 1 tablet by mouth Daily. 90 tablet 3    metoprolol tartrate (LOPRESSOR) 25 MG tablet TAKE 1/2 TO 1 TABLET BY MOUTH TWICE DAILY AS NEEDED FOR PVCS OR FAST HEARTBEAT 90 tablet 0    buPROPion SR (Wellbutrin SR) 150 MG 12 hr tablet Take 1 tablet by mouth Every Morning. 30 tablet 2     No current facility-administered medications for this visit.       Mental Status Exam:   Hygiene:   good  Cooperation:  Cooperative  Eye Contact:  Good  Psychomotor Behavior:  Restless  Affect:  Appropriate  Mood: euthymic  Speech:  Normal  Thought Process:  Goal directed  Thought Content:  Mood congruent  Suicidal:  None  Homicidal:  None  Hallucinations:  None  Delusion:  None  Memory:  Intact  Orientation:  Person, Place, Time, and Situation  Reliability:  good  Insight:  Good  Judgement:  Good  Impulse Control:  Good  Physical/Medical Issues:   Hashimoto's, PCOS        Objective   Vital Signs:   /76   Pulse 78   Ht 160 cm (63\")   Wt 84.4 kg (186 lb)   SpO2 99%   BMI 32.95 kg/m²     Physical Exam  Neurological:      Mental Status: She is oriented to person, place, and time. Mental status is at baseline.      Coordination: Coordination is intact.      Gait: Gait is intact.   Psychiatric:         Behavior: Behavior is cooperative.        Result Review :     The following data was reviewed by: DELISA Parmar on 10/10/2024:    Data reviewed : Previous note, medication history           Assessment and Plan    Diagnoses and all orders for this visit:    1. Mild episode of recurrent major depressive disorder (Primary)  -     buPROPion SR (Wellbutrin SR) 150 MG 12 hr tablet; Take 1 tablet by mouth Every Morning.  Dispense: 30 tablet; Refill: 2    2. ADHD (attention deficit hyperactivity disorder), inattentive type  -     buPROPion SR (Wellbutrin SR) 150 MG 12 hr tablet; Take 1 tablet by mouth Every " Morning.  Dispense: 30 tablet; Refill: 2             PHQ-9 Score:   PHQ-9 Total Score: 13      Depression Screening:  Patient screened positive for depression based on a PHQ-9 score of 13 on 10/10/2024. Follow-up recommendations include: Prescribed antidepressant medication treatment and Suicide Risk Assessment performed.        Tobacco Cessation:  Patient is a former tobacco user. No tobacco cessation education necessary.  Patient does report she smokes cannabis on a daily basis. I encouraged her to reduce and/or stop.     Impression/Plan:  -This is a follow-up appointment.  Patient presents today and reports she has been doing okay but is struggling more so with her anxiety, mood and ADHD symptoms.  As discussed above she did not tolerate the increase in her Wellbutrin to 200 mg twice daily.  Due to patient's physical health complications her medication options to address her symptoms are somewhat limited.  She does have an upcoming appointment with cardiology where she is going to discuss other possible options, that would include stimulants.  However I also advised her in addition to that she cannot continue to use cannabis as well.  Patient says she has been able to reduce her daily use.  Patient is frustrated over the adverse effects she experienced following the increase in her Wellbutrin.  Patient had previously reported at 100 mg she was getting some symptom burden relief.  Today I would like to try Wellbutrin 1 more time, but we will do 150 mg once daily.  Patient is in agreement with trying this as she was happy with some of the relief she was receiving.  If she has symptoms similar to what she experienced over the last month she will notify the office and we will discontinue medication.  -Start Wellbutrin  mg once daily in the morning for ADHD and mood dysfunction.  -Patient's VANDA report reviewed and deemed appropriate.  Patient counseled on use of controlled substances.  -Reviewed available lab  work.  -Schedule in person follow-up appointment for 6 weeks or as needed.      MEDS ORDERED DURING VISIT:  New Medications Ordered This Visit   Medications    buPROPion SR (Wellbutrin SR) 150 MG 12 hr tablet     Sig: Take 1 tablet by mouth Every Morning.     Dispense:  30 tablet     Refill:  2         Follow Up   Return in about 6 weeks (around 11/21/2024), or if symptoms worsen or fail to improve, for Next scheduled follow up, In-Person Appt.  Patient was given instructions and counseling regarding her condition or for health maintenance advice. Please see specific information pulled into the AVS if appropriate.       TREATMENT PLAN/GOALS: Continue supportive psychotherapy efforts and medications as indicated. Treatment and medication options discussed during today's visit. Patient acknowledged and verbally consented to continue with current treatment plan and was educated on the importance of compliance with treatment and follow-up appointments.    MEDICATION ISSUES:  Discussed medication options and treatment plan of prescribed medication as well as the risks, benefits, and side effects including potential falls, possible impaired driving and metabolic adversities among others. Patient is agreeable to call the office with any worsening of symptoms or onset of side effects. Patient is agreeable to call 911 or go to the nearest ER should he/she begin having SI/HI.          This document has been electronically signed by DELISA Calvo, PMHNP-BC  October 10, 2024 13:53 EDT      Part of this note may be an electronic transcription/translation of spoken language to printed text using the Dragon Dictation System.

## 2024-10-17 ENCOUNTER — HOSPITAL ENCOUNTER (OUTPATIENT)
Facility: HOSPITAL | Age: 39
Discharge: HOME | End: 2024-10-17
Admitting: NURSE PRACTITIONER
Payer: COMMERCIAL

## 2024-10-17 VITALS — HEIGHT: 63 IN | BODY MASS INDEX: 32.96 KG/M2 | WEIGHT: 186 LBS

## 2024-10-17 DIAGNOSIS — I49.3 PVC'S (PREMATURE VENTRICULAR CONTRACTIONS): ICD-10-CM

## 2024-10-17 DIAGNOSIS — Q79.60 EHLERS-DANLOS DISEASE: ICD-10-CM

## 2024-10-17 DIAGNOSIS — R42 DIZZINESS: ICD-10-CM

## 2024-10-17 LAB
ASCENDING AORTA: 3.2 CM
BH CV ECHO MEAS - AO MAX PG: 12.2 MMHG
BH CV ECHO MEAS - AO MEAN PG: 6.5 MMHG
BH CV ECHO MEAS - AO ROOT DIAM: 3 CM
BH CV ECHO MEAS - AO V2 MAX: 174.5 CM/SEC
BH CV ECHO MEAS - AO V2 VTI: 36.4 CM
BH CV ECHO MEAS - AVA(I,D): 2.09 CM2
BH CV ECHO MEAS - EDV(CUBED): 140.6 ML
BH CV ECHO MEAS - EDV(MOD-SP2): 71.7 ML
BH CV ECHO MEAS - EDV(MOD-SP4): 113 ML
BH CV ECHO MEAS - EF(MOD-BP): 68.3 %
BH CV ECHO MEAS - EF(MOD-SP2): 72.4 %
BH CV ECHO MEAS - EF(MOD-SP4): 67.9 %
BH CV ECHO MEAS - ESV(CUBED): 46.7 ML
BH CV ECHO MEAS - ESV(MOD-SP2): 19.8 ML
BH CV ECHO MEAS - ESV(MOD-SP4): 36.3 ML
BH CV ECHO MEAS - FS: 30.8 %
BH CV ECHO MEAS - IVS/LVPW: 1.25 CM
BH CV ECHO MEAS - IVSD: 1 CM
BH CV ECHO MEAS - LA DIMENSION: 4.1 CM
BH CV ECHO MEAS - LAT PEAK E' VEL: 12.8 CM/SEC
BH CV ECHO MEAS - LV DIASTOLIC VOL/BSA (35-75): 60.3 CM2
BH CV ECHO MEAS - LV MASS(C)D: 169 GRAMS
BH CV ECHO MEAS - LV MAX PG: 3.5 MMHG
BH CV ECHO MEAS - LV MEAN PG: 2 MMHG
BH CV ECHO MEAS - LV SYSTOLIC VOL/BSA (12-30): 19.4 CM2
BH CV ECHO MEAS - LV V1 MAX: 93.5 CM/SEC
BH CV ECHO MEAS - LV V1 VTI: 21.9 CM
BH CV ECHO MEAS - LVIDD: 5.2 CM
BH CV ECHO MEAS - LVIDS: 3.6 CM
BH CV ECHO MEAS - LVOT AREA: 3.5 CM2
BH CV ECHO MEAS - LVOT DIAM: 2.1 CM
BH CV ECHO MEAS - LVPWD: 0.8 CM
BH CV ECHO MEAS - MED PEAK E' VEL: 12.3 CM/SEC
BH CV ECHO MEAS - MV A MAX VEL: 57.1 CM/SEC
BH CV ECHO MEAS - MV DEC SLOPE: 324 CM/SEC2
BH CV ECHO MEAS - MV DEC TIME: 0.26 SEC
BH CV ECHO MEAS - MV E MAX VEL: 66.9 CM/SEC
BH CV ECHO MEAS - MV E/A: 1.17
BH CV ECHO MEAS - MV P1/2T: 90.4 MSEC
BH CV ECHO MEAS - MVA(P1/2T): 2.43 CM2
BH CV ECHO MEAS - PA ACC TIME: 0.14 SEC
BH CV ECHO MEAS - PA V2 MAX: 116.7 CM/SEC
BH CV ECHO MEAS - PAPD(PI EDV): 4 MMHG
BH CV ECHO MEAS - PI END-D VEL: 93.9 CM/SEC
BH CV ECHO MEAS - RAP SYSTOLE: 3 MMHG
BH CV ECHO MEAS - RVSP: 15 MMHG
BH CV ECHO MEAS - SV(LVOT): 75.9 ML
BH CV ECHO MEAS - SV(MOD-SP2): 51.9 ML
BH CV ECHO MEAS - SV(MOD-SP4): 76.7 ML
BH CV ECHO MEAS - SVI(LVOT): 40.5 ML/M2
BH CV ECHO MEAS - SVI(MOD-SP2): 27.7 ML/M2
BH CV ECHO MEAS - SVI(MOD-SP4): 40.9 ML/M2
BH CV ECHO MEAS - TAPSE (>1.6): 2.39 CM
BH CV ECHO MEAS - TR MAX PG: 11.6 MMHG
BH CV ECHO MEAS - TR MAX VEL: 170 CM/SEC
BH CV ECHO MEASUREMENTS AVERAGE E/E' RATIO: 5.33
BH CV XLRA - RV BASE: 3.9 CM
BH CV XLRA - RV LENGTH: 7.7 CM
BH CV XLRA - RV MID: 3.7 CM
BH CV XLRA - TDI S': 13.4 CM/SEC
IVRT: 95 MS
LEFT ATRIUM VOLUME INDEX: 22.7 ML/M2
LV EF 2D ECHO EST: 60 %

## 2024-10-17 PROCEDURE — 93306 TTE W/DOPPLER COMPLETE: CPT

## 2024-10-17 PROCEDURE — 93306 TTE W/DOPPLER COMPLETE: CPT | Performed by: INTERNAL MEDICINE

## 2024-10-21 ENCOUNTER — TELEPHONE (OUTPATIENT)
Dept: CARDIOLOGY | Facility: HOSPITAL | Age: 39
End: 2024-10-21
Payer: COMMERCIAL

## 2024-10-21 NOTE — TELEPHONE ENCOUNTER
Pt called and stated she is having frequent PVC's and took 12.5 mg of the metoprolol. Her heart rate is in the 60's but she is still feeling the PVC's and wanting to know if it is ok to take the other 12.5 mg . It has been about four hours since her first dose.

## 2024-10-28 ENCOUNTER — OFFICE VISIT (OUTPATIENT)
Age: 39
End: 2024-10-28
Payer: COMMERCIAL

## 2024-10-28 VITALS
SYSTOLIC BLOOD PRESSURE: 132 MMHG | HEART RATE: 63 BPM | BODY MASS INDEX: 32.43 KG/M2 | DIASTOLIC BLOOD PRESSURE: 94 MMHG | WEIGHT: 183 LBS | OXYGEN SATURATION: 98 % | HEIGHT: 63 IN | TEMPERATURE: 98.4 F

## 2024-10-28 DIAGNOSIS — L03.114 CELLULITIS OF LEFT UPPER EXTREMITY: ICD-10-CM

## 2024-10-28 DIAGNOSIS — T78.40XA ALLERGIC REACTION, INITIAL ENCOUNTER: Primary | ICD-10-CM

## 2024-10-28 PROCEDURE — 99213 OFFICE O/P EST LOW 20 MIN: CPT | Performed by: FAMILY MEDICINE

## 2024-10-28 RX ORDER — SULFAMETHOXAZOLE AND TRIMETHOPRIM 800; 160 MG/1; MG/1
1 TABLET ORAL 2 TIMES DAILY
Qty: 10 TABLET | Refills: 0 | Status: SHIPPED | OUTPATIENT
Start: 2024-10-28 | End: 2024-11-01

## 2024-10-28 RX ORDER — PREDNISONE 5 MG/1
1 TABLET ORAL TAKE AS DIRECTED
Qty: 21 TABLET | Refills: 0 | Status: SHIPPED | OUTPATIENT
Start: 2024-10-28 | End: 2024-11-01

## 2024-10-28 NOTE — PROGRESS NOTES
Follow Up Office Visit      Date: 10/28/2024   Patient Name: Angelika Vick  : 1985   MRN: [id number]     Chief Complaint:    Chief Complaint   Patient presents with    Allergic Reaction     Tattoo       History of Present Illness: Angelika Vick is a 39 y.o. female who is here today for tattoo with possible.  Tattoo was done around 2 weeks ago.  Itching started about a week ago.     Subjective      Review of Systems:   Review of Systems   Constitutional:  Negative for appetite change and unexpected weight loss.   HENT:  Negative for trouble swallowing.    Eyes:  Negative for blurred vision and double vision.   Respiratory:  Negative for cough and shortness of breath.    Cardiovascular:  Negative for chest pain and leg swelling.   Gastrointestinal:  Negative for blood in stool.   Endocrine: Negative for cold intolerance, heat intolerance and polyuria.   Musculoskeletal:  Negative for joint swelling.   Skin:  Negative for color change and bruise.   Neurological:  Negative for numbness and memory problem.   Hematological:  Does not bruise/bleed easily.   Psychiatric/Behavioral:  Negative for suicidal ideas and depressed mood. The patient is not nervous/anxious.        I have reviewed the patients family history, social history, past medical history, past surgical history and have updated it as appropriate.     Medications:     Current Outpatient Medications:     levothyroxine (SYNTHROID, LEVOTHROID) 137 MCG tablet, Take 1 tablet by mouth Daily., Disp: 90 tablet, Rfl: 3    metoprolol tartrate (LOPRESSOR) 25 MG tablet, TAKE 1/2 TO 1 TABLET BY MOUTH TWICE DAILY AS NEEDED FOR PVCS OR FAST HEARTBEAT, Disp: 90 tablet, Rfl: 0    predniSONE 5 MG (21) tablet therapy pack dose pack, Take 1 tablet by mouth Take As Directed. Take as directed on package instructions., Disp: 21 tablet, Rfl: 0    sulfamethoxazole-trimethoprim (BACTRIM DS,SEPTRA DS) 800-160 MG per tablet, Take 1 tablet by mouth 2 (Two) Times a  "Day., Disp: 10 tablet, Rfl: 0    Allergies:   Allergies   Allergen Reactions    Dermagel Hand  [Alcohol] Rash       Objective     Physical Exam: Please see above  Vital Signs:   Vitals:    10/28/24 1309   BP: 132/94   Pulse: 63   Temp: 98.4 °F (36.9 °C)   SpO2: 98%   Weight: 83 kg (183 lb)   Height: 160 cm (63\")     Body mass index is 32.42 kg/m².    Physical Exam  Vitals and nursing note reviewed.   Constitutional:       Appearance: Normal appearance.   HENT:      Head: Normocephalic and atraumatic.   Eyes:      General: Lids are normal.      Conjunctiva/sclera: Conjunctivae normal.   Cardiovascular:      Rate and Rhythm: Normal rate and regular rhythm.   Pulmonary:      Effort: Pulmonary effort is normal.      Breath sounds: Normal breath sounds and air entry.   Abdominal:      General: Abdomen is flat. Bowel sounds are normal.      Palpations: Abdomen is soft.   Musculoskeletal:      Cervical back: Full passive range of motion without pain and normal range of motion.   Skin:     Comments: Patient with some red maculopapular rash around her left bicep going towards her left pectoral area.   Neurological:      General: No focal deficit present.      Mental Status: She is alert and oriented to person, place, and time.   Psychiatric:         Attention and Perception: Attention normal.         Mood and Affect: Mood normal.         Behavior: Behavior normal. Behavior is cooperative.         Procedures    Results:   Labs:   Hemoglobin A1C   Date Value Ref Range Status   06/10/2022 5.00 4.80 - 5.60 % Final     TSH   Date Value Ref Range Status   09/23/2024 1.220 0.270 - 4.200 uIU/mL Final        POCT Results (if applicable):   Results for orders placed or performed during the hospital encounter of 10/17/24   Adult Transthoracic Echo Complete W/ Cont if Necessary Per Protocol    Collection Time: 10/17/24  9:46 AM   Result Value Ref Range    EF(MOD-bp) 68.3 %    LVIDd 5.2 cm    LVIDs 3.6 cm    IVSd 1.00 cm    " LVPWd 0.80 cm    FS 30.8 %    IVS/LVPW 1.25 cm    ESV(cubed) 46.7 ml    LV Sys Vol (BSA corrected) 19.4 cm2    EDV(cubed) 140.6 ml    LV Bishop Vol (BSA corrected) 60.3 cm2    LV mass(C)d 169.0 grams    LVOT area 3.5 cm2    LVOT diam 2.10 cm    EDV(MOD-sp2) 71.7 ml    EDV(MOD-sp4) 113.0 ml    ESV(MOD-sp2) 19.8 ml    ESV(MOD-sp4) 36.3 ml    SV(MOD-sp2) 51.9 ml    SV(MOD-sp4) 76.7 ml    SVi(MOD-SP2) 27.7 ml/m2    SVi(MOD-SP4) 40.9 ml/m2    SVi (LVOT) 40.5 ml/m2    EF(MOD-sp2) 72.4 %    EF(MOD-sp4) 67.9 %    MV E max ming 66.9 cm/sec    MV A max ming 57.1 cm/sec    MV dec time 0.26 sec    MV E/A 1.17     LA ESV Index (BP) 22.7 ml/m2    Med Peak E' Ming 12.3 cm/sec    Lat Peak E' Ming 12.8 cm/sec    TR max ming 170.0 cm/sec    Avg E/e' ratio 5.33     SV(LVOT) 75.9 ml    RV Base 3.9 cm    RV Mid 3.7 cm    RV Length 7.7 cm    TAPSE (>1.6) 2.39 cm    RV S' 13.4 cm/sec    LA dimension (2D)  4.1 cm    LV V1 max 93.5 cm/sec    LV V1 max PG 3.5 mmHg    LV V1 mean PG 2.00 mmHg    LV V1 VTI 21.9 cm    Ao pk ming 174.5 cm/sec    Ao max PG 12.2 mmHg    Ao mean PG 6.5 mmHg    Ao V2 VTI 36.4 cm    KALPSEH(I,D) 2.09 cm2    MV P1/2t 90.4 msec    MVA(P1/2t) 2.43 cm2    MV dec slope 324.0 cm/sec2    TR max PG 11.6 mmHg    PA V2 max 116.7 cm/sec    PA acc time 0.14 sec    PI end-d ming 93.9 cm/sec    Ao root diam 3.0 cm    Echo EF Estimated 60.0 %    IVRT 95.0 ms    RVSP(TR) 15 mmHg    RAP systole 3 mmHg    PAPd(PI edV) 4 mmHg    Ascending aorta 3.2 cm       Imaging:   No valid procedures specified.         Assessment / Plan      Assessment/Plan:   Diagnoses and all orders for this visit:    1. Allergic reaction, initial encounter (Primary)  -     predniSONE 5 MG (21) tablet therapy pack dose pack; Take 1 tablet by mouth Take As Directed. Take as directed on package instructions.  Dispense: 21 tablet; Refill: 0    2. Cellulitis of left upper extremity  -     sulfamethoxazole-trimethoprim (BACTRIM DS,SEPTRA DS) 800-160 MG per tablet; Take 1 tablet by  mouth 2 (Two) Times a Day.  Dispense: 10 tablet; Refill: 0               Vaccine Counseling:      Follow Up:   No follow-ups on file.      Daniel Metz, DO [facility]

## 2024-11-01 ENCOUNTER — OFFICE VISIT (OUTPATIENT)
Age: 39
End: 2024-11-01
Payer: COMMERCIAL

## 2024-11-01 ENCOUNTER — PRIOR AUTHORIZATION (OUTPATIENT)
Age: 39
End: 2024-11-01
Payer: COMMERCIAL

## 2024-11-01 VITALS
HEART RATE: 62 BPM | OXYGEN SATURATION: 97 % | SYSTOLIC BLOOD PRESSURE: 130 MMHG | TEMPERATURE: 98.6 F | WEIGHT: 182 LBS | DIASTOLIC BLOOD PRESSURE: 82 MMHG | BODY MASS INDEX: 32.25 KG/M2 | HEIGHT: 63 IN

## 2024-11-01 DIAGNOSIS — F32.2 CURRENT SEVERE EPISODE OF MAJOR DEPRESSIVE DISORDER WITHOUT PSYCHOTIC FEATURES WITHOUT PRIOR EPISODE: Primary | ICD-10-CM

## 2024-11-01 NOTE — TELEPHONE ENCOUNTER
Pharmacy claim for Trintellix (formerly Brintellix) 5MG tablets, prescriber Pamela, has been rejected and requires prior authorization for coverage.    (Key: [id number])  Rx #: [id number]  Trintellix (formerly Brintellix) 5MG tablets

## 2024-11-01 NOTE — PROGRESS NOTES
Follow Up Office Visit      Date: 2024   Patient Name: Angelika Vick  : 1985   MRN: [id number]     Chief Complaint:    Chief Complaint   Patient presents with    Depression       History of Present Illness: Angelika Vick is a 39 y.o. female who is here today to discuss starting medication to treat her depression. She states that she has been seeing a therapist for this and at this point they do believe that she needs medication to assist with treatment. 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 To date she has tried zoloft (serotonin syndrome), celexa (hallucinations), lexapro, prozac, buspar, and paxil as well as a few others. Her therapist has recommended trintellix, remeron, or viibryd.     She would also like evaluation of the tattoo on her left arm for which she was seen recently for an allergic reaction after having it done. She has tried to avoid taking oral prednisone and has been putting steroid cream on it but it doesn't seem to be getting better. It has itched so much that she will scratch it through the night.     Subjective      Review of Systems:   Review of Systems   Constitutional:  Negative for chills and fever.   HENT:  Negative for congestion, sneezing and sore throat.    Respiratory:  Negative for cough and shortness of breath.    Cardiovascular:  Negative for chest pain.   Gastrointestinal:  Negative for nausea.   Genitourinary:  Negative for dysuria.   Skin:  Negative for rash.   Psychiatric/Behavioral:  The patient is not nervous/anxious.         I have reviewed the patients family history, social history, past medical history, past surgical history and have updated it as appropriate.     Medications:     Current Outpatient Medications:     levothyroxine (SYNTHROID, LEVOTHROID) 137 MCG tablet, Take 1 tablet by mouth Daily., Disp: 90 tablet, Rfl: 3    metoprolol tartrate  "(LOPRESSOR) 25 MG tablet, TAKE 1/2 TO 1 TABLET BY MOUTH TWICE DAILY AS NEEDED FOR PVCS OR FAST HEARTBEAT, Disp: 90 tablet, Rfl: 0    Vortioxetine HBr (Trintellix) 5 MG tablet tablet, Take 1 tablet by mouth Daily With Breakfast., Disp: 30 tablet, Rfl: 0    Allergies:   Allergies   Allergen Reactions    Dermagel Hand  [Alcohol] Rash       Objective     Physical Exam: Please see above  Vital Signs:   Vitals:    11/01/24 0806   BP: 130/82   Pulse: 62   Temp: 98.6 °F (37 °C)   SpO2: 97%   Weight: 82.6 kg (182 lb)   Height: 160 cm (63\")     Body mass index is 32.24 kg/m².          Physical Exam  Vitals and nursing note reviewed.   Constitutional:       Appearance: Normal appearance.   HENT:      Head: Atraumatic.   Eyes:      Pupils: Pupils are equal, round, and reactive to light.   Cardiovascular:      Rate and Rhythm: Normal rate and regular rhythm.      Heart sounds: No murmur heard.  Pulmonary:      Effort: Pulmonary effort is normal.      Breath sounds: Normal breath sounds.   Musculoskeletal:         General: Normal range of motion.      Cervical back: Normal range of motion.      Right lower leg: No edema.      Left lower leg: No edema.   Skin:     General: Skin is warm and dry.   Neurological:      General: No focal deficit present.      Mental Status: She is alert and oriented to person, place, and time.      Gait: Gait is intact.   Psychiatric:         Mood and Affect: Mood normal.         Behavior: Behavior normal.         Procedures    Results:   Labs:   Hemoglobin A1C   Date Value Ref Range Status   06/10/2022 5.00 4.80 - 5.60 % Final     TSH   Date Value Ref Range Status   09/23/2024 1.220 0.270 - 4.200 uIU/mL Final        POCT Results (if applicable):   Results for orders placed or performed during the hospital encounter of 10/17/24   Adult Transthoracic Echo Complete W/ Cont if Necessary Per Protocol    Collection Time: 10/17/24  9:46 AM   Result Value Ref Range    EF(MOD-bp) 68.3 %    LVIDd 5.2 " cm    LVIDs 3.6 cm    IVSd 1.00 cm    LVPWd 0.80 cm    FS 30.8 %    IVS/LVPW 1.25 cm    ESV(cubed) 46.7 ml    LV Sys Vol (BSA corrected) 19.4 cm2    EDV(cubed) 140.6 ml    LV Bishop Vol (BSA corrected) 60.3 cm2    LV mass(C)d 169.0 grams    LVOT area 3.5 cm2    LVOT diam 2.10 cm    EDV(MOD-sp2) 71.7 ml    EDV(MOD-sp4) 113.0 ml    ESV(MOD-sp2) 19.8 ml    ESV(MOD-sp4) 36.3 ml    SV(MOD-sp2) 51.9 ml    SV(MOD-sp4) 76.7 ml    SVi(MOD-SP2) 27.7 ml/m2    SVi(MOD-SP4) 40.9 ml/m2    SVi (LVOT) 40.5 ml/m2    EF(MOD-sp2) 72.4 %    EF(MOD-sp4) 67.9 %    MV E max ming 66.9 cm/sec    MV A max ming 57.1 cm/sec    MV dec time 0.26 sec    MV E/A 1.17     LA ESV Index (BP) 22.7 ml/m2    Med Peak E' Ming 12.3 cm/sec    Lat Peak E' Ming 12.8 cm/sec    TR max ming 170.0 cm/sec    Avg E/e' ratio 5.33     SV(LVOT) 75.9 ml    RV Base 3.9 cm    RV Mid 3.7 cm    RV Length 7.7 cm    TAPSE (>1.6) 2.39 cm    RV S' 13.4 cm/sec    LA dimension (2D)  4.1 cm    LV V1 max 93.5 cm/sec    LV V1 max PG 3.5 mmHg    LV V1 mean PG 2.00 mmHg    LV V1 VTI 21.9 cm    Ao pk ming 174.5 cm/sec    Ao max PG 12.2 mmHg    Ao mean PG 6.5 mmHg    Ao V2 VTI 36.4 cm    KALPESH(I,D) 2.09 cm2    MV P1/2t 90.4 msec    MVA(P1/2t) 2.43 cm2    MV dec slope 324.0 cm/sec2    TR max PG 11.6 mmHg    PA V2 max 116.7 cm/sec    PA acc time 0.14 sec    PI end-d ming 93.9 cm/sec    Ao root diam 3.0 cm    Echo EF Estimated 60.0 %    IVRT 95.0 ms    RVSP(TR) 15 mmHg    RAP systole 3 mmHg    PAPd(PI edV) 4 mmHg    Ascending aorta 3.2 cm       Imaging:   No valid procedures specified.     Measures:   Smoking Cessation:   Former smoker.      Assessment / Plan      Assessment/Plan:   Diagnoses and all orders for this visit:    1. Current severe episode of major depressive disorder without psychotic features without prior episode (Primary)  -  Patient does have significant depression for which she has tried multiple medications as listed in the HPI. We discussed the options given by her therapist  including trintellix, remeron, and viibryd and at this time we will trial trintellix as the side effect profile of remeron is not desirable, particularly fatigue and weight gain.  -     Vortioxetine HBr (Trintellix) 5 MG tablet tablet; Take 1 tablet by mouth Daily With Breakfast.  Dispense: 30 tablet; Refill: 0      Follow Up:   Return in about 1 month (around 12/1/2024) for Dr. Santiago or Dr. Metz..      Jennifer Santiago MD  [facility] 1

## 2024-11-04 ENCOUNTER — TELEPHONE (OUTPATIENT)
Dept: CARDIOLOGY | Facility: HOSPITAL | Age: 39
End: 2024-11-04

## 2024-11-04 NOTE — TELEPHONE ENCOUNTER
Caller: Angelika Vick    Relationship: Self    Best call back number: [phone]    What is the best time to reach you: ANYTIME    Who are you requesting to speak with (clinical staff, provider,  specific staff member): ANYONE    What was the call regarding: PATIENT CALLING TO INFORM THAT SHE WAS AT THE [facility] THIS PAST WEEKEND. REPORTS SHE WAS SEEN FOR LIGHTHEADEDNESS AND PALPITATIONS. WOULD LIKE TO KNOW HOW TO PROCEED.

## 2024-11-07 ENCOUNTER — OFFICE VISIT (OUTPATIENT)
Age: 39
End: 2024-11-07
Payer: COMMERCIAL

## 2024-11-07 ENCOUNTER — LAB (OUTPATIENT)
Dept: LAB | Facility: HOSPITAL | Age: 39
End: 2024-11-07
Payer: COMMERCIAL

## 2024-11-07 VITALS
WEIGHT: 184 LBS | HEIGHT: 63 IN | SYSTOLIC BLOOD PRESSURE: 122 MMHG | OXYGEN SATURATION: 97 % | TEMPERATURE: 98.4 F | BODY MASS INDEX: 32.6 KG/M2 | DIASTOLIC BLOOD PRESSURE: 82 MMHG | HEART RATE: 84 BPM

## 2024-11-07 DIAGNOSIS — R21 FACIAL RASH: ICD-10-CM

## 2024-11-07 DIAGNOSIS — R21 FACIAL RASH: Primary | ICD-10-CM

## 2024-11-07 DIAGNOSIS — R53.83 OTHER FATIGUE: ICD-10-CM

## 2024-11-07 LAB
CHROMATIN AB SERPL-ACNC: <10 IU/ML (ref 0–14)
CRP SERPL-MCNC: <0.3 MG/DL (ref 0–0.5)
ERYTHROCYTE [SEDIMENTATION RATE] IN BLOOD: 12 MM/HR (ref 0–20)

## 2024-11-07 PROCEDURE — 86140 C-REACTIVE PROTEIN: CPT

## 2024-11-07 PROCEDURE — 86431 RHEUMATOID FACTOR QUANT: CPT

## 2024-11-07 PROCEDURE — 86038 ANTINUCLEAR ANTIBODIES: CPT

## 2024-11-07 PROCEDURE — 99213 OFFICE O/P EST LOW 20 MIN: CPT | Performed by: FAMILY MEDICINE

## 2024-11-07 PROCEDURE — 85652 RBC SED RATE AUTOMATED: CPT

## 2024-11-07 PROCEDURE — 86225 DNA ANTIBODY NATIVE: CPT

## 2024-11-07 PROCEDURE — 86160 COMPLEMENT ANTIGEN: CPT

## 2024-11-07 PROCEDURE — 36415 COLL VENOUS BLD VENIPUNCTURE: CPT

## 2024-11-07 NOTE — PROGRESS NOTES
"    Follow Up Office Visit      Date: 2024   Patient Name: Angelika Vick  : 1985   MRN: [id number]     Chief Complaint:    Chief Complaint   Patient presents with    Hashimoto's Thyroiditis     Referral to rheumatology         History of Present Illness: Angelika Vick is a 39 y.o. female who is here today for \[facility]\""il rheumatology referal.  States that she has a h/o hashimotos.  States that she is still having some sympotms that she feels are concerning.  States that she will get a \"butterfly\" rash on her face, states that it pops up every so often.  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    Subjective      Review of Systems:   Review of Systems   Constitutional:  Negative for activity change and fatigue.   HENT:  Negative for congestion.    Eyes:  Negative for double vision.   Cardiovascular:  Negative for chest pain.   Gastrointestinal:  Negative for nausea and vomiting.   Skin:  Positive for rash.       I have reviewed the patients family history, social history, past medical history, past surgical history and have updated it as appropriate.     Medications:     Current Outpatient Medications:     levothyroxine (SYNTHROID, LEVOTHROID) 137 MCG tablet, Take 1 tablet by mouth Daily., Disp: 90 tablet, Rfl: 3    metoprolol tartrate (LOPRESSOR) 25 MG tablet, TAKE 1/2 TO 1 TABLET BY MOUTH TWICE DAILY AS NEEDED FOR PVCS OR FAST HEARTBEAT, Disp: 90 tablet, Rfl: 0    Vortioxetine HBr (Trintellix) 5 MG tablet tablet, Take 1 tablet by mouth Daily With Breakfast., Disp: 30 tablet, Rfl: 0    Allergies:   Allergies   Allergen Reactions    Dermagel Hand  [Alcohol] Rash       Objective     Physical Exam: Please see above  Vital Signs:   Vitals:    24 0805   BP: 122/82   Pulse: 84   Temp: 98.4 °F (36.9 °C)   SpO2: 97%   Weight: 83.5 kg (184 lb) " "  Height: 160 cm (63\")     Body mass index is 32.59 kg/m².    Physical Exam  Vitals and nursing note reviewed.   Constitutional:       Appearance: Normal appearance.   HENT:      Head: Normocephalic and atraumatic.   Eyes:      General: Lids are normal.      Conjunctiva/sclera: Conjunctivae normal.   Cardiovascular:      Rate and Rhythm: Normal rate and regular rhythm.   Pulmonary:      Effort: Pulmonary effort is normal.      Breath sounds: Normal breath sounds and air entry.   Abdominal:      General: Abdomen is flat. Bowel sounds are normal.      Palpations: Abdomen is soft.   Musculoskeletal:      Cervical back: Full passive range of motion without pain and normal range of motion.   Neurological:      General: No focal deficit present.      Mental Status: She is alert and oriented to person, place, and time.   Psychiatric:         Attention and Perception: Attention normal.         Mood and Affect: Mood normal.         Behavior: Behavior normal. Behavior is cooperative.         Procedures    Results:   Labs:   Hemoglobin A1C   Date Value Ref Range Status   06/10/2022 5.00 4.80 - 5.60 % Final     TSH   Date Value Ref Range Status   09/23/2024 1.220 0.270 - 4.200 uIU/mL Final        POCT Results (if applicable):   Results for orders placed or performed during the hospital encounter of 10/17/24   Adult Transthoracic Echo Complete W/ Cont if Necessary Per Protocol    Collection Time: 10/17/24  9:46 AM   Result Value Ref Range    EF(MOD-bp) 68.3 %    LVIDd 5.2 cm    LVIDs 3.6 cm    IVSd 1.00 cm    LVPWd 0.80 cm    FS 30.8 %    IVS/LVPW 1.25 cm    ESV(cubed) 46.7 ml    LV Sys Vol (BSA corrected) 19.4 cm2    EDV(cubed) 140.6 ml    LV Bishop Vol (BSA corrected) 60.3 cm2    LV mass(C)d 169.0 grams    LVOT area 3.5 cm2    LVOT diam 2.10 cm    EDV(MOD-sp2) 71.7 ml    EDV(MOD-sp4) 113.0 ml    ESV(MOD-sp2) 19.8 ml    ESV(MOD-sp4) 36.3 ml    SV(MOD-sp2) 51.9 ml    SV(MOD-sp4) 76.7 ml    SVi(MOD-SP2) 27.7 ml/m2    SVi(MOD-SP4) " 40.9 ml/m2    SVi (LVOT) 40.5 ml/m2    EF(MOD-sp2) 72.4 %    EF(MOD-sp4) 67.9 %    MV E max ming 66.9 cm/sec    MV A max ming 57.1 cm/sec    MV dec time 0.26 sec    MV E/A 1.17     LA ESV Index (BP) 22.7 ml/m2    Med Peak E' Ming 12.3 cm/sec    Lat Peak E' Ming 12.8 cm/sec    TR max ming 170.0 cm/sec    Avg E/e' ratio 5.33     SV(LVOT) 75.9 ml    RV Base 3.9 cm    RV Mid 3.7 cm    RV Length 7.7 cm    TAPSE (>1.6) 2.39 cm    RV S' 13.4 cm/sec    LA dimension (2D)  4.1 cm    LV V1 max 93.5 cm/sec    LV V1 max PG 3.5 mmHg    LV V1 mean PG 2.00 mmHg    LV V1 VTI 21.9 cm    Ao pk ming 174.5 cm/sec    Ao max PG 12.2 mmHg    Ao mean PG 6.5 mmHg    Ao V2 VTI 36.4 cm    KALPESH(I,D) 2.09 cm2    MV P1/2t 90.4 msec    MVA(P1/2t) 2.43 cm2    MV dec slope 324.0 cm/sec2    TR max PG 11.6 mmHg    PA V2 max 116.7 cm/sec    PA acc time 0.14 sec    PI end-d ming 93.9 cm/sec    Ao root diam 3.0 cm    Echo EF Estimated 60.0 %    IVRT 95.0 ms    RVSP(TR) 15 mmHg    RAP systole 3 mmHg    PAPd(PI edV) 4 mmHg    Ascending aorta 3.2 cm       Imaging:   No valid procedures specified.         Assessment / Plan      Assessment/Plan:   Diagnoses and all orders for this visit:    1. Facial rash (Primary)  -     Rheumatoid Factor; Future  -     Sedimentation Rate; Future  -     C-reactive protein; Future  -     MARY; Future  -     Anti-DNA Antibody, Double-stranded; Future  -     C3+C4+MARY+RA; Future  - Will look at possibly referring the patient to rheumatologist once we get this lab work back.    2. Other fatigue  -     Rheumatoid Factor; Future  -     Sedimentation Rate; Future  -     C-reactive protein; Future  -     MARY; Future  -     Anti-DNA Antibody, Double-stranded; Future  -     C3+C4+MARY+RA; Future               Vaccine Counseling:      Follow Up:   No follow-ups on file.      Daniel Metz,    [facility]

## 2024-11-08 DIAGNOSIS — M25.561 ARTHRALGIA OF BOTH KNEES: Primary | ICD-10-CM

## 2024-11-08 DIAGNOSIS — M25.562 ARTHRALGIA OF BOTH KNEES: Primary | ICD-10-CM

## 2024-11-08 DIAGNOSIS — Q79.60 EDS (EHLERS-DANLOS SYNDROME): ICD-10-CM

## 2024-11-08 LAB
ANA SER QL: NEGATIVE
C3 SERPL-MCNC: 128 MG/DL (ref 82–167)
C4 SERPL-MCNC: 27 MG/DL (ref 12–38)
DSDNA AB SER-ACNC: 1 IU/ML (ref 0–9)
RHEUMATOID FACT SERPL-ACNC: <10 IU/ML

## 2024-11-25 RX ORDER — METOPROLOL TARTRATE 25 MG/1
TABLET, FILM COATED ORAL
Qty: 90 TABLET | Refills: 3 | Status: SHIPPED | OUTPATIENT
Start: 2024-11-25

## 2024-11-25 NOTE — TELEPHONE ENCOUNTER
Last visit 10/8/24, no follow up on file.  Please refill if appropriate, deny, or re-route.  Tin Cagle MA

## 2024-12-02 ENCOUNTER — TELEPHONE (OUTPATIENT)
Dept: CARDIOLOGY | Facility: HOSPITAL | Age: 39
End: 2024-12-02

## 2024-12-02 NOTE — TELEPHONE ENCOUNTER
Caller: Angelika Vick    Relationship to patient: Self    Best call back number: [phone]    Chief complaint: PT HAS BEEN HAVING IRREGULAR HEARTBEATS AND APPLE WATCH IS SHOWING PT IS IN AFIB    Type of visit: FU    Requested date: ASAP     If rescheduling, when is the original appointment: N/A     Additional notes:PLEASE CALL BACK TO ADVISE, THANK YOU.

## 2024-12-03 ENCOUNTER — HOSPITAL ENCOUNTER (OUTPATIENT)
Dept: CARDIOLOGY | Facility: HOSPITAL | Age: 39
Discharge: HOME | End: 2024-12-03
Payer: COMMERCIAL

## 2024-12-03 ENCOUNTER — OFFICE VISIT (OUTPATIENT)
Dept: CARDIOLOGY | Facility: HOSPITAL | Age: 39
End: 2024-12-03
Payer: COMMERCIAL

## 2024-12-03 VITALS
TEMPERATURE: 99 F | DIASTOLIC BLOOD PRESSURE: 61 MMHG | HEART RATE: 57 BPM | SYSTOLIC BLOOD PRESSURE: 122 MMHG | HEIGHT: 63 IN | WEIGHT: 183 LBS | OXYGEN SATURATION: 98 % | RESPIRATION RATE: 16 BRPM | BODY MASS INDEX: 32.43 KG/M2

## 2024-12-03 DIAGNOSIS — R00.2 PALPITATIONS: ICD-10-CM

## 2024-12-03 DIAGNOSIS — I49.3 PVC'S (PREMATURE VENTRICULAR CONTRACTIONS): ICD-10-CM

## 2024-12-03 DIAGNOSIS — E06.3 HYPOTHYROIDISM DUE TO HASHIMOTO'S THYROIDITIS: ICD-10-CM

## 2024-12-03 DIAGNOSIS — I49.3 PVC'S (PREMATURE VENTRICULAR CONTRACTIONS): Primary | ICD-10-CM

## 2024-12-03 LAB
QT INTERVAL: 412 MS
QTC INTERVAL: 435 MS

## 2024-12-03 PROCEDURE — 93005 ELECTROCARDIOGRAM TRACING: CPT | Performed by: NURSE PRACTITIONER

## 2024-12-03 PROCEDURE — 93270 REMOTE 30 DAY ECG REV/REPORT: CPT

## 2024-12-03 NOTE — PROGRESS NOTES
[facility] Heart Monitor Documentation    Angelika Vick  1985  [id number]  12/03/24      [] ZIO XT Patch  Model [id number] Prescribed for  Days    Serial Number: (N + 9 Digits) N   Apply-By Date on Box:   USPS Tracking Number:   USPS Tracking        [x] [employer] MINI PLUS Mobile Cardiac Telemetry  Model BGMINIPLUS Prescribed for 30 Days    Serial Number: (BGM + 7 Digits) [id number]  Shipped-By Date on Box:   UPS Tracking Number: 1Z  UPS Tracking      [] [employer] MINI Holter Monitor  Model BGMINIEL Prescribed for  Days    Serial Number: (7 Digits)   Shipped-By Date on Box:   UPS Tracking Number: 1Z  UPS Tracking        This monitor was applied to the patient's chest and checked for proper functioning.  Ms. Angelika Vick was instructed in the proper use of this monitor.  She was given the opportunity to ask questions and left the office with the device 's instruction manual.    Ansley Oh MA, 09:31 EST, 12/03/24                  [facility] HospitalMON[facility] Southern IndianaDOCUMENTATION 8.8.2019

## 2024-12-03 NOTE — PROGRESS NOTES
"Chief Complaint  Atrial Fibrillation    Subjective      History of Present Illness {CC  Problem List  Visit  Diagnosis   Encounters  Notes  Medications  Labs  Result Review Imaging  Media :23}     Angelika Vick, 39 y.o. female with PVC, HLD, hypothyroidism presents to [facility] for Atrial Fibrillation.    Presents today for evaluation of recent atrial fibrillation notification on apple smart watch. Recent MCOT April 2024 with no evidence atrial fibrillation, low burden PVC 1%, PAC burden 2%.  Echocardiogram October 2024 with normal LVEF, cardiac valves anatomically/functionally normal. ECG today with NSR.     Presents today noting atrial fibrillation notifications on Apple Watch daily since Thanksgiving Thursday. Symptoms of heart fluttering and irregular heartbeat. She has been taking metoprolol PRN No recent illness or medication change. Lightheadedness but no near syncope/syncope.     Risk factor screening:  Thyroid disorder: Yes- recent TSH normal  Sleep apnea: No  HTN: No  Weight: BMI 32.4  Alcohol: Rare-non recently  Family history: No       Objective     Vital Signs:   Vitals:    12/03/24 0806   BP: 122/61   BP Location: Left arm   Patient Position: Sitting   Cuff Size: Adult   Pulse: 57   Resp: 16   Temp: 99 °F (37.2 °C)   TempSrc: Temporal   SpO2: 98%   Weight: 83 kg (183 lb)   Height: 160 cm (63\")     Body mass index is 32.42 kg/m².  Physical Exam  Vitals and nursing note reviewed.   Constitutional:       Appearance: Normal appearance.   HENT:      Head: Normocephalic.   Eyes:      Extraocular Movements: Extraocular movements intact.   Neck:      Vascular: No carotid bruit.   Cardiovascular:      Rate and Rhythm: Normal rate and regular rhythm.      Pulses: Normal pulses.      Heart sounds: Normal heart sounds, S1 normal and S2 normal. No murmur heard.  Pulmonary:      Effort: Pulmonary effort is normal. No respiratory distress.      Breath sounds: Normal breath " sounds.   Musculoskeletal:      Cervical back: Neck supple.      Right lower leg: No edema.      Left lower leg: No edema.   Skin:     General: Skin is warm and dry.   Neurological:      General: No focal deficit present.      Mental Status: She is alert.   Psychiatric:         Mood and Affect: Mood normal.         Behavior: Behavior normal.         Thought Content: Thought content normal.       Data Reviewed:{ Labs  Result Review  Imaging  Med Tab  Media :23}     Adult Transthoracic Echo Complete W/ Cont if Necessary Per Protocol (10/17/2024 09:46)  ECG 12 Lead Chest Pain (05/05/2024 11:56)  Cardiac Event Monitor (04/22/2024 15:46)   C-reactive protein (11/07/2024 09:05)  Sedimentation Rate (11/07/2024 09:05)  Rheumatoid Factor (11/07/2024 09:05)  TROPONIN (11/02/2024 16:58)  BASIC METABOLIC PANEL (11/02/2024 15:08)  CBC AND DIFFERENTIAL (11/02/2024 15:08)  MAGNESIUM (11/02/2024 15:08)  TROPONIN (11/02/2024 15:08)  D-DIMER, QUANTITATIVE (11/02/2024 15:08)      Assessment & Plan   Assessment and Plan {CC Problem List  Visit Diagnosis  ROS  Review (Popup)  Health Maintenance  Quality  BestPractice  Medications  SmartSets  SnapShot Encounters  Media :23}     1. Palpitation/PVCs (premature ventricular contractions)  -Intermittent palpitation with atrial fibrillation notification on smart watch.  Apple Watch tracing appears to show NSR with premature contractions.  -ECG today with NSR  -Prior cardiac monitor April 2024 with no atrial fibrillation, low PAC/PVC burden  -30-day MCOT for arrhythmia surveillance  -Continue as needed metoprolol tartrate for now, may consider extended release pending cardiac monitor results    2. Hypothyroidism due to Hashimoto's thyroiditis  -Recent TSH normal  -Continue levothyroxine as prescribed  -Routine monitoring per PCP      Follow Up {Instructions Charge Capture  Follow-up Communications :23}     Return in about 6 weeks (around 1/14/2025) for Video visit, Monitor  results, Recheck.    Patient was given instructions and counseling regarding her condition or for health maintenance advice. Please see specific information pulled into the AVS if appropriate.  Patient was instructed to call the [facility] with any questions, concerns, or worsening symptoms.    Dictated Utilizing Dragon Dictation   Please note that portions of this note were completed with a voice recognition program.   Part of this note may be an electronic transcription/translation of spoken language to printed text using the Dragon Dictation System.

## 2024-12-06 ENCOUNTER — HOSPITAL ENCOUNTER (EMERGENCY)
Facility: HOSPITAL | Age: 39
Discharge: HOME | End: 2024-12-06
Attending: STUDENT IN AN ORGANIZED HEALTH CARE EDUCATION/TRAINING PROGRAM
Payer: COMMERCIAL

## 2024-12-06 ENCOUNTER — TELEPHONE (OUTPATIENT)
Dept: CARDIOLOGY | Facility: HOSPITAL | Age: 39
End: 2024-12-06
Payer: COMMERCIAL

## 2024-12-06 ENCOUNTER — HOSPITAL ENCOUNTER (EMERGENCY)
Facility: HOSPITAL | Age: 39
Discharge: HOME | End: 2024-12-06
Attending: EMERGENCY MEDICINE
Payer: COMMERCIAL

## 2024-12-06 ENCOUNTER — APPOINTMENT (OUTPATIENT)
Dept: GENERAL RADIOLOGY | Facility: HOSPITAL | Age: 39
End: 2024-12-06
Payer: COMMERCIAL

## 2024-12-06 VITALS
RESPIRATION RATE: 18 BRPM | WEIGHT: 180 LBS | OXYGEN SATURATION: 97 % | DIASTOLIC BLOOD PRESSURE: 78 MMHG | HEART RATE: 56 BPM | HEIGHT: 63 IN | BODY MASS INDEX: 31.89 KG/M2 | TEMPERATURE: 98.3 F | SYSTOLIC BLOOD PRESSURE: 114 MMHG

## 2024-12-06 VITALS
BODY MASS INDEX: 31.89 KG/M2 | HEIGHT: 63 IN | SYSTOLIC BLOOD PRESSURE: 117 MMHG | OXYGEN SATURATION: 98 % | RESPIRATION RATE: 20 BRPM | DIASTOLIC BLOOD PRESSURE: 70 MMHG | WEIGHT: 180 LBS | TEMPERATURE: 98.9 F | HEART RATE: 59 BPM

## 2024-12-06 DIAGNOSIS — R00.2 PALPITATIONS: Primary | ICD-10-CM

## 2024-12-06 DIAGNOSIS — R55 NEAR SYNCOPE: ICD-10-CM

## 2024-12-06 DIAGNOSIS — R00.2 PALPITATION: Primary | ICD-10-CM

## 2024-12-06 LAB
ALBUMIN SERPL-MCNC: 4.2 G/DL (ref 3.5–5.2)
ALBUMIN SERPL-MCNC: 4.6 G/DL (ref 3.5–5.2)
ALBUMIN/GLOB SERPL: 1.4 G/DL
ALBUMIN/GLOB SERPL: 1.5 G/DL
ALP SERPL-CCNC: 58 U/L (ref 39–117)
ALP SERPL-CCNC: 70 U/L (ref 39–117)
ALT SERPL W P-5'-P-CCNC: 11 U/L (ref 1–33)
ALT SERPL W P-5'-P-CCNC: 6 U/L (ref 1–33)
ANION GAP SERPL CALCULATED.3IONS-SCNC: 11.3 MMOL/L (ref 5–15)
ANION GAP SERPL CALCULATED.3IONS-SCNC: 13.5 MMOL/L (ref 5–15)
AST SERPL-CCNC: 11 U/L (ref 1–32)
AST SERPL-CCNC: 11 U/L (ref 1–32)
B-HCG UR QL: NEGATIVE
BACTERIA UR QL AUTO: ABNORMAL /HPF
BASOPHILS # BLD AUTO: 0.09 10*3/MM3 (ref 0–0.2)
BASOPHILS # BLD AUTO: 0.1 10*3/MM3 (ref 0–0.2)
BASOPHILS NFR BLD AUTO: 1 % (ref 0–1.5)
BASOPHILS NFR BLD AUTO: 1 % (ref 0–1.5)
BILIRUB SERPL-MCNC: 0.4 MG/DL (ref 0–1.2)
BILIRUB SERPL-MCNC: 0.4 MG/DL (ref 0–1.2)
BILIRUB UR QL STRIP: NEGATIVE
BUN SERPL-MCNC: 14 MG/DL (ref 6–20)
BUN SERPL-MCNC: 14 MG/DL (ref 6–20)
BUN/CREAT SERPL: 14.6 (ref 7–25)
BUN/CREAT SERPL: 16.1 (ref 7–25)
CALCIUM SPEC-SCNC: 9 MG/DL (ref 8.6–10.5)
CALCIUM SPEC-SCNC: 9.6 MG/DL (ref 8.6–10.5)
CHLORIDE SERPL-SCNC: 101 MMOL/L (ref 98–107)
CHLORIDE SERPL-SCNC: 103 MMOL/L (ref 98–107)
CK SERPL-CCNC: 40 U/L (ref 20–180)
CLARITY UR: CLEAR
CO2 SERPL-SCNC: 23.5 MMOL/L (ref 22–29)
CO2 SERPL-SCNC: 25.7 MMOL/L (ref 22–29)
COLOR UR: YELLOW
CREAT SERPL-MCNC: 0.87 MG/DL (ref 0.57–1)
CREAT SERPL-MCNC: 0.96 MG/DL (ref 0.57–1)
CRP SERPL-MCNC: <0.3 MG/DL (ref 0–0.5)
D DIMER PPP FEU-MCNC: <0.27 MCGFEU/ML (ref 0–0.5)
DEPRECATED RDW RBC AUTO: 39.3 FL (ref 37–54)
DEPRECATED RDW RBC AUTO: 39.9 FL (ref 37–54)
EGFRCR SERPLBLD CKD-EPI 2021: 77.8 ML/MIN/1.73
EGFRCR SERPLBLD CKD-EPI 2021: 87.6 ML/MIN/1.73
EOSINOPHIL # BLD AUTO: 0.09 10*3/MM3 (ref 0–0.4)
EOSINOPHIL # BLD AUTO: 0.18 10*3/MM3 (ref 0–0.4)
EOSINOPHIL NFR BLD AUTO: 1 % (ref 0.3–6.2)
EOSINOPHIL NFR BLD AUTO: 1.8 % (ref 0.3–6.2)
ERYTHROCYTE [DISTWIDTH] IN BLOOD BY AUTOMATED COUNT: 12.1 % (ref 12.3–15.4)
ERYTHROCYTE [DISTWIDTH] IN BLOOD BY AUTOMATED COUNT: 12.2 % (ref 12.3–15.4)
GLOBULIN UR ELPH-MCNC: 2.8 GM/DL
GLOBULIN UR ELPH-MCNC: 3.2 GM/DL
GLUCOSE SERPL-MCNC: 101 MG/DL (ref 65–99)
GLUCOSE SERPL-MCNC: 110 MG/DL (ref 65–99)
GLUCOSE UR STRIP-MCNC: NEGATIVE MG/DL
HCT VFR BLD AUTO: 41.8 % (ref 34–46.6)
HCT VFR BLD AUTO: 45 % (ref 34–46.6)
HGB BLD-MCNC: 13.9 G/DL (ref 12–15.9)
HGB BLD-MCNC: 14.9 G/DL (ref 12–15.9)
HGB UR QL STRIP.AUTO: ABNORMAL
HOLD SPECIMEN: NORMAL
HOLD SPECIMEN: NORMAL
HYALINE CASTS UR QL AUTO: ABNORMAL /LPF
IMM GRANULOCYTES # BLD AUTO: 0.03 10*3/MM3 (ref 0–0.05)
IMM GRANULOCYTES # BLD AUTO: 0.03 10*3/MM3 (ref 0–0.05)
IMM GRANULOCYTES NFR BLD AUTO: 0.3 % (ref 0–0.5)
IMM GRANULOCYTES NFR BLD AUTO: 0.3 % (ref 0–0.5)
KETONES UR QL STRIP: NEGATIVE
LEUKOCYTE ESTERASE UR QL STRIP.AUTO: NEGATIVE
LYMPHOCYTES # BLD AUTO: 2.29 10*3/MM3 (ref 0.7–3.1)
LYMPHOCYTES # BLD AUTO: 3.21 10*3/MM3 (ref 0.7–3.1)
LYMPHOCYTES NFR BLD AUTO: 25.7 % (ref 19.6–45.3)
LYMPHOCYTES NFR BLD AUTO: 31.6 % (ref 19.6–45.3)
MAGNESIUM SERPL-MCNC: 2 MG/DL (ref 1.6–2.6)
MCH RBC QN AUTO: 29.1 PG (ref 26.6–33)
MCH RBC QN AUTO: 29.3 PG (ref 26.6–33)
MCHC RBC AUTO-ENTMCNC: 33.1 G/DL (ref 31.5–35.7)
MCHC RBC AUTO-ENTMCNC: 33.3 G/DL (ref 31.5–35.7)
MCV RBC AUTO: 87.6 FL (ref 79–97)
MCV RBC AUTO: 88.4 FL (ref 79–97)
MONOCYTES # BLD AUTO: 0.63 10*3/MM3 (ref 0.1–0.9)
MONOCYTES # BLD AUTO: 0.75 10*3/MM3 (ref 0.1–0.9)
MONOCYTES NFR BLD AUTO: 7.1 % (ref 5–12)
MONOCYTES NFR BLD AUTO: 7.4 % (ref 5–12)
NEUTROPHILS NFR BLD AUTO: 5.79 10*3/MM3 (ref 1.7–7)
NEUTROPHILS NFR BLD AUTO: 5.88 10*3/MM3 (ref 1.7–7)
NEUTROPHILS NFR BLD AUTO: 57.9 % (ref 42.7–76)
NEUTROPHILS NFR BLD AUTO: 64.9 % (ref 42.7–76)
NITRITE UR QL STRIP: NEGATIVE
NRBC BLD AUTO-RTO: 0 /100 WBC (ref 0–0.2)
NRBC BLD AUTO-RTO: 0 /100 WBC (ref 0–0.2)
PH UR STRIP.AUTO: 6.5 [PH] (ref 5–8)
PLATELET # BLD AUTO: 333 10*3/MM3 (ref 140–450)
PLATELET # BLD AUTO: 365 10*3/MM3 (ref 140–450)
PMV BLD AUTO: 9.1 FL (ref 6–12)
PMV BLD AUTO: 9.2 FL (ref 6–12)
POTASSIUM SERPL-SCNC: 3.7 MMOL/L (ref 3.5–5.2)
POTASSIUM SERPL-SCNC: 4.4 MMOL/L (ref 3.5–5.2)
PROCALCITONIN SERPL-MCNC: 0.04 NG/ML (ref 0–0.25)
PROT SERPL-MCNC: 7 G/DL (ref 6–8.5)
PROT SERPL-MCNC: 7.8 G/DL (ref 6–8.5)
PROT UR QL STRIP: NEGATIVE
RBC # BLD AUTO: 4.77 10*6/MM3 (ref 3.77–5.28)
RBC # BLD AUTO: 5.09 10*6/MM3 (ref 3.77–5.28)
RBC # UR STRIP: ABNORMAL /HPF
REF LAB TEST METHOD: ABNORMAL
SODIUM SERPL-SCNC: 138 MMOL/L (ref 136–145)
SODIUM SERPL-SCNC: 140 MMOL/L (ref 136–145)
SP GR UR STRIP: 1.01 (ref 1–1.03)
SQUAMOUS #/AREA URNS HPF: ABNORMAL /HPF
TROPONIN T SERPL HS-MCNC: <6 NG/L
TROPONIN T SERPL HS-MCNC: <6 NG/L
TSH SERPL DL<=0.05 MIU/L-ACNC: 1.48 UIU/ML (ref 0.27–4.2)
UROBILINOGEN UR QL STRIP: ABNORMAL
WBC # UR STRIP: ABNORMAL /HPF
WBC NRBC COR # BLD AUTO: 10.15 10*3/MM3 (ref 3.4–10.8)
WBC NRBC COR # BLD AUTO: 8.92 10*3/MM3 (ref 3.4–10.8)
WHOLE BLOOD HOLD COAG: NORMAL
WHOLE BLOOD HOLD SPECIMEN: NORMAL

## 2024-12-06 PROCEDURE — 85379 FIBRIN DEGRADATION QUANT: CPT | Performed by: STUDENT IN AN ORGANIZED HEALTH CARE EDUCATION/TRAINING PROGRAM

## 2024-12-06 PROCEDURE — 84145 PROCALCITONIN (PCT): CPT | Performed by: STUDENT IN AN ORGANIZED HEALTH CARE EDUCATION/TRAINING PROGRAM

## 2024-12-06 PROCEDURE — 81025 URINE PREGNANCY TEST: CPT | Performed by: STUDENT IN AN ORGANIZED HEALTH CARE EDUCATION/TRAINING PROGRAM

## 2024-12-06 PROCEDURE — 99284 EMERGENCY DEPT VISIT MOD MDM: CPT | Performed by: EMERGENCY MEDICINE

## 2024-12-06 PROCEDURE — 99283 EMERGENCY DEPT VISIT LOW MDM: CPT | Performed by: STUDENT IN AN ORGANIZED HEALTH CARE EDUCATION/TRAINING PROGRAM

## 2024-12-06 PROCEDURE — 93005 ELECTROCARDIOGRAM TRACING: CPT | Performed by: EMERGENCY MEDICINE

## 2024-12-06 PROCEDURE — 93005 ELECTROCARDIOGRAM TRACING: CPT | Performed by: STUDENT IN AN ORGANIZED HEALTH CARE EDUCATION/TRAINING PROGRAM

## 2024-12-06 PROCEDURE — 83735 ASSAY OF MAGNESIUM: CPT | Performed by: STUDENT IN AN ORGANIZED HEALTH CARE EDUCATION/TRAINING PROGRAM

## 2024-12-06 PROCEDURE — 71045 X-RAY EXAM CHEST 1 VIEW: CPT

## 2024-12-06 PROCEDURE — 82550 ASSAY OF CK (CPK): CPT | Performed by: STUDENT IN AN ORGANIZED HEALTH CARE EDUCATION/TRAINING PROGRAM

## 2024-12-06 PROCEDURE — 85025 COMPLETE CBC W/AUTO DIFF WBC: CPT | Performed by: EMERGENCY MEDICINE

## 2024-12-06 PROCEDURE — 84484 ASSAY OF TROPONIN QUANT: CPT | Performed by: STUDENT IN AN ORGANIZED HEALTH CARE EDUCATION/TRAINING PROGRAM

## 2024-12-06 PROCEDURE — 80053 COMPREHEN METABOLIC PANEL: CPT | Performed by: EMERGENCY MEDICINE

## 2024-12-06 PROCEDURE — 84484 ASSAY OF TROPONIN QUANT: CPT | Performed by: EMERGENCY MEDICINE

## 2024-12-06 PROCEDURE — 81001 URINALYSIS AUTO W/SCOPE: CPT | Performed by: EMERGENCY MEDICINE

## 2024-12-06 PROCEDURE — 84443 ASSAY THYROID STIM HORMONE: CPT | Performed by: STUDENT IN AN ORGANIZED HEALTH CARE EDUCATION/TRAINING PROGRAM

## 2024-12-06 PROCEDURE — 86140 C-REACTIVE PROTEIN: CPT | Performed by: STUDENT IN AN ORGANIZED HEALTH CARE EDUCATION/TRAINING PROGRAM

## 2024-12-06 PROCEDURE — 85025 COMPLETE CBC W/AUTO DIFF WBC: CPT | Performed by: STUDENT IN AN ORGANIZED HEALTH CARE EDUCATION/TRAINING PROGRAM

## 2024-12-06 PROCEDURE — 99284 EMERGENCY DEPT VISIT MOD MDM: CPT

## 2024-12-06 PROCEDURE — 80053 COMPREHEN METABOLIC PANEL: CPT | Performed by: STUDENT IN AN ORGANIZED HEALTH CARE EDUCATION/TRAINING PROGRAM

## 2024-12-06 RX ORDER — SODIUM CHLORIDE 0.9 % (FLUSH) 0.9 %
10 SYRINGE (ML) INJECTION AS NEEDED
Status: DISCONTINUED | OUTPATIENT
Start: 2024-12-06 | End: 2024-12-06 | Stop reason: HOSPADM

## 2024-12-06 RX ORDER — ASPIRIN 325 MG
325 TABLET ORAL ONCE
Status: COMPLETED | OUTPATIENT
Start: 2024-12-06 | End: 2024-12-06

## 2024-12-06 RX ADMIN — ASPIRIN 325 MG: 325 TABLET ORAL at 11:46

## 2024-12-06 NOTE — Clinical Note
[facility] EMERGENCY DEPARTMENT  [address] [address]  Phone: [phone]    Angelika Vick was seen and treated in our emergency department on 12/6/2024.  She may return to work on 12/10/2024.         Thank you for choosing [facility].    George Ruiz MD

## 2024-12-06 NOTE — ED PROVIDER NOTES
Pt Name: Angelika Vick  MRN: [id number]  : 1985  Date of Encounter: 2024    PCP: Daniel Metz DO      Subjective    History of Present Illness:    Chief Complaint: Near syncope    History of Present Illness: Angelika Vick is a 39 y.o. female who presents to the ER complaining of chest tightness, dizziness that started just prior to arrival to the emergency room.  Patient states she was out shopping with her family started having some palpitations started getting dizzy.  Patient states her Apple Watch told her that she was in atrial fibrillation with a rate greater than 100 bpm.  Patient states she has been seen by [facility] cardiology and is wearing a Holter monitor for her heart palpitations.  Patient denies any chest pain.  Upon arrival to the emergency room patient was hooked up to the monitor and states that her palpitations and weakness had resolved..      Triage Vitals:    ED Triage Vitals   Temp Heart Rate Resp BP SpO2   24 1142 24 1142 24 1142 24 1143 24 1142   98.3 °F (36.8 °C) 61 18 125/77 98 %      Temp src Heart Rate Source Patient Position BP Location FiO2 (%)   24 1142 24 1142 -- -- --   Oral Monitor          Nurses Notes reviewed and agree, including vitals, allergies, social history and prior medical history.     Dermagel hand  [alcohol]    Past Medical History:   Diagnosis Date    Acid reflux     Bilateral ovarian cysts     Depression     Gestational diabetes     with 2nd pregnancy    Gestational hypertension     with last pregnancy    Hashimoto's thyroiditis 2018    Hypertension in pregnancy 2012    Hypothyroidism 2018    Migraines     Mixed hyperlipidemia 2019    Polycystic ovary syndrome 2010    Vitamin D deficiency 2024       Past Surgical History:   Procedure Laterality Date    CYST REMOVAL      GANGLION CYST EXCISION Right        Social History     Socioeconomic History    Marital status:     Tobacco Use    Smoking status: Former     Current packs/day: 0.00     Average packs/day: 1 pack/day for 11.3 years (11.3 ttl pk-yrs)     Types: Cigarettes     Start date: 2005     Quit date: 10/6/2016     Years since quittin.1     Passive exposure: Past    Smokeless tobacco: Never   Vaping Use    Vaping status: Never Used   Substance and Sexual Activity    Alcohol use: Yes     Alcohol/week: 1.0 - 2.0 standard drink of alcohol     Types: 1 - 2 Drinks containing 0.5 oz of alcohol per week     Comment: Occasionally    Drug use: Yes     Types: Marijuana     Comment: Daily    Sexual activity: Yes     Partners: Male     Birth control/protection: I.U.D.       Family History   Problem Relation Age of Onset    Arthritis Mother     Hypertension Mother     No Known Problems Father     Thyroid disease Sister     Diabetes Sister     No Known Problems Brother     Diabetes Maternal Grandmother     Diabetes Maternal Grandfather     No Known Problems Paternal Grandmother     No Known Problems Paternal Grandfather     Diabetes Sister     Obesity Sister     Thyroid disease Sister        REVIEW OF SYSTEMS:     All systems reviewed and not pertinent unless noted.    Review of Systems   Respiratory:  Positive for chest tightness.    Cardiovascular:  Positive for palpitations.   Neurological:  Positive for syncope.   All other systems reviewed and are negative.      Objective    Physical Exam  Vitals and nursing note reviewed.   Constitutional:       Appearance: Normal appearance.   HENT:      Head: Normocephalic and atraumatic.   Eyes:      Extraocular Movements: Extraocular movements intact.      Pupils: Pupils are equal, round, and reactive to light.   Cardiovascular:      Rate and Rhythm: Normal rate and regular rhythm.      Pulses: Normal pulses.           Radial pulses are 1+ on the right side and 1+ on the left side.        Posterior tibial pulses are 1+ on the right side and 1+ on the left side.      Heart  sounds: Normal heart sounds.   Pulmonary:      Effort: Pulmonary effort is normal.      Breath sounds: Normal breath sounds.   Abdominal:      General: Abdomen is flat. Bowel sounds are normal.      Palpations: Abdomen is soft.   Musculoskeletal:      Cervical back: Normal range of motion and neck supple.   Skin:     Capillary Refill: Capillary refill takes less than 2 seconds.   Neurological:      General: No focal deficit present.      Mental Status: She is alert and oriented to person, place, and time. Mental status is at baseline.      GCS: GCS eye subscore is 4. GCS verbal subscore is 5. GCS motor subscore is 6.      Sensory: Sensation is intact.      Motor: Motor function is intact.      Gait: Gait is intact.   Psychiatric:         Attention and Perception: Attention and perception normal.         Mood and Affect: Mood and affect normal.         Speech: Speech normal.         Behavior: Behavior normal. Behavior is cooperative.                           Procedures    ED Course:    ECG 12 Lead ED Triage Standing Order; Chest Pain   Final Result          ED Course as of 12/06/24 1541   Fri Dec 06, 2024   1230 EKG: I reviewed and independently interpreted the EKG as:  Sinus bradycardia rate of 57 bpm, normal axis, normal intervals, no ST elevation, no T wave inversions [CS]   1257 [id number] Score for Atrial Fibrillation Stroke Risk - MDCalc  Calculated on Dec 06 2024 12:56 PM  2 points -> Stroke risk was 2.2% per year in >90,000 patients (the [place] Atrial Fibrillation Cohort Study) and 2.9% risk of stroke/TIA/systemic embolism. [KH]      ED Course User Index  [CS] Tin Rene MD  [KH] Paulie Vásquez APRN       Orders placed during this visit:    Orders Placed This Encounter   Procedures    XR Chest 1 View    [place] Draw    Comprehensive Metabolic Panel    High Sensitivity Troponin T    CBC Auto Differential    Urinalysis With Microscopic If Indicated (No Culture) - Urine, Clean Catch     Urinalysis, Microscopic Only - Urine, Clean Catch    Undress & Gown    Continuous Pulse Oximetry    ECG 12 Lead ED Triage Standing Order; Chest Pain    CBC & Differential    Green Top (Gel)    Lavender Top    Gold Top - SST    Light Blue Top       LAB Results:    Lab Results (last 24 hours)       Procedure Component Value Units Date/Time    CBC & Differential [[id number]]  (Abnormal) Collected: 12/06/24 1143    Specimen: Blood Updated: 12/06/24 1149    Narrative:      The following orders were created for panel order CBC & Differential.  Procedure                               Abnormality         Status                     ---------                               -----------         ------                     CBC Auto Differential[[id number]]        Abnormal            Final result                 Please view results for these tests on the individual orders.    Comprehensive Metabolic Panel [[id number]]  (Abnormal) Collected: 12/06/24 1143    Specimen: Blood Updated: 12/06/24 1211     Glucose 110 mg/dL      BUN 14 mg/dL      Creatinine 0.96 mg/dL      Sodium 140 mmol/L      Potassium 4.4 mmol/L      Chloride 103 mmol/L      CO2 25.7 mmol/L      Calcium 9.6 mg/dL      Total Protein 7.8 g/dL      Albumin 4.6 g/dL      ALT (SGPT) 11 U/L      AST (SGOT) 11 U/L      Alkaline Phosphatase 70 U/L      Total Bilirubin 0.4 mg/dL      Globulin 3.2 gm/dL      A/G Ratio 1.4 g/dL      BUN/Creatinine Ratio 14.6     Anion Gap 11.3 mmol/L      eGFR 77.8 mL/min/1.73     Narrative:      GFR Normal >60  Chronic Kidney Disease <60  Kidney Failure <15      High Sensitivity Troponin T [[id number]]  (Normal) Collected: 12/06/24 1143    Specimen: Blood Updated: 12/06/24 1212     HS Troponin T <6 ng/L     Narrative:      High Sensitive Troponin T Reference Range:  <14.0 ng/L- Negative Female for AMI  <22.0 ng/L- Negative Male for AMI  >=14 - Abnormal Female indicating possible myocardial injury.  >=22 - Abnormal Male indicating possible  myocardial injury.   Clinicians would have to utilize clinical acumen, EKG, Troponin, and serial changes to determine if it is an Acute Myocardial Infarction or myocardial injury due to an underlying chronic condition.         CBC Auto Differential [[id number]]  (Abnormal) Collected: 12/06/24 1143    Specimen: Blood Updated: 12/06/24 1149     WBC 8.92 10*3/mm3      RBC 5.09 10*6/mm3      Hemoglobin 14.9 g/dL      Hematocrit 45.0 %      MCV 88.4 fL      MCH 29.3 pg      MCHC 33.1 g/dL      RDW 12.2 %      RDW-SD 39.9 fl      MPV 9.2 fL      Platelets 365 10*3/mm3      Neutrophil % 64.9 %      Lymphocyte % 25.7 %      Monocyte % 7.1 %      Eosinophil % 1.0 %      Basophil % 1.0 %      Immature Grans % 0.3 %      Neutrophils, Absolute 5.79 10*3/mm3      Lymphocytes, Absolute 2.29 10*3/mm3      Monocytes, Absolute 0.63 10*3/mm3      Eosinophils, Absolute 0.09 10*3/mm3      Basophils, Absolute 0.09 10*3/mm3      Immature Grans, Absolute 0.03 10*3/mm3      nRBC 0.0 /100 WBC     Urinalysis With Microscopic If Indicated (No Culture) - Urine, Clean Catch [[id number]]  (Abnormal) Collected: 12/06/24 1231    Specimen: Urine, Clean Catch Updated: 12/06/24 1304     Color, UA Yellow     Appearance, UA Clear     pH, UA 6.5     Specific Gravity, UA 1.007     Glucose, UA Negative     Ketones, UA Negative     Bilirubin, UA Negative     Blood, UA Trace     Protein, UA Negative     Leuk Esterase, UA Negative     Nitrite, UA Negative     Urobilinogen, UA 0.2 E.U./dL    Urinalysis, Microscopic Only - Urine, Clean Catch [[id number]]  (Abnormal) Collected: 12/06/24 1231    Specimen: Urine, Clean Catch Updated: 12/06/24 1314     RBC, UA 0-2 /HPF      WBC, UA None Seen /HPF      Bacteria, UA Trace /HPF      Squamous Epithelial Cells, UA None Seen /HPF      Hyaline Casts, UA None Seen /LPF      Methodology Manual Light Microscopy             If labs were ordered, I have independently reviewed the results and considered them in the diagnosis and  treatment plan for the patient    RADIOLOGY    XR Chest 1 View    Result Date: 12/6/2024   PROCEDURE: XR CHEST 1 VW-  HISTORY: Chest Pain Triage Protocol, chest pressure, dizziness and palpitations.  COMPARISON: May 5, 2024..  FINDINGS: The heart is normal in size. The mediastinum is unremarkable. Decreased inspiratory effort noted with crowding of the lung markings in both lung bases. No acute infiltrate noted.. There is no pneumothorax. There are no acute osseous abnormalities. Generator is again projected over the heart.      Impression: No acute cardiopulmonary process.  .    This report was signed and finalized on 12/6/2024 12:15 PM by Belle Navas MD.        If I have ordered, I have independently reviewed the above noted radiographic studies.  Please see the radiologist dictation for the official interpretation    Medications given to patient in the ER    Medications   aspirin tablet 325 mg (325 mg Oral Given 12/6/24 1146)       AS OF 15:41 EST VITALS:    BP - 114/78  HR - 56  TEMP - 98.3 °F (36.8 °C) (Oral)  O2 SATS - 97%         Shared Decision Making: After my consideration of the clinical presentation and laboratory/radiology studies obtained, I have discussed the findings with the patient/patient representative who is in agreement with the treatment plan and final disposition. Risks and benefits of discharge and/or observation admission were discussed.  Final disposition of the patient will be discharged home.  Patient is requested to follow-up with primary care provider and specialist in 1 week following final discharge.      Medical Decision Making  Angelika Vick is a 39 y.o. female who presents to the ER complaining of chest tightness, dizziness that started just prior to arrival to the emergency room.  Patient states she was out shopping with her family started having some palpitations started getting dizzy.  Patient states her Apple Watch told her that she was in atrial fibrillation with a  rate greater than 100 bpm.  Patient states she has been seen by [facility] cardiology and is wearing a Holter monitor for her heart palpitations.  Patient denies any chest pain.  Upon arrival to the emergency room patient was hooked up to the monitor and states that her palpitations and weakness had resolved..      DDX: includes but is not limited to: Atrial fibrillation, palpitations, syncope, dizziness, other    Problems Addressed:  Near syncope: complicated acute illness or injury  Palpitation: complicated acute illness or injury    Amount and/or Complexity of Data Reviewed  Labs: ordered.     Details: I have personally reviewed and documented all results  Radiology: ordered.     Details: I have personally reviewed and documented all results  ECG/medicine tests: ordered.     Details: I have personally reviewed and documented all results  Discussion of management or test interpretation with external provider(s): Discussed assessment, treatment and plan with ER attending    Risk  OTC drugs.  Risk Details: I have discussed with patient the finding of the test preformed today. Patient has been diagnosed with palpitations, near syncope and will be discharged home. Advised on return precautions the importance of close follow-up with primary care provider.  Strict return precautions have been given and patient verbalizes understanding        Final diagnoses:   Palpitation   Near syncope       Please note that portions of this document were completed using voice recognition dictation software.       Paulie Vásquez, APRN  12/06/24 [id number]

## 2024-12-06 NOTE — TELEPHONE ENCOUNTER
Patient went to [facility] with concerns of afib. Her Apple watch gave her notification of afib, she is currently wearing a MCOT. Advised her to send her recordings via [employer]. Also I am contacting [employer] to obtain any event reports for today.    [employer] checked her MCOT reports and no alerts for Afib(auto triggered or patient triggered). Has been some PVC's on recordings.     She says she is still not feeling well after ER visit earlier today and is wondering if she can take her Metoprolol, she is thinking might be having PVC runs. Kenny Karo was having her hold Metoprolol during MCOT wear.    Please advise.

## 2024-12-07 NOTE — DISCHARGE INSTRUCTIONS
You were evaluated for palpitations.  We got lab work looking at your thyroid looking for blood clots looking for pneumonia.  This was all negative for any acute process.  As we discussed, you may be having premature atrial contractions which may be the cause of some of your symptoms.  Would continue to follow-up with your cardiologist as well as her primary care doctor and your Holter monitor report to further discuss these findings.  You are now stable for discharge.

## 2024-12-07 NOTE — ED PROVIDER NOTES
Subjective:  History of Present Illness:    Patient is a 39-year-old female with history of Hashimoto's, hypothyroidism, polycystic ovarian syndrome, hyperlipidemia, migraines, currently undergoing evaluation by cardiology for palpitations who presents today with palpitations.  Evaluated in emergency department earlier today with concerns for atrial fibrillation and palpitations.  She had multiple rounds of PVCs noted during her workup.  No acute cardiac process, no concern for V. tach and patient was discharged with cardiology follow-up.  States that since that time, she has had intermittent palpitations, her Apple Watch has alerted her that she has tachycardia, and she became lightheaded.  Concern for possible persistent heart arrhythmia and she now presents to emergency department for evaluation.  Her Holter monitor was interrogated prior to her arrival here and showed no events.  She denies any shortness of breath.  No cough congestion rhinorrhea.  Denies fevers.  No abdominal pain nausea vomiting or diarrhea.  No dysuria.  Denies any abnormal vaginal bleeding or discharge.  No new leg swelling or leg pain.  Denies any personal history of PE/DVT.      Nurses Notes reviewed and agree, including vitals, allergies, social history and prior medical history.     REVIEW OF SYSTEMS: All systems reviewed and not pertinent unless noted.  Review of Systems   Constitutional:  Positive for activity change and fatigue. Negative for appetite change, chills and fever.   HENT:  Negative for rhinorrhea, sinus pressure and sinus pain.    Eyes:  Negative for discharge and itching.   Respiratory:  Negative for cough and shortness of breath.    Cardiovascular:  Positive for chest pain and palpitations. Negative for leg swelling.   Gastrointestinal:  Negative for abdominal distention, abdominal pain, nausea and vomiting.   Endocrine: Negative for cold intolerance and heat intolerance.   Genitourinary:  Negative for decreased urine  volume, difficulty urinating, flank pain, frequency, urgency, vaginal bleeding, vaginal discharge and vaginal pain.   Musculoskeletal:  Negative for gait problem, neck pain and neck stiffness.   Skin:  Negative for color change.   Allergic/Immunologic: Negative for environmental allergies.   Neurological:  Negative for seizures, syncope, facial asymmetry and speech difficulty.   Psychiatric/Behavioral:  Negative for self-injury and suicidal ideas.        Past Medical History:   Diagnosis Date    Acid reflux     Bilateral ovarian cysts     Depression     Gestational diabetes     with 2nd pregnancy    Gestational hypertension     with last pregnancy    Hashimoto's thyroiditis 2018    Hypertension in pregnancy     Hypothyroidism 2018    Migraines     Mixed hyperlipidemia 2019    Polycystic ovary syndrome 2010    Vitamin D deficiency 2024       Allergies:    Dermagel hand  [alcohol]      Past Surgical History:   Procedure Laterality Date    CYST REMOVAL      GANGLION CYST EXCISION Right          Social History     Socioeconomic History    Marital status:    Tobacco Use    Smoking status: Former     Current packs/day: 0.00     Average packs/day: 1 pack/day for 11.3 years (11.3 ttl pk-yrs)     Types: Cigarettes     Start date: 2005     Quit date: 10/6/2016     Years since quittin.1     Passive exposure: Past    Smokeless tobacco: Never   Vaping Use    Vaping status: Never Used   Substance and Sexual Activity    Alcohol use: Yes     Alcohol/week: 1.0 - 2.0 standard drink of alcohol     Types: 1 - 2 Drinks containing 0.5 oz of alcohol per week     Comment: Occasionally    Drug use: Yes     Types: Marijuana     Comment: Daily    Sexual activity: Yes     Partners: Male     Birth control/protection: I.U.D.         Family History   Problem Relation Age of Onset    Arthritis Mother     Hypertension Mother     No Known Problems Father     Thyroid disease Sister     Diabetes Sister      "No Known Problems Brother     Diabetes Maternal Grandmother     Diabetes Maternal Grandfather     No Known Problems Paternal Grandmother     No Known Problems Paternal Grandfather     Diabetes Sister     Obesity Sister     Thyroid disease Sister        Objective  Physical Exam:  /70   Pulse 59   Temp 98.9 °F (37.2 °C) (Oral)   Resp 20   Ht 160 cm (62.99\")   Wt 81.6 kg (180 lb)   LMP 11/25/2024   SpO2 98%   BMI 31.89 kg/m²      Physical Exam  Constitutional:       General: She is not in acute distress.     Appearance: Normal appearance. She is not ill-appearing.   HENT:      Head: Normocephalic and atraumatic.      Nose: Nose normal. No congestion or rhinorrhea.      Mouth/Throat:      Mouth: Mucous membranes are dry.      Pharynx: Oropharynx is clear. No oropharyngeal exudate or posterior oropharyngeal erythema.   Eyes:      Extraocular Movements: Extraocular movements intact.      Conjunctiva/sclera: Conjunctivae normal.      Pupils: Pupils are equal, round, and reactive to light.   Cardiovascular:      Rate and Rhythm: Normal rate and regular rhythm.      Pulses: Normal pulses.      Heart sounds: Normal heart sounds.   Pulmonary:      Effort: Pulmonary effort is normal. No respiratory distress.      Breath sounds: Normal breath sounds.   Abdominal:      General: Abdomen is flat. Bowel sounds are normal. There is no distension.      Palpations: Abdomen is soft.      Tenderness: There is no abdominal tenderness. There is no guarding or rebound.   Musculoskeletal:         General: No swelling or tenderness. Normal range of motion.      Cervical back: Normal range of motion and neck supple.   Skin:     General: Skin is warm and dry.      Capillary Refill: Capillary refill takes less than 2 seconds.   Neurological:      General: No focal deficit present.      Mental Status: She is alert and oriented to person, place, and time. Mental status is at baseline.      Cranial Nerves: No cranial nerve deficit.    "   Sensory: No sensory deficit.      Motor: No weakness.      Coordination: Coordination normal.   Psychiatric:         Mood and Affect: Mood normal.         Behavior: Behavior normal.         Thought Content: Thought content normal.         Judgment: Judgment normal.         Procedures    ED Course:    ED Course as of 12/07/24 0057   Fri Dec 06, 2024   2155 EKG interpreted by me, normal sinus rhythm no concerning ST changes noted, rate of 74 [JE]   2223 Chest x-ray independently interpreted by me prior to radiology overread showing no acute process [JE]      ED Course User Index  [JE] George Ruiz MD       Lab Results (last 24 hours)       Procedure Component Value Units Date/Time    CBC & Differential [[id number]]  (Abnormal) Collected: 12/06/24 1143    Specimen: Blood Updated: 12/06/24 1149    Narrative:      The following orders were created for panel order CBC & Differential.  Procedure                               Abnormality         Status                     ---------                               -----------         ------                     CBC Auto Differential[[id number]]        Abnormal            Final result                 Please view results for these tests on the individual orders.    Comprehensive Metabolic Panel [[id number]]  (Abnormal) Collected: 12/06/24 1143    Specimen: Blood Updated: 12/06/24 1211     Glucose 110 mg/dL      BUN 14 mg/dL      Creatinine 0.96 mg/dL      Sodium 140 mmol/L      Potassium 4.4 mmol/L      Chloride 103 mmol/L      CO2 25.7 mmol/L      Calcium 9.6 mg/dL      Total Protein 7.8 g/dL      Albumin 4.6 g/dL      ALT (SGPT) 11 U/L      AST (SGOT) 11 U/L      Alkaline Phosphatase 70 U/L      Total Bilirubin 0.4 mg/dL      Globulin 3.2 gm/dL      A/G Ratio 1.4 g/dL      BUN/Creatinine Ratio 14.6     Anion Gap 11.3 mmol/L      eGFR 77.8 mL/min/1.73     Narrative:      GFR Normal >60  Chronic Kidney Disease <60  Kidney Failure <15      High Sensitivity Troponin T  [[id number]]  (Normal) Collected: 12/06/24 1143    Specimen: Blood Updated: 12/06/24 1212     HS Troponin T <6 ng/L     Narrative:      High Sensitive Troponin T Reference Range:  <14.0 ng/L- Negative Female for AMI  <22.0 ng/L- Negative Male for AMI  >=14 - Abnormal Female indicating possible myocardial injury.  >=22 - Abnormal Male indicating possible myocardial injury.   Clinicians would have to utilize clinical acumen, EKG, Troponin, and serial changes to determine if it is an Acute Myocardial Infarction or myocardial injury due to an underlying chronic condition.         CBC Auto Differential [[id number]]  (Abnormal) Collected: 12/06/24 1143    Specimen: Blood Updated: 12/06/24 1149     WBC 8.92 10*3/mm3      RBC 5.09 10*6/mm3      Hemoglobin 14.9 g/dL      Hematocrit 45.0 %      MCV 88.4 fL      MCH 29.3 pg      MCHC 33.1 g/dL      RDW 12.2 %      RDW-SD 39.9 fl      MPV 9.2 fL      Platelets 365 10*3/mm3      Neutrophil % 64.9 %      Lymphocyte % 25.7 %      Monocyte % 7.1 %      Eosinophil % 1.0 %      Basophil % 1.0 %      Immature Grans % 0.3 %      Neutrophils, Absolute 5.79 10*3/mm3      Lymphocytes, Absolute 2.29 10*3/mm3      Monocytes, Absolute 0.63 10*3/mm3      Eosinophils, Absolute 0.09 10*3/mm3      Basophils, Absolute 0.09 10*3/mm3      Immature Grans, Absolute 0.03 10*3/mm3      nRBC 0.0 /100 WBC     Urinalysis With Microscopic If Indicated (No Culture) - Urine, Clean Catch [[id number]]  (Abnormal) Collected: 12/06/24 1231    Specimen: Urine, Clean Catch Updated: 12/06/24 1304     Color, UA Yellow     Appearance, UA Clear     pH, UA 6.5     Specific Gravity, UA 1.007     Glucose, UA Negative     Ketones, UA Negative     Bilirubin, UA Negative     Blood, UA Trace     Protein, UA Negative     Leuk Esterase, UA Negative     Nitrite, UA Negative     Urobilinogen, UA 0.2 E.U./dL    Urinalysis, Microscopic Only - Urine, Clean Catch [[id number]]  (Abnormal) Collected: 12/06/24 1231    Specimen: Urine,  Clean Catch Updated: 12/06/24 1314     RBC, UA 0-2 /HPF      WBC, UA None Seen /HPF      Bacteria, UA Trace /HPF      Squamous Epithelial Cells, UA None Seen /HPF      Hyaline Casts, UA None Seen /LPF      Methodology Manual Light Microscopy    Pregnancy, Urine - Urine, Clean Catch [[id number]]  (Normal) Collected: 12/06/24 1231    Specimen: Urine, Clean Catch Updated: 12/06/24 2139     HCG, Urine QL Negative    CBC & Differential [[id number]]  (Abnormal) Collected: 12/06/24 2201    Specimen: Blood Updated: 12/06/24 2207    Narrative:      The following orders were created for panel order CBC & Differential.  Procedure                               Abnormality         Status                     ---------                               -----------         ------                     CBC Auto Differential[[id number]]        Abnormal            Final result                 Please view results for these tests on the individual orders.    Comprehensive Metabolic Panel [[id number]]  (Abnormal) Collected: 12/06/24 2201    Specimen: Blood Updated: 12/06/24 2223     Glucose 101 mg/dL      BUN 14 mg/dL      Creatinine 0.87 mg/dL      Sodium 138 mmol/L      Potassium 3.7 mmol/L      Chloride 101 mmol/L      CO2 23.5 mmol/L      Calcium 9.0 mg/dL      Total Protein 7.0 g/dL      Albumin 4.2 g/dL      ALT (SGPT) 6 U/L      AST (SGOT) 11 U/L      Alkaline Phosphatase 58 U/L      Total Bilirubin 0.4 mg/dL      Globulin 2.8 gm/dL      A/G Ratio 1.5 g/dL      BUN/Creatinine Ratio 16.1     Anion Gap 13.5 mmol/L      eGFR 87.6 mL/min/1.73     Narrative:      GFR Normal >60  Chronic Kidney Disease <60  Kidney Failure <15      High Sensitivity Troponin T [[id number]]  (Normal) Collected: 12/06/24 2201    Specimen: Blood Updated: 12/06/24 2235     HS Troponin T <6 ng/L     Narrative:      High Sensitive Troponin T Reference Range:  <14.0 ng/L- Negative Female for AMI  <22.0 ng/L- Negative Male for AMI  >=14 - Abnormal Female indicating possible  "myocardial injury.  >=22 - Abnormal Male indicating possible myocardial injury.   Clinicians would have to utilize clinical acumen, EKG, Troponin, and serial changes to determine if it is an Acute Myocardial Infarction or myocardial injury due to an underlying chronic condition.         D-dimer, Quantitative [[id number]]  (Normal) Collected: 12/06/24 2201    Specimen: Blood Updated: 12/06/24 2301     D-Dimer, Quantitative <0.27 MCGFEU/mL     Narrative:      According to the assay 's published package insert, a normal (<0.50 MCGFEU/mL) D-dimer result in conjunction with a non-high clinical probability assessment, excludes deep vein thrombosis (DVT) and pulmonary embolism (PE) with high sensitivity.    D-dimer values increase with age and this can make VTE exclusion of an older population difficult. To address this, the [employer], based on best available evidence and recent guidelines, recommends that clinicians use age-adjusted D-dimer thresholds in patients greater than 50 years of age with: a) a low probability of PE who do not meet all Pulmonary Embolism Rule Out Criteria, or b) in those with intermediate probability of PE.   The formula for an age-adjusted D-dimer cut-off is \"age/100\".  For example, a 60 year old patient would have an age-adjusted cut-off of 0.60 MCGFEU/mL and an 80 year old 0.80 MCGFEU/mL.    C-reactive Protein [[id number]]  (Normal) Collected: 12/06/24 2201    Specimen: Blood Updated: 12/06/24 2305     C-Reactive Protein <0.30 mg/dL     Procalcitonin [[id number]]  (Normal) Collected: 12/06/24 2201    Specimen: Blood Updated: 12/06/24 2232     Procalcitonin 0.04 ng/mL     Narrative:      As a Marker for Sepsis (Non-Neonates):    1. <0.5 ng/mL represents a low risk of severe sepsis and/or septic shock.  2. >2 ng/mL represents a high risk of severe sepsis and/or septic shock.    As a Marker for Lower Respiratory Tract Infections that require antibiotic therapy:    PCT " "on Admission    Antibiotic Therapy       6-12 Hrs later    >0.5                Strongly Recommended  >0.25 - <0.5        Recommended   0.1 - 0.25          Discouraged              Remeasure/reassess PCT  <0.1                Strongly Discouraged     Remeasure/reassess PCT    As 28 day mortality risk marker: \"Change in Procalcitonin Result\" (>80% or <=80%) if Day 0 (or Day 1) and Day 4 values are available. Refer to http://www.[facility]-pct-calculator.com    Change in PCT <=80%  A decrease of PCT levels below or equal to 80% defines a positive change in PCT test result representing a higher risk for 28-day all-cause mortality of patients diagnosed with severe sepsis for septic shock.    Change in PCT >80%  A decrease of PCT levels of more than 80% defines a negative change in PCT result representing a lower risk for 28-day all-cause mortality of patients diagnosed with severe sepsis or septic shock.       Magnesium [[id number]]  (Normal) Collected: 12/06/24 2201    Specimen: Blood Updated: 12/06/24 2223     Magnesium 2.0 mg/dL     CK [[id number]]  (Normal) Collected: 12/06/24 2201    Specimen: Blood Updated: 12/06/24 2223     Creatine Kinase 40 U/L     CBC Auto Differential [[id number]]  (Abnormal) Collected: 12/06/24 2201    Specimen: Blood Updated: 12/06/24 2207     WBC 10.15 10*3/mm3      RBC 4.77 10*6/mm3      Hemoglobin 13.9 g/dL      Hematocrit 41.8 %      MCV 87.6 fL      MCH 29.1 pg      MCHC 33.3 g/dL      RDW 12.1 %      RDW-SD 39.3 fl      MPV 9.1 fL      Platelets 333 10*3/mm3      Neutrophil % 57.9 %      Lymphocyte % 31.6 %      Monocyte % 7.4 %      Eosinophil % 1.8 %      Basophil % 1.0 %      Immature Grans % 0.3 %      Neutrophils, Absolute 5.88 10*3/mm3      Lymphocytes, Absolute 3.21 10*3/mm3      Monocytes, Absolute 0.75 10*3/mm3      Eosinophils, Absolute 0.18 10*3/mm3      Basophils, Absolute 0.10 10*3/mm3      Immature Grans, Absolute 0.03 10*3/mm3      nRBC 0.0 /100 WBC     TSH Rfx On Abnormal To " Free T4 [[id number]]  (Normal) Collected: 12/06/24 2201    Specimen: Blood Updated: 12/06/24 2235     TSH 1.480 uIU/mL              XR Chest 1 View    Result Date: 12/6/2024   PROCEDURE: XR CHEST 1 VW-  HISTORY: Chest Pain Triage Protocol, chest pressure, dizziness and palpitations.  COMPARISON: May 5, 2024..  FINDINGS: The heart is normal in size. The mediastinum is unremarkable. Decreased inspiratory effort noted with crowding of the lung markings in both lung bases. No acute infiltrate noted.. There is no pneumothorax. There are no acute osseous abnormalities. Generator is again projected over the heart.      Impression: No acute cardiopulmonary process.  .    This report was signed and finalized on 12/6/2024 12:15 PM by Belle Navas MD.          MDM      Initial impression of presenting illness: Palpitations    DDX: includes but is not limited to: PE, PVC burden, atrial fibrillation, electrolyte derangement, bacterial pneumonia, viral URI    Patient arrives stable with vitals interpreted by myself.     Pertinent features from physical exam: Clear to auscultation, regular rate and rhythm with no murmur on my exam, nontender to abdominal palpation, no pedal edema.    Initial diagnostic plan: CBC, CMP, troponin, D-dimer, EKG, chest x-ray, CRP, Pro-Solo, magnesium    Results from initial plan were reviewed and interpreted by me revealing no concern for acute cardiac process, no concern for PE, no concern for electrolyte derangement, patient has normal sinus rhythm during the duration of her ER stay    Diagnostic information from other sources: Discussed patient's  at bedside reviewed past medical records    Interventions / Re-evaluation: [facility] emergency department for over an hour and a half with no change in vital signs, no recurrence of tachycardia    Results/clinical rationale were discussed with patient at bedside    Consultations/Discussion of results with other physicians: Discussed negative workup in  our emergency department, did observe an episode of PAC while observing patient's 3-lead however, this was not recurrent and was isolated.  Discussed that this could be the cause of symptoms patient is describing.  Still no heavy PVC burden while she was in the emergency department.  I recommended that she continue to follow with her cardiologist and follow-up on her Holter monitor report and represent to our emergency department for any persistent palpitations.    Disposition plan: Discharge  -----        Final diagnoses:   Palpitations          George Ruiz MD  12/07/24 0057

## 2024-12-11 ENCOUNTER — OFFICE VISIT (OUTPATIENT)
Age: 39
End: 2024-12-11
Payer: COMMERCIAL

## 2024-12-11 VITALS
HEIGHT: 63 IN | SYSTOLIC BLOOD PRESSURE: 129 MMHG | DIASTOLIC BLOOD PRESSURE: 88 MMHG | TEMPERATURE: 98.6 F | HEART RATE: 60 BPM | OXYGEN SATURATION: 98 % | WEIGHT: 181 LBS | BODY MASS INDEX: 32.07 KG/M2

## 2024-12-11 DIAGNOSIS — N30.01 ACUTE CYSTITIS WITH HEMATURIA: Primary | ICD-10-CM

## 2024-12-11 DIAGNOSIS — R30.9 PAINFUL URINATION: ICD-10-CM

## 2024-12-11 LAB
BILIRUB BLD-MCNC: NEGATIVE MG/DL
CLARITY, POC: CLEAR
COLOR UR: YELLOW
EXPIRATION DATE: ABNORMAL
GLUCOSE UR STRIP-MCNC: NEGATIVE MG/DL
KETONES UR QL: NEGATIVE
LEUKOCYTE EST, POC: NEGATIVE
Lab: ABNORMAL
NITRITE UR-MCNC: NEGATIVE MG/ML
PH UR: 7 [PH] (ref 5–8)
PROT UR STRIP-MCNC: NEGATIVE MG/DL
RBC # UR STRIP: ABNORMAL /UL
SP GR UR: 1.01 (ref 1–1.03)
UROBILINOGEN UR QL: NORMAL

## 2024-12-11 PROCEDURE — 81003 URINALYSIS AUTO W/O SCOPE: CPT | Performed by: STUDENT IN AN ORGANIZED HEALTH CARE EDUCATION/TRAINING PROGRAM

## 2024-12-11 PROCEDURE — 99213 OFFICE O/P EST LOW 20 MIN: CPT | Performed by: STUDENT IN AN ORGANIZED HEALTH CARE EDUCATION/TRAINING PROGRAM

## 2024-12-11 PROCEDURE — 87086 URINE CULTURE/COLONY COUNT: CPT | Performed by: STUDENT IN AN ORGANIZED HEALTH CARE EDUCATION/TRAINING PROGRAM

## 2024-12-12 NOTE — PROGRESS NOTES
Follow Up Office Visit      Date: 2024   Patient Name: Angelika Vick  : 1985   MRN: [id number]     Chief Complaint:    Chief Complaint   Patient presents with    Difficulty Urinating     Burning urgency when urinating for about 2-3 days     History of Present Illness  The patient is a 39-year-old female who presents for a sick visit.    She was in the ER on Friday for another issue, but her lab work showed blood in her urine and a slightly elevated white blood cell count. She was not informed about these findings at that time. She has since started experiencing urinary discomfort and frequent urination, leading her to suspect a urinary tract infection (UTI). She reports no burning sensation during urination. She has previously tolerated most antibiotics well, except for one potent antibiotic that caused heart palpitations. She has successfully used Bactrim earlier this year.    She is currently on Trintellix but has been inconsistent with her medication due to irregular breakfast habits. She recently discovered that she can take the medication at lunchtime without any issues, which should help her maintain a regular medication schedule. She wishes to postpone her upcoming appointment to assess the effectiveness of the medication.    Subjective      Review of Systems:   Review of Systems   Constitutional:  Negative for chills and fever.   HENT:  Negative for congestion, sneezing and sore throat.    Respiratory:  Negative for cough and shortness of breath.    Cardiovascular:  Negative for chest pain.   Gastrointestinal:  Negative for nausea.   Genitourinary:  Positive for dysuria and frequency.   Skin:  Negative for rash.   Psychiatric/Behavioral:  The patient is not nervous/anxious.         I have reviewed the patients family history, social history, past medical history, past surgical history and have updated it as appropriate.     Medications:     Current Outpatient Medications:      "amoxicillin-clavulanate (AUGMENTIN) 875-125 MG per tablet, Take 1 tablet by mouth 2 (Two) Times a Day., Disp: 10 tablet, Rfl: 0    levothyroxine (SYNTHROID, LEVOTHROID) 137 MCG tablet, Take 1 tablet by mouth Daily., Disp: 90 tablet, Rfl: 3    metoprolol tartrate (LOPRESSOR) 25 MG tablet, TAKE 1/2 TO 1 TABLET BY MOUTH TWICE DAILY AS NEEDED FOR PVCS OR FAST HEARTBEAT, Disp: 90 tablet, Rfl: 3    Vortioxetine HBr (Trintellix) 5 MG tablet tablet, Take 1 tablet by mouth Daily With Breakfast., Disp: 30 tablet, Rfl: 0    Allergies:   Allergies   Allergen Reactions    Dermagel Hand  [Alcohol] Rash       Objective     Physical Exam: Please see above  Vital Signs:   Vitals:    12/11/24 1602   BP: 129/88   Pulse: 60   Temp: 98.6 °F (37 °C)   SpO2: 98%   Weight: 82.1 kg (181 lb)   Height: 160 cm (62.99\")     Body mass index is 32.07 kg/m².          Physical Exam  Vitals and nursing note reviewed.   Constitutional:       Appearance: Normal appearance.   HENT:      Head: Atraumatic.   Eyes:      Pupils: Pupils are equal, round, and reactive to light.   Cardiovascular:      Rate and Rhythm: Normal rate and regular rhythm.      Heart sounds: No murmur heard.  Pulmonary:      Effort: Pulmonary effort is normal.      Breath sounds: Normal breath sounds.   Musculoskeletal:         General: Normal range of motion.      Cervical back: Normal range of motion.      Right lower leg: No edema.      Left lower leg: No edema.   Skin:     General: Skin is warm and dry.   Neurological:      General: No focal deficit present.      Mental Status: She is alert and oriented to person, place, and time.      Gait: Gait is intact.   Psychiatric:         Mood and Affect: Mood normal.         Behavior: Behavior normal.         Procedures    Results:   Labs:   Hemoglobin A1C   Date Value Ref Range Status   06/10/2022 5.00 4.80 - 5.60 % Final     TSH   Date Value Ref Range Status   12/06/2024 1.480 0.270 - 4.200 uIU/mL Final        POCT Results " (if applicable):   Results for orders placed or performed in visit on 12/11/24   POCT urinalysis dipstick, automated    Collection Time: 12/11/24  4:14 PM    Specimen: Urine   Result Value Ref Range    Color Yellow Yellow, Straw, Dark Yellow, Carmenza    Clarity, UA Clear Clear    Specific Gravity  1.010 1.005 - 1.030    pH, Urine 7.0 5.0 - 8.0    Leukocytes Negative Negative    Nitrite, UA Negative Negative    Protein, POC Negative Negative mg/dL    Glucose, UA Negative Negative mg/dL    Ketones, UA Negative Negative    Urobilinogen, UA Normal Normal, 0.2 E.U./dL    Bilirubin Negative Negative    Blood, UA 1+ (A) Negative    Lot Number [id number]     Expiration Date 10/11/2026        Imaging:   No valid procedures specified.     Measures:   Smoking Cessation:   Never smoker.      Assessment / Plan      Assessment/Plan:   Diagnoses and all orders for this visit:    1. Painful urination (Primary)  -     POCT urinalysis dipstick, automated    2. Acute cystitis with hematuria  -     amoxicillin-clavulanate (AUGMENTIN) 875-125 MG per tablet; Take 1 tablet by mouth 2 (Two) Times a Day.  Dispense: 10 tablet; Refill: 0  -     Urine Culture - Urine, Urine, Clean Catch; Future  -     Urine Culture - Urine, Urine, Clean Catch      Assessment & Plan  1. Urinary Tract Infection (UTI).  Her symptoms, including frequent urination and discomfort, along with the presence of blood and bacteria in her urine while in the ED, suggest a UTI. A prescription for Augmentin will be sent to [facility]. A urine culture will be ordered.    2. Medication Management.  She is currently on Trintellix but has not been taking it regularly. She plans to start taking it consistently at lunchtime. She will reschedule her appointment with Dr. Metz for one month later to evaluate the effectiveness of the medication.      Follow Up:   No follow-ups on file..    Patient or patient representative verbalized consent for the use of Ambient Listening during the visit  with  Jennifer Santiago MD for chart documentation. 12/12/2024  09:03 EST    Jennifer Santiago MD  [facility] 1

## 2024-12-13 LAB — BACTERIA SPEC AEROBE CULT: NO GROWTH

## 2025-01-17 ENCOUNTER — TELEMEDICINE (OUTPATIENT)
Dept: CARDIOLOGY | Facility: HOSPITAL | Age: 40
End: 2025-01-17
Payer: COMMERCIAL

## 2025-01-17 VITALS — HEART RATE: 84 BPM

## 2025-01-17 DIAGNOSIS — R00.2 PALPITATIONS: ICD-10-CM

## 2025-01-17 DIAGNOSIS — I49.3 PVC'S (PREMATURE VENTRICULAR CONTRACTIONS): Primary | ICD-10-CM

## 2025-01-17 NOTE — PATIENT INSTRUCTIONS
- Metoprolol as neeed for palpitations    - Message Kenny if abnormal rhythms on Karida or worsened symptoms

## 2025-01-17 NOTE — PROGRESS NOTES
Mode of visit: Video  Location of patient[place], [place]   Location of provider: [facility]  You have chosen to receive care through a telehealth visit.   Do you consent to the use video/audio connection for your medical care today?: Yes  This visit included audio and video interaction. No technical issues occurred during the visit.       Chief Complaint  Palpitations    [facility]  Telemedicine note    This was a video enabled telemedicine encounter.    Subjective    History of Present Illness {CC  Problem List  Visit  Diagnosis   Encounters  Notes  Medications  Labs  Result Review Imaging  Media :23}     Angelika Vick, 39 y.o. very pleasant and active female with PVC, HLD, hypothyroidism  presents to [facility] telemedicine visit for Palpitations.    Since previous evaluation patient was evaluated in emergency department for recurrent palpitations while shopping. ED workup with negative troponin, normal CRP/CK, normal TSH. ECG with NSR.     Recent MCOT with no evidence of atrial fibrillation, no significant arrhythmia. PAC/PVC burden less than 1%. Patient triggered events predominately correlate with PVCs.     Presents today with improvement of palpitation symptoms since ED evaluation. Reports that she was treated for UTI around this time also. Currently feeling overall well from a cardiac standpoint. Palpitations improve with PRN metoprolol. No sustained tachypalpitation, syncope.      Objective     Vital Signs:   Vitals:    01/17/25 1033   Pulse: 84     There is no height or weight on file to calculate BMI.  Physical Exam  Vitals reviewed.   Constitutional:       Appearance: Normal appearance.   HENT:      Head: Normocephalic.   Pulmonary:      Effort: Pulmonary effort is normal. No respiratory distress.   Neurological:      Mental Status: She is alert.   Psychiatric:         Mood and Affect: Mood normal.         Behavior: Behavior  normal.         Thought Content: Thought content normal.        Data Reviewed:{ Labs  Result Review  Imaging  Med Tab  Media :23}     ECG 12 Lead Tachycardia (12/06/2024 22:00)  ECG 12 Lead ED Triage Standing Order; Chest Pain (12/06/2024 11:46)  Cardiac Event Monitor (RITO) or Mobile Cardiac Outpatient Telemetry (MCT) (12/03/2024 09:07)  Adult Transthoracic Echo Complete W/ Cont if Necessary Per Protocol (10/17/2024 09:46)  Cardiac Event Monitor (RITO) or Mobile Cardiac Outpatient Telemetry (MCT) (12/03/2024 09:07)    ECG 12 Lead ED Triage Standing Order; Chest Pain (12/06/2024 11:46)  ECG 12 Lead Tachycardia (12/06/2024 22:00)  TSH Rfx On Abnormal To Free T4 (12/06/2024 22:01)  Magnesium (12/06/2024 22:01)  C-reactive Protein (12/06/2024 22:01)  D-dimer, Quantitative (12/06/2024 22:01)  High Sensitivity Troponin T (12/06/2024 22:01)  Comprehensive Metabolic Panel (12/06/2024 22:01)  CBC & Differential (12/06/2024 22:01)  High Sensitivity Troponin T (12/06/2024 11:43)  Comprehensive Metabolic Panel (12/06/2024 11:43)  CBC & Differential (12/06/2024 11:43)      Assessment and Plan {CC Problem List  Visit Diagnosis  ROS  Review (Popup)  Health Maintenance  Quality  BestPractice  Medications  SmartSets  SnapShot Encounters  Media :23}     This visit has been scheduled as a video visit.     1. Palpitation/PVCs (premature ventricular contractions)  -Intermittent palpitation with atrial fibrillation notification on smart watch.  Prior Apple Watch tracing appears to show NSR with premature contractions.  -Recent MCOT with no evidence of atrial fibrillation, no significant arrhythmia. Low burden PAC/PVC.  -Continue as needed metoprolol tartrate for now, may consider extended release pending cardiac monitor results  -Discussed [employer] for additional monitoring device since Apple Watch reporting AF with PVCs  -Follow-up in 3 months for symptom check or sooner for worsened symptoms     2. Hypothyroidism  due to Hashimoto's thyroiditis  -Recent TSH normal  -Continue levothyroxine as prescribed  -Routine monitoring per PCP      Follow Up {Instructions Charge Capture  Follow-up Communications :23}   Return in about 3 months (around 4/17/2025) for Office follow-up, Recheck palpitations .      Patient was given instructions and counseling regarding her condition or for health maintenance advice. Please see specific information pulled into the AVS if appropriate. Patient was instructed to call the [facility] with any questions, concerns, or worsening symptoms.    *Please note that portions of this note were completed with a voice recognition program. Efforts were made to edit the dictations, but occasionally words are mistranscribed.

## 2025-01-30 ENCOUNTER — OFFICE VISIT (OUTPATIENT)
Age: 40
End: 2025-01-30
Payer: COMMERCIAL

## 2025-01-30 VITALS
HEIGHT: 63 IN | SYSTOLIC BLOOD PRESSURE: 132 MMHG | OXYGEN SATURATION: 98 % | DIASTOLIC BLOOD PRESSURE: 71 MMHG | TEMPERATURE: 98.7 F | HEART RATE: 63 BPM | BODY MASS INDEX: 30.83 KG/M2 | WEIGHT: 174 LBS

## 2025-01-30 DIAGNOSIS — R21 RASH: Primary | ICD-10-CM

## 2025-01-30 PROCEDURE — 99213 OFFICE O/P EST LOW 20 MIN: CPT | Performed by: FAMILY MEDICINE

## 2025-01-30 RX ORDER — AMOXICILLIN 500 MG/1
500 TABLET, FILM COATED ORAL 2 TIMES DAILY
COMMUNITY
Start: 2025-01-26 | End: 2025-01-30

## 2025-01-30 RX ORDER — CEFDINIR 300 MG/1
300 CAPSULE ORAL 2 TIMES DAILY
Qty: 14 CAPSULE | Refills: 0 | Status: SHIPPED | OUTPATIENT
Start: 2025-01-30 | End: 2025-02-06

## 2025-01-30 RX ORDER — PREDNISONE 20 MG/1
TABLET ORAL
Qty: 15 TABLET | Refills: 0 | Status: SHIPPED | OUTPATIENT
Start: 2025-01-30 | End: 2025-02-11

## 2025-01-30 RX ORDER — TRIAMCINOLONE ACETONIDE 0.25 MG/G
1 OINTMENT TOPICAL 2 TIMES DAILY
Qty: 15 G | Refills: 0 | Status: SHIPPED | OUTPATIENT
Start: 2025-01-30

## 2025-01-30 NOTE — PROGRESS NOTES
Follow Up Office Visit      Date: 2025   Patient Name: Angelika Vick  : 1985   MRN: [id number]     Chief Complaint:    Chief Complaint   Patient presents with    Rash     Under right breast         History of Present Illness: Angelika Vick is a 39 y.o. female who is here today for spot under right breast. Its been there for a month, will flare up and occasionally get itchy    Subjective      Review of Systems:   Review of Systems   Constitutional:  Negative for appetite change and unexpected weight loss.   HENT:  Positive for congestion. Negative for trouble swallowing.    Eyes:  Negative for blurred vision and double vision.   Respiratory:  Negative for cough and shortness of breath.    Cardiovascular:  Negative for chest pain and leg swelling.   Gastrointestinal:  Negative for blood in stool.   Endocrine: Negative for cold intolerance, heat intolerance and polyuria.   Musculoskeletal:  Negative for joint swelling.   Skin:  Positive for rash. Negative for color change and bruise.   Neurological:  Negative for numbness and memory problem.   Hematological:  Does not bruise/bleed easily.   Psychiatric/Behavioral:  Negative for suicidal ideas and depressed mood. The patient is not nervous/anxious.        I have reviewed the patients family history, social history, past medical history, past surgical history and have updated it as appropriate.     Medications:     Current Outpatient Medications:     levothyroxine (SYNTHROID, LEVOTHROID) 137 MCG tablet, Take 1 tablet by mouth Daily., Disp: 90 tablet, Rfl: 3    metoprolol tartrate (LOPRESSOR) 25 MG tablet, TAKE 1/2 TO 1 TABLET BY MOUTH TWICE DAILY AS NEEDED FOR PVCS OR FAST HEARTBEAT, Disp: 90 tablet, Rfl: 3    cefdinir (OMNICEF) 300 MG capsule, Take 1 capsule by mouth 2 (Two) Times a Day for 7 days., Disp: 14 capsule, Rfl: 0    predniSONE (DELTASONE) 20 MG tablet, Take 2 tablets by mouth Daily for 3 days, THEN 1.5 tablets Daily for 3 days,  "THEN 1 tablet Daily for 3 days, THEN 0.5 tablets Daily for 3 days., Disp: 15 tablet, Rfl: 0    triamcinolone (KENALOG) 0.025 % ointment, Apply 1 Application topically to the appropriate area as directed 2 (Two) Times a Day., Disp: 15 g, Rfl: 0    Vortioxetine HBr (Trintellix) 5 MG tablet tablet, Take 1 tablet by mouth Daily With Breakfast. (Patient not taking: Reported on 1/30/2025), Disp: 30 tablet, Rfl: 0    Allergies:   Allergies   Allergen Reactions    Dermagel Hand  [Alcohol] Rash       Objective     Physical Exam: Please see above  Vital Signs:   Vitals:    01/30/25 1359 01/30/25 1400   BP: 141/94 132/71   Pulse: 63    Temp: 98.7 °F (37.1 °C)    SpO2: 98%    Weight: 78.9 kg (174 lb)    Height: 160 cm (62.99\")      Body mass index is 30.83 kg/m².    Physical Exam  Vitals and nursing note reviewed.   Constitutional:       Appearance: Normal appearance.   HENT:      Head: Normocephalic and atraumatic.   Eyes:      General: Lids are normal.      Conjunctiva/sclera: Conjunctivae normal.   Cardiovascular:      Rate and Rhythm: Normal rate and regular rhythm.   Pulmonary:      Effort: Pulmonary effort is normal.      Breath sounds: Normal breath sounds and air entry.   Abdominal:      General: Abdomen is flat. Bowel sounds are normal.      Palpations: Abdomen is soft.   Musculoskeletal:      Cervical back: Full passive range of motion without pain and normal range of motion.   Neurological:      General: No focal deficit present.      Mental Status: She is alert and oriented to person, place, and time.   Psychiatric:         Attention and Perception: Attention normal.         Mood and Affect: Mood normal.         Behavior: Behavior normal. Behavior is cooperative.         Procedures    Results:   Labs:   Hemoglobin A1C   Date Value Ref Range Status   06/10/2022 5.00 4.80 - 5.60 % Final     TSH   Date Value Ref Range Status   12/06/2024 1.480 0.270 - 4.200 uIU/mL Final        POCT Results (if applicable): "   Results for orders placed or performed in visit on 12/11/24   POCT urinalysis dipstick, automated    Collection Time: 12/11/24  4:14 PM    Specimen: Urine   Result Value Ref Range    Color Yellow Yellow, Straw, Dark Yellow, Carmenza    Clarity, UA Clear Clear    Specific Gravity  1.010 1.005 - 1.030    pH, Urine 7.0 5.0 - 8.0    Leukocytes Negative Negative    Nitrite, UA Negative Negative    Protein, POC Negative Negative mg/dL    Glucose, UA Negative Negative mg/dL    Ketones, UA Negative Negative    Urobilinogen, UA Normal Normal, 0.2 E.U./dL    Bilirubin Negative Negative    Blood, UA 1+ (A) Negative    Lot Number [id number]     Expiration Date 10/11/2026    Urine Culture - Urine, Urine, Clean Catch    Collection Time: 12/11/24  4:34 PM    Specimen: Urine, Clean Catch   Result Value Ref Range    Urine Culture No growth        Imaging:   No valid procedures specified.       Assessment / Plan      Assessment/Plan:   Diagnoses and all orders for this visit:    1. Rash (Primary)  -     triamcinolone (KENALOG) 0.025 % ointment; Apply 1 Application topically to the appropriate area as directed 2 (Two) Times a Day.  Dispense: 15 g; Refill: 0    Other orders  -     predniSONE (DELTASONE) 20 MG tablet; Take 2 tablets by mouth Daily for 3 days, THEN 1.5 tablets Daily for 3 days, THEN 1 tablet Daily for 3 days, THEN 0.5 tablets Daily for 3 days.  Dispense: 15 tablet; Refill: 0  -     cefdinir (OMNICEF) 300 MG capsule; Take 1 capsule by mouth 2 (Two) Times a Day for 7 days.  Dispense: 14 capsule; Refill: 0            Vaccine Counseling:      Follow Up:   No follow-ups on file.      Daniel Metz, DO   [facility] 1

## 2025-02-03 ENCOUNTER — TELEPHONE (OUTPATIENT)
Age: 40
End: 2025-02-03
Payer: COMMERCIAL

## 2025-02-03 RX ORDER — FLUCONAZOLE 100 MG/1
100 TABLET ORAL DAILY
Qty: 1 TABLET | Refills: 1 | Status: SHIPPED | OUTPATIENT
Start: 2025-02-03

## 2025-02-03 NOTE — TELEPHONE ENCOUNTER
Caller: Angelika Vick     Relationship: PATIENT    Best call back number:     What is your medical concern? YEAST INFECTION      Is your provider already aware of this issue? SENT MESSAGE THROUGH MY CHART    Have you been treated for this issue? NO

## 2025-02-12 ENCOUNTER — OFFICE VISIT (OUTPATIENT)
Age: 40
End: 2025-02-12
Payer: COMMERCIAL

## 2025-02-12 VITALS
TEMPERATURE: 98.8 F | SYSTOLIC BLOOD PRESSURE: 145 MMHG | OXYGEN SATURATION: 97 % | WEIGHT: 170 LBS | HEART RATE: 90 BPM | BODY MASS INDEX: 30.12 KG/M2 | DIASTOLIC BLOOD PRESSURE: 89 MMHG

## 2025-02-12 DIAGNOSIS — H66.001 NON-RECURRENT ACUTE SUPPURATIVE OTITIS MEDIA OF RIGHT EAR WITHOUT SPONTANEOUS RUPTURE OF TYMPANIC MEMBRANE: ICD-10-CM

## 2025-02-12 DIAGNOSIS — J02.9 SORE THROAT: ICD-10-CM

## 2025-02-12 DIAGNOSIS — K12.0 CANKER SORES ORAL: ICD-10-CM

## 2025-02-12 LAB
EXPIRATION DATE: NORMAL
EXPIRATION DATE: NORMAL
FLUAV AG UPPER RESP QL IA.RAPID: NOT DETECTED
FLUBV AG UPPER RESP QL IA.RAPID: NOT DETECTED
INTERNAL CONTROL: NORMAL
INTERNAL CONTROL: NORMAL
Lab: NORMAL
Lab: NORMAL
S PYO AG THROAT QL: NEGATIVE
SARS-COV-2 AG UPPER RESP QL IA.RAPID: NOT DETECTED

## 2025-02-12 PROCEDURE — 99214 OFFICE O/P EST MOD 30 MIN: CPT | Performed by: STUDENT IN AN ORGANIZED HEALTH CARE EDUCATION/TRAINING PROGRAM

## 2025-02-12 PROCEDURE — 87428 SARSCOV & INF VIR A&B AG IA: CPT | Performed by: STUDENT IN AN ORGANIZED HEALTH CARE EDUCATION/TRAINING PROGRAM

## 2025-02-12 PROCEDURE — 87880 STREP A ASSAY W/OPTIC: CPT | Performed by: STUDENT IN AN ORGANIZED HEALTH CARE EDUCATION/TRAINING PROGRAM

## 2025-02-12 RX ORDER — FLUCONAZOLE 100 MG/1
100 TABLET ORAL
Qty: 3 TABLET | Refills: 0 | Status: SHIPPED | OUTPATIENT
Start: 2025-02-12

## 2025-02-12 RX ORDER — LIDOCAINE HYDROCHLORIDE 20 MG/ML
5 SOLUTION OROPHARYNGEAL AS NEEDED
Qty: 100 ML | Refills: 0 | Status: SHIPPED | OUTPATIENT
Start: 2025-02-12

## 2025-02-13 ENCOUNTER — TELEPHONE (OUTPATIENT)
Age: 40
End: 2025-02-13

## 2025-02-13 DIAGNOSIS — K12.0 CANKER SORES ORAL: ICD-10-CM

## 2025-02-13 RX ORDER — LIDOCAINE HYDROCHLORIDE 20 MG/ML
5 SOLUTION OROPHARYNGEAL AS NEEDED
Qty: 100 ML | Refills: 0 | Status: CANCELLED | OUTPATIENT
Start: 2025-02-13

## 2025-02-13 NOTE — TELEPHONE ENCOUNTER
Caller: Angelika Vick    Relationship: Self    Best call back number: [phone]     Which medication are you concerned about: Lidocaine Viscous HCl (XYLOCAINE) 2 % solution     Who prescribed you this medication: DR KUO    When did you start taking this medication: PHARMACY NEEDS ADDITIONAL DETAILS BEFORE FILLING.    What are your concerns: THE DIRECTIONS ARE NOT CLEAR, PLEASE CALL THE PHARMACY TO CORRECT.    PHARMACY:  [facility]/pharmacy #[id number] - [place], [place] - [address] [place] [address] [phone] [place] [phone]

## 2025-02-19 NOTE — PROGRESS NOTES
Acute Office Visit      Date: 2025   Patient Name: Angelika Vick  : 1985   MRN: [id number]     Chief Complaint:    Chief Complaint   Patient presents with    Sore Throat     Swollen tonsils, fluid in ears, fatigued      History of Present Illness  The patient is a 39-year-old female who presents for an acute visit.    Approximately 2.5 to 3 weeks ago, she sought medical attention at an urgent care facility due to a severe illness. Despite negative test results, the severity of her symptoms warranted antibiotic treatment. She was initially prescribed cephalexin but developed a rash after a few days, necessitating a switch to cefdinir. This led to the onset of a significant yeast infection, which resolved after discontinuation of the antibiotic and completion of a second course of antifungal medication. Following a brief period of wellness, she experienced a recurrence of her sore throat and fever, with the latter peaking on . Although her condition improved on Monday, she continues to experience throat discomfort. She reports no shortness of breath or chest pain but mentions occasional premature ventricular contractions (PVCs). She also reports mild congestion and left ear pain. She has not been prescribed Augmentin in the past.    Additionally, she reports the presence of canker sores on her tongue, which she first noticed on Monday night.    Supplemental Information  She has a history of allergy to HAND , but not all hand sanitizers cause a reaction.    ALLERGIES  The patient has an allergic reaction to CEPHALEXIN and HAND .    MEDICATIONS  Current: cefdinir  Past: cephalexin    Subjective      Review of Systems:   Review of Systems   Constitutional:  Positive for fever. Negative for chills.   HENT:  Positive for congestion, ear pain and sore throat. Negative for sneezing.    Respiratory:  Negative for cough and shortness of breath.    Cardiovascular:  Negative for  chest pain.   Gastrointestinal:  Negative for nausea.   Genitourinary:  Negative for dysuria.   Skin:  Negative for rash.   Psychiatric/Behavioral:  The patient is not nervous/anxious.         I have reviewed the patients family history, social history, past medical history, past surgical history and have updated it as appropriate.     Medications:     Current Outpatient Medications:     levothyroxine (SYNTHROID, LEVOTHROID) 137 MCG tablet, Take 1 tablet by mouth Daily., Disp: 90 tablet, Rfl: 3    metoprolol tartrate (LOPRESSOR) 25 MG tablet, TAKE 1/2 TO 1 TABLET BY MOUTH TWICE DAILY AS NEEDED FOR PVCS OR FAST HEARTBEAT, Disp: 90 tablet, Rfl: 3    amoxicillin-clavulanate (AUGMENTIN) 875-125 MG per tablet, Take 1 tablet by mouth 2 (Two) Times a Day., Disp: 14 tablet, Rfl: 0    fluconazole (Diflucan) 100 MG tablet, Take 1 tablet by mouth Every 72 (Seventy-Two) Hours. 1 tablet now followed by 1 tablet in 72 hours if symptoms remain, Disp: 3 tablet, Rfl: 0    Lidocaine Viscous HCl (XYLOCAINE) 2 % solution, Take 5 mL by mouth As Needed for Mild Pain. Swish and spit, Disp: 100 mL, Rfl: 0    triamcinolone (KENALOG) 0.025 % ointment, Apply 1 Application topically to the appropriate area as directed 2 (Two) Times a Day. (Patient not taking: Reported on 2/12/2025), Disp: 15 g, Rfl: 0    Allergies:   Allergies   Allergen Reactions    Dermagel Hand  [Alcohol] Rash       Objective     Physical Exam: Please see above  Vital Signs:   Vitals:    02/12/25 1607   BP: 145/89   Pulse: 90   Temp: 98.8 °F (37.1 °C)   SpO2: 97%   Weight: 77.1 kg (170 lb)     Body mass index is 30.12 kg/m².  BMI is >= 30 and <35. (Class 1 Obesity). The following options were offered after discussion;: to be discussed at future visits.       Physical Exam  Vitals and nursing note reviewed.   Constitutional:       Appearance: Normal appearance.   HENT:      Head: Atraumatic.   Eyes:      Pupils: Pupils are equal, round, and reactive to light.    Cardiovascular:      Rate and Rhythm: Normal rate and regular rhythm.      Heart sounds: No murmur heard.  Pulmonary:      Effort: Pulmonary effort is normal.      Breath sounds: Normal breath sounds.   Musculoskeletal:         General: Normal range of motion.      Cervical back: Normal range of motion.      Right lower leg: No edema.      Left lower leg: No edema.   Skin:     General: Skin is warm and dry.   Neurological:      General: No focal deficit present.      Mental Status: She is alert and oriented to person, place, and time.      Gait: Gait is intact.   Psychiatric:         Mood and Affect: Mood normal.         Behavior: Behavior normal.       Physical Exam  Right ear shows signs of infection. Tonsils are enlarged.  Lungs are clear.    Procedures    Results:   Labs:   Hemoglobin A1C   Date Value Ref Range Status   06/10/2022 5.00 4.80 - 5.60 % Final     TSH   Date Value Ref Range Status   12/06/2024 1.480 0.270 - 4.200 uIU/mL Final        POCT Results (if applicable):   Results for orders placed or performed in visit on 02/12/25   POCT SARS-CoV-2 Antigen COLETTE + Flu    Collection Time: 02/12/25  4:33 PM    Specimen: Swab   Result Value Ref Range    SARS Antigen Not Detected Not Detected, Presumptive Negative    Influenza A Antigen COLETTE Not Detected Not Detected    Influenza B Antigen COLETTE Not Detected Not Detected    Internal Control Passed Passed    Lot Number [id number]     Expiration Date 11/27/2025    POCT rapid strep A    Collection Time: 02/12/25  4:33 PM    Specimen: Swab   Result Value Ref Range    Rapid Strep A Screen Negative Negative, VALID, INVALID, Not Performed    Internal Control Passed Passed    Lot Number [id number]     Expiration Date 03/06/2027        Imaging:   No valid procedures specified.     Measures:   Smoking Cessation:   Former smoker.      Assessment / Plan      Assessment/Plan:   Diagnoses and all orders for this visit:    1. Non-recurrent acute suppurative otitis media of right  ear without spontaneous rupture of tympanic membrane  -     amoxicillin-clavulanate (AUGMENTIN) 875-125 MG per tablet; Take 1 tablet by mouth 2 (Two) Times a Day.  Dispense: 14 tablet; Refill: 0  -     fluconazole (Diflucan) 100 MG tablet; Take 1 tablet by mouth Every 72 (Seventy-Two) Hours. 1 tablet now followed by 1 tablet in 72 hours if symptoms remain  Dispense: 3 tablet; Refill: 0    2. Canker sores oral  -     Lidocaine Viscous HCl (XYLOCAINE) 2 % solution; Take 5 mL by mouth As Needed for Mild Pain. Swish and spit  Dispense: 100 mL; Refill: 0    3. Sore throat  -     POCT SARS-CoV-2 Antigen COLETTE + Flu  -     POCT rapid strep A      Assessment & Plan  1. Right ear infection.  The patient reports left ear pain, but examination revealed an infection in the right ear. Augmentin will be prescribed for 7 days to cover the ear infection as well as potential throat and chest infections. She is advised to start taking probiotics to mitigate side effects.    2. Canker sores.  The patient has canker sores on her tongue, first noticed on Monday night. Viscous lidocaine will be prescribed for symptomatic relief. She can either swish and spit or apply it directly using a Q-tip. If the sores persist, an oral steroid suspension may be considered.    3. Yeast infection prophylaxis.  The patient experienced a severe yeast infection after taking cefdinir previously. Diflucan will be prescribed prophylactically. She is instructed to take one tablet at the start of the antibiotic course and then one every 72 hours for three doses.      Follow Up:   No follow-ups on file..    Patient or patient representative verbalized consent for the use of Ambient Listening during the visit with  Jennifer Santiago MD for chart documentation. 2/19/2025  11:10 EST    Jennifer Santiago MD  [facility]

## 2025-03-10 ENCOUNTER — TELEPHONE (OUTPATIENT)
Dept: CARDIOLOGY | Facility: HOSPITAL | Age: 40
End: 2025-03-10
Payer: COMMERCIAL

## 2025-03-10 DIAGNOSIS — I49.3 PVC'S (PREMATURE VENTRICULAR CONTRACTIONS): Primary | ICD-10-CM

## 2025-03-10 RX ORDER — METOPROLOL SUCCINATE 25 MG/1
25 TABLET, EXTENDED RELEASE ORAL DAILY
Qty: 90 TABLET | Refills: 0 | Status: SHIPPED | OUTPATIENT
Start: 2025-03-10

## 2025-03-10 NOTE — TELEPHONE ENCOUNTER
Huddleston, Christopher, APRN to Me (Selected Message)        3/10/25 12:00 PM  We can try sustained release Toprol-XL 25mg nightly. Generally the long acting has less side effects and can be more of a maintenance medicine for her.     I will send in for Toprol-XL 25 Mg nightly.  Stop short acting metoprolol to tartrate. Continue to monitor symptoms, follow-up for worsened symptoms if needed prior to her upcoming appointment.     Thanks

## 2025-03-10 NOTE — TELEPHONE ENCOUNTER
"Called patient to gather more information. Patient states that she has been taking Metoprolol Tartrate 25mg due to having more frequent PVCs or elevated HR. She states that over the past 2 weeks that she has had consistent symptoms of dizziness and \"uncomfortable\" feeling. HR before medication is typically in the 80-90 range and states that once she has taken her medication that is is around 60s. She states that the medication has been making her feel better but she has not been consistent on the dosage. She states that she often takes anywhere between 1.5 tablets to 2 tablets daily. She is wondering if she should be on a daily dosage at a consistent dosage.   "

## 2025-03-10 NOTE — TELEPHONE ENCOUNTER
Patient left voice mail on MA line that she has been taking her beta blocker for past 7 days and wanted to make provider aware.

## 2025-03-10 NOTE — TELEPHONE ENCOUNTER
Patient notified to stop taking the Metoprolol Tartrate and start the long-acting succinate form. Patient will follow-up if needed and continue to monitor her symptoms.

## 2025-03-17 ENCOUNTER — OFFICE VISIT (OUTPATIENT)
Age: 40
End: 2025-03-17
Payer: COMMERCIAL

## 2025-03-17 ENCOUNTER — LAB (OUTPATIENT)
Facility: HOSPITAL | Age: 40
End: 2025-03-17
Payer: COMMERCIAL

## 2025-03-17 VITALS
SYSTOLIC BLOOD PRESSURE: 118 MMHG | HEIGHT: 63 IN | HEART RATE: 65 BPM | BODY MASS INDEX: 30.12 KG/M2 | WEIGHT: 170 LBS | TEMPERATURE: 97.3 F | DIASTOLIC BLOOD PRESSURE: 84 MMHG

## 2025-03-17 DIAGNOSIS — M25.50 ARTHRALGIA, UNSPECIFIED JOINT: Primary | ICD-10-CM

## 2025-03-17 DIAGNOSIS — M25.50 ARTHRALGIA, UNSPECIFIED JOINT: ICD-10-CM

## 2025-03-17 DIAGNOSIS — R53.83 FATIGUE, UNSPECIFIED TYPE: ICD-10-CM

## 2025-03-17 LAB
BASOPHILS # BLD AUTO: 0.09 10*3/MM3 (ref 0–0.2)
BASOPHILS NFR BLD AUTO: 1.1 % (ref 0–1.5)
BILIRUB UR QL STRIP: NEGATIVE
CLARITY UR: CLEAR
COLOR UR: YELLOW
DEPRECATED RDW RBC AUTO: 41.6 FL (ref 37–54)
EOSINOPHIL # BLD AUTO: 0.13 10*3/MM3 (ref 0–0.4)
EOSINOPHIL NFR BLD AUTO: 1.6 % (ref 0.3–6.2)
ERYTHROCYTE [DISTWIDTH] IN BLOOD BY AUTOMATED COUNT: 12.9 % (ref 12.3–15.4)
ERYTHROCYTE [SEDIMENTATION RATE] IN BLOOD: 8 MM/HR (ref 0–20)
GLUCOSE UR STRIP-MCNC: NEGATIVE MG/DL
HCT VFR BLD AUTO: 38.6 % (ref 34–46.6)
HGB BLD-MCNC: 13.2 G/DL (ref 12–15.9)
HGB UR QL STRIP.AUTO: NEGATIVE
HOLD SPECIMEN: NORMAL
IMM GRANULOCYTES # BLD AUTO: 0.03 10*3/MM3 (ref 0–0.05)
IMM GRANULOCYTES NFR BLD AUTO: 0.4 % (ref 0–0.5)
KETONES UR QL STRIP: ABNORMAL
LEUKOCYTE ESTERASE UR QL STRIP.AUTO: NEGATIVE
LYMPHOCYTES # BLD AUTO: 2.65 10*3/MM3 (ref 0.7–3.1)
LYMPHOCYTES NFR BLD AUTO: 32.4 % (ref 19.6–45.3)
MCH RBC QN AUTO: 29.9 PG (ref 26.6–33)
MCHC RBC AUTO-ENTMCNC: 34.2 G/DL (ref 31.5–35.7)
MCV RBC AUTO: 87.5 FL (ref 79–97)
MONOCYTES # BLD AUTO: 0.61 10*3/MM3 (ref 0.1–0.9)
MONOCYTES NFR BLD AUTO: 7.4 % (ref 5–12)
NEUTROPHILS NFR BLD AUTO: 4.68 10*3/MM3 (ref 1.7–7)
NEUTROPHILS NFR BLD AUTO: 57.1 % (ref 42.7–76)
NITRITE UR QL STRIP: NEGATIVE
NRBC BLD AUTO-RTO: 0 /100 WBC (ref 0–0.2)
PH UR STRIP.AUTO: 6 [PH] (ref 5–8)
PLATELET # BLD AUTO: 352 10*3/MM3 (ref 140–450)
PMV BLD AUTO: 9.6 FL (ref 6–12)
PROT UR QL STRIP: NEGATIVE
RBC # BLD AUTO: 4.41 10*6/MM3 (ref 3.77–5.28)
SP GR UR STRIP: 1.03 (ref 1–1.03)
UROBILINOGEN UR QL STRIP: ABNORMAL
WBC NRBC COR # BLD AUTO: 8.19 10*3/MM3 (ref 3.4–10.8)

## 2025-03-17 PROCEDURE — 82085 ASSAY OF ALDOLASE: CPT

## 2025-03-17 PROCEDURE — 81003 URINALYSIS AUTO W/O SCOPE: CPT

## 2025-03-17 PROCEDURE — 85613 RUSSELL VIPER VENOM DILUTED: CPT

## 2025-03-17 PROCEDURE — 85732 THROMBOPLASTIN TIME PARTIAL: CPT

## 2025-03-17 PROCEDURE — 85652 RBC SED RATE AUTOMATED: CPT

## 2025-03-17 PROCEDURE — 86431 RHEUMATOID FACTOR QUANT: CPT

## 2025-03-17 PROCEDURE — 83516 IMMUNOASSAY NONANTIBODY: CPT

## 2025-03-17 PROCEDURE — 85730 THROMBOPLASTIN TIME PARTIAL: CPT

## 2025-03-17 PROCEDURE — 85598 HEXAGNAL PHOSPH PLTLT NEUTRL: CPT

## 2025-03-17 PROCEDURE — 86038 ANTINUCLEAR ANTIBODIES: CPT

## 2025-03-17 PROCEDURE — 81374 HLA I TYPING 1 ANTIGEN LR: CPT

## 2025-03-17 PROCEDURE — 36415 COLL VENOUS BLD VENIPUNCTURE: CPT

## 2025-03-17 PROCEDURE — 82550 ASSAY OF CK (CPK): CPT

## 2025-03-17 PROCEDURE — 84439 ASSAY OF FREE THYROXINE: CPT

## 2025-03-17 PROCEDURE — 83520 IMMUNOASSAY QUANT NOS NONAB: CPT

## 2025-03-17 PROCEDURE — 85670 THROMBIN TIME PLASMA: CPT

## 2025-03-17 PROCEDURE — 99204 OFFICE O/P NEW MOD 45 MIN: CPT | Performed by: INTERNAL MEDICINE

## 2025-03-17 PROCEDURE — 86037 ANCA TITER EACH ANTIBODY: CPT

## 2025-03-17 PROCEDURE — 85610 PROTHROMBIN TIME: CPT

## 2025-03-17 PROCEDURE — 86800 THYROGLOBULIN ANTIBODY: CPT

## 2025-03-17 PROCEDURE — 80053 COMPREHEN METABOLIC PANEL: CPT

## 2025-03-17 PROCEDURE — 84443 ASSAY THYROID STIM HORMONE: CPT

## 2025-03-17 PROCEDURE — 85025 COMPLETE CBC W/AUTO DIFF WBC: CPT

## 2025-03-17 PROCEDURE — 86140 C-REACTIVE PROTEIN: CPT

## 2025-03-17 PROCEDURE — 86376 MICROSOMAL ANTIBODY EACH: CPT

## 2025-03-17 PROCEDURE — 86147 CARDIOLIPIN ANTIBODY EA IG: CPT

## 2025-03-17 PROCEDURE — 86146 BETA-2 GLYCOPROTEIN ANTIBODY: CPT

## 2025-03-17 NOTE — PROGRESS NOTES
Office Visit       Date: 03/17/2025   Patient Name: Angelika Vick  MRN: [id number]  YOB: 1985    Referring Physician: Daniel Metz DO     Chief Complaint   Patient presents with    Joint Pain       History of Present Illness: Angelika Vick is a 39 y.o. female who is here today at the request of Daniel Metz DO. The referral indicates that she has joint pain and perhaps Jeanine Danlos syndrome. Today she raises concerns about a possible facial rash and fatigue. No recent serious injuries or infections. No fever.  No history of uveitis, iritis, or scleritis. No oral, nasal, or genital ulcers. No history of blood clots or miscarriage. No history of psoriasis, gout, or Raynaud's. No history of uveitis, iritis, or scleritis. No sicca symptoms. No shortness of breath. No chest pain. No GI or  issues. No headaches or paresthesias. No abnormal bruising/bleeding. No rash. No hair loss.     She has joint pain. She rates her pain as 3/10 in severity. No morning stiffness. No red or hot joints. She has muscle pain. No muscle weakness. No back or neck issues.     She has a history of thyroid disease and is on thyroid medication.     Medication/treatment/interventions tried include: Tylenol, She has seen cardiology, She has seen psychiatry, She has done some physical therapy, she has seen orthopaedic surgeons, Knee immobilizer, ice PRN, ibuprofen, meloxicam   Studies reviewed included:   11/7/24: DS DNA normal, MARY negative, C3 and C4 normal, RF negative, Sed rate normal, CRP normal  12/6/24: CPK normal   Knee x-rays 8/28/24:      3 view plain films of the right knee were done in office today for the evaluation of pain including a bilateral AP sunrise view. No acute osseous abnormalities are seen. The right knee does reveal mild patellofemoral joint space narrowing without osteophyte formation. No significant patellar tilt and the patella is in its proper place and position. Mild  patella chester is again noted. The medial and lateral compartments are preserved.      PROCEDURE: XR SHOULDER 2+ VW LEFT-     History: left shoulder pain; M25.512-Pain in left shoulder     COMPARISON: None.     FINDINGS:  A 3 view exam demonstrates no acute fracture or dislocation.  The joint spaces are preserved. No soft tissue abnormality is seen.     IMPRESSION:  No acute fracture.        Images were reviewed, interpreted, and dictated by Dr. Belle Navas MD  Transcribed by Glendy Sauer PA-C.     This report was signed and finalized on 9/10/2024 1:49 PM by Belle Navas MD.        Subjective     Review of Systems   Constitutional: Negative.  Positive for appetite change and fatigue.   HENT: Negative.  Positive for sore throat, swollen glands, trouble swallowing and voice change.    Eyes: Negative.    Respiratory: Negative.     Cardiovascular: Negative.  Positive for palpitations.   Gastrointestinal: Negative.    Endocrine: Negative.  Positive for cold intolerance and heat intolerance.   Genitourinary: Negative.    Musculoskeletal: Negative.  Positive for arthralgias and myalgias.   Skin: Negative.  Positive for rash.   Allergic/Immunologic: Negative.  Positive for environmental allergies and food allergies.   Neurological: Negative.    Hematological: Negative.  Bruises/bleeds easily.   Psychiatric/Behavioral: Negative.  Positive for agitation, positive for hyperactivity and depressed mood.    All other systems reviewed and are negative.       Past Medical History:   Diagnosis Date    Acid reflux     Bilateral ovarian cysts     Depression     GERD (gastroesophageal reflux disease)     Gestational diabetes     with 2nd pregnancy    Gestational hypertension     with last pregnancy    Hashimoto's thyroiditis 06/2018    Hypertension in pregnancy 2012    Hypothyroidism 06/2018    Migraines     Mixed hyperlipidemia 02/08/2019    Polycystic ovary syndrome 2010    Vitamin D deficiency 06/11/2024       Past Surgical  History:   Procedure Laterality Date    CYST REMOVAL      GANGLION CYST EXCISION Right        Family History   Problem Relation Age of Onset    Arthritis Mother     Hypertension Mother     Diabetes Mother     No Known Problems Father     Thyroid disease Sister     Diabetes Sister     Diabetes Sister     Obesity Sister     Thyroid disease Sister     No Known Problems Brother     Diabetes Maternal Grandmother     Diabetes Maternal Grandfather     No Known Problems Paternal Grandmother     No Known Problems Paternal Grandfather        Social History     Socioeconomic History    Marital status:    Tobacco Use    Smoking status: Former     Current packs/day: 0.00     Average packs/day: 1 pack/day for 11.3 years (11.3 ttl pk-yrs)     Types: Cigarettes     Start date: 2005     Quit date: 10/6/2016     Years since quittin.4     Passive exposure: Past    Smokeless tobacco: Never   Vaping Use    Vaping status: Never Used   Substance and Sexual Activity    Alcohol use: Yes     Alcohol/week: 1.0 - 2.0 standard drink of alcohol     Types: 1 - 2 Drinks containing 0.5 oz of alcohol per week     Comment: Occasionally    Drug use: Yes     Types: Marijuana     Comment: Daily    Sexual activity: Yes     Partners: Male     Birth control/protection: I.U.D.         Current Outpatient Medications:     levothyroxine (SYNTHROID, LEVOTHROID) 137 MCG tablet, Take 1 tablet by mouth Daily., Disp: 90 tablet, Rfl: 3    metoprolol succinate XL (TOPROL-XL) 25 MG 24 hr tablet, Take 1 tablet by mouth Daily., Disp: 90 tablet, Rfl: 0    Allergies   Allergen Reactions    Dermagel Hand  [Alcohol] Rash       I reviewed the patient's chief complaint, history of present illness, review of systems, past medical history, surgical history, family history, social history, medications and allergy list.     Objective      Vitals:    25 1359   BP: 118/84   BP Location: Left arm   Patient Position: Sitting   Cuff Size: Adult   Pulse:  "65   Temp: 97.3 °F (36.3 °C)   Weight: 77.1 kg (170 lb)   Height: 160 cm (63\")   PainSc: 3    PainLoc: Generalized     Body mass index is 30.11 kg/m².       Physical Exam       General: Well appearing 39 year old  female. Not in distress. She is ambulating unassisted.   SKIN: No rashes. No alopecia. No subcutaneous nodules. No digital pits or ulcers. No sclerodactyly.   HEENT: NCAT. Conjunctiva clear, no photophobia. No oral or nasal ulcers. Hearing intact.    Pulmonary: Clear to auscultation bilaterally. No wheezing, rales, or rhonchi.  CV: Regular rate and rhythm. No murmurs, rubs, or gallops.   Psych: Normal mood and affect. Alert and oriented x 3.   Extremities: No cyanosis or edema.   Musculoskeletal: No joint swelling or tenderness to palpation. No warmth or erythema. Normal range of motion of the wrists, ankles, elbows, and knees.   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   She reports she has not officially been diagnosed with this   She has done physical therapy   She has taken ibuprofen   We gave her a handout on joint and muscle pain.   We will evaluate further for autoimmune disease/inflammatory types of arthritis.   She has had a facial rash.   We will evaluate for autoimmune causes.   Orders:    14.3.3 ETA, Rheum. Arthritis; Future    MARY 12 Plus Profile (RDL); Future    MARY by IFA, Reflex to Titer and Pattern; Future    ANCA Panel; Future    Beta-2 Glycoprotein Antibodies; Future    CBC Auto Differential; Future    " CK; Future    Comprehensive Metabolic Panel; Future    C-reactive Protein; Future    Cyclic Citrul Peptide Antibody, IgG / IgA; Future    HLA-B27 Antigen; Future    Lupus Anticoag / Cardiolipin Ab; Future    RF Isotypes, IgG, IgA, IgM EIA; Future    Sedimentation Rate; Future    Thyroid Antibodies; Future    TSH+Free T4; Future    Urinalysis With Culture If Indicated -; Future    Aldolase; Future    XR Hand 2 View Bilateral; Future    XR Foot 3+ View Bilateral; Future    XR Sacroiliac Joints 3+ View    Fatigue, unspecified type    Labs and x-rays to evaluate for autoimmune conditions. We usually contact patients with these results within 10-14 business days  Consider evaluation for sleep apnea.     Orders:    14.3.3 ETA, Rheum. Arthritis; Future    MARY 12 Plus Profile (RDL); Future    MARY by IFA, Reflex to Titer and Pattern; Future    ANCA Panel; Future    Beta-2 Glycoprotein Antibodies; Future    CBC Auto Differential; Future    CK; Future    Comprehensive Metabolic Panel; Future    C-reactive Protein; Future    Cyclic Citrul Peptide Antibody, IgG / IgA; Future    HLA-B27 Antigen; Future    Lupus Anticoag / Cardiolipin Ab; Future    RF Isotypes, IgG, IgA, IgM EIA; Future    Sedimentation Rate; Future    Thyroid Antibodies; Future    TSH+Free T4; Future    Urinalysis With Culture If Indicated -; Future    Aldolase; Future    XR Hand 2 View Bilateral; Future    XR Foot 3+ View Bilateral; Future    XR Sacroiliac Joints 3+ View      Follow Up:   Return in about 3 months (around 6/17/2025).    Ari Petersen DO  [facility]

## 2025-03-18 ENCOUNTER — RESULTS FOLLOW-UP (OUTPATIENT)
Facility: HOSPITAL | Age: 40
End: 2025-03-18
Payer: COMMERCIAL

## 2025-03-18 DIAGNOSIS — E06.3 HYPOTHYROIDISM DUE TO HASHIMOTO'S THYROIDITIS: Primary | ICD-10-CM

## 2025-03-18 LAB
ALBUMIN SERPL-MCNC: 3.8 G/DL (ref 3.5–5.2)
ALBUMIN/GLOB SERPL: 1.3 G/DL
ALP SERPL-CCNC: 65 U/L (ref 39–117)
ALT SERPL W P-5'-P-CCNC: 10 U/L (ref 1–33)
ANION GAP SERPL CALCULATED.3IONS-SCNC: 9.9 MMOL/L (ref 5–15)
AST SERPL-CCNC: 16 U/L (ref 1–32)
BILIRUB SERPL-MCNC: <0.2 MG/DL (ref 0–1.2)
BUN SERPL-MCNC: 13 MG/DL (ref 6–20)
BUN/CREAT SERPL: 11.3 (ref 7–25)
CALCIUM SPEC-SCNC: 9.1 MG/DL (ref 8.6–10.5)
CHLORIDE SERPL-SCNC: 105 MMOL/L (ref 98–107)
CK SERPL-CCNC: 47 U/L (ref 20–180)
CO2 SERPL-SCNC: 24.1 MMOL/L (ref 22–29)
CREAT SERPL-MCNC: 1.15 MG/DL (ref 0.57–1)
CRP SERPL-MCNC: <0.3 MG/DL (ref 0–0.5)
EGFRCR SERPLBLD CKD-EPI 2021: 62.7 ML/MIN/1.73
GLOBULIN UR ELPH-MCNC: 2.9 GM/DL
GLUCOSE SERPL-MCNC: 109 MG/DL (ref 65–99)
POTASSIUM SERPL-SCNC: 3.9 MMOL/L (ref 3.5–5.2)
PROT SERPL-MCNC: 6.7 G/DL (ref 6–8.5)
SODIUM SERPL-SCNC: 139 MMOL/L (ref 136–145)
T4 FREE SERPL-MCNC: 1.86 NG/DL (ref 0.92–1.68)
TSH SERPL DL<=0.05 MIU/L-ACNC: 0.13 UIU/ML (ref 0.27–4.2)

## 2025-03-18 RX ORDER — LEVOTHYROXINE SODIUM 125 UG/1
125 TABLET ORAL
Qty: 90 TABLET | Refills: 0 | Status: SHIPPED | OUTPATIENT
Start: 2025-03-18

## 2025-03-19 LAB
THYROGLOB AB SERPL-ACNC: 3.8 IU/ML (ref 0–0.9)
THYROPEROXIDASE AB SERPL-ACNC: 248 IU/ML (ref 0–34)

## 2025-03-20 LAB — ALDOLASE SERPL-CCNC: 3.3 U/L (ref 3.3–10.3)

## 2025-03-21 LAB
14-3-3 ETA AG SER IA-MCNC: <0.2 NG/ML
C-ANCA TITR SER IF: NORMAL TITER
MYELOPEROXIDASE AB SER IA-ACNC: <0.2 UNITS (ref 0–0.9)
P-ANCA ATYPICAL TITR SER IF: NORMAL TITER
P-ANCA TITR SER IF: NORMAL TITER
PROTEINASE3 AB SER IA-ACNC: <0.2 UNITS (ref 0–0.9)

## 2025-03-22 LAB
RF IGA SER-ACNC: <7 U
RF IGG SER-ACNC: <7 U
RF IGM SER IA-ACNC: <7 U

## 2025-03-24 LAB — HLA-B27 QL NAA+PROBE: NEGATIVE

## 2025-03-26 LAB
ANA HOMOGEN TITR SER: ABNORMAL {TITER}
ANA PLUS 12 INTERPRETATION: ABNORMAL
ANA SER QL IF: POSITIVE
C3 SERPL-MCNC: 132 MG/DL (ref 90–180)
C4 SERPL-MCNC: 28 MG/DL (ref 10–40)
CARDIOLIPIN IGA SER IA-ACNC: <12 APL U/ML
CARDIOLIPIN IGG SER IA-ACNC: <15 GPL U/ML
CARDIOLIPIN IGM SER IA-ACNC: <13 MPL U/ML
CCP IGA+IGG SERPL IA-ACNC: <20 UNITS
CENTROMERE AB TITR SER IF: ABNORMAL {TITER}
CHROMATIN IGG SERPL-ACNC: <20 UNITS
DSDNA AB SER FARR-ACNC: <8 IU/ML
ENA SCL70 AB SER IA-ACNC: <20 UNITS
ENA SM AB SER-ACNC: <20 UNITS
ENA SS-A AB SER IA-ACNC: <20 UNITS
ENA SS-B AB SER IA-ACNC: <20 UNITS
LABORATORY COMMENT REPORT: ABNORMAL
RHEUMATOID FACT SERPL-ACNC: <14 IU/ML
THYROPEROXIDASE AB SERPL-ACNC: 192.1 IU/ML
U1 SNRNP AB SER IA-ACNC: <20 UNITS

## 2025-04-01 LAB
APTT HEX PL PPP: 4 SEC
APTT IMM NP PPP: NORMAL SEC
APTT PPP 1:1 SALINE: NORMAL SEC
APTT PPP: 27.8 SEC
B2 GLYCOPROT1 IGA SER-ACNC: <10 SAU
B2 GLYCOPROT1 IGG SER-ACNC: 13 SGU
B2 GLYCOPROT1 IGM SER-ACNC: <10 SMU
CARDIOLIPIN IGG SER IA-ACNC: <10 GPL
CARDIOLIPIN IGM SER IA-ACNC: <10 MPL
CONFIRM DRVVT: NORMAL SEC
DRVVT SCREEN TO CONFIRM RATIO: NORMAL RATIO
INR PPP: 1 RATIO
LABORATORY COMMENT REPORT: NORMAL
PROTHROMBIN TIME: 11.4 SEC
SCREEN DRVVT: 39.6 SEC
THROMBIN TIME: 19.8 SEC

## 2025-04-04 ENCOUNTER — OFFICE VISIT (OUTPATIENT)
Age: 40
End: 2025-04-04
Payer: COMMERCIAL

## 2025-04-04 VITALS
WEIGHT: 178 LBS | DIASTOLIC BLOOD PRESSURE: 72 MMHG | SYSTOLIC BLOOD PRESSURE: 110 MMHG | HEART RATE: 73 BPM | BODY MASS INDEX: 31.54 KG/M2 | HEIGHT: 63 IN | OXYGEN SATURATION: 98 %

## 2025-04-04 DIAGNOSIS — F41.9 ANXIETY: Primary | ICD-10-CM

## 2025-04-04 PROCEDURE — 99213 OFFICE O/P EST LOW 20 MIN: CPT | Performed by: FAMILY MEDICINE

## 2025-04-04 RX ORDER — METOPROLOL SUCCINATE 50 MG/1
50 TABLET, EXTENDED RELEASE ORAL DAILY
COMMUNITY

## 2025-04-04 NOTE — PROGRESS NOTES
Follow Up Office Visit      Date: 2025   Patient Name: Angelika Vick  : 1985   MRN: [id number]     Chief Complaint:    Chief Complaint   Patient presents with    Anxiety     Discuss medication       History of Present Illness: Angelika Vick is a 39 y.o. female who is here today for anxiety.  States that she has been on Abilify, Trintellix, and Wellbutrin, as well as being on several SSRIs (which she has a hard time tolerating).  States that she is having panic attacks at night and also waking up in the morning feeling very anxious.      Subjective      Review of Systems:   Review of Systems   Constitutional:  Negative for appetite change and unexpected weight loss.   HENT:  Negative for trouble swallowing.    Eyes:  Negative for blurred vision and double vision.   Respiratory:  Negative for cough and shortness of breath.    Cardiovascular:  Negative for chest pain and leg swelling.   Gastrointestinal:  Negative for blood in stool.   Endocrine: Negative for cold intolerance, heat intolerance and polyuria.   Musculoskeletal:  Negative for joint swelling.   Skin:  Negative for color change and bruise.   Neurological:  Negative for numbness and memory problem.   Hematological:  Does not bruise/bleed easily.   Psychiatric/Behavioral:  Negative for suicidal ideas and depressed mood. The patient is not nervous/anxious.        I have reviewed the patients family history, social history, past medical history, past surgical history and have updated it as appropriate.     Medications:     Current Outpatient Medications:     levothyroxine (SYNTHROID, LEVOTHROID) 125 MCG tablet, Take 1 tablet by mouth Every Morning., Disp: 90 tablet, Rfl: 0    metoprolol succinate XL (TOPROL-XL) 50 MG 24 hr tablet, Take 1 tablet by mouth Daily., Disp: , Rfl:     metoprolol succinate XL (TOPROL-XL) 25 MG 24 hr tablet, Take 1 tablet by mouth Daily. (Patient not taking: Reported on 2025), Disp: 90 tablet, Rfl: 0     "Vortioxetine HBr (Trintellix) 5 MG tablet tablet, Take 1 tablet by mouth Daily With Breakfast., Disp: 30 tablet, Rfl: 3    Allergies:   Allergies   Allergen Reactions    Dermagel Hand  [Alcohol] Rash       Objective     Physical Exam: Please see above  Vital Signs:   Vitals:    04/04/25 1437   BP: 110/72   Pulse: 73   SpO2: 98%   Weight: 80.7 kg (178 lb)   Height: 160 cm (63\")     Body mass index is 31.53 kg/m².    Physical Exam  Vitals and nursing note reviewed.   Constitutional:       Appearance: Normal appearance.   HENT:      Head: Normocephalic and atraumatic.   Eyes:      General: Lids are normal.      Conjunctiva/sclera: Conjunctivae normal.   Cardiovascular:      Rate and Rhythm: Normal rate and regular rhythm.   Pulmonary:      Effort: Pulmonary effort is normal.      Breath sounds: Normal breath sounds and air entry.   Abdominal:      General: Abdomen is flat. Bowel sounds are normal.      Palpations: Abdomen is soft.   Musculoskeletal:      Cervical back: Full passive range of motion without pain and normal range of motion.   Neurological:      General: No focal deficit present.      Mental Status: She is alert and oriented to person, place, and time.   Psychiatric:         Attention and Perception: Attention normal.         Mood and Affect: Mood normal.         Behavior: Behavior normal. Behavior is cooperative.         Procedures    Results:   Labs:   Hemoglobin A1C   Date Value Ref Range Status   06/10/2022 5.00 4.80 - 5.60 % Final     TSH   Date Value Ref Range Status   03/17/2025 0.128 (L) 0.270 - 4.200 uIU/mL Final        POCT Results (if applicable):   Results for orders placed or performed in visit on 03/17/25   14.3.3 ETA, Rheum. Arthritis    Collection Time: 03/17/25  2:40 PM    Specimen: Blood   Result Value Ref Range    14.3.3 ETA, Rheum. Arthritis <0.20 ng/mL   MARY 12 Plus Profile (RDL)    Collection Time: 03/17/25  2:40 PM    Specimen: Blood   Result Value Ref Range    ANTI-NUCLEAR " AB BY IFA (RDL) Positive (A) Negative   ANCA Panel    Collection Time: 03/17/25  2:40 PM    Specimen: Blood   Result Value Ref Range    ANTI-MPO ANTIBODIES <0.2 0.0 - 0.9 units    ANTI-PR3 ANTIBODIES <0.2 0.0 - 0.9 units    C-ANCA <1:20 Neg:<1:20 titer    P-ANCA <1:20 Neg:<1:20 titer    Atypical pANCA <1:20 Neg:<1:20 titer   CBC Auto Differential    Collection Time: 03/17/25  2:40 PM    Specimen: Blood   Result Value Ref Range    WBC 8.19 3.40 - 10.80 10*3/mm3    RBC 4.41 3.77 - 5.28 10*6/mm3    Hemoglobin 13.2 12.0 - 15.9 g/dL    Hematocrit 38.6 34.0 - 46.6 %    MCV 87.5 79.0 - 97.0 fL    MCH 29.9 26.6 - 33.0 pg    MCHC 34.2 31.5 - 35.7 g/dL    RDW 12.9 12.3 - 15.4 %    RDW-SD 41.6 37.0 - 54.0 fl    MPV 9.6 6.0 - 12.0 fL    Platelets 352 140 - 450 10*3/mm3    Neutrophil % 57.1 42.7 - 76.0 %    Lymphocyte % 32.4 19.6 - 45.3 %    Monocyte % 7.4 5.0 - 12.0 %    Eosinophil % 1.6 0.3 - 6.2 %    Basophil % 1.1 0.0 - 1.5 %    Immature Grans % 0.4 0.0 - 0.5 %    Neutrophils, Absolute 4.68 1.70 - 7.00 10*3/mm3    Lymphocytes, Absolute 2.65 0.70 - 3.10 10*3/mm3    Monocytes, Absolute 0.61 0.10 - 0.90 10*3/mm3    Eosinophils, Absolute 0.13 0.00 - 0.40 10*3/mm3    Basophils, Absolute 0.09 0.00 - 0.20 10*3/mm3    Immature Grans, Absolute 0.03 0.00 - 0.05 10*3/mm3    nRBC 0.0 0.0 - 0.2 /100 WBC   CK    Collection Time: 03/17/25  2:40 PM    Specimen: Blood   Result Value Ref Range    Creatine Kinase 47 20 - 180 U/L   Comprehensive Metabolic Panel    Collection Time: 03/17/25  2:40 PM    Specimen: Blood   Result Value Ref Range    Glucose 109 (H) 65 - 99 mg/dL    BUN 13 6 - 20 mg/dL    Creatinine 1.15 (H) 0.57 - 1.00 mg/dL    Sodium 139 136 - 145 mmol/L    Potassium 3.9 3.5 - 5.2 mmol/L    Chloride 105 98 - 107 mmol/L    CO2 24.1 22.0 - 29.0 mmol/L    Calcium 9.1 8.6 - 10.5 mg/dL    Total Protein 6.7 6.0 - 8.5 g/dL    Albumin 3.8 3.5 - 5.2 g/dL    ALT (SGPT) 10 1 - 33 U/L    AST (SGOT) 16 1 - 32 U/L    Alkaline Phosphatase 65 39 -  117 U/L    Total Bilirubin <0.2 0.0 - 1.2 mg/dL    Globulin 2.9 gm/dL    A/G Ratio 1.3 g/dL    BUN/Creatinine Ratio 11.3 7.0 - 25.0    Anion Gap 9.9 5.0 - 15.0 mmol/L    eGFR 62.7 >60.0 mL/min/1.73   C-reactive Protein    Collection Time: 03/17/25  2:40 PM    Specimen: Blood   Result Value Ref Range    C-Reactive Protein <0.30 0.00 - 0.50 mg/dL   HLA-B27 Antigen    Collection Time: 03/17/25  2:40 PM    Specimen: Blood   Result Value Ref Range    HLA B27 Negative    Lupus Anticoag / Cardiolipin Ab    Collection Time: 03/17/25  2:40 PM    Specimen: Blood   Result Value Ref Range    Protime 11.4 sec    INR 1.0 ratio    aPTT 27.8 sec    APTT 1:1 NP TNP sec    APTT 1:1 Saline TNP sec    Thrombin Time 19.8 sec    DRVVT Screen Seconds 39.6 sec    DRVVT Confirm Seconds TNP sec    DRVVT/Confirm Ratio TNP ratio    Hex Phosph Neut Test 4 sec    Anticardiolipin IgG <10 GPL    Anticardiolipin IgM <10 MPL    Beta-2 Glyco 1 IgG 13 SGU    Beta-2 Glyco 1 IgM <10 SMU    Beta-2 Glyco 1 IgA <10 RODOLFO    LAC Interpretation Comment    RF Isotypes, IgG, IgA, IgM EIA    Collection Time: 03/17/25  2:40 PM    Specimen: Blood   Result Value Ref Range    RF, IGG BY EIA (RDL) <7 <7 U    RF, IGA BY EIA (RDL) <7 <7 U    RF, IGM BY EIA (RDL) <7 <7 U   Sedimentation Rate    Collection Time: 03/17/25  2:40 PM    Specimen: Blood   Result Value Ref Range    Sed Rate 8 0 - 20 mm/hr   Thyroid Antibodies    Collection Time: 03/17/25  2:40 PM    Specimen: Blood   Result Value Ref Range    Thyroid Peroxidase Antibody 248 (H) 0 - 34 IU/mL    Thyroglobulin Ab 3.8 (H) 0.0 - 0.9 IU/mL   TSH+Free T4    Collection Time: 03/17/25  2:40 PM    Specimen: Blood   Result Value Ref Range    TSH 0.128 (L) 0.270 - 4.200 uIU/mL    Free T4 1.86 (H) 0.92 - 1.68 ng/dL   Urinalysis With Culture If Indicated - Urine, Clean Catch    Collection Time: 03/17/25  2:40 PM    Specimen: Urine, Clean Catch   Result Value Ref Range    Color, UA Yellow Yellow, Straw    Appearance, UA Clear  Clear    pH, UA 6.0 5.0 - 8.0    Specific Gravity, UA 1.027 1.005 - 1.030    Glucose, UA Negative Negative    Ketones, UA Trace (A) Negative    Bilirubin, UA Negative Negative    Blood, UA Negative Negative    Protein, UA Negative Negative    Leuk Esterase, UA Negative Negative    Nitrite, UA Negative Negative    Urobilinogen, UA 1.0 E.U./dL 0.2 - 1.0 E.U./dL   Aldolase    Collection Time: 03/17/25  2:40 PM    Specimen: Blood   Result Value Ref Range    Aldolase 3.3 3.3 - 10.3 U/L   Willis Urine Culture Tube - Urine, Clean Catch    Collection Time: 03/17/25  2:40 PM    Specimen: Urine, Clean Catch   Result Value Ref Range    Extra Tube Hold for add-ons.    MARY 12 Plus Profile, Positive    Collection Time: 03/17/25  2:40 PM    Specimen: Blood   Result Value Ref Range    Homogeneous Pattern 1:80 (H) <1:40    Note: Comment     ANTI-CENTROMERE AB (RDL) <1:40 <1:40    ANTI-DSDNA AB BY MEREDITH <8.0 <8.0 IU/mL    ANTI-SM AB (RDL) <20 <20 Units    Anti-U1 RNP Ab  <20 <20 Units    ANTI-RO (SS-A) <20 <20 Units    ANTI-LA (SS-B) <20 <20 Units    ANTI-SCL-70 AB (RDL) <20 <20 Units    Anti-Cardiolipin Ab, IgG (RDL) <15 <15 GPL U/mL    Anti-Cardiolipin Ab, IgA (RDL)^ <12 <12 APL U/mL    Anti-Cardiolipin Ab, IgM (RDL) <13 <13 MPL U/mL    C3 Complement 132 90 - 180 mg/dL    C4 Complement 28 10 - 40 mg/dL    Anti-TPO Ab (RDL) 192.1 (H) <9.0 IU/mL    Anti-Chromatin Ab, IgG <20 <20 Units    Anti-CCP Ab, IgG & IgA <20 <20 Units    Rheumatoid Factor by Turb RDL <14 <14 IU/mL    MARY Plus 12 Interpretation Comment        Imaging:   No valid procedures specified.         Assessment / Plan      Assessment/Plan:   Diagnoses and all orders for this visit:    1. Anxiety (Primary)  -     Vortioxetine HBr (Trintellix) 5 MG tablet tablet; Take 1 tablet by mouth Daily With Breakfast.  Dispense: 30 tablet; Refill: 3  -     Ambulatory Referral to Psychiatry               Vaccine Counseling:      Follow Up:   Return in about 4 weeks (around 5/2/2025)  for Anxiety, depression.        Daniel Metz, DO  [facility]

## 2025-05-01 ENCOUNTER — OFFICE VISIT (OUTPATIENT)
Dept: OBSTETRICS AND GYNECOLOGY | Facility: CLINIC | Age: 40
End: 2025-05-01
Payer: COMMERCIAL

## 2025-05-01 VITALS
WEIGHT: 178.4 LBS | BODY MASS INDEX: 31.61 KG/M2 | SYSTOLIC BLOOD PRESSURE: 120 MMHG | HEIGHT: 63 IN | DIASTOLIC BLOOD PRESSURE: 70 MMHG

## 2025-05-01 DIAGNOSIS — Z11.3 SCREEN FOR STD (SEXUALLY TRANSMITTED DISEASE): ICD-10-CM

## 2025-05-01 DIAGNOSIS — Z97.5 IUD (INTRAUTERINE DEVICE) IN PLACE: ICD-10-CM

## 2025-05-01 DIAGNOSIS — F32.81 PMDD (PREMENSTRUAL DYSPHORIC DISORDER): ICD-10-CM

## 2025-05-01 DIAGNOSIS — Z01.419 WELL WOMAN EXAM WITH ROUTINE GYNECOLOGICAL EXAM: Primary | ICD-10-CM

## 2025-05-01 NOTE — PROGRESS NOTES
Subjective   Chief Complaint   Patient presents with    Gynecologic Exam     [facility] woman exam       History of Present Illness       presenting to be seen for her annual exam. She is interested in starting medications for PMDD.  She states prior to her menstrual cycle she will have intense lows with tearfulness and anxiety that resolves within a day of starting her menstrual cycle.  Is interested in nonhormonal options and if these are not effective considering hormonal options for the symptoms.  She is involved with a therapist who is going to be consulted by patient on the symptoms as well.  She and her  are currently .  They are doing well, living under same roof and going to couples counseling.  They are unsure if they will continue as a couple or her better office friends.  Family history of breast cancer in paternal aunt diagnosed in her late 40s or early 50s.  SEXUAL Hx:  She is currently sexually active.  In the past year there there has been ONE new sexual partner.    Condoms are always used.  She would like to be screened for STD's at today's exam.  Current birth control method: IUD - Paraguard.  She is happy with her current method of contraception and does not want to discuss alternative methods of contraception.  MENSTRUAL Hx:  Patient's last menstrual period was 2025 (exact date).  In the past 6 months her cycles have been regular, predictable and occur monthly.  Her menstrual flow is typically normal.   Each month on average there are roughly 0 day(s) of very heavy flow.    Intermenstrual bleeding is absent.    Post-coital bleeding is absent.  Dysmenorrhea: is not affecting her activities of daily living  PMS: is not affecting her activities of daily living  Her cycles are not a source of concern for her that she wishes to discuss today.  HEALTH Hx:  She exercises regularly: yes.  She wears her seat belt: yes.  She has concerns about domestic violence: no.  OTHER THINGS  "SHE WANTS TO DISCUSS TODAY:  Nothing else    The following portions of the patient's history were reviewed and updated as appropriate:problem list, current medications, allergies, past family history, past medical history, past social history, and past surgical history.    Social History    Tobacco Use      Smoking status: Former        Packs/day: 0.00        Years: 1 pack/day for 11.3 years (11.3 ttl pk-yrs)        Types: Cigarettes        Start date: 2005        Quit date: 10/6/2016        Years since quittin.5        Passive exposure: Past      Smokeless tobacco: Never    Review of Systems  Constitutional POS: nothing reported    NEG: anorexia or night sweats   Genitourinary POS: nothing reported    NEG: dysuria or hematuria      Gastointestinal POS: nothing reported    NEG: bloating, change in bowel habits, melena, or reflux symptoms   Integument POS: nothing reported    NEG: moles that are changing in size, shape, color or rashes   Breast POS:  fibrocystic areas    NEG: persistent breast lump, skin dimpling, or nipple discharge        Objective   /70 (BP Location: Left arm, Patient Position: Sitting, Cuff Size: Adult)   Ht 160 cm (63\")   Wt 80.9 kg (178 lb 6.4 oz)   LMP 2025 (Exact Date)   BMI 31.60 kg/m²     General:  well developed; well nourished  no acute distress  mentation appropriate   Skin:  No suspicious lesions seen   Thyroid: normal to inspection and palpation   Breasts:  Examined in supine position  Symmetric without masses or skin dimpling  Nipples normal without inversion, lesions or discharge  There are no palpable axillary nodes   Abdomen: soft, non-tender; no masses  no umbilical or inguinal hernias are present  no hepato-splenomegaly   Pelvis: Clinical staff was present for exam  :  urethral meatus normal; urethra normal:  Vaginal:  normal pink mucosa without prolapse or lesions.  Cervix:  normal appearance. IUD string present - 3 cms in length;  Uterus:  normal size, " shape and consistency.  Adnexa:  normal bimanual exam of the adnexa.  Rectal:  digital rectal exam not performed; anus visually normal appearing.     Physical Exam         Assessment     Assessment & Plan      Well woman with routine gynecological exam  Paragard iud in place  Pmdd  Std screening      Plan     Pap and STD testing was done today.  If she does not receive the results of the Pap within 2 weeks  time, she was instructed to call to find out the results.  I explained to Angelika that the recommendations for Pap smear interval in a low risk patient's has lengthened to 3 years time.  I encouraged her to be seen yearly for a full physical exam including breast and pelvic exam even during the off years when PAP's will not be performed.  Start zoloft  50mg for cyclic use  starting a week leading up to cycle then stop 2-3 days after menses starts. If this is helpful continue each month for PMDD symptoms. If some improvement but not fully resolved, will increase to 100mg. Consider continuous use ocps if zoloft not effective.  Angelika will send [email or web address] message to provider the next 2 months to update on symptoms.  The importance of keeping all planned follow-up and taking all medications as prescribed was emphasized.  Today I discussed with Angelika the total recommended calcium intake for a premenopausal female is 1000 mg.  Ideally this should be from dietary sources.  I reviewed calcium content in various foods including milk, fortified orange juice and yogurt.  If she cannot get sufficient calcium through dietary means, it is recommended to supplement with either a multivitamin or calcium to reach her daily goal.  I also reviewed the difference in the bioavailability of calcium carbonate and calcium citrate containing supplements and the importance of taking calcium carbonate containing products with food.  Finally, vitamin D's role in calcium absorption was reviewed and a total daily vitamin D intake of 800  units was recommended.  Lab order sent for sti bloodwork   Follow up for annual exam 1 year    New Medications Ordered This Visit   Medications    sertraline (Zoloft) 50 MG tablet     Sig: Take 1 tablet daily for 10 days starting mid cycle and continuing for 3 days after start of menses.     Dispense:  30 tablet     Refill:  3              This note was electronically signed.    Samanta Lockett, APRN  May 1, 2025

## 2025-05-02 PROBLEM — Z01.419 NORMAL GYNECOLOGIC EXAMINATION: Status: ACTIVE | Noted: 2025-05-02

## 2025-05-02 LAB — REF LAB TEST METHOD: NORMAL

## 2025-05-05 ENCOUNTER — OFFICE VISIT (OUTPATIENT)
Dept: CARDIOLOGY | Facility: HOSPITAL | Age: 40
End: 2025-05-05
Payer: COMMERCIAL

## 2025-05-05 VITALS
SYSTOLIC BLOOD PRESSURE: 142 MMHG | RESPIRATION RATE: 18 BRPM | HEART RATE: 48 BPM | WEIGHT: 185 LBS | OXYGEN SATURATION: 99 % | DIASTOLIC BLOOD PRESSURE: 76 MMHG | HEIGHT: 63 IN | BODY MASS INDEX: 32.78 KG/M2

## 2025-05-05 DIAGNOSIS — E06.3 HYPOTHYROIDISM DUE TO HASHIMOTO'S THYROIDITIS: ICD-10-CM

## 2025-05-05 DIAGNOSIS — I49.3 PVC'S (PREMATURE VENTRICULAR CONTRACTIONS): Primary | ICD-10-CM

## 2025-05-05 PROCEDURE — 99214 OFFICE O/P EST MOD 30 MIN: CPT | Performed by: NURSE PRACTITIONER

## 2025-05-05 RX ORDER — METOPROLOL SUCCINATE 25 MG/1
25 TABLET, EXTENDED RELEASE ORAL NIGHTLY
Qty: 90 TABLET | Refills: 1 | Status: SHIPPED | OUTPATIENT
Start: 2025-05-05

## 2025-05-05 NOTE — PROGRESS NOTES
"Chief Complaint  Palpitations (PVCs)    Subjective      History of Present Illness {CC  Problem List  Visit  Diagnosis   Encounters  Notes  Medications  Labs  Result Review Imaging  Media :23}     Angelika Vick, 39 y.o. female with PVC, hypothyroidism presents to [facility] for Palpitations (PVCs).    Prior MCOT December 2024 with MCOT with no evidence of atrial fibrillation, no significant arrhythmia. PAC/PVC burden less than 1%. Patient triggered events predominately correlate with PVCs.     Palpitations improved with increased Toprol-XL. Recent abnormal thyroid panel and antibodies that potentially contribute to symptomatic PVCs. Interested in decreasing Toprol-XL. Remains active with aerial gymnastics.       Objective     Vital Signs:   Vitals:    05/05/25 0852   BP: 142/76   BP Location: Left arm   Patient Position: Sitting   Cuff Size: Adult   Pulse: (!) 48   Resp: 18   SpO2: 99%   Weight: 83.9 kg (185 lb)   Height: 160 cm (63\")     Body mass index is 32.77 kg/m².  Physical Exam  Vitals and nursing note reviewed.   Constitutional:       Appearance: Normal appearance.   HENT:      Head: Normocephalic.   Eyes:      Extraocular Movements: Extraocular movements intact.   Neck:      Vascular: No carotid bruit.   Cardiovascular:      Rate and Rhythm: Normal rate and regular rhythm.      Pulses: Normal pulses.      Heart sounds: Normal heart sounds, S1 normal and S2 normal. No murmur heard.  Pulmonary:      Effort: Pulmonary effort is normal. No respiratory distress.      Breath sounds: Normal breath sounds.   Musculoskeletal:      Cervical back: Neck supple.      Right lower leg: No edema.      Left lower leg: No edema.   Skin:     General: Skin is warm and dry.   Neurological:      General: No focal deficit present.      Mental Status: She is alert.   Psychiatric:         Mood and Affect: Mood normal.         Behavior: Behavior normal.        "  Thought Content: Thought content normal.        Data Reviewed:{ Labs  Result Review  Imaging  Med Tab  Media :23}     Cardiac Event Monitor (RITO) or Mobile Cardiac Outpatient Telemetry (MCT) (12/03/2024 09:07)  Adult Transthoracic Echo Complete W/ Cont if Necessary Per Protocol (10/17/2024 09:46)  Cardiac Event Monitor (RITO) or Mobile Cardiac Outpatient Telemetry (MCT) (12/03/2024 09:07)  ECG 12 Lead Tachycardia (12/06/2024 22:00)   TSH+Free T4 (03/17/2025 14:40)  Thyroid Antibodies (03/17/2025 14:40)  Sedimentation Rate (03/17/2025 14:40)  Comprehensive Metabolic Panel (03/17/2025 14:40)  CBC Auto Differential (03/17/2025 14:40)      Assessment & Plan   Assessment and Plan {CC Problem List  Visit Diagnosis  ROS  Review (Popup)  [facility] Maintenance  Quality  BestPractice  Medications  SmartSets  SnapShot Encounters  Media :23}     1. Palpitation/PVCs (premature ventricular contractions)  - Recently with improved palpitation symptoms while maintained on Toprol-XL 50 Mg nightly.  -Recent abnormal thyroid studies with levothyroxine dose adjustment; potentially contributing to prior symptomatic PVCs.  - Prior MCOT with no evidence of atrial fibrillation, no significant arrhythmia. Low burden PAC/PVC.  -Interested in down titration of Toprol-XL given symptoms improvement.  -Discussed decreasing Toprol-XL to 25 Mg nightly, continue monitoring of symptoms.  Long-term plan of potentially changing to as needed metoprolol to tartrate.  -Follow-up in approximately 2 months via telemedicine for symptom check, potential transition to as needed metoprolol     2. Hypothyroidism due to Hashimoto's thyroiditis  -Recent abnormal TSH/thyroid studies  -Continue levothyroxine and routine monitoring per PCP      Follow Up {Instructions Charge Capture  Follow-up Communications :23}     Return in about 2 months (around 7/5/2025) for Video visit, Recheck.      Patient was given instructions and counseling regarding her  condition or for health maintenance advice. Please see specific information pulled into the AVS if appropriate. Patient was instructed to call the [facility] with any questions, concerns, or worsening symptoms.    Dictated Utilizing Dragon Dictation   Please note that portions of this note were completed with a voice recognition program. Part of this note may be an electronic transcription/translation of spoken language to printed text using the Dragon Dictation System.

## 2025-06-12 NOTE — TELEPHONE ENCOUNTER
Rx Refill Note  Requested Prescriptions     Pending Prescriptions Disp Refills    levothyroxine (SYNTHROID, LEVOTHROID) 125 MCG tablet [Pharmacy Med Name: LEVOTHYROXINE 125 MCG TABLET] 30 tablet 0     Sig: TAKE 1 TABLET BY MOUTH EVERY DAY IN THE MORNING      Last office visit with prescribing clinician: 7/23/2024     Next office visit with prescribing clinician: 7/1/2025     Jemma Pierre MA  06/12/25, 08:35 EDT

## 2025-06-13 RX ORDER — LEVOTHYROXINE SODIUM 125 UG/1
125 TABLET ORAL EVERY MORNING
Qty: 30 TABLET | Refills: 1 | Status: SHIPPED | OUTPATIENT
Start: 2025-06-13

## 2025-06-17 ENCOUNTER — TELEPHONE (OUTPATIENT)
Dept: CARDIOLOGY | Facility: HOSPITAL | Age: 40
End: 2025-06-17
Payer: COMMERCIAL

## 2025-06-17 NOTE — TELEPHONE ENCOUNTER
Pt called and left message asking if she can increase her metoprolol- she is having an increase in PAC/PVC and is symptomatic.  Pt currently on metoprolol 25mg qHS     Please advise

## 2025-06-18 ENCOUNTER — LAB (OUTPATIENT)
Dept: LAB | Facility: HOSPITAL | Age: 40
End: 2025-06-18
Payer: COMMERCIAL

## 2025-06-18 DIAGNOSIS — E06.3 HYPOTHYROIDISM DUE TO HASHIMOTO'S THYROIDITIS: ICD-10-CM

## 2025-06-18 DIAGNOSIS — Z11.3 SCREEN FOR STD (SEXUALLY TRANSMITTED DISEASE): ICD-10-CM

## 2025-06-18 LAB
HBV SURFACE AG SERPL QL IA: NORMAL
HCV AB SER QL: NORMAL
HIV 1+2 AB+HIV1 P24 AG SERPL QL IA: NORMAL
T4 FREE SERPL-MCNC: 1.63 NG/DL (ref 0.92–1.68)
TSH SERPL DL<=0.05 MIU/L-ACNC: 1.3 UIU/ML (ref 0.27–4.2)

## 2025-06-18 PROCEDURE — 84439 ASSAY OF FREE THYROXINE: CPT

## 2025-06-18 PROCEDURE — G0432 EIA HIV-1/HIV-2 SCREEN: HCPCS

## 2025-06-18 PROCEDURE — 86803 HEPATITIS C AB TEST: CPT

## 2025-06-18 PROCEDURE — 87340 HEPATITIS B SURFACE AG IA: CPT

## 2025-06-18 PROCEDURE — 86592 SYPHILIS TEST NON-TREP QUAL: CPT

## 2025-06-18 PROCEDURE — 84443 ASSAY THYROID STIM HORMONE: CPT

## 2025-06-19 LAB — RPR SER QL: NORMAL

## 2025-06-19 RX ORDER — LEVOTHYROXINE SODIUM 125 UG/1
125 TABLET ORAL EVERY MORNING
Qty: 90 TABLET | Refills: 1 | Status: SHIPPED | OUTPATIENT
Start: 2025-06-19

## 2025-06-26 ENCOUNTER — OFFICE VISIT (OUTPATIENT)
Age: 40
End: 2025-06-26
Payer: COMMERCIAL

## 2025-06-26 VITALS
HEIGHT: 63 IN | BODY MASS INDEX: 31.54 KG/M2 | DIASTOLIC BLOOD PRESSURE: 76 MMHG | WEIGHT: 178 LBS | OXYGEN SATURATION: 98 % | TEMPERATURE: 98.6 F | SYSTOLIC BLOOD PRESSURE: 130 MMHG | HEART RATE: 64 BPM

## 2025-06-26 DIAGNOSIS — M25.512 ACUTE PAIN OF LEFT SHOULDER: Primary | ICD-10-CM

## 2025-06-26 PROCEDURE — 99213 OFFICE O/P EST LOW 20 MIN: CPT | Performed by: FAMILY MEDICINE

## 2025-06-26 RX ORDER — IBUPROFEN 800 MG/1
800 TABLET, FILM COATED ORAL EVERY 8 HOURS PRN
Qty: 20 TABLET | Refills: 0 | Status: SHIPPED | OUTPATIENT
Start: 2025-06-26

## 2025-06-26 RX ORDER — HYDROXYZINE PAMOATE 25 MG/1
25 CAPSULE ORAL EVERY 6 HOURS PRN
COMMUNITY
Start: 2025-06-18 | End: 2025-07-18

## 2025-06-26 RX ORDER — CYCLOBENZAPRINE HCL 5 MG
5 TABLET ORAL 3 TIMES DAILY PRN
Qty: 12 TABLET | Refills: 0 | Status: SHIPPED | OUTPATIENT
Start: 2025-06-26

## 2025-06-26 RX ORDER — PREDNISONE 20 MG/1
TABLET ORAL
Qty: 15 TABLET | Refills: 0 | Status: SHIPPED | OUTPATIENT
Start: 2025-06-26 | End: 2025-07-08

## 2025-06-26 NOTE — PROGRESS NOTES
"    Follow Up Office Visit      Date: 2025   Patient Name: Angelika Vick  : 1985   MRN: [id number]     Chief Complaint:    Chief Complaint   Patient presents with    Shoulder Pain     Neck and arm pain for about a week       History of Present Illness: Angelika Vick is a 39 y.o. female who is here today for left shoulder pain.  States that she \"hurt\" her shoulder.  Saw PT yesterday.  Today woke up with shoulder hurting worse.  States that she was using a ball to massage it.  After she stopped that she heard a \"pop\" and the pain got much worse.  Pain goes from base of skull all the way to left elbow.    Subjective      Review of Systems:   Review of Systems   Constitutional:  Negative for appetite change and unexpected weight loss.   HENT:  Negative for trouble swallowing.    Eyes:  Negative for blurred vision and double vision.   Respiratory:  Negative for cough and shortness of breath.    Cardiovascular:  Negative for chest pain and leg swelling.   Gastrointestinal:  Negative for blood in stool.   Endocrine: Negative for cold intolerance, heat intolerance and polyuria.   Musculoskeletal:  Negative for joint swelling.   Skin:  Negative for color change and bruise.   Neurological:  Negative for numbness and memory problem.   Hematological:  Does not bruise/bleed easily.   Psychiatric/Behavioral:  Negative for suicidal ideas and depressed mood. The patient is not nervous/anxious.        I have reviewed the patients family history, social history, past medical history, past surgical history and have updated it as appropriate.     Medications:     Current Outpatient Medications:     hydrOXYzine pamoate (VISTARIL) 25 MG capsule, Take 1 capsule by mouth Every 6 (Six) Hours As Needed., Disp: , Rfl:     levothyroxine (SYNTHROID, LEVOTHROID) 125 MCG tablet, Take 1 tablet by mouth Every Morning., Disp: 90 tablet, Rfl: 1    metoprolol succinate XL (TOPROL-XL) 25 MG 24 hr tablet, Take 1 tablet by mouth " "Every Night., Disp: 90 tablet, Rfl: 1    cyclobenzaprine (FLEXERIL) 5 MG tablet, Take 1 tablet by mouth 3 (Three) Times a Day As Needed for Muscle Spasms., Disp: 12 tablet, Rfl: 0    ibuprofen (ADVIL,MOTRIN) 800 MG tablet, Take 1 tablet by mouth Every 8 (Eight) Hours As Needed for Mild Pain., Disp: 20 tablet, Rfl: 0    predniSONE (DELTASONE) 20 MG tablet, Take 2 tablets by mouth Daily for 3 days, THEN 1.5 tablets Daily for 3 days, THEN 1 tablet Daily for 3 days, THEN 0.5 tablets Daily for 3 days., Disp: 15 tablet, Rfl: 0    sertraline (Zoloft) 50 MG tablet, Take 1 tablet daily for 10 days starting mid cycle and continuing for 3 days after start of menses. (Patient not taking: Reported on 6/26/2025), Disp: 30 tablet, Rfl: 3    Allergies:   Allergies   Allergen Reactions    Dermagel Hand  [Alcohol] Rash       Objective     Physical Exam: Please see above  Vital Signs:   Vitals:    06/26/25 1250   BP: 130/76   Pulse: 64   Temp: 98.6 °F (37 °C)   SpO2: 98%   Weight: 80.7 kg (178 lb)   Height: 160 cm (63\")     Body mass index is 31.53 kg/m².    Physical Exam  Vitals and nursing note reviewed.   Constitutional:       Appearance: Normal appearance.   HENT:      Head: Normocephalic and atraumatic.   Eyes:      General: Lids are normal.      Conjunctiva/sclera: Conjunctivae normal.   Cardiovascular:      Rate and Rhythm: Normal rate and regular rhythm.   Pulmonary:      Effort: Pulmonary effort is normal.      Breath sounds: Normal breath sounds and air entry.   Abdominal:      General: Abdomen is flat. Bowel sounds are normal.      Palpations: Abdomen is soft.   Musculoskeletal:      Cervical back: Full passive range of motion without pain and normal range of motion.   Neurological:      General: No focal deficit present.      Mental Status: She is alert and oriented to person, place, and time.   Psychiatric:         Attention and Perception: Attention normal.         Mood and Affect: Mood normal.         Behavior: " Behavior normal. Behavior is cooperative.         Procedures    Results:   Labs:   Hemoglobin A1C   Date Value Ref Range Status   06/10/2022 5.00 4.80 - 5.60 % Final     TSH   Date Value Ref Range Status   06/18/2025 1.300 0.270 - 4.200 uIU/mL Final        POCT Results (if applicable):   Results for orders placed or performed in visit on 06/18/25   Hepatitis B Surface Antigen    Collection Time: 06/18/25 10:02 AM    Specimen: Blood   Result Value Ref Range    Hepatitis B Surface Ag Non-Reactive Non-Reactive   TSH    Collection Time: 06/18/25 10:02 AM    Specimen: Blood   Result Value Ref Range    TSH 1.300 0.270 - 4.200 uIU/mL   T4, Free    Collection Time: 06/18/25 10:02 AM    Specimen: Blood   Result Value Ref Range    Free T4 1.63 0.92 - 1.68 ng/dL   HIV-1 / O / 2 Ag / Antibody    Collection Time: 06/18/25 10:02 AM    Specimen: Blood   Result Value Ref Range    HIV DUO Non-Reactive Non-Reactive   RPR Qualitative with Reflex to Quant    Collection Time: 06/18/25 10:02 AM    Specimen: Blood   Result Value Ref Range    RPR Non-Reactive Non-Reactive   Hepatitis C Antibody    Collection Time: 06/18/25 10:02 AM    Specimen: Blood   Result Value Ref Range    Hepatitis C Ab Non-Reactive Non-Reactive       Imaging:   No valid procedures specified.            Assessment / Plan      Assessment/Plan:   Diagnoses and all orders for this visit:    1. Acute pain of left shoulder (Primary)  -     XR Shoulder 2+ View Left; Future  -     cyclobenzaprine (FLEXERIL) 5 MG tablet; Take 1 tablet by mouth 3 (Three) Times a Day As Needed for Muscle Spasms.  Dispense: 12 tablet; Refill: 0  -     predniSONE (DELTASONE) 20 MG tablet; Take 2 tablets by mouth Daily for 3 days, THEN 1.5 tablets Daily for 3 days, THEN 1 tablet Daily for 3 days, THEN 0.5 tablets Daily for 3 days.  Dispense: 15 tablet; Refill: 0  -     ibuprofen (ADVIL,MOTRIN) 800 MG tablet; Take 1 tablet by mouth Every 8 (Eight) Hours As Needed for Mild Pain.  Dispense: 20  tablet; Refill: 0               Vaccine Counseling:      Follow Up:   No follow-ups on file.        Daniel Metz DO  [facility] [address]

## 2025-07-01 ENCOUNTER — OFFICE VISIT (OUTPATIENT)
Age: 40
End: 2025-07-01
Payer: COMMERCIAL

## 2025-07-01 VITALS
DIASTOLIC BLOOD PRESSURE: 66 MMHG | OXYGEN SATURATION: 96 % | HEIGHT: 63 IN | WEIGHT: 183 LBS | SYSTOLIC BLOOD PRESSURE: 110 MMHG | HEART RATE: 74 BPM | BODY MASS INDEX: 32.43 KG/M2

## 2025-07-01 DIAGNOSIS — R63.5 WEIGHT GAIN: ICD-10-CM

## 2025-07-01 DIAGNOSIS — E06.3 HYPOTHYROIDISM DUE TO HASHIMOTO'S THYROIDITIS: Primary | ICD-10-CM

## 2025-07-01 DIAGNOSIS — M25.512 ACUTE PAIN OF LEFT SHOULDER: ICD-10-CM

## 2025-07-01 LAB
EXPIRATION DATE: NORMAL
EXPIRATION DATE: NORMAL
GLUCOSE BLDC GLUCOMTR-MCNC: 104 MG/DL (ref 70–130)
HBA1C MFR BLD: 5.4 % (ref 4.5–5.7)
Lab: NORMAL
Lab: NORMAL

## 2025-07-01 RX ORDER — CYCLOBENZAPRINE HCL 5 MG
5 TABLET ORAL 3 TIMES DAILY PRN
Qty: 12 TABLET | Refills: 0 | Status: SHIPPED | OUTPATIENT
Start: 2025-07-01

## 2025-07-01 RX ORDER — LEVOTHYROXINE SODIUM 125 UG/1
125 TABLET ORAL EVERY MORNING
Qty: 90 TABLET | Refills: 3 | Status: SHIPPED | OUTPATIENT
Start: 2025-07-01

## 2025-07-01 NOTE — ASSESSMENT & PLAN NOTE
Unlikely thyroid given normal hormone levels.  Possible steroid use contributor  A1c and BG normal in office today.   Consider perimenopause, lifestyle, YUNIOR, among others.  Consider additional cortisol evaluation if progression overt time.   Encouraged portion control/counting calories, continue regular physical activity/ strength exercise.  Consider additional weight management/nutritionist, medication.

## 2025-07-01 NOTE — PROGRESS NOTES
"Chief Complaint   Patient presents with    Hypothyroidism due to Hashimoto's thyroiditis     Follow up  Reduced meds a few weeks ago- gaining weight/hunger, but feels okay      HPI  Angelika Vick is a 39 y.o. female presents today for evaluation of hypothyroidism.    Admits weight gain recently; started steroids for shoulder pain/tendonitis.   Admits continued difficulty sleeping; frequent wakening at night.     Otherwise feeling well on current dose. Palpitations improved.   Denies heat/cold intolerance, anxiety/depression, chest pain, abdominal pain, constipation, edema, changes in hair/nails, numbness/tingling.     LMP: \"2 weeks ago\" cycles monthly  Birth control: has IUD  Hx irregular menses, acne, hair loss in past; has had improvement with changing in thyroid medications.     Diet: increase protein recently; mostly chicken/breast/thigh/red meat with vegetable, CHO. Drinks: water    Exercise: gym 3-4 times per week; 1-1.5 hr; low intensity right now     Medical history: Hypothyroidism, gestational diabetes     Hypothyroidism:   Diagnosed with Hashimoto's hypothyroidism and has been on thyroid medication since 2018.  Labs from 10/22/2018 thyroid antibody: >600.     Labs from   6/18/2025 TSH: 1.3, FT4: 1.63  3/17/2025 TSH: 0.12, FT4: 1.86, thyroglobulin antibody: 3.8, TPO antibody: 248 -levothyroxine decreased to 125 mcg  12/6/2024 TSH: 1.4  9/23/2024 TSH: 1.2, FT4: 1.49  5/5/2024 TSH: 6.560, FT4: 1.39  10/17/2023 TSH: 4.28 FT4: 1.25, total T3: 98 - increased levothyroxine 125 mcg and continue liothyronine 5 mg.  She reports worsening symptoms after continuing liothyronine and stopped this medication after about a month or so.     Reports since last visit had knee injury - taking Meloxicam daily since 3/2024 daily; has been less active during this time.      - Current regimen includes: levothyroxine 125 mcg daily.   Reports good compliance with levothyroxine and takes it at the same time every day, on an " empty stomach, with water.      - Denies thyroid imaging.   - Admits family history of thyroid disease: sister hypothyroidism  - Denies hx of radiation to head/neck.  - Denies taking biotin supplement.   - Denies high iodine diet.     The following portions of the patient's history were reviewed and updated as appropriate: allergies, current medications, past family history, past medical history, past social history, past surgical history and problem list.    Past Medical History:   Diagnosis Date    Acid reflux     ADHD (attention deficit hyperactivity disorder)     I have not been diagnosed but believe i have it    Allergic 2022    Anxiety     Bilateral ovarian cysts     Bulimia nervosa     Started in Middle School    Depression     GERD (gastroesophageal reflux disease)     Gestational diabetes     with 2nd pregnancy    Gestational hypertension     with last pregnancy    Hashimoto's thyroiditis 06/2018    Hypertension in pregnancy 2012    Hypothyroidism 06/2018    Migraines     Mixed hyperlipidemia 02/08/2019    Obsessive-compulsive disorder     Paralysis     Polycystic ovary syndrome 2010    Shortness of breath 2022    Substance abuse 2007    Not currently and havent since 2011    Vitamin D deficiency 06/11/2024     Past Surgical History:   Procedure Laterality Date    CYST REMOVAL      GANGLION CYST EXCISION Right       Family History   Problem Relation Age of Onset    Arthritis Mother     Hypertension Mother     Diabetes Mother     Alcohol abuse Father     Suicide Attempts Father     Thyroid disease Sister     Diabetes Sister     Diabetes Sister     Obesity Sister     Thyroid disease Sister     No Known Problems Brother     Diabetes Maternal Grandmother     Diabetes Maternal Grandfather     No Known Problems Paternal Grandmother     No Known Problems Paternal Grandfather     Drug abuse Brother       Social History     Socioeconomic History    Marital status:     Number of children: 3    Highest  education level: Some college, no degree   Tobacco Use    Smoking status: Former     Current packs/day: 0.00     Average packs/day: 1 pack/day for 11.3 years (11.3 ttl pk-yrs)     Types: Cigarettes     Start date: 2005     Quit date: 10/6/2016     Years since quittin.7     Passive exposure: Past    Smokeless tobacco: Never   Vaping Use    Vaping status: Never Used   Substance and Sexual Activity    Alcohol use: Yes     Alcohol/week: 1.0 - 2.0 standard drink of alcohol     Comment: Occasionally    Drug use: Yes     Types: Marijuana     Comment: Daily    Sexual activity: Yes     Partners: Male     Birth control/protection: I.U.D.     Comment: paragard      Allergies   Allergen Reactions    Dermagel Hand  [Alcohol] Rash      Current Outpatient Medications on File Prior to Visit   Medication Sig Dispense Refill    cyclobenzaprine (FLEXERIL) 5 MG tablet Take 1 tablet by mouth 3 (Three) Times a Day As Needed for Muscle Spasms. 12 tablet 0    hydrOXYzine pamoate (VISTARIL) 25 MG capsule Take 1 capsule by mouth Every 6 (Six) Hours As Needed.      ibuprofen (ADVIL,MOTRIN) 800 MG tablet Take 1 tablet by mouth Every 8 (Eight) Hours As Needed for Mild Pain. 20 tablet 0    metoprolol succinate XL (TOPROL-XL) 25 MG 24 hr tablet Take 1 tablet by mouth Every Night. 90 tablet 1    predniSONE (DELTASONE) 20 MG tablet Take 2 tablets by mouth Daily for 3 days, THEN 1.5 tablets Daily for 3 days, THEN 1 tablet Daily for 3 days, THEN 0.5 tablets Daily for 3 days. 15 tablet 0    [DISCONTINUED] levothyroxine (SYNTHROID, LEVOTHROID) 125 MCG tablet Take 1 tablet by mouth Every Morning. 90 tablet 1     No current facility-administered medications on file prior to visit.        Review of Systems   Constitutional:  Positive for unexpected weight gain. Negative for activity change, appetite change, fatigue and unexpected weight loss.   HENT:  Negative for trouble swallowing and voice change.    Respiratory:  Negative for  "shortness of breath.    Cardiovascular:  Negative for chest pain, palpitations and leg swelling.   Gastrointestinal:  Negative for constipation and diarrhea.   Endocrine: Negative for cold intolerance and heat intolerance.   Musculoskeletal:  Negative for myalgias.   Skin:  Negative for dry skin.   Neurological:  Negative for tremors and numbness.   Psychiatric/Behavioral:  Positive for sleep disturbance. Negative for depressed mood. The patient is not nervous/anxious.         /66 (BP Location: Right arm, Patient Position: Sitting, Cuff Size: Adult)   Pulse 74   Ht 160 cm (63\")   Wt 83 kg (183 lb)   SpO2 96%   BMI 32.42 kg/m²      Physical Exam  Constitutional:       Appearance: Normal appearance. She is obese.   Cardiovascular:      Rate and Rhythm: Normal rate.   Pulmonary:      Effort: Pulmonary effort is normal.   Musculoskeletal:         General: Normal range of motion.   Skin:     General: Skin is warm and dry.   Neurological:      General: No focal deficit present.      Mental Status: She is alert and oriented to person, place, and time.   Psychiatric:         Mood and Affect: Mood normal.         Behavior: Behavior normal.         LABS AND IMAGING    CMP  Lab Results   Component Value Date    GLUCOSE 109 (H) 03/17/2025    BUN 13 03/17/2025    CREATININE 1.15 (H) 03/17/2025    EGFR 62.7 03/17/2025    BCR 11.3 03/17/2025    K 3.9 03/17/2025    CO2 24.1 03/17/2025    CALCIUM 9.1 03/17/2025    ALBUMIN 3.8 03/17/2025    AST 16 03/17/2025    ALT 10 03/17/2025        CBC w/DIFF   Lab Results   Component Value Date    WBC 8.19 03/17/2025    RBC 4.41 03/17/2025    HGB 13.2 03/17/2025    HCT 38.6 03/17/2025    MCV 87.5 03/17/2025    MCH 29.9 03/17/2025    MCHC 34.2 03/17/2025    RDW 12.9 03/17/2025    RDWSD 41.6 03/17/2025    MPV 9.6 03/17/2025     03/17/2025    NEUTRORELPCT 57.1 03/17/2025    LYMPHORELPCT 32.4 03/17/2025    MONORELPCT 7.4 03/17/2025    EOSRELPCT 1.6 03/17/2025    BASORELPCT 1.1 " "03/17/2025    AUTOIGPER 0.4 03/17/2025    NEUTROABS 4.68 03/17/2025    LYMPHSABS 2.65 03/17/2025    MONOSABS 0.61 03/17/2025    EOSABS 0.13 03/17/2025    BASOSABS 0.09 03/17/2025    AUTOIGNUM 0.03 03/17/2025    NRBC 0.0 03/17/2025       TSH  Lab Results   Component Value Date    TSH 1.300 06/18/2025    TSH 0.128 (L) 03/17/2025    TSH 1.480 12/06/2024       T4  Lab Results   Component Value Date    FREET4 1.63 06/18/2025    FREET4 1.86 (H) 03/17/2025    FREET4 1.49 09/23/2024     Lab Results   Component Value Date    I6ZEIGD 5.00 10/17/2018       T3  No results found for: \"T3FREE\"  Lab Results   Component Value Date    H9APISM 101.0 07/23/2024       TRAb  No results found for: \"TSURCPAB\"    TPO  Lab Results   Component Value Date    THYROIDAB 248 (H) 03/17/2025       No valid procedures specified.    Assessment and Plan    Diagnoses and all orders for this visit:    1. Hypothyroidism due to Hashimoto's thyroiditis (Primary)  Assessment & Plan:  Clinically euthyroid  Recent TFT normal/ideal range  Rx: continue current dose levothyroxine 125 mcg for now.        Orders:  -     TSH; Future  -     T4, Free; Future    2. Weight gain  Assessment & Plan:  Unlikely thyroid given normal hormone levels.  Possible steroid use contributor  A1c and BG normal in office today.   Consider perimenopause, lifestyle, YUNIOR, among others.  Consider additional cortisol evaluation if progression overt time.   Encouraged portion control/counting calories, continue regular physical activity/ strength exercise.  Consider additional weight management/nutritionist, medication.    Orders:  -     POC Glycosylated Hemoglobin (Hb A1C)  -     POC Glucose, Blood    Other orders  -     levothyroxine (SYNTHROID, LEVOTHROID) 125 MCG tablet; Take 1 tablet by mouth Every Morning.  Dispense: 90 tablet; Refill: 3         Return in about 1 year (around 7/1/2026) for follow-up thyroid disease. The patient was instructed to contact the clinic with any interval " questions or concerns.    Electronically signed by: Edith Carbajal PA-C   Endocrinology    Please note that portions of this note were completed with a voice recognition program.

## 2025-07-01 NOTE — ASSESSMENT & PLAN NOTE
Clinically euthyroid  Recent TFT normal/ideal range  Rx: continue current dose levothyroxine 125 mcg for now.

## 2025-07-01 NOTE — TELEPHONE ENCOUNTER
Rx Refill Note  Requested Prescriptions     Pending Prescriptions Disp Refills    cyclobenzaprine (FLEXERIL) 5 MG tablet 12 tablet 0     Sig: Take 1 tablet by mouth 3 (Three) Times a Day As Needed for Muscle Spasms.      Last office visit with prescribing clinician: 6/26/2025   Last telemedicine visit with prescribing clinician: Visit date not found   Next office visit with prescribing clinician: 7/14/2025                         Would you like a call back once the refill request has been completed: [] Yes [] No    If the office needs to give you a call back, can they leave a voicemail: [] Yes [] No    Selina Pennington MA  07/01/25, 13:10 EDT

## 2025-07-11 ENCOUNTER — TELEMEDICINE (OUTPATIENT)
Dept: CARDIOLOGY | Facility: HOSPITAL | Age: 40
End: 2025-07-11
Payer: COMMERCIAL

## 2025-07-11 VITALS — HEART RATE: 82 BPM

## 2025-07-11 DIAGNOSIS — I49.3 PVC'S (PREMATURE VENTRICULAR CONTRACTIONS): ICD-10-CM

## 2025-07-11 DIAGNOSIS — E06.3 HYPOTHYROIDISM DUE TO HASHIMOTO'S THYROIDITIS: Primary | ICD-10-CM

## 2025-07-11 RX ORDER — METOPROLOL SUCCINATE 25 MG/1
37.5 TABLET, EXTENDED RELEASE ORAL NIGHTLY
Qty: 90 TABLET | Refills: 1 | Status: SHIPPED | OUTPATIENT
Start: 2025-07-11

## 2025-07-11 NOTE — PROGRESS NOTES
Mode of visit: Video  Location of patient:[facility]   Location of provider: [facility]  You have chosen to receive care through a telehealth visit.   Do you consent to the use video/audio connection for your medical care today?: Yes  This visit included audio and video interaction. No technical issues occurred during the visit.       Chief Complaint  Palpitations (Follow-up)    [facility]  Telemedicine note    This was a video enabled telemedicine encounter.    Subjective    History of Present Illness {CC  Problem List  Visit  Diagnosis   Encounters  Notes  Medications  Labs  Result Review Imaging  Media :23}     Angelika Vick, 39 y.o. female with PVCs, hypothyroidism, anxiety presents to [facility] telemedicine visit for Palpitations (Follow-up)    Prior MCOT December 2024 with MCOT with no evidence of atrial fibrillation, no significant arrhythmia. PAC/PVC burden less than 1%. Patient triggered events predominately correlate with PVCs.     Presents today feeling improvement in palpitations with increasing Toprol-XL. Toprol-XL currently at 1.5 tabs daily and helping with PVCs. She did have worsened anxiety, and working with behavioral health on anxiety medicine changes.       Objective     Vital Signs:   Vitals:    07/11/25 0840   Pulse: 82     There is no height or weight on file to calculate BMI.  Physical Exam  Vitals reviewed.   Constitutional:       Appearance: Normal appearance.   HENT:      Head: Normocephalic.   Pulmonary:      Effort: Pulmonary effort is normal. No respiratory distress.   Neurological:      Mental Status: She is alert.   Psychiatric:         Mood and Affect: Mood normal.         Behavior: Behavior normal.         Thought Content: Thought content normal.        Data Reviewed:{ Labs  Result Review  Imaging  Med Tab  Media :23}     Cardiac Event Monitor (RITO) or Mobile Cardiac Outpatient Telemetry (MCT)  (12/03/2024 09:07)  Adult Transthoracic Echo Complete W/ Cont if Necessary Per Protocol (10/17/2024 09:46)  Cardiac Event Monitor (RITO) or Mobile Cardiac Outpatient Telemetry (MCT) (12/03/2024 09:07)  ECG 12 Lead Tachycardia (12/06/2024 22:00)   TSH+Free T4 (03/17/2025 14:40)  Thyroid Antibodies (03/17/2025 14:40)  Sedimentation Rate (03/17/2025 14:40)  Comprehensive Metabolic Panel (03/17/2025 14:40)  CBC Auto Differential (03/17/2025 14:40)      Assessment and Plan {CC Problem List  Visit Diagnosis  ROS  Review (Popup)  Health Maintenance  Quality  BestPractice  Medications  SmartSets  SnapShot Encounters  Media :23}     This visit has been scheduled as a video visit.     1. Palpitation/PVCs (premature ventricular contractions)  - Recently with ipalpitations worsened with anxiety and Toprol-XL increased to 37.5mg nightly  -Palpitations improved with slight increase in Toprol-XL and working with behavioral health on medication adjustments  - Prior MCOT with no evidence of atrial fibrillation, no significant arrhythmia. Low burden PAC/PVC.  -Discussed reasonable to transition to propanolol from a cardiac standpoint if behavioral health advises.  -Follow-up in approximately 2 months via telemedicine for symptom check, potential transition to as needed metoprolol     2. Hypothyroidism due to Hashimoto's thyroiditis  -Improved TSH on June lab work  -Continue levothyroxine and routine monitoring per PCP      Follow Up {Instructions Charge Capture  Follow-up Communications :23}   Return in about 2 months (around 9/11/2025) for Video visit, Palpitations Recheck, medicine check.      Patient was given instructions and counseling regarding her condition or for health maintenance advice. Please see specific information pulled into the AVS if appropriate. Patient was instructed to call the [facility] with any questions, concerns, or worsening symptoms.    *Please note that portions of this note were  completed with a voice recognition program. Efforts were made to edit the dictations, but occasionally words are mistranscribed.

## 2025-07-16 NOTE — PROGRESS NOTES
Subjective   Chief Complaint   Patient presents with    Follow-up     Discuss hormone replacement, PMDD, increased anxiety        History of Present Illness  The patient presents for perimenopausal symptoms.    She reports experiencing hormonal fluctuations, which have been causing mood swings and irritability. These symptoms have been particularly severe during her menstrual cycle, leading to heightened anxiety. She sought medical attention at [facility] due to the intensity of these feelings. Several physicians suggested that she might be experiencing perimenopause and recommended hormone level testing to determine if hormone replacement therapy could be beneficial. She also experiences night sweats, although inconsistently, and has noticed an increase in sweating upon waking. Sleep disturbances are also a concern for her. She has not yet started Zoloft but is in discussions with a psychiatrist to develop a treatment plan. She is currently on metoprolol for her heart rate and PVCs but does not have any blood pressure issues. She has not used hormonal birth control since prior to the birth of her first child.She has paraguard iud in place. She takes Vistaril 50 mg, which helps her sleep, and occasionally uses melatonin if she does not fall asleep within an hour and a half. This regimen has not been effective for her. Denies and HI/SI. She has an appointment with her mental health provider next week. She also mentions that she subluxed four joints last month, including her knee, elbow, wrist, and shoulder, and is still recovering from the shoulder injury. She has EDS. She is wondering if part of her joint issues could be related to perimenopause as well.    GYNECOLOGICAL HISTORY:  - Gynecology History discussed    CONTRACEPTION:  - Type used: ParaGard IUD  - History: No hormonal birth control since before her first delivery in 2012  - Menopausal status: Perimenopausal    OBSTETRICAL HISTORY:  - Obstetrics History  discussed    .  Patient's last menstrual period was 2025 (exact date).     The following portions of the patient's history were reviewed and updated as appropriate:current medications and allergies    Social History    Tobacco Use      Smoking status: Former        Packs/day: 0.00        Years: 1 pack/day for 11.3 years (11.3 ttl pk-yrs)        Types: Cigarettes        Start date: 2005        Quit date: 10/6/2016        Years since quittin.7        Passive exposure: Past      Smokeless tobacco: Never         Objective   /70   Wt 80 kg (176 lb 6.4 oz)   LMP 2025 (Exact Date)   BMI 31.25 kg/m²     Lab Review       Imaging        Results         Assessment     Assessment & Plan  1. Perimenopausal symptoms.  - Symptoms of anxiety, irritability, and night sweats are consistent with perimenopause.   - Hormone levels will not be checked as treatment will be based on symptoms. A prescription for Junel , containing 1 mg of progesterone and 20 mcg of estrogen, has been provided.   - Advised to take the active hormonal pill daily, within a 2 to 3-hour window each day, and to avoid the placebo pills. If a dose is missed, take two pills the following day, one in the morning and one at night. It may take 4 to 6 weeks to notice significant improvement, and up to three months to see the full effect. Advised to continue using ParaGard as long as desired. If the continuous birth control is effective and she prefers to discontinue ParaGard, it can be removed without any adverse effects. If any worsening anxiety/depression symptoms notify provider. She will send my chart message to provider in a few weeks to up date on status and with mental health providers treatment plan     Follow-up: A follow-up visit is scheduled in 8 weeks.               New Medications Ordered This Visit   Medications    norethindrone-ethinyl estradiol FE (Junel FE ) 1-20 MG-MCG per tablet     Sig: Take 1 tablet by  mouth Daily. Take 1 tablet by mouth daily, skipping placebos     Dispense:  84 tablet     Refill:  4          This note was electronically signed.    Patient or patient representative verbalized consent for the use of Ambient Listening during the visit with  Samanta Lockett CNM for chart documentation. 7/17/2025  10:25 EDT    DELISA Freedman  July 17, 2025

## 2025-07-17 ENCOUNTER — OFFICE VISIT (OUTPATIENT)
Dept: OBSTETRICS AND GYNECOLOGY | Facility: CLINIC | Age: 40
End: 2025-07-17
Payer: COMMERCIAL

## 2025-07-17 VITALS — BODY MASS INDEX: 31.25 KG/M2 | SYSTOLIC BLOOD PRESSURE: 132 MMHG | DIASTOLIC BLOOD PRESSURE: 70 MMHG | WEIGHT: 176.4 LBS

## 2025-07-17 DIAGNOSIS — N95.1 PERIMENOPAUSAL VASOMOTOR SYMPTOMS: ICD-10-CM

## 2025-07-17 DIAGNOSIS — F32.81 PMDD (PREMENSTRUAL DYSPHORIC DISORDER): Primary | ICD-10-CM

## 2025-07-17 DIAGNOSIS — N95.1 PERIMENOPAUSAL: ICD-10-CM

## 2025-07-17 RX ORDER — NORETHINDRONE ACETATE AND ETHINYL ESTRADIOL 1MG-20(21)
1 KIT ORAL DAILY
Qty: 84 TABLET | Refills: 4 | Status: SHIPPED | OUTPATIENT
Start: 2025-07-17 | End: 2026-07-17

## 2025-07-22 ENCOUNTER — OFFICE VISIT (OUTPATIENT)
Age: 40
End: 2025-07-22
Payer: COMMERCIAL

## 2025-07-22 VITALS
OXYGEN SATURATION: 97 % | HEIGHT: 63 IN | DIASTOLIC BLOOD PRESSURE: 75 MMHG | TEMPERATURE: 99.3 F | SYSTOLIC BLOOD PRESSURE: 126 MMHG | HEART RATE: 55 BPM | WEIGHT: 177 LBS | BODY MASS INDEX: 31.36 KG/M2

## 2025-07-22 DIAGNOSIS — Z00.00 ROUTINE GENERAL MEDICAL EXAMINATION AT A HEALTH CARE FACILITY: ICD-10-CM

## 2025-07-22 DIAGNOSIS — E78.5 HYPERLIPIDEMIA, UNSPECIFIED HYPERLIPIDEMIA TYPE: Primary | ICD-10-CM

## 2025-07-22 RX ORDER — MIRTAZAPINE 15 MG/1
15 TABLET, FILM COATED ORAL NIGHTLY
COMMUNITY
Start: 2025-07-09 | End: 2025-07-22

## 2025-07-22 NOTE — PROGRESS NOTES
Female Physical Note      Date: 2025   Patient Name: Angelika Vick  : 1985   MRN: [id number]     Chief Complaint:    Chief Complaint   Patient presents with    Annual Exam     Physical          History of Present Illness: Angelika Vick is a 39 y.o. female who is here today for their annual health maintenance and physical.   Overall patient states that she is doing fairly well with no present time.      Subjective      Review of Systems:   Review of Systems   Constitutional:  Negative for appetite change and unexpected weight loss.   HENT:  Negative for trouble swallowing.    Eyes:  Negative for blurred vision and double vision.   Respiratory:  Negative for cough and shortness of breath.    Cardiovascular:  Negative for chest pain and leg swelling.   Gastrointestinal:  Negative for blood in stool.   Endocrine: Negative for cold intolerance, heat intolerance and polyuria.   Musculoskeletal:  Negative for joint swelling.   Skin:  Negative for color change and bruise.   Neurological:  Negative for numbness and memory problem.   Hematological:  Does not bruise/bleed easily.   Psychiatric/Behavioral:  Negative for suicidal ideas and depressed mood. The patient is not nervous/anxious.        Past Medical History, Social History, Family History and Care Team were all reviewed with patient and updated as appropriate.     Medications:     Current Outpatient Medications:     hydrOXYzine pamoate (VISTARIL) 25 MG capsule, Take 1 capsule by mouth Every 6 (Six) Hours As Needed., Disp: , Rfl:     ibuprofen (ADVIL,MOTRIN) 800 MG tablet, Take 1 tablet by mouth Every 8 (Eight) Hours As Needed for Mild Pain., Disp: 20 tablet, Rfl: 0    levothyroxine (SYNTHROID, LEVOTHROID) 125 MCG tablet, Take 1 tablet by mouth Every Morning., Disp: 90 tablet, Rfl: 3    metoprolol succinate XL (TOPROL-XL) 25 MG 24 hr tablet, Take 1.5 tablets by mouth Every Night., Disp: 90 tablet, Rfl: 1    norethindrone-ethinyl estradiol  FE (Junel FE 1/20) 1-20 MG-MCG per tablet, Take 1 tablet by mouth Daily. Take 1 tablet by mouth daily, skipping placebos, Disp: 84 tablet, Rfl: 4    Remeron 15 MG tablet, Take 1 tablet by mouth Every Night. (Patient not taking: Reported on 7/22/2025), Disp: , Rfl:     Allergies:   Allergies   Allergen Reactions    Dermagel Hand  [Alcohol] Rash       Immunizations:  Health Maintenance Summary            Current Care Gaps       ANNUAL PHYSICAL (Yearly) Overdue since 6/8/2023 06/08/2022  Registry Metric: Last Annual Physical                      Awaiting Completion       LIPID PANEL (Yearly) Order placed this encounter      07/22/2025  Order placed for Lipid panel by Daniel Metz DO    07/23/2024  Lipid panel    06/10/2022  Lipid Panel    02/03/2021  Lipid Panel    12/05/2018  Lipid Panel     Only the first 5 history entries have been loaded, but more history exists.                    Upcoming       COVID-19 Vaccine (3 - 2024-25 season) Postponed until 9/10/2025      09/10/2024  Postponed until 9/10/2025 by Shanna Guo MA (Product Unavailable)    07/11/2024  Postponed until 7/11/2025 by Shanna Guo MA (Product Unavailable)    07/06/2021  Imm Admin: COVID-19 (PFIZER) Purple Cap Monovalent    06/14/2021  Imm Admin: COVID-19 (PFIZER) PURPLE CAP              INFLUENZA VACCINE (Yearly - October to March) Next due on 10/1/2025      09/10/2024  Postponed until 3/31/2025 by Shanna Guo MA (Product Unavailable)    02/02/2024  Postponed until 3/31/2024 by Cassia Fernandez LPN (Patient Refused)    12/08/2021  Imm Admin: FluLaval/Fluzone >6mos    10/20/2020  Imm Admin: Influenza, Unspecified    10/22/2018  Imm Admin: FluLaval/Fluzone >6mos      Only the first 5 history entries have been loaded, but more history exists.              TDAP/TD VACCINES (2 - Td or Tdap) Next due on 8/31/2027 08/31/2017  Imm Admin: Tdap              PAP SMEAR (Every 3 Years) Next due on 5/1/2028 05/01/2025  " LIQUID-BASED PAP SMEAR WITH HPV GENOTYPING REGARDLESS OF INTERPRETATION (YRN,COR,MAD)                      Completed or No Longer Recommended       HEPATITIS C SCREENING  Completed      06/18/2025  Hepatitis C Antibody    02/27/2019  Hepatitis C Antibody    01/09/2017  Hepatitis C Antibody    01/06/2017  Hepatitis C Antibody              Pneumococcal Vaccine 0-49 (Series Information) Aged Out      No completion, postpone, or frequency change history exists for this topic.                             No orders of the defined types were placed in this encounter.       Colorectal Screening:   not due   Last Completed Colonoscopy    This patient has no relevant Health Maintenance data.       Pap:  up to date   Last Completed Pap Smear            Upcoming       PAP SMEAR (Every 3 Years) Next due on 5/1/2028 05/01/2025  LIQUID-BASED PAP SMEAR WITH HPV GENOTYPING REGARDLESS OF INTERPRETATION (YRN,COR,MAD)                           Mammogram:  not due   Last Completed Mammogram    This patient has no relevant Health Maintenance data.            HIV (Age 15-65 once): No results found for: \"HIV1X2\"  A1c:   Hemoglobin A1C   Date Value Ref Range Status   07/01/2025 5.4 4.5 - 5.7 % Final   06/10/2022 5.00 4.80 - 5.60 % Final      Lipid panel:  No results found for: \"LIPIDEXCLUSI\"    The ASCVD Risk score (Thom DK, et al., 2019) failed to calculate for the following reasons:    The 2019 ASCVD risk score is only valid for ages 40 to 79      Ophthalmologist: up to date  Dentist: up to date    Tobacco Use: Medium Risk (7/22/2025)    Patient History     Smoking Tobacco Use: Former     Smokeless Tobacco Use: Never     Passive Exposure: Past       Social History     Substance and Sexual Activity   Alcohol Use Yes    Alcohol/week: 1.0 - 2.0 standard drink of alcohol    Types: 1 - 2 Drinks containing 0.5 oz of alcohol per week    Comment: Occasionally        Social History     Substance and Sexual Activity   Drug Use Not " "Currently    Types: Hydrocodone, Marijuana, Oxycodone    Comment: Use maijuana occasionally                    Objective     Physical Exam:  Vital Signs:   Vitals:    07/22/25 1122   BP: 126/75   Pulse: 55   Temp: 99.3 °F (37.4 °C)   SpO2: 97%   Weight: 80.3 kg (177 lb)   Height: 160 cm (63\")     Body mass index is 31.35 kg/m².     Physical Exam  Vitals and nursing note reviewed.   Constitutional:       Appearance: Normal appearance.   HENT:      Head: Normocephalic and atraumatic.   Eyes:      General: Lids are normal.      Conjunctiva/sclera: Conjunctivae normal.   Cardiovascular:      Rate and Rhythm: Normal rate and regular rhythm.   Pulmonary:      Effort: Pulmonary effort is normal.      Breath sounds: Normal breath sounds and air entry.   Abdominal:      General: Abdomen is flat. Bowel sounds are normal.      Palpations: Abdomen is soft.   Musculoskeletal:      Cervical back: Full passive range of motion without pain and normal range of motion.   Neurological:      General: No focal deficit present.      Mental Status: She is alert and oriented to person, place, and time.   Psychiatric:         Attention and Perception: Attention normal.         Mood and Affect: Mood normal.         Behavior: Behavior normal. Behavior is cooperative.         POCT Results (if applicable);   Results for orders placed or performed in visit on 07/01/25   POC Glycosylated Hemoglobin (Hb A1C)    Collection Time: 07/01/25 11:46 AM    Specimen: Blood   Result Value Ref Range    Hemoglobin A1C 5.4 4.5 - 5.7 %    Lot Number [id number]     Expiration Date 01/17/2027    POC Glucose, Blood    Collection Time: 07/01/25 11:46 AM    Specimen: Blood   Result Value Ref Range    Glucose 104 70 - 130 mg/dL    Lot Number [id number]     Expiration Date 04/16/2026         Procedures    Assessment / Plan      Assessment/Plan:   Diagnoses and all orders for this visit:    1. Hyperlipidemia, unspecified hyperlipidemia type (Primary)  -     Lipid " panel; Future    2. Routine general medical examination at a health care facility         Healthcare Maintenance:  Counseling provided based on age appropriate USPSTF guidelines.       Angelika Vick voices understanding and acceptance of this advice and will call back with any further questions or concerns. AVS with preventive healthcare tips printed for patient.     Vaccine Counseling:      Follow Up:   Return in about 1 year (around 7/22/2026) for Annual physical.    I have spent a total of 20 min on reviewing test results/preparing to see patient, counseling patient, performing medically appropriate exam and documenting clinical information in the electronic or other health record.         Daniel Metz, DO  [facility]

## 2025-07-23 PROBLEM — R89.9 ABNORMAL LABORATORY TEST: Status: ACTIVE | Noted: 2025-07-23

## 2025-07-23 PROBLEM — M25.50 ARTHRALGIA: Status: ACTIVE | Noted: 2025-07-23
